# Patient Record
Sex: FEMALE | Race: WHITE | NOT HISPANIC OR LATINO | Employment: FULL TIME | URBAN - METROPOLITAN AREA
[De-identification: names, ages, dates, MRNs, and addresses within clinical notes are randomized per-mention and may not be internally consistent; named-entity substitution may affect disease eponyms.]

---

## 2017-01-11 ENCOUNTER — OFFICE VISIT (OUTPATIENT)
Dept: PODIATRY | Facility: CLINIC | Age: 47
End: 2017-01-11
Payer: COMMERCIAL

## 2017-01-11 ENCOUNTER — DOCUMENTATION ONLY (OUTPATIENT)
Dept: PODIATRY | Facility: CLINIC | Age: 47
End: 2017-01-11

## 2017-01-11 ENCOUNTER — TELEPHONE (OUTPATIENT)
Dept: PODIATRY | Facility: CLINIC | Age: 47
End: 2017-01-11

## 2017-01-11 VITALS
SYSTOLIC BLOOD PRESSURE: 114 MMHG | WEIGHT: 167 LBS | BODY MASS INDEX: 25.31 KG/M2 | HEIGHT: 68 IN | DIASTOLIC BLOOD PRESSURE: 66 MMHG

## 2017-01-11 DIAGNOSIS — S82.891D ANKLE FRACTURE, RIGHT, CLOSED, WITH ROUTINE HEALING, SUBSEQUENT ENCOUNTER: Primary | ICD-10-CM

## 2017-01-11 PROCEDURE — 99213 OFFICE O/P EST LOW 20 MIN: CPT | Performed by: PODIATRIST

## 2017-01-11 NOTE — TELEPHONE ENCOUNTER
Received a form from patient to fill out for her work. Dr. Solorzano filled out the form and I mailed it to the address as it instructed me to. Copy for form sent to be scanned.    ELIE Tyson MA January 11, 2017 5:37 PM

## 2017-01-11 NOTE — PROGRESS NOTES
"PATIENT HISTORY:  Nicole Fritsche is a 46 year old female who presents to clinic for recheck of her right ankle fracture, ligament injury.  DOI 11/12/16.  She fell helping her  put up a tree stand and twisted her ankle.  She is WB in a tall Aircast.  Doing better.  Minimal pain.  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  Reports some zinging pain down leg at times, but overall doing well.  Smoker.  Nondiabetic.  Family hx of DM.  She has not started PT.     EXAM:/66 mmHg  Ht 5' 8\" (1.727 m)  Wt 167 lb (75.751 kg)  BMI 25.40 kg/m2    General appearance: Patient is alert and fully cooperative with history & exam.  No sign of distress is noted during the visit.     Dermatologic: Skin is intact to RLE.  No paronychia or evidence of soft tissue infection is noted.     Vascular: DP & PT pulses are intact & regular on the right.  Edema of ankle is mild. CFT and skin temperature are normal.     Neurologic: Lower extremity sensation is intact to light touch.      Musculoskeletal:   Some diffuse right ankle discomfort on exam, but I could not elicit pain with palpation of the fracture site, lateral ankle ligaments, medial ankle ligaments, syndesmosis.  No significant calf pain/swelling noted.    XRs of right ankle deferred today; prior images with bone healing noted.     ASSESSMENT: Complex right ankle ligamentous injury with fracture of posterior malleolus, improving     PLAN:  Reviewed patient's chart in epic.  Discussed condition and treatment options including pros and cons.    Continue protected wt bearing in the boot as tolerated.  RICE as needed.  Advised she start PT.    Pt will return to work next week for 4 hr shifts in boot.    F/u in 2 wks.  Call with concerns.      Andrés Solorzano, BRYCE, FACFAS      "

## 2017-01-11 NOTE — MR AVS SNAPSHOT
After Visit Summary   1/11/2017    Nicole Fritsche    MRN: [id number]           Patient Information     Date Of Birth          1970        Visit Information        Provider Department      1/11/2017 3:20 PM Andrés Solorzano DPM [facility]        Today's Diagnoses     Ankle fracture, right, closed, with routine healing, subsequent encounter    -  1       Care Instructions    Continue weightbearing as tolerated in Aircast boot  Begin physical therapy  Follow up in 2 weeks      PRICE Therapy    Many aches and pains throughout the foot and ankle can be helped with many simple treatments.  This is usually described as PRICE Therapy.      P - Protection - often times, inflammation/pain in the lower extremity is not able to improve simply because the areas involved are never allowed to rest.  Every step we take can bother the problematic area.  Protecting those areas is an important step in the healing process.  This may involve a walking cast boot, a special insert/orthotic device, an ankle brace, or simply avoiding barefoot walking.    R - Rest - in addition to protecting the foot/ankle, resting is an important, but often times difficult, treatment option.  Getting off your feet when they bother you, and specifically avoiding activities that cause pain/discomfort, are very beneficial to prevent, and treat, foot/ankle pain.      I - Ice - icing regularly can help to decrease inflammation and swelling in the foot, thus decreasing pain.  Using an ice pack or a bag of frozen peas works very well.  Ice for 20 minutes multiple times per day as needed.  Do not place the ice directly on the skin as this can cause tissue damage.    C - Compression - using a compression wrap or an ACE wrap can help to decrease swelling, which can help to decrease pain.  Wearing the wraps is generally not needed at night, but they should be worn on a regular basis when you are going to be on your feet for  prolonged periods as gravity tends to pull fluids down to your feet/ankles.    E - Elevation - elevating your lower extremities multiple times daily for 15-20 minutes can help to decrease swelling, which works well in decreasing pain levels.      NSAID/Tylenol - An anti-inflammatory, like Aleve or ibuprofen, and/or a pain medication, such as Tylenol, can help to improve pain levels and get the issue resolved sooner rather than later.  Anyone with liver issues should be careful with Tylenol, and anyone with high blood pressure or heart, stomach or kidney issues should be careful with anti-inflammatories.  Please ask if you have questions about these medications, including dosage.            Follow-ups after your visit        Who to contact     If you have questions or need follow up information about today's clinic visit or your schedule please contact [facility] directly at [phone].  Normal or non-critical lab and imaging results will be communicated to you by MyChart, letter or phone within 4 business days after the clinic has received the results. If you do not hear from us within 7 days, please contact the clinic through [employer] or phone. If you have a critical or abnormal lab result, we will notify you by phone as soon as possible.  Submit refill requests through [employer] or call your pharmacy and they will forward the refill request to us. Please allow 3 business days for your refill to be completed.          Additional Information About Your Visit        MyChart Information     [employer] gives you secure access to your electronic health record. If you see a primary care provider, you can also send messages to your care team and make appointments. If you have questions, please call your primary care clinic.  If you do not have a primary care provider, please call [phone] and they will assist you.        Care EveryWhere ID     This is your Care EveryWhere ID. This could be used by other  "organizations to access your [facility] medical records  [id number]        Your Vitals Were     Height BMI (Body Mass Index)                5' 8\" (1.727 m) 25.40 kg/m2           Blood Pressure from Last 3 Encounters:   01/11/17 114/66   12/28/16 115/59   11/28/16 110/67    Weight from Last 3 Encounters:   01/11/17 167 lb (75.751 kg)   12/28/16 167 lb (75.751 kg)   11/28/16 167 lb (75.751 kg)              Today, you had the following     No orders found for display       Primary Care Provider Office Phone # Fax #    Ansley White PA-C [phone]       [address] [address]        Thank you!     Thank you for choosing [facility]  for your care. Our goal is always to provide you with excellent care. Hearing back from our patients is one way we can continue to improve our services. Please take a few minutes to complete the written survey that you may receive in the mail after your visit with us. Thank you!             Your Updated Medication List - Protect others around you: Learn how to safely use, store and throw away your medicines at www.disposemymeds.org.          This list is accurate as of: 1/11/17  3:56 PM.  Always use your most recent med list.                   Brand Name Dispense Instructions for use    acetaminophen-codeine 300-30 MG per tablet    TYLENOL #3    18 tablet    Take 1-2 tablets by mouth nightly as needed for pain maximum 4 tablet(s) per day       albuterol 108 (90 BASE) MCG/ACT Inhaler    albuterol    1 Inhaler    Inhale 1-2 puffs into the lungs every 6 hours as needed for shortness of breath / dyspnea or wheezing       fluticasone 110 MCG/ACT Inhaler    FLOVENT HFA    3 Inhaler    Inhale 2 puffs into the lungs 2 times daily       fluticasone 50 MCG/ACT spray    FLONASE    1 Package    Spray 1-2 sprays into both nostrils daily       HYDROcodone-acetaminophen 5-325 MG per tablet    NORCO    20 tablet    " Take 1-2 tablets by mouth nightly as needed for moderate to severe pain       IRON SUPPLEMENT PO      Take 325 mg by mouth 2 times daily (with meals)       MULTIVITAMINS PO      Take  by mouth daily.       * order for DME     1 Device    rollabout Length of use: Three months       * order for DME     1 Device    Equipment being ordered: Tall aircast boot size LG       oxyCODONE-acetaminophen 5-325 MG per tablet    PERCOCET    40 tablet    Take 1-2 tablets by mouth every 6 hours as needed for moderate to severe pain       * Notice:  This list has 2 medication(s) that are the same as other medications prescribed for you. Read the directions carefully, and ask your doctor or other care provider to review them with you.

## 2017-01-11 NOTE — PATIENT INSTRUCTIONS
Continue weightbearing as tolerated in Aircast boot  Begin physical therapy  Follow up in 2 weeks      PRICE Therapy    Many aches and pains throughout the foot and ankle can be helped with many simple treatments.  This is usually described as PRICE Therapy.      P - Protection - often times, inflammation/pain in the lower extremity is not able to improve simply because the areas involved are never allowed to rest.  Every step we take can bother the problematic area.  Protecting those areas is an important step in the healing process.  This may involve a walking cast boot, a special insert/orthotic device, an ankle brace, or simply avoiding barefoot walking.    R - Rest - in addition to protecting the foot/ankle, resting is an important, but often times difficult, treatment option.  Getting off your feet when they bother you, and specifically avoiding activities that cause pain/discomfort, are very beneficial to prevent, and treat, foot/ankle pain.      I - Ice - icing regularly can help to decrease inflammation and swelling in the foot, thus decreasing pain.  Using an ice pack or a bag of frozen peas works very well.  Ice for 20 minutes multiple times per day as needed.  Do not place the ice directly on the skin as this can cause tissue damage.    C - Compression - using a compression wrap or an ACE wrap can help to decrease swelling, which can help to decrease pain.  Wearing the wraps is generally not needed at night, but they should be worn on a regular basis when you are going to be on your feet for prolonged periods as gravity tends to pull fluids down to your feet/ankles.    E - Elevation - elevating your lower extremities multiple times daily for 15-20 minutes can help to decrease swelling, which works well in decreasing pain levels.      NSAID/Tylenol - An anti-inflammatory, like Aleve or ibuprofen, and/or a pain medication, such as Tylenol, can help to improve pain levels and get the issue resolved sooner  rather than later.  Anyone with liver issues should be careful with Tylenol, and anyone with high blood pressure or heart, stomach or kidney issues should be careful with anti-inflammatories.  Please ask if you have questions about these medications, including dosage.

## 2017-01-11 NOTE — Clinical Note
[facility]  [address] [place] [address]  Phone: [phone]    January 11, 2017        Nicole Fritsche  [address]  [facility] [address]          To whom it may concern:    RE: Nicole Fritsche    Patient may return to work 1/12/17 with the following:  No more than 4 hours of work per day, weightbearing as tolerated in Aircast boot.  Restrictions will be reassessed at her next follow up in 2 weeks.    Please contact me for questions or concerns.      Sincerely,        Andrés Solorzano DPM

## 2017-01-11 NOTE — NURSING NOTE
"Chief Complaint   Patient presents with     RECHECK     2 week follow up R foot Fx       Initial /66 mmHg  Ht 5' 8\" (1.727 m)  Wt 167 lb (75.751 kg)  BMI 25.40 kg/m2 Estimated body mass index is 25.4 kg/(m^2) as calculated from the following:    Height as of this encounter: 5' 8\" (1.727 m).    Weight as of this encounter: 167 lb (75.751 kg).    ELIE Tyson MA January 11, 2017 3:38 PM      "

## 2017-01-11 NOTE — PROGRESS NOTES
Weight management plan: Patient was referred to their PCP to discuss a diet and exercise plan.   ELIE Tyson MA January 11, 2017 3:39 PM

## 2017-01-11 NOTE — Clinical Note
[facility]  [address] [place] [address]  Phone: [phone]    January 11, 2017        Nicole Fritsche  [address]  [facility] [address]          To whom it may concern:    RE: Nicole Fritsche    Patient may return to work 1/16/17 with the following:  No more than 4 hours of work per day, weightbearing as tolerated in Aircast boot.  Restrictions will be reassessed at her next follow up in 2 weeks.    Please contact me for questions or concerns.      Sincerely,        Andrés Solorzano DPM

## 2017-01-17 ENCOUNTER — THERAPY VISIT (OUTPATIENT)
Dept: PHYSICAL THERAPY | Facility: CLINIC | Age: 47
End: 2017-01-17
Payer: COMMERCIAL

## 2017-01-17 DIAGNOSIS — M25.571 ANKLE PAIN, RIGHT: Primary | ICD-10-CM

## 2017-01-17 PROCEDURE — 97110 THERAPEUTIC EXERCISES: CPT | Mod: GP | Performed by: PHYSICAL THERAPIST

## 2017-01-17 PROCEDURE — 97161 PT EVAL LOW COMPLEX 20 MIN: CPT | Mod: GP | Performed by: PHYSICAL THERAPIST

## 2017-01-17 PROCEDURE — 97530 THERAPEUTIC ACTIVITIES: CPT | Mod: GP | Performed by: PHYSICAL THERAPIST

## 2017-01-17 NOTE — PROGRESS NOTES
Physical Therapy Initial Evaluation   1/17/17   Precautions/Restrictions/MD instructions:    Therapist Impression:   Pt is a 47 y/o female. Pt presents with R ankle pain and strength/ROM deficits secondary to ankle fracture. These impairments limit their ability to ambulate and negotiate stairs. Skilled PT services necessary in order to reduce impairments and improve independent function.    Subjective:   Chief Complaint: R ankle fracture secondary to fall   DOI/onset: 11/12/16 DOS:   Location: R ankle Quality: throbbing  Frequency: activity dependent  Radiates: localized at ankle joint   Pain scale: 6-7/10  Time of day: n/a  Sleeping: wakes patient up at night   Exacerbated by: movement Relieved by: tylenol Progression: improving  Previous Treatment: n/a Effect of prior treatment: n/a   PMH and/or surgical history:    Imaging:     Occupation:  Job duties:    Current HEP/exercise regimen:  Patient's goals:   Medications:   General health as reported by patient:   Return to MD:     ANKLE EVALUATION    Gait: Antalgic gait on R. Ambulating with aircast walking boot     Inspection: Bruising along heel, moderate swelling along medial aspect of foot    Palpation: TTP anterior aspect of ankle along TCJ, hypersensitivity to touch      ROM:   DF PF Inv  Ev   Left 10 70 60 30   Right -20 50 Grossly limited secondary to pain and guarding  Grossly limited secondary to pain and guarding      Strength: Unable to assess secondary to pain and guarding    Foot Intrinsics:   Right: mild activation of foot intrinsics       Subjective:    HPI                    Objective:    System    Physical Exam    General     ROS    Assessment/Plan:      Patient is a 46 year old female with right side ankle complaints.    Patient has the following significant findings with corresponding treatment plan.                Diagnosis 1:  R ankle pain  Pain -  electric stimulation, splint/taping/bracing/orthotics, self management, education and home  program  Decreased ROM/flexibility - manual therapy and therapeutic exercise  Decreased joint mobility - manual therapy and therapeutic exercise  Decreased strength - therapeutic exercise and therapeutic activities  Decreased proprioception - neuro re-education and therapeutic activities  Impaired gait - gait training  Impaired muscle performance - neuro re-education  Decreased function - therapeutic activities    Therapy Evaluation Codes:   1) History comprised of:   Personal factors that impact the plan of care:      Fear avoidance behaviour .    Comorbidity factors that impact the plan of care are:      Asthma, Pain at night/rest, Smoking and Anemia.     Medications impacting care: Pain.  2) Examination of Body Systems comprised of:   Body structures and functions that impact the plan of care:      Ankle.   Activity limitations that impact the plan of care are:      Driving, Squatting/kneeling, Stairs and Walking.  3) Clinical presentation characteristics are:   Stable/Uncomplicated.  4) Decision-Making    Low complexity using standardized patient assessment instrument and/or measureable assessment of functional outcome.  Cumulative Therapy Evaluation is: Low complexity.    Previous and current functional limitations:  (See Goal Flow Sheet for this information)    Short term and Long term goals: (See Goal Flow Sheet for this information)     Communication ability:  Patient appears to be able to clearly communicate and understand verbal and written communication and follow directions correctly.  Treatment Explanation - The following has been discussed with the patient:   RX ordered/plan of care  Anticipated outcomes  Possible risks and side effects  This patient would benefit from PT intervention to resume normal activities.   Rehab potential is good.    Frequency:  1 X week, once daily  Duration:  for 8 weeks  Discharge Plan:  Achieve all LTG.  Independent in home treatment program.  Reach maximal therapeutic  benefit.    Please refer to the daily flowsheet for treatment today, total treatment time and time spent performing 1:1 timed codes.

## 2017-01-19 PROBLEM — M25.571 ANKLE PAIN, RIGHT: Status: ACTIVE | Noted: 2017-01-19

## 2017-01-25 ENCOUNTER — RADIANT APPOINTMENT (OUTPATIENT)
Dept: GENERAL RADIOLOGY | Facility: CLINIC | Age: 47
End: 2017-01-25
Attending: PODIATRIST
Payer: COMMERCIAL

## 2017-01-25 ENCOUNTER — OFFICE VISIT (OUTPATIENT)
Dept: PODIATRY | Facility: CLINIC | Age: 47
End: 2017-01-25
Payer: COMMERCIAL

## 2017-01-25 VITALS
SYSTOLIC BLOOD PRESSURE: 108 MMHG | DIASTOLIC BLOOD PRESSURE: 71 MMHG | WEIGHT: 168 LBS | BODY MASS INDEX: 25.46 KG/M2 | HEIGHT: 68 IN

## 2017-01-25 DIAGNOSIS — S82.891D ANKLE FRACTURE, RIGHT, CLOSED, WITH ROUTINE HEALING, SUBSEQUENT ENCOUNTER: Primary | ICD-10-CM

## 2017-01-25 DIAGNOSIS — S82.891D ANKLE FRACTURE, RIGHT, CLOSED, WITH ROUTINE HEALING, SUBSEQUENT ENCOUNTER: ICD-10-CM

## 2017-01-25 PROCEDURE — 99213 OFFICE O/P EST LOW 20 MIN: CPT | Performed by: PODIATRIST

## 2017-01-25 PROCEDURE — 73610 X-RAY EXAM OF ANKLE: CPT | Mod: RT

## 2017-01-25 NOTE — MR AVS SNAPSHOT
After Visit Summary   1/25/2017    Nicole Fritsche    MRN: [id number]           Patient Information     Date Of Birth          1970        Visit Information        Provider Department      1/25/2017 4:00 PM Andrés Solorzano, BRYCE [facility]        Today's Diagnoses     Ankle fracture, right, closed, with routine healing, subsequent encounter    -  1       Care Instructions    Transition to a stiff soled shoe as tolerated.  Continue Physical Therapy.  Follow up as needed.  Dr. Solorzano's Clinic Locations    Monday Tuesday  [facility]  [address]         [id number]  [place] [address]      [place] [address]  Ph:  [phone]      Ph: [phone]  Fax: [phone]      Fax: [phone]    Wednesday Thursday - Surgery Day  [facility]     Call Vy @ [phone]  [address] [place] [address]  Ph: [phone]  Fax: [phone]        PRICE Therapy    Many aches and pains throughout the foot and ankle can be helped with many simple treatments.  This is usually described as PRICE Therapy.      P - Protection - often times, inflammation/pain in the lower extremity is not able to improve simply because the areas involved are never allowed to rest.  Every step we take can bother the problematic area.  Protecting those areas is an important step in the healing process.  This may involve a walking cast boot, a special insert/orthotic device, an ankle brace, or simply avoiding barefoot walking.    R - Rest - in addition to protecting the foot/ankle, resting is an important, but often times difficult, treatment option.  Getting off your feet when they bother you, and specifically avoiding activities that cause pain/discomfort, are very beneficial to prevent, and treat, foot/ankle pain.      I - Ice - icing regularly can help to decrease inflammation and swelling in  the foot, thus decreasing pain.  Using an ice pack or a bag of frozen peas works very well.  Ice for 20 minutes multiple times per day as needed.  Do not place the ice directly on the skin as this can cause tissue damage.    C - Compression - using a compression wrap or an ACE wrap can help to decrease swelling, which can help to decrease pain.  Wearing the wraps is generally not needed at night, but they should be worn on a regular basis when you are going to be on your feet for prolonged periods as gravity tends to pull fluids down to your feet/ankles.    E - Elevation - elevating your lower extremities multiple times daily for 15-20 minutes can help to decrease swelling, which works well in decreasing pain levels.      NSAID/Tylenol - An anti-inflammatory, like Aleve or ibuprofen, and/or a pain medication, such as Tylenol, can help to improve pain levels and get the issue resolved sooner rather than later.  Anyone with liver issues should be careful with Tylenol, and anyone with high blood pressure or heart, stomach or kidney issues should be careful with anti-inflammatories.  Please ask if you have questions about these medications, including dosage.        Smoking Cessation    What's in cigarette smoke? - Cigarette smoke contains over 4,000 chemicals. Nicotine is one of the main ingredients which is an insecticide/herbicide. It is poisonous to our nervous system, increases blood clotting risk, and decreases the body's defenses to fight off infection. Another chemical is Carbon Monoxide is an asphyxiating gas that permanently binds to blood cells and blocks the transport of oxygen. This leads to tissue death and decreases your metabolism. Tar is a chemical that coats your lungs and trachea which impairs new oxygen coming in and carbon dioxide getting out of your body.   How does smoking impact surgery? - Smoking is particularly hazardous with regards to surgery. Surgery puts stress on the body and a smoker's  body is already under strain from these chemicals. Putting the two together, especially for an elective surgery, could be a recipe for disaster. Smoking before and after surgery increases your risk of heart problems, slow wound healing, delayed bone healing, blood clots, wound infection and anesthesia complications.   What are the benefits of quitting? - In 20 minutes your blood pressure will drop back down to normal. In 8 hours the carbon monoxide (a toxic gas) levels in your blood stream will drop by half, and oxygen levels will return to normal. In 48 hours your chance of having a heart attack will have decreased. All nicotine will have left your body. Your sense of taste and smell will return to a normal level. In 72 hours your bronchial tubes will relax, and your energy levels will increase. In 2 weeks your circulation will increase, and it will continue to improve for the next 10 weeks.    Recommendations for elective surgery - Ideally, patients should quit smoking 8 weeks before and at least 2 weeks after elective surgery in order to avoid complications. Simply cutting back on the amount of cigarettes smoked per day does not offer any benefit or decrease the risk of poor wound healing, heart problems, and infection. Smokers should also start taking Vitamin C and B for two weeks before surgery and two weeks after surgery.    Ways to Stop Smokin. Nicotine patches, lozenges, or gum  2. Support Groups  3. Medications (see below)    List of Medications:  1. Varenicline Tartrate (CHANTIX)   2. Bupropion HCL (WELLBUTRIN, ZYBAN) - note: make sure Wellbutrin is for smoking cessation and not other issues   3. Nicotine Patch (NICODERM)   4. Nicotine Inhaler (NICOTROL)   5. Nicotine Gum Nicotine Polacrilex   6. Nicotine Lozenge: Nicotine Polacrilex (COMMIT)   * [place] offers a smoking support group as well!  Please visit: [email or web address]  If you are interested in these, ask about getting a  "prescription or talk to your primary care doctor about what may be the best way for you to quit.               Follow-ups after your visit        Your next 10 appointments already scheduled     Jan 26, 2017  4:40 PM   MONICA Extremity with Mylene Rush PT   [facility] ([facility]  )    [address] [place] [address] [phone]              Who to contact     If you have questions or need follow up information about today's clinic visit or your schedule please contact [facility] directly at [phone].  Normal or non-critical lab and imaging results will be communicated to you by MyChart, letter or phone within 4 business days after the clinic has received the results. If you do not hear from us within 7 days, please contact the clinic through [employer] or phone. If you have a critical or abnormal lab result, we will notify you by phone as soon as possible.  Submit refill requests through [employer] or call your pharmacy and they will forward the refill request to us. Please allow 3 business days for your refill to be completed.          Additional Information About Your Visit        MyChart Information     [employer] gives you secure access to your electronic health record. If you see a primary care provider, you can also send messages to your care team and make appointments. If you have questions, please call your primary care clinic.  If you do not have a primary care provider, please call [phone] and they will assist you.        Care EveryWhere ID     This is your Care EveryWhere ID. This could be used by other organizations to access your [place] medical records  [id number]        Your Vitals Were     Height BMI (Body Mass Index)                5' 8\" (1.727 m) 25.55 kg/m2           Blood Pressure from Last 3 Encounters:   01/25/17 108/71   01/11/17 114/66   12/28/16 115/59    Weight from Last 3 Encounters:   01/25/17 " 168 lb (76.204 kg)   01/11/17 167 lb (75.751 kg)   12/28/16 167 lb (75.751 kg)               Primary Care Provider Office Phone # Fax #    Ansley [place] PA-C [phone]       [facility] [address] [facility] [address]        Thank you!     Thank you for choosing [facility]  for your care. Our goal is always to provide you with excellent care. Hearing back from our patients is one way we can continue to improve our services. Please take a few minutes to complete the written survey that you may receive in the mail after your visit with us. Thank you!             Your Updated Medication List - Protect others around you: Learn how to safely use, store and throw away your medicines at www.disposemymeds.org.          This list is accurate as of: 1/25/17  4:04 PM.  Always use your most recent med list.                   Brand Name Dispense Instructions for use    acetaminophen-codeine 300-30 MG per tablet    TYLENOL #3    18 tablet    Take 1-2 tablets by mouth nightly as needed for pain maximum 4 tablet(s) per day       albuterol 108 (90 BASE) MCG/ACT Inhaler    albuterol    1 Inhaler    Inhale 1-2 puffs into the lungs every 6 hours as needed for shortness of breath / dyspnea or wheezing       fluticasone 110 MCG/ACT Inhaler    FLOVENT HFA    3 Inhaler    Inhale 2 puffs into the lungs 2 times daily       fluticasone 50 MCG/ACT spray    FLONASE    1 Package    Spray 1-2 sprays into both nostrils daily       HYDROcodone-acetaminophen 5-325 MG per tablet    NORCO    20 tablet    Take 1-2 tablets by mouth nightly as needed for moderate to severe pain       IRON SUPPLEMENT PO      Take 325 mg by mouth 2 times daily (with meals)       MULTIVITAMINS PO      Take  by mouth daily.       * order for DME     1 Device    rollabout Length of use: Three months       * order for DME     1 Device    Equipment being ordered: Tall aircast boot size LG        oxyCODONE-acetaminophen 5-325 MG per tablet    PERCOCET    40 tablet    Take 1-2 tablets by mouth every 6 hours as needed for moderate to severe pain       * Notice:  This list has 2 medication(s) that are the same as other medications prescribed for you. Read the directions carefully, and ask your doctor or other care provider to review them with you.

## 2017-01-25 NOTE — PROGRESS NOTES
Weight management plan: Patient was referred to their PCP to discuss a diet and exercise plan.   ELIE Tyson MA January 25, 2017 3:51 PM

## 2017-01-25 NOTE — NURSING NOTE
"Chief Complaint   Patient presents with     Fracture     2 week follow up R foot fracture       Initial /71 mmHg  Ht 5' 8\" (1.727 m)  Wt 168 lb (76.204 kg)  BMI 25.55 kg/m2 Estimated body mass index is 25.55 kg/(m^2) as calculated from the following:    Height as of this encounter: 5' 8\" (1.727 m).    Weight as of this encounter: 168 lb (76.204 kg).    ELIE Tyson MA January 25, 2017 3:50 PM      "

## 2017-01-25 NOTE — PATIENT INSTRUCTIONS
Transition to a stiff soled shoe as tolerated.  Continue Physical Therapy.  Follow up as needed.  Dr. Solorzano's Clinic Locations    Monday Tuesday  [facility]  [address]         [id number]  [place] [address]      [place] [address]  Ph:  [phone]      Ph: [phone]  Fax: [phone]      Fax: [phone]    Wednesday Thursday - Surgery Day  [facility]     Call Vy @ [phone]  [address] [place], [place] [address] [address]  Ph: [phone]  Fax: [phone]        PRICE Therapy    Many aches and pains throughout the foot and ankle can be helped with many simple treatments.  This is usually described as PRICE Therapy.      P - Protection - often times, inflammation/pain in the lower extremity is not able to improve simply because the areas involved are never allowed to rest.  Every step we take can bother the problematic area.  Protecting those areas is an important step in the healing process.  This may involve a walking cast boot, a special insert/orthotic device, an ankle brace, or simply avoiding barefoot walking.    R - Rest - in addition to protecting the foot/ankle, resting is an important, but often times difficult, treatment option.  Getting off your feet when they bother you, and specifically avoiding activities that cause pain/discomfort, are very beneficial to prevent, and treat, foot/ankle pain.      I - Ice - icing regularly can help to decrease inflammation and swelling in the foot, thus decreasing pain.  Using an ice pack or a bag of frozen peas works very well.  Ice for 20 minutes multiple times per day as needed.  Do not place the ice directly on the skin as this can cause tissue damage.    C - Compression - using a compression wrap or an ACE wrap can help to decrease swelling, which can help to decrease pain.  Wearing the wraps is generally not needed at night, but they should be  worn on a regular basis when you are going to be on your feet for prolonged periods as gravity tends to pull fluids down to your feet/ankles.    E - Elevation - elevating your lower extremities multiple times daily for 15-20 minutes can help to decrease swelling, which works well in decreasing pain levels.      NSAID/Tylenol - An anti-inflammatory, like Aleve or ibuprofen, and/or a pain medication, such as Tylenol, can help to improve pain levels and get the issue resolved sooner rather than later.  Anyone with liver issues should be careful with Tylenol, and anyone with high blood pressure or heart, stomach or kidney issues should be careful with anti-inflammatories.  Please ask if you have questions about these medications, including dosage.        Smoking Cessation    What's in cigarette smoke? - Cigarette smoke contains over 4,000 chemicals. Nicotine is one of the main ingredients which is an insecticide/herbicide. It is poisonous to our nervous system, increases blood clotting risk, and decreases the body's defenses to fight off infection. Another chemical is Carbon Monoxide is an asphyxiating gas that permanently binds to blood cells and blocks the transport of oxygen. This leads to tissue death and decreases your metabolism. Tar is a chemical that coats your lungs and trachea which impairs new oxygen coming in and carbon dioxide getting out of your body.   How does smoking impact surgery? - Smoking is particularly hazardous with regards to surgery. Surgery puts stress on the body and a smoker's body is already under strain from these chemicals. Putting the two together, especially for an elective surgery, could be a recipe for disaster. Smoking before and after surgery increases your risk of heart problems, slow wound healing, delayed bone healing, blood clots, wound infection and anesthesia complications.   What are the benefits of quitting? - In 20 minutes your blood pressure will drop back down to normal.  In 8 hours the carbon monoxide (a toxic gas) levels in your blood stream will drop by half, and oxygen levels will return to normal. In 48 hours your chance of having a heart attack will have decreased. All nicotine will have left your body. Your sense of taste and smell will return to a normal level. In 72 hours your bronchial tubes will relax, and your energy levels will increase. In 2 weeks your circulation will increase, and it will continue to improve for the next 10 weeks.    Recommendations for elective surgery - Ideally, patients should quit smoking 8 weeks before and at least 2 weeks after elective surgery in order to avoid complications. Simply cutting back on the amount of cigarettes smoked per day does not offer any benefit or decrease the risk of poor wound healing, heart problems, and infection. Smokers should also start taking Vitamin C and B for two weeks before surgery and two weeks after surgery.    Ways to Stop Smokin. Nicotine patches, lozenges, or gum  2. Support Groups  3. Medications (see below)    List of Medications:  1. Varenicline Tartrate (CHANTIX)   2. Bupropion HCL (WELLBUTRIN, ZYBAN)   note: make sure Wellbutrin is for smoking cessation and not other issues   3. Nicotine Patch (NICODERM)   4. Nicotine Inhaler (NICOTROL)   5. Nicotine Gum Nicotine Polacrilex   6. Nicotine Lozenge: Nicotine Polacrilex (COMMIT)   * [facility] offers a smoking support group as well!  Please visit: [email or web address]  If you are interested in these, ask about getting a prescription or talk to your primary care doctor about what may be the best way for you to quit.

## 2017-01-25 NOTE — PROGRESS NOTES
"PATIENT HISTORY:  Nicole Fritsche is a 46 year old female who presents to clinic for recheck of her right ankle fracture, ligament injury.  DOI 11/12/16.  She fell helping her  put up a tree stand and twisted her ankle.  She is WB in a tall Aircast.  Doing better.  Denies pain in the boot.  She just started PT.  Some ankle pain at night.  Smoker.  Nondiabetic.  Family hx of DM.       EXAM:  /71 mmHg  Ht 1.727 m (5' 8\")  Wt 76.204 kg (168 lb)  BMI 25.55 kg/m2    General appearance: Patient is alert and fully cooperative with history & exam.  No sign of distress is noted during the visit.     Dermatologic: Skin is intact to RLE.  No paronychia or evidence of soft tissue infection is noted.     Vascular: DP & PT pulses are intact & regular on the right.  Edema of ankle is mild. CFT and skin temperature are normal.     Neurologic: Lower extremity sensation is intact to light touch.      Musculoskeletal:   Some diffuse right ankle discomfort on exam, but I could not elicit pain with palpation of the fracture site, lateral ankle ligaments, medial ankle ligaments, syndesmosis.  Pt is gaurded.  No significant calf pain/swelling noted.    XRs of right ankle reviewed with pt.  WB views.  Fx appears healed.  Mortise congruent w/o widening.     ASSESSMENT: Complex right ankle ligamentous injury with fracture of posterior malleolus     PLAN:  Reviewed patient's chart in epic.  Discussed condition and treatment options including pros and cons.    Pt may transition out of the boot into supportive shoes as tolerated.  RICE as needed.      Continue PT.  Stressed importance of rehab.      Continue current work restrictions.    Starting 1/30/17 she can work for 6 hr shifts in boot as needed for 2 weeks, followed by full time w/o restrictions as tolerated.    F/u with me as needed with any concerns.        Andrés Solorzano, BRYCE, FACFAS      "

## 2017-01-25 NOTE — Clinical Note
[facility]  [address] [place] [address]  Phone: [phone]    January 25, 2017        Nicole Fritsche  [address]   [facility] [address]          To whom it may concern:    RE: Nicole Fritsche    Patient was seen and treated today at our clinic.    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  After that time she may return to full time without restrictions.    Please contact me for questions or concerns.      Sincerely,        Andrés Solorzano DPM

## 2017-01-25 NOTE — Clinical Note
[facility]  [address] [place] [address]  Phone: [phone]    January 25, 2017        Nicole Fritsche  [address] [facility]   [facility] [address]          To whom it may concern:    RE: Nicole Fritsche    Patient was seen and treated today at our clinic.    Continue current work restrictions until 1/30/17.  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  After that time she may return to full time without restrictions.    Please contact me for questions or concerns.      Sincerely,        Andrés Solorzano DPM

## 2017-02-03 ENCOUNTER — THERAPY VISIT (OUTPATIENT)
Dept: PHYSICAL THERAPY | Facility: CLINIC | Age: 47
End: 2017-02-03
Payer: COMMERCIAL

## 2017-02-03 DIAGNOSIS — M25.571 ANKLE PAIN, RIGHT: Primary | ICD-10-CM

## 2017-02-03 PROCEDURE — 97530 THERAPEUTIC ACTIVITIES: CPT | Mod: GP | Performed by: PHYSICAL THERAPIST

## 2017-02-03 PROCEDURE — 97110 THERAPEUTIC EXERCISES: CPT | Mod: GP | Performed by: PHYSICAL THERAPIST

## 2017-02-09 ENCOUNTER — THERAPY VISIT (OUTPATIENT)
Dept: PHYSICAL THERAPY | Facility: CLINIC | Age: 47
End: 2017-02-09
Payer: COMMERCIAL

## 2017-02-09 DIAGNOSIS — M25.571 ANKLE PAIN, RIGHT: Primary | ICD-10-CM

## 2017-02-09 PROCEDURE — 97110 THERAPEUTIC EXERCISES: CPT | Mod: GP | Performed by: PHYSICAL THERAPIST

## 2017-02-09 PROCEDURE — 97035 APP MDLTY 1+ULTRASOUND EA 15: CPT | Mod: GP | Performed by: PHYSICAL THERAPIST

## 2017-02-09 PROCEDURE — 97112 NEUROMUSCULAR REEDUCATION: CPT | Mod: GP | Performed by: PHYSICAL THERAPIST

## 2017-02-13 ENCOUNTER — OFFICE VISIT (OUTPATIENT)
Dept: FAMILY MEDICINE | Facility: CLINIC | Age: 47
End: 2017-02-13
Payer: COMMERCIAL

## 2017-02-13 VITALS
HEART RATE: 68 BPM | BODY MASS INDEX: 25.16 KG/M2 | SYSTOLIC BLOOD PRESSURE: 112 MMHG | HEIGHT: 68 IN | WEIGHT: 166 LBS | DIASTOLIC BLOOD PRESSURE: 78 MMHG | TEMPERATURE: 98.6 F

## 2017-02-13 DIAGNOSIS — Z71.6 ENCOUNTER FOR TOBACCO USE CESSATION COUNSELING: ICD-10-CM

## 2017-02-13 DIAGNOSIS — R53.83 OTHER FATIGUE: ICD-10-CM

## 2017-02-13 DIAGNOSIS — Z72.0 TOBACCO ABUSE: Primary | ICD-10-CM

## 2017-02-13 LAB — HGB BLD-MCNC: 14.2 G/DL (ref 11.7–15.7)

## 2017-02-13 PROCEDURE — 85018 HEMOGLOBIN: CPT | Performed by: PHYSICIAN ASSISTANT

## 2017-02-13 PROCEDURE — 84439 ASSAY OF FREE THYROXINE: CPT | Performed by: PHYSICIAN ASSISTANT

## 2017-02-13 PROCEDURE — 99214 OFFICE O/P EST MOD 30 MIN: CPT | Performed by: PHYSICIAN ASSISTANT

## 2017-02-13 PROCEDURE — 84443 ASSAY THYROID STIM HORMONE: CPT | Performed by: PHYSICIAN ASSISTANT

## 2017-02-13 PROCEDURE — 36415 COLL VENOUS BLD VENIPUNCTURE: CPT | Performed by: PHYSICIAN ASSISTANT

## 2017-02-13 PROCEDURE — 82306 VITAMIN D 25 HYDROXY: CPT | Performed by: PHYSICIAN ASSISTANT

## 2017-02-13 RX ORDER — BUPROPION HYDROCHLORIDE 150 MG/1
TABLET, EXTENDED RELEASE ORAL
Qty: 60 TABLET | Refills: 2 | Status: SHIPPED | OUTPATIENT
Start: 2017-02-13 | End: 2018-02-13

## 2017-02-13 NOTE — PROGRESS NOTES
"  SUBJECTIVE:                                                    Nicole Fritsche is a 46 year old female who presents to clinic today for the following health issues:      Concern - Fatigue     Onset: couple of weeks    Description:   Pt states that she has been feeling tired, weak, blah.  Off and on for several months, but worse over past couple of weeks.  Both fatigue and sleepy.  Not sleeping well-pain in her foot prevents her from getting comfortable.  PT for this right now. Taking APAP and ibuprofen.  Can fall asleep without problem, just wakes up frequently. At times can take up to 30 minutes to fall back asleep. Does snore, but  doesn't have periods of stopping breathing.  Period is really heavy-lasts 6-7 days. Does take iron tablet-is taking this daily.  Vitamins daily.    Intensity: mild    Progression of Symptoms:  same    Accompanying Signs & Symptoms:  none       Previous history of similar problem:   yes    Precipitating factors:   Worsened by: low iron levels    Alleviating factors:  Improved by: none       Therapies Tried and outcome: none    Discuss smoking cessation.  Has tried to quit in the past.  Has been on Chantix, but wants to try something different.  Has been smoking since she was 15, quit x 1 year and during pregnancies.  Smoking 1/2 ppd.      -------------------------------------    Problem list and histories reviewed & adjusted, as indicated.  Additional history: as documented    ROS:  Constitutional, HEENT, cardiovascular, pulmonary, gi and gu systems are negative, except as otherwise noted.    OBJECTIVE:                                                    /78 (Cuff Size: Adult Regular)  Pulse 68  Temp 98.6  F (37  C) (Oral)  Ht 5' 8\" (1.727 m)  Wt 166 lb (75.3 kg)  LMP  (LMP Unknown)  BMI 25.24 kg/m2  Body mass index is 25.24 kg/(m^2).  GENERAL: healthy, alert and no distress  NECK: no adenopathy, no asymmetry, masses, or scars and thyroid normal to palpation  RESP: lungs " clear to auscultation - no rales, rhonchi or wheezes  CV: regular rate and rhythm, normal S1 S2, no S3 or S4, no murmur, click or rub, no peripheral edema and peripheral pulses strong  ABDOMEN: soft, nontender, no hepatosplenomegaly, no masses and bowel sounds normal  MS: no gross musculoskeletal defects noted, no edema  PSYCH: mentation appears normal, affect normal/bright    Diagnostic Test Results:  none      ASSESSMENT/PLAN:                                                    1. Tobacco abuse  2. Encounter for tobacco use cessation counseling  Below medication as directed with full discussion of risks, benefits, and possible adverse effects.  F/u via [employer] in 2-4 weeks.  - Tobacco Cessation - for Health Maintenance  - T4 free  - buPROPion (WELLBUTRIN SR) 150 MG 12 hr tablet; Take 1 tablet once daily and increase to 1 tablet twice daily after 4 to 7 days  Dispense: 60 tablet; Refill: 2    3. Other fatigue  Unclear cause at this time, likely multifactorial.  Further workup with below labs  F/u pending results.  - TSH with free T4 reflex  - Hemoglobin  - Vitamin D Deficiency    [place] PA-C  [facility]

## 2017-02-13 NOTE — MR AVS SNAPSHOT
After Visit Summary   2/13/2017    Nicole Fritsche    MRN: [id number]           Patient Information     Date Of Birth          1970        Visit Information        Provider Department      2/13/2017 3:20 PM Ansley White PA-C [facility]        Today's Diagnoses     Tobacco abuse    -  1    Encounter for tobacco use cessation counseling        Other fatigue          Care Instructions    Ansley or her team will be in touch with you with results.  Start Wellbutrin as directed below.    Send Ansley a [employer] Message in 2-4 weeks with update.    RD England PA-C        Zyban / Wellbutrin    Dosing:    Before Your Quit Date: Dose   Days 1-3 Take 150 mg by mouth once daily in the morning.   Days 4-7 (or up to 4-14 days) Take 150 mg by mouth twice daily in the morning and evening.   After Your Quit Date:    Treatment usually continues 7-12 weeks Take 150 mg by mouth twice daily in the morning and evening.       Some tips:    You will start taking bupropion at least 1 week before quitting smoking. It is okay to smoke while using bupropion.     You can use nicotine replacement therapy such as nicotine gum while using  Bupropion.     Take your 2 daily doses at least 8 hours apart.     DO NOT CRUSH OR BREAK TABLETS. Swallow the tablet whole.     You can take your medication with or without food.     Do not drink alcohol while taking this medication.     Side Effects:    Some people may experience dry mouth and insomnia. These may resolve in time.     Small frequent meals, frequent mouth care, chewing gum, or sucking lozenges may help with dry mouth.     Other possible rare side effects include constipation, nausea, headache, drowsiness, and weight loss may occur.     If you experience any other side effects that are troublesome, or if you feel something is not right, call your health care professional.         HOW TO QUIT SMOKING  Smoking is one of the hardest habits to  break. About half of all those who have ever smoked have been able to quit, and most of those (about 70%) who still smoke want to quit. Here are some of the best ways to stop smoking.     KEEP TRYING:  It takes most smokers about 8 tries before they are finally able to fully quit. So, the more often you try and fail, the better your chance of quitting the next time! So, don't give up!    GO COLD TURKEY:  Most ex-smokers quit cold turkey. Trying to cut back gradually doesn't seem to work as well, perhaps because it continues the smoking habit. Also, it is possible to fool yourself by inhaling more while smoking fewer cigarettes. This results in the same amount of nicotine in your body!    GET SUPPORT:  Support programs can make an important difference, especially for the heavy smoker. These groups offer lectures, methods to change your behavior and peer support. Call the free national Quitline for more information. 800-QUIT-NOW (327-057-4962). Low-cost or free programs are offered by many hospitals, local chapters of the American Lung Association (509-006-1683) and the American Cancer Society (651-199-0583). Support at home is important too. Non-smokers can help by offering praise and encouragement. If the smoker fails to quit, encourage them to try again!    OVER-THE-COUNTER MEDICINES:  For those who can't quit on their own, Nicotine Replacement Therapy (NRT) may make quitting much easier. Certain aids such as the nicotine patch, gum and lozenge are available without a prescription. However, it is best to use these under the guidance of your doctor. The skin patch provides a steady supply of nicotine to the body. Nicotine gum and lozenge gives temporary bursts of low levels of nicotine. Both methods take the edge off the craving for cigarettes. WARNING: If you feel symptoms of nicotine overdose, such as nausea, vomiting, dizziness, weakness, or fast heartbeat, stop using these and see your doctor.    PRESCRIPTION  MEDICINES:  After evaluating your smoking patterns and prior attempts at quitting, your doctor may offer a prescription medicine such as bupropion (Zyban, Wellbutrin), varenicline (Chantix, Champix), a niocotine inhaler or nasal spray. Each has its unique advantage and side effects which your doctor can review with you.    HEALTH BENEFITS OF QUITTING:  The benefits of quitting start right away and keep improving the longer you go without smokin minutes: blood pressure and pulse return to normal  8 hours: oxygen levels return to normal  2 days: ability to smell and taste begins to improve as damaged nerves start to regrow  2-3 weeks: circulation and lung function improves  1-9 months: decreased cough, congestion and shortness of breath; less tired  1 year: risk of heart attack decreases by half  5 years: risk of lung cancer decreases by half; risk of stroke becomes the same as a non-smoker  For information about how to quit smoking, visit the following links:  National Cancer [place] ,   Clearing the Air, Quit Smoking Today   - an online booklet. http://www.smokefree.gov/pubs/clearing_the_air.pdf  Smokefree.gov http://smokefree.gov/  QuitNet http://www.quitnet.com/    7042-8933 [facility], [address] [place] [address], [place] [address]. All rights reserved. This information is not intended as a substitute for professional medical care. Always follow your healthcare professional's instructions.    The Benefits of Living Smoke Free  What do you want to gain from quitting? Check off some reasons to quit.  Health Benefits  ___ Reduce my risk of lung cancer, heart disease, chronic lung disease  ___ Have fewer wrinkles and softer skin  ___ Improve my sense of taste and smell  ___ For pregnant women--reduce the risk of having a miscarriage, stillbirth, premature birth, or low-birth-weight baby  Personal Benefits  ___ Feel more in control of my life  ___ Have better-smelling hair, breath, clothes, home, and car  ___  Save time by not having to take smoke breaks, buy cigarettes, or hunt for a light  ___ Have whiter teeth  Family Benefits  ___ Reduce my children s respiratory tract infections  ___ Set a good example for my children  ___ Reduce my family s cancer risk  Financial Benefits  ___ Save hundreds of dollars each year that would be spent on cigarettes  ___ Save money on medical bills  ___ Save on life, health, and car insurance premiums    Those Dollars Add Up!  Cigarettes are expensive, and getting more expensive all the time. Do you realize how much money you are spending on cigarettes per year? What is the average amount you spend on a pack of cigarettes? What is the average number of packs that you smoke per day? Using your answers to these questions, fill in this formula to help you find out:  ($ _____ per pack) ×  ( _____ number of packs per day) × (365 days) =  $ _____ yearly cost of smoking  Besides tobacco, there are other costs, including extra cleaning bills and replacement costs for clothing and furniture; medical expenses for smoking-related illnesses; and higher health, life, and car insurance premiums.    Cigars and Pipes Count Too!  Cigars and pipes are also dangerous. So are smokeless (chewing) tobacco and snuff. All of these products contain nicotine, a highly addictive substance that has harmful effects on your body. Quitting smoking means giving up all tobacco products.      1859-0353 [place] [address]. All rights reserved. This information is not intended as a substitute for professional medical care. Always follow your healthcare professional's instructions.        Follow-ups after your visit        Your next 10 appointments already scheduled     Feb 14, 2017  4:40 PM CST   MONICA Callejas with Mylene Rush, PT   [place] [facility] ([facility]  )    [address] [place] [id number] [phone]         "      Who to contact     If you have questions or need follow up information about today's clinic visit or your schedule please contact [facility] directly at [phone].  Normal or non-critical lab and imaging results will be communicated to you by MyChart, letter or phone within 4 business days after the clinic has received the results. If you do not hear from us within 7 days, please contact the clinic through [employer] or phone. If you have a critical or abnormal lab result, we will notify you by phone as soon as possible.  Submit refill requests through [employer] or call your pharmacy and they will forward the refill request to us. Please allow 3 business days for your refill to be completed.          Additional Information About Your Visit        [employer] Information     [employer] gives you secure access to your electronic health record. If you see a primary care provider, you can also send messages to your care team and make appointments. If you have questions, please call your primary care clinic.  If you do not have a primary care provider, please call [phone] and they will assist you.        Care EveryWhere ID     This is your Care EveryWhere ID. This could be used by other organizations to access your [facility] medical records  [id number]        Your Vitals Were     Pulse Temperature Height Last Period BMI (Body Mass Index)       68 98.6  F (37  C) (Oral) 5' 8\" (1.727 m) (LMP Unknown) 25.24 kg/m2        Blood Pressure from Last 3 Encounters:   02/13/17 112/78   01/25/17 108/71   01/11/17 114/66    Weight from Last 3 Encounters:   02/13/17 166 lb (75.3 kg)   01/25/17 168 lb (76.2 kg)   01/11/17 167 lb (75.8 kg)              We Performed the Following     Hemoglobin     Tobacco Cessation - for Health Maintenance     TSH with free T4 reflex     Vitamin D Deficiency          Today's Medication Changes          These changes are accurate as of: 2/13/17  4:23 PM.  If you have any questions, " ask your nurse or doctor.               Start taking these medicines.        Dose/Directions    buPROPion 150 MG 12 hr tablet   Commonly known as:  WELLBUTRIN SR   Used for:  Encounter for tobacco use cessation counseling   Started by:  Ansley White PA-C        Take 1 tablet once daily and increase to 1 tablet twice daily after 4 to 7 days   Quantity:  60 tablet   Refills:  2            Where to get your medicines      These medications were sent to [facility] - [place], [place] - [address] AT [facility] [address]  [address], [place] [address]     Phone:  [phone]     buPROPion 150 MG 12 hr tablet                Primary Care Provider Office Phone # Fax #    Ansley White PA-C [phone]       [facility] [address] [facility] [address]        Thank you!     Thank you for choosing [facility]  for your care. Our goal is always to provide you with excellent care. Hearing back from our patients is one way we can continue to improve our services. Please take a few minutes to complete the written survey that you may receive in the mail after your visit with us. Thank you!             Your Updated Medication List - Protect others around you: Learn how to safely use, store and throw away your medicines at www.disposemymeds.org.          This list is accurate as of: 2/13/17  4:23 PM.  Always use your most recent med list.                   Brand Name Dispense Instructions for use    albuterol 108 (90 BASE) MCG/ACT Inhaler    albuterol    1 Inhaler    Inhale 1-2 puffs into the lungs every 6 hours as needed for shortness of breath / dyspnea or wheezing       buPROPion 150 MG 12 hr tablet    WELLBUTRIN SR    60 tablet    Take 1 tablet once daily and increase to 1 tablet twice daily after 4 to 7 days       fluticasone 110 MCG/ACT Inhaler    FLOVENT HFA    3 Inhaler    Inhale 2 puffs into the  lungs 2 times daily       fluticasone 50 MCG/ACT spray    FLONASE    1 Package    Spray 1-2 sprays into both nostrils daily       IRON SUPPLEMENT PO      Take 325 mg by mouth 2 times daily (with meals)       MULTIVITAMINS PO      Take  by mouth daily.

## 2017-02-13 NOTE — NURSING NOTE
"Chief Complaint   Patient presents with     Smoking Cessation     Fatigue     would like to be checked for low hemoglobin       Initial /78 (Cuff Size: Adult Regular)  Pulse 68  Temp 98.6  F (37  C) (Oral)  Ht 5' 8\" (1.727 m)  Wt 166 lb (75.3 kg)  LMP  (LMP Unknown)  BMI 25.24 kg/m2 Estimated body mass index is 25.24 kg/(m^2) as calculated from the following:    Height as of this encounter: 5' 8\" (1.727 m).    Weight as of this encounter: 166 lb (75.3 kg).  Medication Reconciliation: complete   Mandi Hunt, Certified Medical Assistant (AAMA)     "

## 2017-02-13 NOTE — PATIENT INSTRUCTIONS
Ansley or her team will be in touch with you with results.  Start Wellbutrin as directed below.    Send Ansley a [employer] Message in 2-4 weeks with update.    RD England, MICHAEL        Zyban / Wellbutrin    Dosing:    Before Your Quit Date: Dose   Days 1-3 Take 150 mg by mouth once daily in the morning.   Days 4-7 (or up to 4-14 days) Take 150 mg by mouth twice daily in the morning and evening.   After Your Quit Date:    Treatment usually continues 7-12 weeks Take 150 mg by mouth twice daily in the morning and evening.       Some tips:    You will start taking bupropion at least 1 week before quitting smoking. It is okay to smoke while using bupropion.     You can use nicotine replacement therapy such as nicotine gum while using  Bupropion.     Take your 2 daily doses at least 8 hours apart.     DO NOT CRUSH OR BREAK TABLETS. Swallow the tablet whole.     You can take your medication with or without food.     Do not drink alcohol while taking this medication.     Side Effects:    Some people may experience dry mouth and insomnia. These may resolve in time.     Small frequent meals, frequent mouth care, chewing gum, or sucking lozenges may help with dry mouth.     Other possible rare side effects include constipation, nausea, headache, drowsiness, and weight loss may occur.     If you experience any other side effects that are troublesome, or if you feel something is not right, call your health care professional.         HOW TO QUIT SMOKING  Smoking is one of the hardest habits to break. About half of all those who have ever smoked have been able to quit, and most of those (about 70%) who still smoke want to quit. Here are some of the best ways to stop smoking.     KEEP TRYING:  It takes most smokers about 8 tries before they are finally able to fully quit. So, the more often you try and fail, the better your chance of quitting the next time! So, don't give up!    GO COLD TURKEY:  Most ex-smokers quit cold  turkey. Trying to cut back gradually doesn't seem to work as well, perhaps because it continues the smoking habit. Also, it is possible to fool yourself by inhaling more while smoking fewer cigarettes. This results in the same amount of nicotine in your body!    GET SUPPORT:  Support programs can make an important difference, especially for the heavy smoker. These groups offer lectures, methods to change your behavior and peer support. Call the free national Quitline for more information. 800-QUIT-NOW (829-397-4916). Low-cost or free programs are offered by many hospitals, local chapters of the American Lung Association (933-128-4826) and the American Cancer Society (536-274-4005). Support at home is important too. Non-smokers can help by offering praise and encouragement. If the smoker fails to quit, encourage them to try again!    OVER-THE-COUNTER MEDICINES:  For those who can't quit on their own, Nicotine Replacement Therapy (NRT) may make quitting much easier. Certain aids such as the nicotine patch, gum and lozenge are available without a prescription. However, it is best to use these under the guidance of your doctor. The skin patch provides a steady supply of nicotine to the body. Nicotine gum and lozenge gives temporary bursts of low levels of nicotine. Both methods take the edge off the craving for cigarettes. WARNING: If you feel symptoms of nicotine overdose, such as nausea, vomiting, dizziness, weakness, or fast heartbeat, stop using these and see your doctor.    PRESCRIPTION MEDICINES:  After evaluating your smoking patterns and prior attempts at quitting, your doctor may offer a prescription medicine such as bupropion (Zyban, Wellbutrin), varenicline (Chantix, Champix), a niocotine inhaler or nasal spray. Each has its unique advantage and side effects which your doctor can review with you.    HEALTH BENEFITS OF QUITTING:  The benefits of quitting start right away and keep improving the longer you go  without smokin minutes: blood pressure and pulse return to normal  8 hours: oxygen levels return to normal  2 days: ability to smell and taste begins to improve as damaged nerves start to regrow  2-3 weeks: circulation and lung function improves  1-9 months: decreased cough, congestion and shortness of breath; less tired  1 year: risk of heart attack decreases by half  5 years: risk of lung cancer decreases by half; risk of stroke becomes the same as a non-smoker  For information about how to quit smoking, visit the following links:  National Cancer [place] ,   Clearing the Air, Quit Smoking Today   - an online booklet. http://www.smokefree.gov/pubs/clearing_the_air.pdf  Smokefree.gov http://smokefree.gov/  QuitNet http://www.quitnet.com/    4379-5931 [facility], [address] [place] [address]. All rights reserved. This information is not intended as a substitute for professional medical care. Always follow your healthcare professional's instructions.    The Benefits of Living Smoke Free  What do you want to gain from quitting? Check off some reasons to quit.  Health Benefits  ___ Reduce my risk of lung cancer, heart disease, chronic lung disease  ___ Have fewer wrinkles and softer skin  ___ Improve my sense of taste and smell  ___ For pregnant women--reduce the risk of having a miscarriage, stillbirth, premature birth, or low-birth-weight baby  Personal Benefits  ___ Feel more in control of my life  ___ Have better-smelling hair, breath, clothes, home, and car  ___ Save time by not having to take smoke breaks, buy cigarettes, or hunt for a light  ___ Have whiter teeth  Family Benefits  ___ Reduce my children s respiratory tract infections  ___ Set a good example for my children  ___ Reduce my family s cancer risk  Financial Benefits  ___ Save hundreds of dollars each year that would be spent on cigarettes  ___ Save money on medical bills  ___ Save on life, health, and car insurance  premiums    Those Dollars Add Up!  Cigarettes are expensive, and getting more expensive all the time. Do you realize how much money you are spending on cigarettes per year? What is the average amount you spend on a pack of cigarettes? What is the average number of packs that you smoke per day? Using your answers to these questions, fill in this formula to help you find out:  ($ _____ per pack) ×  ( _____ number of packs per day) × (365 days) =  $ _____ yearly cost of smoking  Besides tobacco, there are other costs, including extra cleaning bills and replacement costs for clothing and furniture; medical expenses for smoking-related illnesses; and higher health, life, and car insurance premiums.    Cigars and Pipes Count Too!  Cigars and pipes are also dangerous. So are smokeless (chewing) tobacco and snuff. All of these products contain nicotine, a highly addictive substance that has harmful effects on your body. Quitting smoking means giving up all tobacco products.      8183-9482 [facility], [address] [place] [address], [place] [address]. All rights reserved. This information is not intended as a substitute for professional medical care. Always follow your healthcare professional's instructions.

## 2017-02-14 LAB
DEPRECATED CALCIDIOL+CALCIFEROL SERPL-MC: 33 UG/L (ref 20–75)
T4 FREE SERPL-MCNC: 1.02 NG/DL (ref 0.76–1.46)
TSH SERPL DL<=0.005 MIU/L-ACNC: 4.2 MU/L (ref 0.4–4)

## 2017-02-22 ENCOUNTER — THERAPY VISIT (OUTPATIENT)
Dept: PHYSICAL THERAPY | Facility: CLINIC | Age: 47
End: 2017-02-22
Payer: COMMERCIAL

## 2017-02-22 DIAGNOSIS — M25.571 ANKLE PAIN, RIGHT: ICD-10-CM

## 2017-02-22 PROCEDURE — 97110 THERAPEUTIC EXERCISES: CPT | Mod: GP | Performed by: PHYSICAL THERAPIST

## 2017-02-22 PROCEDURE — 97035 APP MDLTY 1+ULTRASOUND EA 15: CPT | Mod: GP | Performed by: PHYSICAL THERAPIST

## 2017-03-01 ENCOUNTER — THERAPY VISIT (OUTPATIENT)
Dept: PHYSICAL THERAPY | Facility: CLINIC | Age: 47
End: 2017-03-01
Payer: COMMERCIAL

## 2017-03-01 DIAGNOSIS — M25.571 ANKLE PAIN, RIGHT: ICD-10-CM

## 2017-03-01 PROCEDURE — 97110 THERAPEUTIC EXERCISES: CPT | Mod: GP | Performed by: PHYSICAL THERAPIST

## 2017-03-01 PROCEDURE — 97035 APP MDLTY 1+ULTRASOUND EA 15: CPT | Mod: GP | Performed by: PHYSICAL THERAPIST

## 2017-03-08 ENCOUNTER — THERAPY VISIT (OUTPATIENT)
Dept: PHYSICAL THERAPY | Facility: CLINIC | Age: 47
End: 2017-03-08
Payer: COMMERCIAL

## 2017-03-08 DIAGNOSIS — M25.571 ANKLE PAIN, RIGHT: ICD-10-CM

## 2017-03-08 PROCEDURE — 97110 THERAPEUTIC EXERCISES: CPT | Mod: GP | Performed by: PHYSICAL THERAPIST

## 2017-03-08 PROCEDURE — 97112 NEUROMUSCULAR REEDUCATION: CPT | Mod: GP | Performed by: PHYSICAL THERAPIST

## 2017-12-19 ENCOUNTER — OFFICE VISIT (OUTPATIENT)
Dept: FAMILY MEDICINE | Facility: CLINIC | Age: 47
End: 2017-12-19
Payer: COMMERCIAL

## 2017-12-19 VITALS
BODY MASS INDEX: 22.37 KG/M2 | DIASTOLIC BLOOD PRESSURE: 80 MMHG | HEIGHT: 68 IN | TEMPERATURE: 98.3 F | WEIGHT: 147.6 LBS | SYSTOLIC BLOOD PRESSURE: 138 MMHG | OXYGEN SATURATION: 98 % | RESPIRATION RATE: 22 BRPM | HEART RATE: 91 BPM

## 2017-12-19 DIAGNOSIS — J45.30 MILD PERSISTENT ASTHMA WITHOUT COMPLICATION: ICD-10-CM

## 2017-12-19 DIAGNOSIS — J20.9 ACUTE BRONCHITIS WITH SYMPTOMS > 10 DAYS: Primary | ICD-10-CM

## 2017-12-19 PROCEDURE — 99214 OFFICE O/P EST MOD 30 MIN: CPT | Performed by: PHYSICIAN ASSISTANT

## 2017-12-19 RX ORDER — AZITHROMYCIN 250 MG/1
TABLET, FILM COATED ORAL
Qty: 6 TABLET | Refills: 0 | Status: SHIPPED | OUTPATIENT
Start: 2017-12-19 | End: 2018-02-28

## 2017-12-19 RX ORDER — CODEINE PHOSPHATE AND GUAIFENESIN 10; 100 MG/5ML; MG/5ML
1 SOLUTION ORAL EVERY 4 HOURS PRN
Qty: 120 ML | Refills: 0 | Status: SHIPPED | OUTPATIENT
Start: 2017-12-19 | End: 2018-02-28

## 2017-12-19 RX ORDER — ALBUTEROL SULFATE 90 UG/1
1-2 AEROSOL, METERED RESPIRATORY (INHALATION) EVERY 6 HOURS PRN
Qty: 1 INHALER | Refills: 0 | Status: SHIPPED | OUTPATIENT
Start: 2017-12-19 | End: 2018-01-10

## 2017-12-19 ASSESSMENT — PAIN SCALES - GENERAL: PAINLEVEL: NO PAIN (0)

## 2017-12-19 NOTE — NURSING NOTE
"Chief Complaint   Patient presents with     Cough     times 3 wks        Initial /80 (BP Location: Right arm, Patient Position: Chair, Cuff Size: Adult Regular)  Pulse 91  Temp 98.3  F (36.8  C) (Oral)  Resp 22  Ht 5' 8\" (1.727 m)  Wt 147 lb 9.6 oz (67 kg)  SpO2 98%  BMI 22.44 kg/m2 Estimated body mass index is 22.44 kg/(m^2) as calculated from the following:    Height as of this encounter: 5' 8\" (1.727 m).    Weight as of this encounter: 147 lb 9.6 oz (67 kg).  Medication Reconciliation: complete   Kaylyn Cortes CMA      "

## 2017-12-19 NOTE — PATIENT INSTRUCTIONS
Cough syrup at night as needed  Albuterol inhaler 2 puffs every 6 hrs until cough is gone  Antibiotics as directed.  Continue with rest and fluids.    Call if not improving.    RD England, PA-C

## 2017-12-19 NOTE — PROGRESS NOTES
"    SUBJECTIVE:                                                    Nicole Fritsche is a 47 year old female who presents to clinic today for the following health issues:    Acute Illness   Acute illness concerns: cough   Onset: 3 wks     Fever: not anymore, but at the start of this had fevers up to 102    Chills/Sweats: no    Headache (location?): YES    Sinus Pressure:YES    Conjunctivitis:  YES-eyes watering.    Ear Pain: no    Rhinorrhea: YES    Congestion: YES    Sore Throat: YES- with cough      Cough: YES, persistent at night, having a hard time sleeping, has tried lots of OTC medications without medications.    Wheeze: YES- at night     Decreased Appetite: YES    Nausea: no    Vomiting: no    Diarrhea:  YES    Dysuria/Freq.: no    Fatigue/Achiness: YES    Sick/Strep Exposure: no     Therapies Tried and outcome: mucenex - tylenol cold       -------------------------------------    Problem list and histories reviewed & adjusted, as indicated.  Additional history: as documented    ROS:  Constitutional, HEENT, cardiovascular, pulmonary, gi and gu systems are negative, except as otherwise noted.      OBJECTIVE:   /80 (BP Location: Right arm, Patient Position: Chair, Cuff Size: Adult Regular)  Pulse 91  Temp 98.3  F (36.8  C) (Oral)  Resp 22  Ht 5' 8\" (1.727 m)  Wt 147 lb 9.6 oz (67 kg)  SpO2 98%  BMI 22.44 kg/m2  Body mass index is 22.44 kg/(m^2).  GENERAL: healthy, alert and no distress  EYES: Eyes grossly normal to inspection, PERRL and conjunctivae and sclerae normal  HENT: ear canals and TM's normal, nose and mouth without ulcers or lesions  NECK: no adenopathy, no asymmetry, masses, or scars and thyroid normal to palpation  RESP: lungs clear to auscultation - no rales, rhonchi or wheezes  CV: regular rate and rhythm, normal S1 S2, no S3 or S4, no murmur, click or rub, no peripheral edema and peripheral pulses strong  MS: no gross musculoskeletal defects noted, no edema    Diagnostic Test " Results:  none     ASSESSMENT/PLAN:       ICD-10-CM    1. Acute bronchitis with symptoms > 10 days J20.9 guaiFENesin-codeine (ROBITUSSIN AC) 100-10 MG/5ML SOLN solution     azithromycin (ZITHROMAX) 250 MG tablet   2. Mild persistent asthma without complication J45.30 albuterol (PROAIR HFA) 108 (90 BASE) MCG/ACT Inhaler     Patient Instructions   Cough syrup at night as needed  Albuterol inhaler 2 puffs every 6 hrs until cough is gone  Antibiotics as directed.  Continue with rest and fluids.    Call if not improving.    RD England, PA-C        Ansley White, PA-C  [facility]

## 2017-12-19 NOTE — MR AVS SNAPSHOT
After Visit Summary   12/19/2017    Nicole Fritsche    MRN: [id number]           Patient Information     Date Of Birth          1970        Visit Information        Provider Department      12/19/2017 11:20 AM Ansley White PA-C [facility]        Today's Diagnoses     Acute bronchitis with symptoms > 10 days    -  1    Mild persistent asthma without complication          Care Instructions    Cough syrup at night as needed  Albuterol inhaler 2 puffs every 6 hrs until cough is gone  Antibiotics as directed.  Continue with rest and fluids.    Call if not improving.    RD England PA-C            Follow-ups after your visit        Who to contact     If you have questions or need follow up information about today's clinic visit or your schedule please contact [facility] directly at [phone].  Normal or non-critical lab and imaging results will be communicated to you by mPurahart, letter or phone within 4 business days after the clinic has received the results. If you do not hear from us within 7 days, please contact the clinic through mPurahart or phone. If you have a critical or abnormal lab result, we will notify you by phone as soon as possible.  Submit refill requests through 6Waves or call your pharmacy and they will forward the refill request to us. Please allow 3 business days for your refill to be completed.          Additional Information About Your Visit        mPurahart Information     6Waves gives you secure access to your electronic health record. If you see a primary care provider, you can also send messages to your care team and make appointments. If you have questions, please call your primary care clinic.  If you do not have a primary care provider, please call [phone] and they will assist you.        Care EveryWhere ID     This is your Care EveryWhere ID. This could be used by other organizations to access your [place]  "medical records  [id number]        Your Vitals Were     Pulse Temperature Respirations Height Pulse Oximetry BMI (Body Mass Index)    91 98.3  F (36.8  C) (Oral) 22 5' 8\" (1.727 m) 98% 22.44 kg/m2       Blood Pressure from Last 3 Encounters:   12/19/17 138/80   02/13/17 112/78   01/25/17 108/71    Weight from Last 3 Encounters:   12/19/17 147 lb 9.6 oz (67 kg)   02/13/17 166 lb (75.3 kg)   01/25/17 168 lb (76.2 kg)              Today, you had the following     No orders found for display         Today's Medication Changes          These changes are accurate as of: 12/19/17 12:16 PM.  If you have any questions, ask your nurse or doctor.               Start taking these medicines.        Dose/Directions    azithromycin 250 MG tablet   Commonly known as:  ZITHROMAX   Used for:  Acute bronchitis with symptoms > 10 days   Started by:  Ansley White PA-C        Two tablets first day, then one tablet daily for four days.   Quantity:  6 tablet   Refills:  0       guaiFENesin-codeine 100-10 MG/5ML Soln solution   Commonly known as:  ROBITUSSIN AC   Used for:  Acute bronchitis with symptoms > 10 days   Started by:  Ansley White PA-C        Dose:  1 tsp.   Take 5 mLs by mouth every 4 hours as needed for cough   Quantity:  120 mL   Refills:  0            Where to get your medicines      These medications [id number] [address] AT [facility] [address], [place] [address]     Phone:  [phone]     albuterol 108 (90 BASE) MCG/ACT Inhaler    azithromycin 250 MG tablet         Some of these will need a paper prescription and others can be bought over the counter.  Ask your nurse if you have questions.     Bring a paper prescription for each of these medications     guaiFENesin-codeine 100-10 MG/5ML Soln solution                Primary Care Provider Office Phone # Fax #    Ansley White PA-C [phone] " [phone]       [address] [facility] [address]        Equal Access to Services     JIMMY PEREZ : Hadii aad ku haddelfinojosé miguel Sosouleymane, waaxda luqadaha, qaybta kaalmada gerardalexandragrazyna, waxay idiin hayruthieprabhakar johnsonjordanmarietta brewster. So [facility] [phone].    ATENCIÓN: Si habla español, tiene a otoole disposición servicios gratuitos de asistencia lingüística. Llame al [phone].    We comply with applicable federal civil rights laws and [place] laws. We do not discriminate on the basis of race, color, national origin, age, disability, sex, sexual orientation, or gender identity.            Thank you!     Thank you for choosing [facility]  for your care. Our goal is always to provide you with excellent care. Hearing back from our patients is one way we can continue to improve our services. Please take a few minutes to complete the written survey that you may receive in the mail after your visit with us. Thank you!             Your Updated Medication List - Protect others around you: Learn how to safely use, store and throw away your medicines at www.disposemymeds.org.          This list is accurate as of: 12/19/17 12:16 PM.  Always use your most recent med list.                   Brand Name Dispense Instructions for use Diagnosis    albuterol 108 (90 BASE) MCG/ACT Inhaler    PROAIR HFA    1 Inhaler    Inhale 1-2 puffs into the lungs every 6 hours as needed for shortness of breath / dyspnea or wheezing    Mild persistent asthma without complication       azithromycin 250 MG tablet    ZITHROMAX    6 tablet    Two tablets first day, then one tablet daily for four days.    Acute bronchitis with symptoms > 10 days       buPROPion 150 MG 12 hr tablet    WELLBUTRIN SR    60 tablet    Take 1 tablet once daily and increase to 1 tablet twice daily after 4 to 7 days    Encounter for tobacco use cessation counseling, Tobacco abuse       fluticasone 110 MCG/ACT Inhaler    FLOVENT HFA    3 Inhaler    Inhale 2 puffs  into the lungs 2 times daily    Mild persistent asthma       fluticasone 50 MCG/ACT spray    FLONASE    1 Package    Spray 1-2 sprays into both nostrils daily    Allergic rhinitis       guaiFENesin-codeine 100-10 MG/5ML Soln solution    ROBITUSSIN AC    120 mL    Take 5 mLs by mouth every 4 hours as needed for cough    Acute bronchitis with symptoms > 10 days       IRON SUPPLEMENT PO      Take 325 mg by mouth 2 times daily (with meals)        MULTIVITAMINS PO      Take  by mouth daily.

## 2017-12-20 ASSESSMENT — ASTHMA QUESTIONNAIRES: ACT_TOTALSCORE: 11

## 2018-01-10 DIAGNOSIS — J45.30 MILD PERSISTENT ASTHMA WITHOUT COMPLICATION: ICD-10-CM

## 2018-01-10 NOTE — TELEPHONE ENCOUNTER
"Requested Prescriptions   Pending Prescriptions Disp Refills     PROAIR  (90 BASE) MCG/ACT inhaler [Pharmacy Med Name: PROAIR HFA ORAL INH (200  PFS) 8.5G]  Last Written Prescription Date:  12/19/2017  Last Fill Quantity: 1 inhaler,  # refills: 0   Last Office Visit with [facility] or [facility] prescribing provider:  12/19/2017  Future Office Visit:      8.5 g 0     Sig: INHALE 1 TO 2 PUFFS INTO THE LUNGS EVERY 6 HOURS AS NEEDED FOR SHORTNESS OF BREATH/ DYSPNEA OR WHEEZING    Asthma Maintenance Inhalers - Anticholinergics Failed    1/10/2018 10:09 AM       Failed - Asthma control test score is 20 or greater in last 6 months    Please review ACT score.   ACT Total Scores 9/8/2015 11/14/2016 12/19/2017   ACT TOTAL SCORE - - -   ASTHMA ER VISITS - - -   ASTHMA HOSPITALIZATIONS - - -   ACT TOTAL SCORE (Goal Greater than or Equal to 20) 20 23 11   In the past 12 months, how many times did you visit the emergency room for your asthma without being admitted to the hospital? 0 0 0   In the past 12 months, how many times were you hospitalized overnight because of your asthma? 0 0 0            Passed - Patient is age 12 years or older       Passed - Recent (6 mo) or future visit with authorizing provider's specialty    Patient had office visit in the last 6 months or has a visit in the next 30 days with authorizing provider.  See \"Patient Info\" tab in inbasket, or \"Choose Columns\" in Meds & Orders section of the refill encounter.             "

## 2018-01-15 RX ORDER — ALBUTEROL SULFATE 90 UG/1
AEROSOL, METERED RESPIRATORY (INHALATION)
Qty: 8.5 G | Refills: 0 | Status: SHIPPED | OUTPATIENT
Start: 2018-01-15 | End: 2018-10-11

## 2018-01-17 ENCOUNTER — MYC MEDICAL ADVICE (OUTPATIENT)
Dept: FAMILY MEDICINE | Facility: CLINIC | Age: 48
End: 2018-01-17

## 2018-02-13 DIAGNOSIS — Z72.0 TOBACCO ABUSE: ICD-10-CM

## 2018-02-13 DIAGNOSIS — Z71.6 ENCOUNTER FOR TOBACCO USE CESSATION COUNSELING: ICD-10-CM

## 2018-02-13 RX ORDER — BUPROPION HYDROCHLORIDE 150 MG/1
TABLET, EXTENDED RELEASE ORAL
Qty: 60 TABLET | Refills: 1 | Status: SHIPPED | OUTPATIENT
Start: 2018-02-13 | End: 2018-10-11

## 2018-02-13 NOTE — TELEPHONE ENCOUNTER
Prescription approved per [facility] Refill Protocol. Patient uses for smoking cessation.   Raven Marley RN

## 2018-02-13 NOTE — TELEPHONE ENCOUNTER
"Requested Prescriptions   Pending Prescriptions Disp Refills     buPROPion (WELLBUTRIN SR) 150 MG 12 hr tablet [Pharmacy Med Name: BUPROPION SR 150MG TABLETS (12 H)]  Last Written Prescription Date:  2/13/2017  Last Fill Quantity: 60 tabs,  # refills: 2   Last Office Visit with [facility], [facility] or [facility] prescribing provider:  12/19/2017  Future Office Visit:      60 tablet 0     Sig: TAKE 1 TABLET BY MOUTH EVERY DAY. INCREASE TO 1 TABLET TWICE DAILY AFTER 4 TO 7 DAYS    SSRIs Protocol Passed    2/13/2018  3:29 AM           Passed - Recent or future visit with authorizing provider    Patient had office visit in the last year or has a visit in the next 30 days with authorizing provider.  See \"Patient Info\" tab in inbasket, or \"Choose Columns\" in Meds & Orders section of the refill encounter.            Passed - Medication is Bupropion    If the medication is Bupropion (Wellbutrin), and the patient is taking for smoking cessation; OK to refill.         Passed - Patient is age 18 or older       Passed - No active pregnancy on record       Passed - No positive pregnancy test in last 12 months          "

## 2018-02-28 ENCOUNTER — RADIANT APPOINTMENT (OUTPATIENT)
Dept: GENERAL RADIOLOGY | Facility: CLINIC | Age: 48
End: 2018-02-28
Attending: PODIATRIST
Payer: COMMERCIAL

## 2018-02-28 ENCOUNTER — OFFICE VISIT (OUTPATIENT)
Dept: PODIATRY | Facility: CLINIC | Age: 48
End: 2018-02-28
Payer: COMMERCIAL

## 2018-02-28 VITALS — WEIGHT: 145.8 LBS | HEIGHT: 68 IN | HEART RATE: 72 BPM | BODY MASS INDEX: 22.1 KG/M2

## 2018-02-28 DIAGNOSIS — Q66.70 CAVUS DEFORMITY OF FOOT: ICD-10-CM

## 2018-02-28 DIAGNOSIS — M25.571 RIGHT ANKLE PAIN, UNSPECIFIED CHRONICITY: Primary | ICD-10-CM

## 2018-02-28 DIAGNOSIS — M25.571 RIGHT ANKLE PAIN, UNSPECIFIED CHRONICITY: ICD-10-CM

## 2018-02-28 PROCEDURE — 73610 X-RAY EXAM OF ANKLE: CPT | Mod: RT

## 2018-02-28 PROCEDURE — 99214 OFFICE O/P EST MOD 30 MIN: CPT | Performed by: PODIATRIST

## 2018-02-28 NOTE — MR AVS SNAPSHOT
After Visit Summary   2/28/2018    Nicole Fritsche    MRN: [id number]           Patient Information     Date Of Birth          1970        Visit Information        Provider Department      2/28/2018 8:00 AM Andrés Salcedo DPM [facility]        Today's Diagnoses     Right ankle pain, unspecified chronicity    -  1    Cavus deformity of foot          Care Instructions    Thank you for choosing [facility]!    Follow up after MRI.    DR. SALCEDO'S CLINIC LOCATIONS     MONDAY  [place] TUESDAY & FRIDAY AM  [place]   [address] [facility]   [address]   [place] [address] [place] [id number] [phone]  [phone]       WEDNESDAY  [place] SCHEDULE SURGERY: [phone]   [address] [place] [address] APPOINTMENTS: [phone]   [place] [address] BILLING QUESTIONS: [phone]   [phone]         [facility] SCHEDULING  They should be calling you within 24 hours to schedule your scan.  If not, please call the location discussed at your appointment.    1) [facility]:       [phone]      [address] [place] [address]    2) [facility]:      [phone]      [address] [place] [address]    3) [facility]:       [phone]      [address] [place] [address]    PRICE THERAPY  Many aches and pains throughout the foot and ankle can be helped with many simple treatments. This is usually described as PRICE Therapy.      P - Protection - often times, inflammation/pain in the lower extremity is not able to improve simply because the areas involved are never allowed to rest. Every step we take can bother the problematic area. Protecting those areas is an important step in the healing process. This may involve a walking cast boot, a special insert/orthotic device, an ankle brace,  or simply avoiding barefoot walking.    R - Rest - in addition to protecting the foot/ankle, resting is an important, but often times difficult, treatment option. Getting off your feet when they bother you, and specifically avoiding activities that cause pain/discomfort, are very beneficial to prevent, and treat, foot/ankle pain.      I - Ice - icing regularly can help to decrease inflammation and swelling in the foot, thus decreasing pain. Using an ice pack or a bag of frozen veggies works very well. Ice for 20 minutes multiple times per day as needed.  Do not place the ice directly on the skin as this can cause tissue damage.    C - Compression - using a compression wrap or an ACE wrap can help to decrease swelling, which can help to decrease pain. Wearing the wraps is generally not needed at night, but they should be worn on a regular basis when you are going to be on your feet for prolonged periods as gravity tends to pull fluids down to your feet/ankles.    E - Elevation - elevating your lower extremities multiple times daily for 15-20 minutes can help to decrease swelling, which works well in decreasing pain levels.    NSAID/Tylenol - Anti-inflammatories like Aleve or ibuprofen, and/or a pain medication, such as Tylenol, can help to improve pain levels and get the issue resolved sooner rather than later. Anyone with liver issues should be careful with Tylenol, and anyone with high blood pressure or heart, stomach or kidney issues should be careful with anti-inflammatories. Please ask if you have questions about these medications, including dosage.    [place] [facility] LOCATIONS  [facility]  [address]  [place] [address]  Phone: [phone]  Fax: [phone] [facility]  [address]  [place] [address]  Phone: [phone]   Fax: [phone]   [facility]  [address]  [place]  [address]  Phone: [phone]  Fax: [phone] [facility]  [address]  [place] [address]  Phone: [phone]   Fax: [phone]   [facility]   [address]  [place] [address]  Phone: [phone]   Fax: [phone] * Please call any location listed to make an appointment for a casting/fitting. Your referral was sent to their central office and they will all have the order on file.           Body Mass Index (BMI)  Many things can cause foot and ankle problems. Foot structure, activity level, foot mechanics and injuries are common causes of pain.  One very important issue that often goes unmentioned, is body weight. Extra weight can cause increased stress on muscles, ligaments, bones and tendons.  Sometimes just a few extra pounds is all it takes to put one over her/his threshold. Without reducing that stress, it can be difficult to alleviate pain. Some people are uncomfortable addressing this issue, but we feel it is important for you to think about it. As Foot &  Ankle specialists, our job is addressing the lower extremity problem and possible causes. Regarding extra body weight, we encourage patients to discuss diet and weight management plans with their primary care doctors. It is this team approach that gives you the best opportunity for pain relief and getting you back on your feet.              Follow-ups after your visit        Additional Services     ORTHOTICS REFERRAL       Please be aware that coverage of these services is subject to the terms and limitations of your health insurance plan.  Call member services at your health plan with any benefit or coverage questions.      Please bring the following to your appointment:    >>   Any x-rays, CTs or MRIs which have been performed.  Contact the facility where they were done to arrange for  prior to your scheduled appointment.  Any new CT, MRI or other procedures ordered by your specialist  must be performed at a [place] facility or coordinated by your clinic's referral office.    >>   List of current medications   >>   This referral request   >>   Any documents/labs given to you for this referral    ==This Referral PRINTS in the [facility] (ORTHOTICS & PROSTHETICS) Central scheduling office ==     The [facility] Scheduling staff will contact patient to arrange appointments. Central Scheduling Phone #:  BRIDGETTE Delgado  [phone]     Orthotics: Foot Orthotics                  Future tests that were ordered for you today     Open Future Orders        Priority Expected Expires Ordered    MR Ankle Right w/o & w Contrast Routine  2/28/2019 2/28/2018            Who to contact     If you have questions or need follow up information about today's clinic visit or your schedule please contact [facility] directly at [phone].  Normal or non-critical lab and imaging results will be communicated to you by LYZER DIAGNOSTICShart, letter or phone within 4 business days after the clinic has received the results. If you do not hear from us within 7 days, please contact the clinic through LYZER DIAGNOSTICShart or phone. If you have a critical or abnormal lab result, we will notify you by phone as soon as possible.  Submit refill requests through Apofore or call your pharmacy and they will forward the refill request to us. Please allow 3 business days for your refill to be completed.          Additional Information About Your Visit        LYZER DIAGNOSTICSharVantageous Information     Apofore gives you secure access to your electronic health record. If you see a primary care provider, you can also send messages to your care team and make appointments. If you have questions, please call your primary care clinic.  If you do not have a primary care provider, please call [phone] and they will assist you.        Care EveryWhere ID     This is your Care EveryWhere ID. This could be used by other organizations to access  "your [place] medical records  [id number]        Your Vitals Were     Height BMI (Body Mass Index)                5' 8\" (1.727 m) 22.17 kg/m2           Blood Pressure from Last 3 Encounters:   12/19/17 138/80   02/13/17 112/78   01/25/17 108/71    Weight from Last 3 Encounters:   02/28/18 145 lb 12.8 oz (66.1 kg)   12/19/17 147 lb 9.6 oz (67 kg)   02/13/17 166 lb (75.3 kg)              We Performed the Following     ORTHOTICS REFERRAL        Primary Care Provider Office Phone # Fax #    Ansley Josseline White PA-C [phone]       [address] [facility] [address]        Equal Access to Services     JIMMY PEREZ : Hadii aad ku hadasho Soomaali, waaxda luqadaha, qaybta kaalmada adeegyada, max garcia hayselvin hale . So [facility] [phone].    ATENCIÓN: Si habla español, tiene a otoole disposición servicios gratuitos de asistencia lingüística. Llame al [phone].    We comply with applicable federal civil rights laws and [place] laws. We do not discriminate on the basis of race, color, national origin, age, disability, sex, sexual orientation, or gender identity.            Thank you!     Thank you for choosing [facility]  for your care. Our goal is always to provide you with excellent care. Hearing back from our patients is one way we can continue to improve our services. Please take a few minutes to complete the written survey that you may receive in the mail after your visit with us. Thank you!             Your Updated Medication List - Protect others around you: Learn how to safely use, store and throw away your medicines at www.disposemymeds.org.          This list is accurate as of 2/28/18  8:39 AM.  Always use your most recent med list.                   Brand Name Dispense Instructions for use Diagnosis    buPROPion 150 MG 12 hr tablet    WELLBUTRIN SR    60 tablet    TAKE 1 TABLET BY MOUTH EVERY DAY. INCREASE TO 1 TABLET TWICE DAILY AFTER 4 TO 7 DAYS    " Encounter for tobacco use cessation counseling, Tobacco abuse       fluticasone 110 MCG/ACT Inhaler    FLOVENT HFA    3 Inhaler    Inhale 2 puffs into the lungs 2 times daily    Mild persistent asthma       fluticasone 50 MCG/ACT spray    FLONASE    1 Package    Spray 1-2 sprays into both nostrils daily    Allergic rhinitis       IRON SUPPLEMENT PO      Take 325 mg by mouth 2 times daily (with meals)        MULTIVITAMINS PO      Take  by mouth daily.        PROAIR  (90 BASE) MCG/ACT Inhaler   Generic drug:  albuterol     8.5 g    INHALE 1 TO 2 PUFFS INTO THE LUNGS EVERY 6 HOURS AS NEEDED FOR SHORTNESS OF BREATH/ DYSPNEA OR WHEEZING    Mild persistent asthma without complication

## 2018-02-28 NOTE — NURSING NOTE
"Chief Complaint   Patient presents with     Ankle Pain       Initial Ht 5' 8\" (1.727 m)  Wt 145 lb 12.8 oz (66.1 kg)  BMI 22.17 kg/m2 Estimated body mass index is 22.17 kg/(m^2) as calculated from the following:    Height as of this encounter: 5' 8\" (1.727 m).    Weight as of this encounter: 145 lb 12.8 oz (66.1 kg).  Medication Reconciliation: chino Tyson MA February 28, 2018 8:07 AM  "

## 2018-02-28 NOTE — PATIENT INSTRUCTIONS
Thank you for choosing [facility]!    Follow up after MRI.    DR. SALCEDO'S CLINIC LOCATIONS     MONDAY  [place] TUESDAY & FRIDAY AM  [place]   [address] [facility]   [address]   [place] [address] [place] [address]   [phone]  [phone]  [phone]       WEDNESDAY  [place] SCHEDULE SURGERY: [phone]   [address] [facility] APPOINTMENTS: [phone]   [place] [address] BILLING QUESTIONS: [phone]   [phone]         [place] RADIOLOGY SCHEDULING  They should be calling you within 24 hours to schedule your scan.  If not, please call the location discussed at your appointment.    1) [facility]:       [phone]      [address] [place] [address]    2) [facility]:      [phone]      [address]      [place] [address]    3) [facility]:       [phone]      [address] [place] [address]    PRICE THERAPY  Many aches and pains throughout the foot and ankle can be helped with many simple treatments. This is usually described as PRICE Therapy.      P - Protection - often times, inflammation/pain in the lower extremity is not able to improve simply because the areas involved are never allowed to rest. Every step we take can bother the problematic area. Protecting those areas is an important step in the healing process. This may involve a walking cast boot, a special insert/orthotic device, an ankle brace, or simply avoiding barefoot walking.    R - Rest - in addition to protecting the foot/ankle, resting is an important, but often times difficult, treatment option. Getting off your feet when they bother you, and specifically avoiding activities that cause pain/discomfort, are very beneficial to prevent, and treat, foot/ankle pain.      I - Ice - icing regularly can help to decrease inflammation and swelling in the  foot, thus decreasing pain. Using an ice pack or a bag of frozen veggies works very well. Ice for 20 minutes multiple times per day as needed.  Do not place the ice directly on the skin as this can cause tissue damage.    C - Compression - using a compression wrap or an ACE wrap can help to decrease swelling, which can help to decrease pain. Wearing the wraps is generally not needed at night, but they should be worn on a regular basis when you are going to be on your feet for prolonged periods as gravity tends to pull fluids down to your feet/ankles.    E - Elevation - elevating your lower extremities multiple times daily for 15-20 minutes can help to decrease swelling, which works well in decreasing pain levels.    NSAID/Tylenol - Anti-inflammatories like Aleve or ibuprofen, and/or a pain medication, such as Tylenol, can help to improve pain levels and get the issue resolved sooner rather than later. Anyone with liver issues should be careful with Tylenol, and anyone with high blood pressure or heart, stomach or kidney issues should be careful with anti-inflammatories. Please ask if you have questions about these medications, including dosage.    [place] [facility] LOCATIONS  [facility]  [address]  [place] [address]  Phone: [phone]  Fax: [phone] [facility]  [address]  [place] [address]  Phone: [phone]   Fax: [phone]   [facility]  [address]  [place] [address]  Phone: [phone]  Fax: [phone] [facility]  [address]  [place] [address]  Phone: [phone]   Fax: [phone]   [facility]   [address]  [place] [address]  Phone: [phone]   Fax: [phone] * Please call any location listed to make an appointment for a casting/fitting. Your referral was sent to their central office and they will all  have the order on file.           Body Mass Index (BMI)  Many things can cause foot and ankle problems. Foot structure, activity level, foot mechanics and injuries are common causes of pain.  One very important issue that often goes unmentioned, is body weight. Extra weight can cause increased stress on muscles, ligaments, bones and tendons.  Sometimes just a few extra pounds is all it takes to put one over her/his threshold. Without reducing that stress, it can be difficult to alleviate pain. Some people are uncomfortable addressing this issue, but we feel it is important for you to think about it. As Foot &  Ankle specialists, our job is addressing the lower extremity problem and possible causes. Regarding extra body weight, we encourage patients to discuss diet and weight management plans with their primary care doctors. It is this team approach that gives you the best opportunity for pain relief and getting you back on your feet.

## 2018-02-28 NOTE — LETTER
"    2/28/2018         RE: Nicole Fritsche  [address] [place] [address]        Dear Colleague,    Thank you for referring your patient, Nicole Fritsche, to the [facility]. Please see a copy of my visit note below.    BMI is normal.  ELIE Tyson MA February 28, 2018 8:07 AM      PATIENT HISTORY:  Nicole Fritsche is a 47 year old female who presents to clinic for right ankle pain.  Pt had significant ligamentous injury with isolated posterior malleolar fracture 11/12/16.  Last time I saw her was 1/25/17.  Pt went to PT.  She states she was improving overall but the ankle pain never completely resolved.  She reports sharp pain over lateral ankle area, diffusely on the heel, plantar arch.  Reports subjective feeling of instability in the ankle when walking, as though the ankle will \"roll\" or invert.  Worse with activity.  5-8/10 pain.  Denies new injury.  She limps at times.  Nonsmoker.  Works on her feet 50% of the time.  Nondiabetic.  Family hx of DM.       EXAM:Vitals: Ht 5' 8\" (1.727 m)  Wt 145 lb 12.8 oz (66.1 kg)  BMI 22.17 kg/m2  BMI= Body mass index is 22.17 kg/(m^2).    General appearance: Patient is alert and fully cooperative with history & exam.  No sign of distress is noted during the visit.     Dermatologic: Skin is intact to right foot/ankle without significant lesions, rash or abrasion.  No paronychia or evidence of soft tissue infection is noted.     Vascular: DP & PT pulses are intact & regular bilaterally.  No significant edema or varicosities noted.  CFT and skin temperature are normal.     Neurologic: Lower extremity sensation is intact to light touch.  No evidence of weakness or contracture in the lower extremities.  No evidence of neuropathy.     Musculoskeletal: Pt guarded on exam.  Tenderness to proximal peroneal tendons on the right, also with palpation of the syndesmosis, distal fibula.  No ATFL or CFL pain.  No deltoid, medial malleolar pain, or posterior " malleolar pain appreciated.  I don't appreciate obvious instability of the ankle, but again pt is guarded.  High arch foot type.  I could not locate any focal heel or arch pain.  No pain with ankle joint motion.  Patient is ambulatory without assistive device or brace.  No gross ankle deformity noted.  No foot or ankle joint effusion is noted.    XRs of right ankle reviewed with pt.  No acute findings.  Posterior malleolus appears healed.  Mortise appears congruent w/o significant widening, but there is perhaps a small < 1mm gap between tibia and fibula on mortise view, may be angle related.      ASSESSMENT: R ankle pain, injury 11/12/16     PLAN:  Reviewed patient's chart in epic.  Discussed condition and treatment options including pros and cons.    Pt had a rare injury with isolated posterior malleolar fracture, severe ligamentous injury with syndesmotic involvement, but prior MRI did not suggest a complete rupture of the syndesmosis.  However, we did discuss the possibility of future pain/instability following her injury.  Surgical repair was offered at the time of injury, but pt elected for nonoperative care.  Likely some residual pain from this injury.    Pt also with significant cavus foot, pain along peroneal tendons proximal to fibula.  Some tendonitis is possible.  Foot type can contribute to lateral tendonitis and propensity to sprain/roll ankle.    Advised PRICE, orthotics.  Custom referral placed and otc options discussed.  Trilok given for use as needed.    Advised MRI to reeval the ankle at this point.  I advised f/u in clinic after scan to discuss findings/options.         Andrés Solorzano DPM, FACFAS          Again, thank you for allowing me to participate in the care of your patient.        Sincerely,        Andrés Solorzano DPM

## 2018-02-28 NOTE — PROGRESS NOTES
"PATIENT HISTORY:  Nicole Fritsche is a 47 year old female who presents to clinic for right ankle pain.  Pt had significant ligamentous injury with isolated posterior malleolar fracture 11/12/16.  Last time I saw her was 1/25/17.  Pt went to PT.  She states she was improving overall but the ankle pain never completely resolved.  She reports sharp pain over lateral ankle area, diffusely on the heel, plantar arch.  Reports subjective feeling of instability in the ankle when walking, as though the ankle will \"roll\" or invert.  Worse with activity.  5-8/10 pain.  Denies new injury.  She limps at times.  Nonsmoker.  Works on her feet 50% of the time.  Nondiabetic.  Family hx of DM.       EXAM:Vitals: Ht 5' 8\" (1.727 m)  Wt 145 lb 12.8 oz (66.1 kg)  BMI 22.17 kg/m2  BMI= Body mass index is 22.17 kg/(m^2).    General appearance: Patient is alert and fully cooperative with history & exam.  No sign of distress is noted during the visit.     Dermatologic: Skin is intact to right foot/ankle without significant lesions, rash or abrasion.  No paronychia or evidence of soft tissue infection is noted.     Vascular: DP & PT pulses are intact & regular bilaterally.  No significant edema or varicosities noted.  CFT and skin temperature are normal.     Neurologic: Lower extremity sensation is intact to light touch.  No evidence of weakness or contracture in the lower extremities.  No evidence of neuropathy.     Musculoskeletal: Pt guarded on exam.  Tenderness to proximal peroneal tendons on the right, also with palpation of the syndesmosis, distal fibula.  No ATFL or CFL pain.  No deltoid, medial malleolar pain, or posterior malleolar pain appreciated.  I don't appreciate obvious instability of the ankle, but again pt is guarded.  High arch foot type.  I could not locate any focal heel or arch pain.  No pain with ankle joint motion.  Patient is ambulatory without assistive device or brace.  No gross ankle deformity noted.  No foot or " ankle joint effusion is noted.    XRs of right ankle reviewed with pt.  No acute findings.  Posterior malleolus appears healed.  Mortise appears congruent w/o significant widening, but there is perhaps a small < 1mm gap between tibia and fibula on mortise view, may be angle related.      ASSESSMENT: R ankle pain, injury 11/12/16     PLAN:  Reviewed patient's chart in epic.  Discussed condition and treatment options including pros and cons.    Pt had a rare injury with isolated posterior malleolar fracture, severe ligamentous injury with syndesmotic involvement, but prior MRI did not suggest a complete rupture of the syndesmosis.  However, we did discuss the possibility of future pain/instability following her injury.  Surgical repair was offered at the time of injury, but pt elected for nonoperative care.  Likely some residual pain from this injury.    Pt also with significant cavus foot, pain along peroneal tendons proximal to fibula.  Some tendonitis is possible.  Foot type can contribute to lateral tendonitis and propensity to sprain/roll ankle.    Advised PRICE, orthotics.  Custom referral placed and otc options discussed.  Trilok given for use as needed.    Advised MRI to reeval the ankle at this point.  I advised f/u in clinic after scan to discuss findings/options.         Andrés Solorzano DPM, FACFAS

## 2018-03-16 ENCOUNTER — RADIANT APPOINTMENT (OUTPATIENT)
Dept: MRI IMAGING | Facility: CLINIC | Age: 48
End: 2018-03-16
Attending: PODIATRIST
Payer: COMMERCIAL

## 2018-03-16 DIAGNOSIS — M25.571 RIGHT ANKLE PAIN, UNSPECIFIED CHRONICITY: ICD-10-CM

## 2018-03-16 PROCEDURE — 73721 MRI JNT OF LWR EXTRE W/O DYE: CPT | Mod: TC

## 2018-10-11 ENCOUNTER — OFFICE VISIT (OUTPATIENT)
Dept: FAMILY MEDICINE | Facility: CLINIC | Age: 48
End: 2018-10-11
Payer: COMMERCIAL

## 2018-10-11 VITALS
OXYGEN SATURATION: 100 % | DIASTOLIC BLOOD PRESSURE: 68 MMHG | HEART RATE: 70 BPM | SYSTOLIC BLOOD PRESSURE: 101 MMHG | WEIGHT: 162 LBS | TEMPERATURE: 97.6 F | BODY MASS INDEX: 24.63 KG/M2

## 2018-10-11 DIAGNOSIS — J45.30 MILD PERSISTENT ASTHMA WITHOUT COMPLICATION: ICD-10-CM

## 2018-10-11 DIAGNOSIS — M25.512 ACUTE PAIN OF LEFT SHOULDER: ICD-10-CM

## 2018-10-11 DIAGNOSIS — G56.02 CARPAL TUNNEL SYNDROME OF LEFT WRIST: ICD-10-CM

## 2018-10-11 DIAGNOSIS — Z23 NEED FOR PROPHYLACTIC VACCINATION AND INOCULATION AGAINST INFLUENZA: ICD-10-CM

## 2018-10-11 DIAGNOSIS — R07.89 ATYPICAL CHEST PAIN: Primary | ICD-10-CM

## 2018-10-11 DIAGNOSIS — R07.9 CHEST PAIN, UNSPECIFIED TYPE: Primary | ICD-10-CM

## 2018-10-11 PROCEDURE — 90686 IIV4 VACC NO PRSV 0.5 ML IM: CPT | Performed by: NURSE PRACTITIONER

## 2018-10-11 PROCEDURE — 93010 ELECTROCARDIOGRAM REPORT: CPT | Performed by: INTERNAL MEDICINE

## 2018-10-11 PROCEDURE — 93005 ELECTROCARDIOGRAM TRACING: CPT | Performed by: NURSE PRACTITIONER

## 2018-10-11 PROCEDURE — 90471 IMMUNIZATION ADMIN: CPT | Performed by: NURSE PRACTITIONER

## 2018-10-11 PROCEDURE — 99214 OFFICE O/P EST MOD 30 MIN: CPT | Mod: 25 | Performed by: NURSE PRACTITIONER

## 2018-10-11 RX ORDER — ALBUTEROL SULFATE 90 UG/1
2 AEROSOL, METERED RESPIRATORY (INHALATION) EVERY 6 HOURS PRN
Qty: 1 INHALER | Refills: 1 | Status: SHIPPED | OUTPATIENT
Start: 2018-10-11 | End: 2019-05-01

## 2018-10-11 RX ORDER — FLUTICASONE PROPIONATE 110 UG/1
2 AEROSOL, METERED RESPIRATORY (INHALATION) 2 TIMES DAILY
Qty: 3 INHALER | Refills: 3 | Status: SHIPPED | OUTPATIENT
Start: 2018-10-11 | End: 2019-05-01

## 2018-10-11 ASSESSMENT — PAIN SCALES - GENERAL: PAINLEVEL: MODERATE PAIN (5)

## 2018-10-11 NOTE — PATIENT INSTRUCTIONS
Take 600 mg ibuprofen three times daily (every 6 hours) for next three days for antiinflammatory effect. After that, take as needed for pain    Wear left wrist splint at night, and much as possible during the day    If the chest pain worsens, is associated with exertion, or accompanied with nausea, shortness of breath, lightheadedness, dizziness, please call [phone]  Carpal Tunnel Syndrome    Carpal tunnel syndrome is a painful condition of the wrist and arm. It is caused by pressure on the median nerve.  The median nerve is one of the nerves that give feeling and movement to the hand. It passes through a tunnel in the wrist called the carpal tunnel. This tunnel is made up of bones and ligaments. Narrowing of this tunnel or swelling of the tissues inside the tunnel puts pressure on the median nerve. This causes numbness, pins and needles, or electric shooting pains in your hand and forearm. Often the pain is worse at night and may wake you when you are asleep.  Carpal tunnel syndrome may occur during pregnancy and with use of birth control pills. It is more common in workers who must often bend their wrists. It is also common in people who work with power tools that cause strong vibrations.  Home care    Rest the painful wrist. Avoid repeated bending of the wrist back and forth. This puts pressure on the median nerve. Avoid using power tools with strong vibrations.    If you were given a splint, wear it at night while you sleep. You may also wear it during the day for comfort.    Move your fingers and wrists often to avoid stiffness.    Elevate your arms on pillows when you lie down.    Try using the unaffected hand more.    Try not to hold your wrists in a bent, downward position.    Sometimes changes in the work place may ease symptoms. If you type most of the day, it may help to change the position of your keyboard or add a wrist support. Your wrist should be in a neutral position and not bent back when  typing.    You may use over-the-counter pain medicine to treat pain and inflammation, unless another medicine was prescribed. Anti-inflammatory pain medicines, such as ibuprofen or naproxen may be more effective than acetaminophen, which treats pain, but not inflammation. If you have chronic liver or kidney disease or ever had a stomach ulcer or GI bleeding, talk with your doctor before using these medicines.    Opioid pain medicine will only give temporary relief and does not treat the problem. If pain continues, you may need a shot of a steroid drug into your wrist.    If the above methods fail, you may need surgery. This will open the carpal tunnel and release the pressure on the trapped nerve.  Follow-up care  Follow up with your healthcare provider, or as advised, if the pain doesn t begin to improve within the next week.  If X-rays were taken, you will be notified of any new findings that may affect your care.  When to seek medical advice  Call your healthcare provider right away if any of these occur:    Pain not improving with the above treatment    Fingers or hand become cold, blue, numb, or tingly    Your whole arm becomes swollen or weak  Date Last Reviewed: 11/23/2015 2000-2017 [employer]. [address] [place] [address], [place] [address]. All rights reserved. This information is not intended as a substitute for professional medical care. Always follow your healthcare professional's instructions.

## 2018-10-11 NOTE — PROGRESS NOTES
"  SUBJECTIVE:   Nicole Fritsche is a 48 year old female who presents to clinic today for the following health issues:      Joint Pain    Onset: 4 days    Description:   Location: left shoulder and whole arm  Character: Sharp and Dull ache    Intensity: moderate    Progression of Symptoms: same    Accompanying Signs & Symptoms:  Other symptoms: tingling on her finger tips    History:   Previous similar pain: no       Precipitating factors:   Trauma or overuse: no     Alleviating factors:  Improved by: stretching and exercises not helpful    Therapies Tried and outcome:     Developed pain to left shoulder, radiates down to fingers and left chest wall 4 days ago  No history of injury  Intermittent  Tried deep breathing and relaxation which doesn't help  Has not taken anything for it  Some tingling to tips of left fingers  Hand pain will wake her up at night  Denies lightheadedness, nausea, anxiety, diaphoresis  Family history of CAD. Father had MI in 40s, sister is 62 with MI  She does not routinely exercise. Does \"light exercise\" like stretching  Has not tried to exert herself since the pain started    Needs refill Flovent  ACT: 21  She uses seasonally      Problem list and histories reviewed & adjusted, as indicated.  Additional history: none    Patient Active Problem List   Diagnosis     Arrested hydrocephalus     Tobacco abuse     CARDIOVASCULAR SCREENING; LDL GOAL LESS THAN 160     Pain in joint, upper arm     Lateral epicondylitis     Breast cancer screening     Mild persistent asthma     Ankle pain, right     Past Surgical History:   Procedure Laterality Date     C VENTRICULO-CISTERNOSTOMY,3RD VENT      for treatment of HA related to hydrocephalus     EXCISE MASS TRUNK Left 9/16/2015    Procedure: EXCISE MASS TRUNK;  Surgeon: Carlos Enrique Briones MD;  Location:  OR       Social History   Substance Use Topics     Smoking status: Former Smoker     Packs/day: 0.25     Years: 20.00     Types: Cigarettes     " Quit date: 2/18/2018     Smokeless tobacco: Former User     Quit date: 5/25/2015     Alcohol use 0.0 oz/week     0 Standard drinks or equivalent per week      Comment: once every 3-4 months     Family History   Problem Relation Age of Onset     Diabetes Mother      Cerebrovascular Disease Mother      C.A.D. Father 40     Cerebrovascular Disease Father      C.A.D. Sister 62     stent placed           Reviewed and updated as needed this visit by clinical staff  Tobacco  Allergies  Meds  Med Hx  Surg Hx  Fam Hx  Soc Hx      Reviewed and updated as needed this visit by Provider         ROS:  Constitutional, HEENT, cardiovascular, pulmonary, gi and gu systems are negative, except as otherwise noted.    OBJECTIVE:     /68 (BP Location: Right arm, Patient Position: Chair, Cuff Size: Adult Regular)  Pulse 70  Temp 97.6  F (36.4  C) (Oral)  Wt 162 lb (73.5 kg)  SpO2 100%  Breastfeeding? No  BMI 24.63 kg/m2  Body mass index is 24.63 kg/(m^2).  GENERAL: healthy, alert and no distress  RESP: lungs clear to auscultation - no rales, rhonchi or wheezes  CV: regular rate and rhythm, normal S1 S2, no S3 or S4, no murmur, click or rub, no peripheral edema and peripheral pulses strong  MS: Tenderness to palpation left chest wall, left anterior shoulder, biceps tendon. Full ROM left shoulder without difficulty. Negative empty can test. Positive Phalen left  SKIN: no suspicious lesions or rashes  NEURO: Normal strength and tone, mentation intact and speech normal    Diagnostic Test Results:  ECG: SR, HR 73    ASSESSMENT/PLAN:       ICD-10-CM    1. Chest pain, unspecified type R07.9 EKG 12-lead, tracing only   2. Mild persistent asthma without complication J45.30 fluticasone (FLOVENT HFA) 110 MCG/ACT Inhaler     albuterol (PROAIR HFA/PROVENTIL HFA/VENTOLIN HFA) 108 (90 Base) MCG/ACT inhaler   3. Acute pain of left shoulder M25.512    4. Carpal tunnel syndrome of left wrist G56.02    5. Need for prophylactic vaccination  and inoculation against influenza Z23 FLU VACCINE, SPLIT VIRUS, IM (QUADRIVALENT) [27525]- >3 YRS     Vaccine Administration, Initial [15046]       She does have family history of CAD, but ECG was normal, I can reproduce pain on exam, and she does not have other symptoms suggestive of ACS  Reviewed S/S and when to call 911  Suspect pain is MSK in origin. Will treat with rest, NSAID and ice  No findings to warrant imaging  Also likely some carpal tunnel. Reviewed etiology and treatment. She was given splint in clinic      HOMERO Jurado [facility]

## 2018-10-11 NOTE — MR AVS SNAPSHOT
After Visit Summary   10/11/2018    Nicole Fritsche    MRN: [id number]           Patient Information     Date Of Birth          1970        Visit Information        Provider Department      10/11/2018 11:00 AM Nataliya Colunga APRN [facility]        Today's Diagnoses     Chest pain, unspecified type    -  1    Mild persistent asthma without complication        Acute pain of left shoulder        Carpal tunnel syndrome of left wrist          Care Instructions    Take 600 mg ibuprofen three times daily (every 6 hours) for next three days for antiinflammatory effect. After that, take as needed for pain    Wear left wrist splint at night, and much as possible during the day    If the chest pain worsens, is associated with exertion, or accompanied with nausea, shortness of breath, lightheadedness, dizziness, please call 911  Carpal Tunnel Syndrome    Carpal tunnel syndrome is a painful condition of the wrist and arm. It is caused by pressure on the median nerve.  The median nerve is one of the nerves that give feeling and movement to the hand. It passes through a tunnel in the wrist called the carpal tunnel. This tunnel is made up of bones and ligaments. Narrowing of this tunnel or swelling of the tissues inside the tunnel puts pressure on the median nerve. This causes numbness, pins and needles, or electric shooting pains in your hand and forearm. Often the pain is worse at night and may wake you when you are asleep.  Carpal tunnel syndrome may occur during pregnancy and with use of birth control pills. It is more common in workers who must often bend their wrists. It is also common in people who work with power tools that cause strong vibrations.  Home care    Rest the painful wrist. Avoid repeated bending of the wrist back and forth. This puts pressure on the median nerve. Avoid using power tools with strong vibrations.    If you were given a splint, wear it at night  while you sleep. You may also wear it during the day for comfort.    Move your fingers and wrists often to avoid stiffness.    Elevate your arms on pillows when you lie down.    Try using the unaffected hand more.    Try not to hold your wrists in a bent, downward position.    Sometimes changes in the work place may ease symptoms. If you type most of the day, it may help to change the position of your keyboard or add a wrist support. Your wrist should be in a neutral position and not bent back when typing.    You may use over-the-counter pain medicine to treat pain and inflammation, unless another medicine was prescribed. Anti-inflammatory pain medicines, such as ibuprofen or naproxen may be more effective than acetaminophen, which treats pain, but not inflammation. If you have chronic liver or kidney disease or ever had a stomach ulcer or GI bleeding, talk with your doctor before using these medicines.    Opioid pain medicine will only give temporary relief and does not treat the problem. If pain continues, you may need a shot of a steroid drug into your wrist.    If the above methods fail, you may need surgery. This will open the carpal tunnel and release the pressure on the trapped nerve.  Follow-up care  Follow up with your healthcare provider, or as advised, if the pain doesn t begin to improve within the next week.  If X-rays were taken, you will be notified of any new findings that may affect your care.  When to seek medical advice  Call your healthcare provider right away if any of these occur:    Pain not improving with the above treatment    Fingers or hand become cold, blue, numb, or tingly    Your whole arm becomes swollen or weak  Date Last Reviewed: 11/23/2015 2000-2017 [employer]. [address] [place] [address], [place] [address]. All rights reserved. This information is not intended as a substitute for professional medical care. Always follow your healthcare professional's  instructions.                Follow-ups after your visit        Who to contact     If you have questions or need follow up information about today's clinic visit or your schedule please contact [facility] directly at [phone].  Normal or non-critical lab and imaging results will be communicated to you by MyChart, letter or phone within 4 business days after the clinic has received the results. If you do not hear from us within 7 days, please contact the clinic through [employer] or phone. If you have a critical or abnormal lab result, we will notify you by phone as soon as possible.  Submit refill requests through [employer] or call your pharmacy and they will forward the refill request to us. Please allow 3 business days for your refill to be completed.          Additional Information About Your Visit        [employer] Information     [employer] gives you secure access to your electronic health record. If you see a primary care provider, you can also send messages to your care team and make appointments. If you have questions, please call your primary care clinic.  If you do not have a primary care provider, please call [phone] and they will assist you.        Care EveryWhere ID     This is your Care EveryWhere ID. This could be used by other organizations to access your [facility] medical records  [id number]        Your Vitals Were     Pulse Temperature Pulse Oximetry Breastfeeding? BMI (Body Mass Index)       70 97.6  F (36.4  C) (Oral) 100% No 24.63 kg/m2        Blood Pressure from Last 3 Encounters:   10/11/18 101/68   12/19/17 138/80   02/13/17 112/78    Weight from Last 3 Encounters:   10/11/18 162 lb (73.5 kg)   02/28/18 145 lb 12.8 oz (66.1 kg)   12/19/17 147 lb 9.6 oz (67 kg)              We Performed the Following     EKG 12-lead, tracing only          Today's Medication Changes          These changes are accurate as of 10/11/18 11:43 AM.  If you have any questions, ask your nurse or  doctor.               Start taking these medicines.        Dose/Directions    albuterol 108 (90 Base) MCG/ACT inhaler   Commonly known as:  PROAIR HFA/PROVENTIL HFA/VENTOLIN HFA   Used for:  Mild persistent asthma without complication   Started by:  Nataliya Colunga APRN CNP        Dose:  2 puff   Inhale 2 puffs into the lungs every 6 hours as needed for shortness of breath / dyspnea or wheezing   Quantity:  1 Inhaler   Refills:  1            Where to get your medicines      These medications were sent to [id number] - [place], [place] - [address] AT [facility] [address]  [address], [place] [address]     Phone:  [phone]     albuterol 108 (90 Base) MCG/ACT inhaler    fluticasone 110 MCG/ACT Inhaler                Primary Care Provider Office Phone # Fax #    Ansley Josseline White PA-C [phone]       [address] [facility] [address]        Equal Access to Services     GOSIA PEREZ AH: Hadii oren ku hadasho Soomaali, waaxda luqadaha, qaybta kaalmada adeegyada, waxay amanuelin hayselvin hale . So [facility] [phone].    ATENCIÓN: Si habla español, tiene a otoole disposición servicios gratuitos de asistencia lingüística. Ll[facility] [phone].    We comply with applicable federal civil rights laws and [place] laws. We do not discriminate on the basis of race, color, national origin, age, disability, sex, sexual orientation, or gender identity.            Thank you!     Thank you for choosing [facility]  for your care. Our goal is always to provide you with excellent care. Hearing back from our patients is one way we can continue to improve our services. Please take a few minutes to complete the written survey that you may receive in the mail after your visit with us. Thank you!             Your Updated Medication List - Protect others around you: Learn how to safely use, store and throw away your  medicines at www.disposemymeds.org.          This list is accurate as of 10/11/18 11:43 AM.  Always use your most recent med list.                   Brand Name Dispense Instructions for use Diagnosis    albuterol 108 (90 Base) MCG/ACT inhaler    PROAIR HFA/PROVENTIL HFA/VENTOLIN HFA    1 Inhaler    Inhale 2 puffs into the lungs every 6 hours as needed for shortness of breath / dyspnea or wheezing    Mild persistent asthma without complication       fluticasone 110 MCG/ACT Inhaler    FLOVENT HFA    3 Inhaler    Inhale 2 puffs into the lungs 2 times daily    Mild persistent asthma without complication       fluticasone 50 MCG/ACT spray    FLONASE    1 Package    Spray 1-2 sprays into both nostrils daily    Allergic rhinitis       IRON SUPPLEMENT PO      Take 325 mg by mouth 2 times daily (with meals)        MULTIVITAMINS PO      Take  by mouth daily.        order for DME     1 Device    Equipment being ordered: Tri-bee brace    Right ankle pain, unspecified chronicity, Cavus deformity of foot

## 2018-10-11 NOTE — PROGRESS NOTES
Injectable Influenza Immunization Documentation    1.  Is the person to be vaccinated sick today?   No    2. Does the person to be vaccinated have an allergy to a component   of the vaccine?   No  Egg Allergy Algorithm Link    3. Has the person to be vaccinated ever had a serious reaction   to influenza vaccine in the past?   No    4. Has the person to be vaccinated ever had Guillain-Barré syndrome?   No    Form completed by ADDI Lomeli MA  Patient/or Parent of Patient instructed to wait 15 minutes after injection in the case of a allergic reaction.

## 2018-10-12 ASSESSMENT — ASTHMA QUESTIONNAIRES: ACT_TOTALSCORE: 21

## 2019-05-01 ENCOUNTER — OFFICE VISIT (OUTPATIENT)
Dept: FAMILY MEDICINE | Facility: CLINIC | Age: 49
End: 2019-05-01
Payer: COMMERCIAL

## 2019-05-01 VITALS
HEART RATE: 68 BPM | DIASTOLIC BLOOD PRESSURE: 78 MMHG | TEMPERATURE: 98.2 F | HEIGHT: 68 IN | SYSTOLIC BLOOD PRESSURE: 110 MMHG | WEIGHT: 162 LBS | BODY MASS INDEX: 24.55 KG/M2

## 2019-05-01 DIAGNOSIS — J45.30 MILD PERSISTENT ASTHMA WITHOUT COMPLICATION: ICD-10-CM

## 2019-05-01 DIAGNOSIS — Z00.01 ENCOUNTER FOR ROUTINE ADULT MEDICAL EXAM WITH ABNORMAL FINDINGS: Primary | ICD-10-CM

## 2019-05-01 DIAGNOSIS — Z12.31 VISIT FOR SCREENING MAMMOGRAM: ICD-10-CM

## 2019-05-01 DIAGNOSIS — L82.1 SEBORRHEIC KERATOSIS: ICD-10-CM

## 2019-05-01 DIAGNOSIS — J30.2 SEASONAL ALLERGIC RHINITIS, UNSPECIFIED TRIGGER: ICD-10-CM

## 2019-05-01 DIAGNOSIS — N93.9 VAGINAL SPOTTING: ICD-10-CM

## 2019-05-01 DIAGNOSIS — Z11.4 SCREENING FOR HIV (HUMAN IMMUNODEFICIENCY VIRUS): ICD-10-CM

## 2019-05-01 DIAGNOSIS — Z12.11 SCREEN FOR COLON CANCER: ICD-10-CM

## 2019-05-01 LAB
CHOLEST SERPL-MCNC: 153 MG/DL
HDLC SERPL-MCNC: 42 MG/DL
HIV 1+2 AB+HIV1 P24 AG SERPL QL IA: NONREACTIVE
LDLC SERPL CALC-MCNC: 88 MG/DL
NONHDLC SERPL-MCNC: 117 MG/DL
TRIGL SERPL-MCNC: 146 MG/DL

## 2019-05-01 PROCEDURE — 99213 OFFICE O/P EST LOW 20 MIN: CPT | Performed by: PHYSICIAN ASSISTANT

## 2019-05-01 PROCEDURE — 36415 COLL VENOUS BLD VENIPUNCTURE: CPT | Performed by: PHYSICIAN ASSISTANT

## 2019-05-01 PROCEDURE — 87389 HIV-1 AG W/HIV-1&-2 AB AG IA: CPT | Performed by: PHYSICIAN ASSISTANT

## 2019-05-01 PROCEDURE — 99396 PREV VISIT EST AGE 40-64: CPT | Performed by: PHYSICIAN ASSISTANT

## 2019-05-01 PROCEDURE — 80061 LIPID PANEL: CPT | Performed by: PHYSICIAN ASSISTANT

## 2019-05-01 RX ORDER — FLUTICASONE PROPIONATE 50 MCG
1-2 SPRAY, SUSPENSION (ML) NASAL DAILY
Qty: 16 G | Refills: 1 | Status: SHIPPED | OUTPATIENT
Start: 2019-05-01 | End: 2021-06-28

## 2019-05-01 RX ORDER — ALBUTEROL SULFATE 90 UG/1
2 AEROSOL, METERED RESPIRATORY (INHALATION) EVERY 6 HOURS PRN
Qty: 18 G | Refills: 1 | Status: SHIPPED | OUTPATIENT
Start: 2019-05-01 | End: 2021-06-28

## 2019-05-01 RX ORDER — FLUTICASONE PROPIONATE 110 UG/1
2 AEROSOL, METERED RESPIRATORY (INHALATION) 2 TIMES DAILY
Qty: 3 INHALER | Refills: 3 | Status: SHIPPED | OUTPATIENT
Start: 2019-05-01 | End: 2021-06-28

## 2019-05-01 ASSESSMENT — ENCOUNTER SYMPTOMS
SORE THROAT: 0
CHILLS: 1
EYE PAIN: 0
HEADACHES: 1
JOINT SWELLING: 0
MYALGIAS: 1
DIZZINESS: 0
NERVOUS/ANXIOUS: 1
HEARTBURN: 0
HEMATURIA: 0
FEVER: 0
HEMATOCHEZIA: 0
ABDOMINAL PAIN: 0
BREAST MASS: 0
DYSURIA: 0
WEAKNESS: 0
PARESTHESIAS: 0
ARTHRALGIAS: 0
FREQUENCY: 0
PALPITATIONS: 0
CONSTIPATION: 0
DIARRHEA: 0
NAUSEA: 0
SHORTNESS OF BREATH: 0
COUGH: 0

## 2019-05-01 ASSESSMENT — MIFFLIN-ST. JEOR: SCORE: 1403.33

## 2019-05-01 NOTE — PATIENT INSTRUCTIONS
BRIDGETTE Rodriguez will call you to schedule an appointment for colonoscopy.  Call [facility] to schedule appointment for imaging study-[phone]-Mammogram.  [facility]  will call you to schedule ultrasound.  Ansley or her team will be in touch with you with results.  Shingles vaccine at pharmacy.    Refills sent to pharmacy.    Ansley White PA-C

## 2019-05-01 NOTE — PROGRESS NOTES
SUBJECTIVE:   CC: Nicole Fritsche is an 49 year old woman who presents for preventive health visit.     Healthy Habits:     Getting at least 3 servings of Calcium per day:  Yes    Bi-annual eye exam:  Yes    Dental care twice a year:  Yes    Sleep apnea or symptoms of sleep apnea:  Daytime drowsiness    Diet:  Regular (no restrictions)    Frequency of exercise:  2-3 days/week    Duration of exercise:  15-30 minutes    Taking medications regularly:  Yes    Medication side effects:  Not applicable    PHQ-2 Total Score: 2    Additional concerns today:  Yes    Check out a mole, thinks it is getting bigger on her R mid abdomen.  Continues to be a non-smoker, still vaping on occasion.  Spotting with intercourse x 1.  No longer having regular periods. 1-2 days of bleeding every 5-6 months. Denies any other vaginal symptoms, no concern for pregnancy.    Asthma Follow-Up    Was ACT completed today?    Yes    ACT Total Scores 10/11/2018   ACT TOTAL SCORE -   ASTHMA ER VISITS -   ASTHMA HOSPITALIZATIONS -   ACT TOTAL SCORE (Goal Greater than or Equal to 20) 21   In the past 12 months, how many times did you visit the emergency room for your asthma without being admitted to the hospital? 0   In the past 12 months, how many times were you hospitalized overnight because of your asthma? 0       Recent asthma triggers that patient is dealing with: spring allergies     Today's PHQ-2 Score:   PHQ-2 ( 1999 Pfizer) 5/1/2019   Q1: Little interest or pleasure in doing things 1   Q2: Feeling down, depressed or hopeless 1   PHQ-2 Score 2   Q1: Little interest or pleasure in doing things Several days   Q2: Feeling down, depressed or hopeless Several days   PHQ-2 Score 2     Abuse: Current or Past(Physical, Sexual or Emotional)- No  Do you feel safe in your environment? Yes    Social History     Tobacco Use     Smoking status: Former Smoker     Packs/day: 0.25     Years: 20.00     Pack years: 5.00     Types: Cigarettes     Last attempt to  quit: 2018     Years since quittin.1     Smokeless tobacco: Former User     Quit date: 2015   Substance Use Topics     Alcohol use: Yes     Alcohol/week: 0.0 oz     Comment: once every 3-4 months       Alcohol Use 2019   Prescreen: >3 drinks/day or >7 drinks/week? No   Prescreen: >3 drinks/day or >7 drinks/week? -   No flowsheet data found.    Reviewed orders with patient.  Reviewed health maintenance and updated orders accordingly - Yes      Mammogram Screening: Patient over age 50, mutual decision to screen reflected in health maintenance.    Pertinent mammograms are reviewed under the imaging tab.  History of abnormal Pap smear: NO - age 30-65 PAP every 5 years with negative HPV co-testing recommended  PAP / HPV Latest Ref Rng & Units 6/15/2015 3/28/2012 2009   PAP - NIL OTHER-NIL, See Result NIL   HPV 16 DNA NEG Negative - -   HPV 18 DNA NEG Negative - -   OTHER HR HPV NEG Negative - -     Reviewed and updated as needed this visit by clinical staff  Tobacco  Allergies  Meds  Problems  Med Hx  Surg Hx  Fam Hx  Soc Hx          Reviewed and updated as needed this visit by Provider  Tobacco  Meds  Problems  Med Hx  Surg Hx  Fam Hx  Soc Hx           Review of Systems   Constitutional: Positive for chills. Negative for fever.   HENT: Negative for congestion, ear pain, hearing loss and sore throat.    Eyes: Negative for pain and visual disturbance.   Respiratory: Negative for cough and shortness of breath.    Cardiovascular: Negative for chest pain, palpitations and peripheral edema.   Gastrointestinal: Negative for abdominal pain, constipation, diarrhea, heartburn, hematochezia and nausea.   Breasts:  Negative for tenderness, breast mass and discharge.   Genitourinary: Positive for vaginal bleeding. Negative for dysuria, frequency, genital sores, hematuria, pelvic pain, urgency and vaginal discharge.   Musculoskeletal: Positive for myalgias. Negative for arthralgias and joint  "swelling.   Skin: Negative for rash.   Neurological: Positive for headaches. Negative for dizziness, weakness and paresthesias.   Psychiatric/Behavioral: Negative for mood changes. The patient is nervous/anxious.         OBJECTIVE:   /78 (Cuff Size: Adult Regular)   Pulse 68   Temp 98.2  F (36.8  C) (Oral)   Ht 1.727 m (5' 8\")   Wt 73.5 kg (162 lb)   BMI 24.63 kg/m    Physical Exam  GENERAL: healthy, alert and no distress  EYES: Eyes grossly normal to inspection, PERRL and conjunctivae and sclerae normal  HENT: ear canals and TM's normal, nose and mouth without ulcers or lesions  NECK: no adenopathy, no asymmetry, masses, or scars and thyroid normal to palpation  RESP: lungs clear to auscultation - no rales, rhonchi or wheezes  BREAST: normal without masses, tenderness or nipple discharge and no palpable axillary masses or adenopathy  CV: regular rate and rhythm, normal S1 S2, no S3 or S4, no murmur, click or rub, no peripheral edema and peripheral pulses strong  ABDOMEN: soft, nontender, no hepatosplenomegaly, no masses and bowel sounds normal  MS: no gross musculoskeletal defects noted, no edema  SKIN: R mid abdomen with 6 mm SK  NEURO: Normal strength and tone, mentation intact and speech normal  PSYCH: mentation appears normal, affect normal/bright    Diagnostic Test Results:  none     ASSESSMENT/PLAN:   1. Encounter for routine adult medical exam with abnormal findings  Follow up pending above results. Encouraged healthy diet and regular exercise, monthy SBE, and yearly exams.  - Lipid panel reflex to direct LDL Fasting    2. Mild persistent asthma without complication  ACT of 20, well controlled.  - albuterol (PROAIR HFA/PROVENTIL HFA/VENTOLIN HFA) 108 (90 Base) MCG/ACT inhaler; Inhale 2 puffs into the lungs every 6 hours as needed for shortness of breath / dyspnea or wheezing  Dispense: 18 g; Refill: 1  - fluticasone (FLOVENT HFA) 110 MCG/ACT inhaler; Inhale 2 puffs into the lungs 2 times daily  " "Dispense: 3 Inhaler; Refill: 3    3. Seasonal allergic rhinitis, unspecified trigger  Refills of below medications for stable chronic conditions.  - fluticasone (FLONASE) 50 MCG/ACT nasal spray; Spray 1-2 sprays into both nostrils daily  Dispense: 16 g; Refill: 1    4. Screen for colon cancer  Discussed options, she is interested in doing the colonoscopy and will be contacted by [facility]  - GASTROENTEROLOGY ADULT REF PROCEDURE ONLY Other; [facility] [phone]    5. Visit for screening mammogram  - MA SCREENING DIGITAL BILAT - Future  (s+30); Future    6. Screening for HIV (human immunodeficiency virus)  - HIV Screening    7. Vaginal spotting  With intercourse.  Periods are otherwise 1 day every 6 months.  - US Pelvic Complete w Transvaginal; Future    8. Seborrheic keratosis  Reassurance provided.      COUNSELING:  Reviewed preventive health counseling, as reflected in patient instructions    BP Readings from Last 1 Encounters:   05/01/19 110/78     Estimated body mass index is 24.63 kg/m  as calculated from the following:    Height as of this encounter: 1.727 m (5' 8\").    Weight as of this encounter: 73.5 kg (162 lb).     reports that she quit smoking about 14 months ago. Her smoking use included cigarettes. She has a 5.00 pack-year smoking history. She quit smokeless tobacco use about 3 years ago.      Counseling Resources:  ATP IV Guidelines  Pooled Cohorts Equation Calculator  Breast Cancer Risk Calculator  FRAX Risk Assessment  ICSI Preventive Guidelines  Dietary Guidelines for Americans, 2010  USDA's MyPlate  ASA Prophylaxis  Lung CA Screening    Ansley White PA-C  [facility]  "

## 2019-05-02 ENCOUNTER — ANCILLARY PROCEDURE (OUTPATIENT)
Dept: MAMMOGRAPHY | Facility: CLINIC | Age: 49
End: 2019-05-02
Attending: PHYSICIAN ASSISTANT
Payer: COMMERCIAL

## 2019-05-02 DIAGNOSIS — Z12.31 VISIT FOR SCREENING MAMMOGRAM: ICD-10-CM

## 2019-05-02 PROCEDURE — 77067 SCR MAMMO BI INCL CAD: CPT | Mod: TC | Performed by: FAMILY MEDICINE

## 2019-05-02 ASSESSMENT — ASTHMA QUESTIONNAIRES: ACT_TOTALSCORE: 20

## 2019-05-03 ENCOUNTER — ANCILLARY PROCEDURE (OUTPATIENT)
Dept: ULTRASOUND IMAGING | Facility: CLINIC | Age: 49
End: 2019-05-03
Attending: PHYSICIAN ASSISTANT
Payer: COMMERCIAL

## 2019-05-03 DIAGNOSIS — N93.9 VAGINAL SPOTTING: ICD-10-CM

## 2019-05-03 PROCEDURE — 76830 TRANSVAGINAL US NON-OB: CPT

## 2019-05-03 PROCEDURE — 76856 US EXAM PELVIC COMPLETE: CPT

## 2019-05-21 ENCOUNTER — RESULT FOLLOW UP (OUTPATIENT)
Dept: FAMILY MEDICINE | Facility: CLINIC | Age: 49
End: 2019-05-21

## 2019-05-21 ENCOUNTER — OFFICE VISIT (OUTPATIENT)
Dept: FAMILY MEDICINE | Facility: CLINIC | Age: 49
End: 2019-05-21
Payer: COMMERCIAL

## 2019-05-21 VITALS
TEMPERATURE: 98 F | SYSTOLIC BLOOD PRESSURE: 104 MMHG | WEIGHT: 162.8 LBS | BODY MASS INDEX: 24.67 KG/M2 | HEART RATE: 76 BPM | DIASTOLIC BLOOD PRESSURE: 74 MMHG | HEIGHT: 68 IN

## 2019-05-21 DIAGNOSIS — D25.9 FIBROID OF CERVIX: ICD-10-CM

## 2019-05-21 DIAGNOSIS — N93.9 VAGINAL BLEEDING: Primary | ICD-10-CM

## 2019-05-21 DIAGNOSIS — Z12.4 SCREENING FOR CERVICAL CANCER: ICD-10-CM

## 2019-05-21 LAB
SPECIMEN SOURCE: NORMAL
WET PREP SPEC: NORMAL

## 2019-05-21 PROCEDURE — 87491 CHLMYD TRACH DNA AMP PROBE: CPT | Performed by: PHYSICIAN ASSISTANT

## 2019-05-21 PROCEDURE — G0145 SCR C/V CYTO,THINLAYER,RESCR: HCPCS | Performed by: PHYSICIAN ASSISTANT

## 2019-05-21 PROCEDURE — 87591 N.GONORRHOEAE DNA AMP PROB: CPT | Performed by: PHYSICIAN ASSISTANT

## 2019-05-21 PROCEDURE — G0124 SCREEN C/V THIN LAYER BY MD: HCPCS | Performed by: PHYSICIAN ASSISTANT

## 2019-05-21 PROCEDURE — 87624 HPV HI-RISK TYP POOLED RSLT: CPT | Performed by: PHYSICIAN ASSISTANT

## 2019-05-21 PROCEDURE — 99213 OFFICE O/P EST LOW 20 MIN: CPT | Performed by: PHYSICIAN ASSISTANT

## 2019-05-21 PROCEDURE — 87210 SMEAR WET MOUNT SALINE/INK: CPT | Performed by: PHYSICIAN ASSISTANT

## 2019-05-21 ASSESSMENT — MIFFLIN-ST. JEOR: SCORE: 1406.96

## 2019-05-21 NOTE — LETTER
July 23, 2019      Nicole Fritsche  [address] [place] [address]    Dear Ms.Fritsche,      At [facility], your health and wellness is our primary concern. That is why we are following up on an abnormal pap from 5/21/19, which was reported as ASCUS and positive for high risk HPV type 18. Your provider had recommended that you have a Colposcopy  completed by 8/21/19. Our records do not show that this has been scheduled.    It is important to complete the follow up that your provider has suggested for you to ensure that there are no worsening changes which may, over time, develop into cancer.      Please contact our [facility] at [phone], choose #2 (ob/gyn) then #3 for the nurses to schedule an appointment for a Colposcopy (this cannot be scheduled through [facility]) at your earliest convenience. If you have questions or concerns, please call the clinic and we will be happy to assist you.    If you have completed the tests outside of [facility], please have the results forwarded to our office. We will update the chart for your primary Physician to review before your next annual physical.     Thank you for choosing [facility]!    Sincerely,      Your [facility] Care Team/rlm

## 2019-05-21 NOTE — PATIENT INSTRUCTIONS
Ansley or her team will be in touch with you with results.  If negative, will recommend consultation with one of our OBYGN.    Ansley White PA-C

## 2019-05-21 NOTE — PROGRESS NOTES
Subjective     Nicole Fritsche is a 49 year old female who presents to clinic today for the following health issues:    HPI   Vaginal Symptoms  Onset: 3 days ago    Description:  Vaginal Discharge: none   Itching (Pruritis): no   Burning sensation:  no   Odor: no     Accompanying Signs & Symptoms:  Pain with Urination: no   Abdominal Pain: YES- cramping-mild,  Has had heavy bleeding after intercourse, then lightens over the next 3 days  Fever: no     History:   Sexually active: YES  New Partner: no   Possibility of Pregnancy:  No    Precipitating factors:   Recent Antibiotic Use: no     Alleviating factors:  Heating pad    Patient did have some fibroids in the uterus.  Patient says when she has intercourse, she bleeds and it hurts a little bit.   Therapies Tried and outcome: Heating pad helped with cramping.     Pt was sent for TVUS at recent CPE as at end of visit she noted bleeding after intercourse and intermittent spotting.     PELVIC ULTRASOUND WITH ENDOVAGINAL TRANSDUCER IMAGING   5/3/2019 8:10  AM      HISTORY: Vaginal spotting. Anemia.     TECHNIQUE:  Transabdominal and endovaginal sonography was performed.     COMPARISON: None.     FINDINGS: The uterus is normal in size and is retroverted measuring  8.1 x 4.5 x 5.6 cm. The endometrium is normal in thickness at 0.2 cm.  There is a solitary fibroid in the region of the cervix measuring 2.4  x 2.0 x 1.5 cm. There is a simple right ovarian cyst measuring 3.4 x  3.0 x 1.9 cm. Left ovary is normal in appearance. No adnexal mass. No  free pelvic fluid.                                                      IMPRESSION:  1. 3.4 cm simple right ovarian cyst.  2. 2.4 cm fibroid in the region of the cervix.     ABELINO TORRES MD        Reviewed and updated as needed this visit by Provider         Review of Systems   ROS COMP: Constitutional, HEENT, cardiovascular, pulmonary, gi and gu systems are negative, except as otherwise noted.      Objective    /74 (BP  "Location: Right arm, Patient Position: Chair, Cuff Size: Adult Regular)   Pulse 76   Temp 98  F (36.7  C) (Oral)   Ht 1.727 m (5' 8\")   Wt 73.8 kg (162 lb 12.8 oz)   LMP  (LMP Unknown)   Breastfeeding? No   BMI 24.75 kg/m    Body mass index is 24.75 kg/m .  Physical Exam   GENERAL: healthy, alert and no distress  RESP: lungs clear to auscultation - no rales, rhonchi or wheezes  CV: regular rate and rhythm, normal S1 S2, no S3 or S4, no murmur, click or rub, no peripheral edema and peripheral pulses strong  ABDOMEN: soft, nontender, no hepatosplenomegaly, no masses and bowel sounds normal   (female): normal female external genitalia, normal urethral meatus, vaginal mucosa, normal adnexa/uterus without masses or discharge. Cervix is firm and friable, bleeding with light touch  MS: no gross musculoskeletal defects noted, no edema    Diagnostic Test Results:  Labs reviewed in Epic        Assessment & Plan     1. Vaginal bleeding  Wet prep is negative, STD testing pending.  - Wet prep  - NEISSERIA GONORRHOEA PCR  - CHLAMYDIA TRACHOMATIS PCR    2. Fibroid of cervix  3. Screening for cervical cancer  Given friable and firm cervix, pap smear was completed.  Recommended consultation with OBGYN given TVUS findings and bleeding with intercourse.   Pt will be scheduled.  - Pap imaged thin layer screen with HPV - recommended age 30 - 65 years (select HPV order below)  - HPV High Risk Types DNA Cervical     Patient Instructions   Ansley or her team will be in touch with you with results.  If negative, will recommend consultation with one of our OBYGN.    Ansley White PA-C        No follow-ups on file.    Ansley White PA-C  [facility]      "

## 2019-05-23 LAB
C TRACH DNA SPEC QL NAA+PROBE: NEGATIVE
N GONORRHOEA DNA SPEC QL NAA+PROBE: NEGATIVE
SPECIMEN SOURCE: NORMAL
SPECIMEN SOURCE: NORMAL

## 2019-05-24 LAB
COPATH REPORT: ABNORMAL
PAP: ABNORMAL

## 2019-05-28 PROBLEM — R87.810 ASCUS WITH POSITIVE HIGH RISK HPV CERVICAL: Status: ACTIVE | Noted: 2019-05-28

## 2019-05-28 PROBLEM — R87.610 ASCUS WITH POSITIVE HIGH RISK HPV CERVICAL: Status: ACTIVE | Noted: 2019-05-28

## 2019-05-28 LAB
FINAL DIAGNOSIS: ABNORMAL
HPV HR 12 DNA CVX QL NAA+PROBE: NEGATIVE
HPV16 DNA SPEC QL NAA+PROBE: NEGATIVE
HPV18 DNA SPEC QL NAA+PROBE: POSITIVE
SPECIMEN DESCRIPTION: ABNORMAL
SPECIMEN SOURCE CVX/VAG CYTO: ABNORMAL

## 2019-05-28 NOTE — PROGRESS NOTES
5/21/19 ASCUS pap, + HR HPV # 18. Plan: [place]  5/29/19 Pt notified by phone.  6/12/19 [place] - INVASIVE WELL-DIFFERENTIATED SQUAMOUS CELL CARCINOMA, focally   Keratinizing (tessa)  7/23/19 [place] reminder letter sent (rlm)

## 2019-05-29 ENCOUNTER — TELEPHONE (OUTPATIENT)
Dept: FAMILY MEDICINE | Facility: CLINIC | Age: 49
End: 2019-05-29
Payer: COMMERCIAL

## 2019-05-29 NOTE — TELEPHONE ENCOUNTER
Reason for Call:  Other / Colposcopy appointment    Detailed comments: Patient called and requested a call back to schedule a colposcopy with Dr Gin Bejarano.    Phone Number Patient can be reached at: Cell number on file:    Telephone Information:   Mobile [phone]     Best Time: ASAP    Can we leave a detailed message on this number? YES    Call taken on 5/29/2019 at 5:08 PM by Ita Rodríguez

## 2019-05-30 NOTE — TELEPHONE ENCOUNTER
Patient calling this morning to schedule colp.   She would like a call back before 12:30 today (5/30/19), as she will be going to work and cannot accept calls.   PH: [phone]    Thank you!  Meenakshi Velasco, LAURENN, RN, Pap Tracking Nurse

## 2019-06-03 ENCOUNTER — OFFICE VISIT (OUTPATIENT)
Dept: FAMILY MEDICINE | Facility: CLINIC | Age: 49
End: 2019-06-03
Payer: COMMERCIAL

## 2019-06-03 VITALS
SYSTOLIC BLOOD PRESSURE: 128 MMHG | HEIGHT: 68 IN | BODY MASS INDEX: 24.64 KG/M2 | TEMPERATURE: 97.5 F | DIASTOLIC BLOOD PRESSURE: 70 MMHG | WEIGHT: 162.6 LBS

## 2019-06-03 DIAGNOSIS — R87.610 ASCUS WITH POSITIVE HIGH RISK HPV CERVICAL: Primary | ICD-10-CM

## 2019-06-03 DIAGNOSIS — R87.810 ASCUS WITH POSITIVE HIGH RISK HPV CERVICAL: Primary | ICD-10-CM

## 2019-06-03 LAB — HCG UR QL: NEGATIVE

## 2019-06-03 PROCEDURE — 88342 IMHCHEM/IMCYTCHM 1ST ANTB: CPT | Performed by: FAMILY MEDICINE

## 2019-06-03 PROCEDURE — 88305 TISSUE EXAM BY PATHOLOGIST: CPT | Performed by: FAMILY MEDICINE

## 2019-06-03 PROCEDURE — 88341 IMHCHEM/IMCYTCHM EA ADD ANTB: CPT | Performed by: FAMILY MEDICINE

## 2019-06-03 PROCEDURE — 57454 BX/CURETT OF CERVIX W/SCOPE: CPT | Performed by: FAMILY MEDICINE

## 2019-06-03 PROCEDURE — 81025 URINE PREGNANCY TEST: CPT | Performed by: FAMILY MEDICINE

## 2019-06-03 ASSESSMENT — MIFFLIN-ST. JEOR: SCORE: 1406.05

## 2019-06-03 ASSESSMENT — PAIN SCALES - GENERAL: PAINLEVEL: NO PAIN (0)

## 2019-06-03 NOTE — PROGRESS NOTES
Nicole Fritsche is a 49 year old female who presents for initial colposcopy, referred by Ansley Velasco.   Pap smear 1 months ago showed: ASC-US with HR HPV . The prior pap showed normal.     Past Medical History:   Diagnosis Date     Abnormal Pap smear of cervix 05/21/2019    see problem list     Arrested hydrocephalus 2/3/2009    Ongoing HAs, seeing neurosurgeon. Per past notes from Dr. Resendiz, HAs most likely not secondary to the hydrocephalus, but rather vascular inorgin.  Please see scanned in records.     Cervical high risk HPV (human papillomavirus) test positive 05/21/2019    See problem list     Family History   Problem Relation Age of Onset     Diabetes Mother      Cerebrovascular Disease Mother      C.A.D. Father 40     Cerebrovascular Disease Father      C.A.D. Sister 62        stent placed       Previous history of abnormal paps?: No  History of cryotherapy (freezing)?: : No  History of veneral diseases: : No  Do you desire testing for any of these diseases? : No  History of genital warts:  No  Visible warts now?:  No  Previously treated? If so, how?:  No     No LMP recorded (lmp unknown). Patient is premenopausal.    History   Smoking Status     Former Smoker     Packs/day: 0.25     Years: 20.00     Types: Cigarettes     Quit date: 2/18/2018   Smokeless Tobacco     Former User     Quit date: 5/25/2015     History of sexual abuse:  No  Allergies as of 06/03/2019 - Reviewed 06/03/2019   Allergen Reaction Noted     Nkda [no known drug allergies]  11/19/2009        PROCEDURE:  Before the procedure, it was ensured that the patient was educated regarding the nature of her findings to date, the implications of them, and what was to be done. She has been made aware of the role of HPV, the natural history of infection, ways to minimize her future risk, the effect of HPV on the cervix, and treatment options available should they be indicated. The   pathophysiology of the cervix, including a discussion of  squamous vs. endometrial cells, and squamous metaplasia have all been reviewed, using illustrations and sketches. The details of the colposcopic procedure were reviewed, as well as the risks of missed diagnoses, pain, infection and bleeding. All questions were answered before proceeding, and informed consent was therefore obtained.    Bimanual examination: was not done  Unenhanced examination of the cervix was normal without lesions.  Pap smear and endocervical sampling not obtained due to:    not due  Please refer to images section for details!  Pap repeated?:  No  SCJ seen?:  no  Endocervical speculum needed?:  No  ECC done?:  Yes   Lugol's solution used?:  No  Satisfactory examination?:  no    Vaginal vault: normal to cursory inspection   Urethra normal?:  yes  Labia normal?:  yes  Perineum normal?:  yes  Rectum normal?:  yes    FINDINGS:        Cervix: Thick acetowhite changes from 6:00-3:00.  Abnormal vessels in the same area   Procedure: biopsies taken (not including ECC): 2.    Procedure summary: Patient tolerated procedure well     Assessment: JESSA 2    Plan: Specimens labelled and sent to pathology., Will base further treatment on pathology findings. and post biopsy instructions given to patient

## 2019-06-06 LAB — COPATH REPORT: NORMAL

## 2019-06-07 ENCOUNTER — TELEPHONE (OUTPATIENT)
Dept: FAMILY MEDICINE | Facility: CLINIC | Age: 49
End: 2019-06-07

## 2019-06-07 NOTE — TELEPHONE ENCOUNTER
Called pt at 810am today to relay colposcopy results.  Got VM and left a message to call back.      Patient should speak only to physician when she calls back.  Feel free to grab me from a patient room to talk with her if needed.

## 2019-06-07 NOTE — TELEPHONE ENCOUNTER
Routing to Dr. Bejarano as an FYI regarding plan.     Spoke to Sumeet at OB.  Patient has an upcoming appointment on 6/18. Due to new diagnosis, patient will need to see GYN Oncology.      Called GYN Oncology and scheduled appointment.  Details are as follows:    1.  Wednesday, June 12th, at 5:00 pm with Dr. Palacio.  2.  [facility], [address] [place] [address]  3.   parking costs 7.00.  4.  Patient to check-in at 's desk on 2nd floor.    5.  Patient to call [phone] for questions.      Per GYN Oncology, okay to cancel appointment for 6/18 with Dr. Loera; however, patient will wait to cancel appointment until after the appointment with  Dr. Palacio.    Fernando Mccracken RN

## 2019-06-07 NOTE — TELEPHONE ENCOUNTER
RECORDS STATUS - ALL OTHER DIAGNOSIS      RECORDS RECEIVED FROM: [facility]   DATE RECEIVED: 6/7/19   NOTES STATUS DETAILS   OFFICE NOTE from referring provider Manju Bejarano   OFFICE NOTE from medical oncologist NA    DISCHARGE SUMMARY from hospital NA    DISCHARGE REPORT from the ER NA    OPERATIVE REPORT NA    MEDICATION LIST [facility] As of 5/1/19   CLINICAL TRIAL TREATMENTS TO DATE     LABS     PATHOLOGY REPORTS [facility] 6/3/19   ANYTHING RELATED TO DIAGNOSIS Epic 6/3/19   GENONOMIC TESTING     TYPE:     IMAGING (NEED IMAGES & REPORT)     CT SCANS     MRI     MAMMO PACS 5/2/19   ULTRASOUND PACS 5/3/19   PET

## 2019-06-07 NOTE — TELEPHONE ENCOUNTER
ONCOLOGY INTAKE: Records Information      APPT INFORMATION:  Referring provider: Dr. Gin Bejarano  Referring provider s clinic:  [facility]  Reason for visit/diagnosis:  Uterine Mass  Has patient been notified of appointment date and time?: yes    RECORDS INFORMATION:  Were the records received with the referral (via [employer])? no    Has patient been seen for any external appt for this diagnosis? no    If yes, where? n/a    Has patient had any imaging or procedures outside of [facility] for this condition? no      If Yes, where? n/a    ADDITIONAL INFORMATION:

## 2019-06-07 NOTE — TELEPHONE ENCOUNTER
Spoke with pt on the phone regarding her colposcopy results (see below).  Patient needs to be seen by OB/Gyn for excisional treatment.  She has an appt with Dr. Loera at [facility] on 7/17 to discuss a possible hysterectomy for fibroids/menorrhagia.  She would like to be seen sooner if possible now that we know she has cervical cancer.  Please call OB/Gyn office to see if Dr. Daniel or Dr. Loera would be willing to squeeze her in somewhere.  Patient is willing to go to any clinic, but prefers morning appts since she works in the afternoons.        SPECIMEN(S):   A: Cervical biopsy, 6 o'clock   B: Cervical biopsy, 3 o'clock   C: Endocervical curettings     FINAL DIAGNOSIS:   A. Cervical biopsy, 6 o'clock:   - High-grade squamous intraepithelial lesion (severe dysplasia/carcinoma   in situ), cannot rule out invasive   malignancy (see comment and images)     B. Cervical biopsy, 3 o'clock:   - High-grade squamous intraepithelial lesion (severe dysplasia/carcinoma   in situ), cannot rule out invasive   malignancy (see comment and images)     C. Endocervical curettings:   - High-grade squamous intraepithelial lesion (severe dysplasia/carcinoma   in situ), cannot rule out invasive   malignancy (see comment and images)

## 2019-06-12 ENCOUNTER — ONCOLOGY VISIT (OUTPATIENT)
Dept: ONCOLOGY | Facility: CLINIC | Age: 49
End: 2019-06-12
Attending: OBSTETRICS & GYNECOLOGY
Payer: COMMERCIAL

## 2019-06-12 ENCOUNTER — PRE VISIT (OUTPATIENT)
Dept: ONCOLOGY | Facility: CLINIC | Age: 49
End: 2019-06-12

## 2019-06-12 ENCOUNTER — TELEPHONE (OUTPATIENT)
Dept: ONCOLOGY | Facility: CLINIC | Age: 49
End: 2019-06-12

## 2019-06-12 VITALS
OXYGEN SATURATION: 98 % | HEART RATE: 70 BPM | BODY MASS INDEX: 25.43 KG/M2 | DIASTOLIC BLOOD PRESSURE: 91 MMHG | WEIGHT: 162 LBS | TEMPERATURE: 98.1 F | HEIGHT: 67 IN | SYSTOLIC BLOOD PRESSURE: 128 MMHG | RESPIRATION RATE: 14 BRPM

## 2019-06-12 DIAGNOSIS — N87.9 CERVICAL DYSPLASIA: Primary | ICD-10-CM

## 2019-06-12 PROCEDURE — 57500 BIOPSY OF CERVIX: CPT | Performed by: OBSTETRICS & GYNECOLOGY

## 2019-06-12 PROCEDURE — 99204 OFFICE O/P NEW MOD 45 MIN: CPT | Mod: 25 | Performed by: OBSTETRICS & GYNECOLOGY

## 2019-06-12 PROCEDURE — 57500 BIOPSY OF CERVIX: CPT

## 2019-06-12 PROCEDURE — G0463 HOSPITAL OUTPT CLINIC VISIT: HCPCS | Mod: 25

## 2019-06-12 PROCEDURE — 88305 TISSUE EXAM BY PATHOLOGIST: CPT | Performed by: OBSTETRICS & GYNECOLOGY

## 2019-06-12 ASSESSMENT — PAIN SCALES - GENERAL: PAINLEVEL: MODERATE PAIN (5)

## 2019-06-12 ASSESSMENT — MIFFLIN-ST. JEOR: SCORE: 1387.46

## 2019-06-12 NOTE — LETTER
2019       RE: Nicole Fritsche  [address]   [place] [address]     Dear Colleague,    Thank you for referring your patient, Nicole Fritsche, to the [facility]. Please see a copy of my visit note below.                            [facility] Gynecologic Oncology Consult Notes on Referred Patient    Date: 2019    Dr. Gin Bejarano, DO  [address] [place] [address]     RE: Nicole Fritsche  : 1970  DAVID: 2019    Dear Dr. Gin Bejarano:    I had the pleasure of seeing your patient Nicole Fritsche here at the Gynecologic Cancer Clinic at the [facility] on 2019.  As you know she is a very pleasant 49 year old woman with a recent diagnosis of  ASC-US on pap smear on  and HSIL on colposcopy on 6/3.  Given these findings she was subsequently sent to the Gynecologic Cancer Clinic for new patient consultation.     Patient has never had any abnormal pap smears, so this new abnormal pap smear is a shock to her. Patient stopped having monthly menses several months ago (unsure about exactly when), but still have occasional vaginal spotting. Also endorsed post-coital bleeding and new 2-week of constant vaginal burning pain.     Review of Systems:  Systemic:        no weight changes; no fever; no chills; no night sweats; no appetite changes  Skin:         no rashes, or lesions  Eye:        no irritation; no changes in vision  Temo-Laryngeal:         no dysphagia; no hoarseness   Pulmonary: no cough; no shortness of breath  Cardiovascular: no chest pain; no palpitations  Gastrointestinal:  no diarrhea (does have occasional loose stool); no constipation; no abdominal pain; no changes in bowel  habits; no blood in stool  Genitourinary:  no urinary frequency; no urinary urgency; no dysuria; no pain; no abnormal vaginal discharge; postcoital vaginal bleeding; inter-menstrual vaginal spotting.  Breast: no breast discharge; no breast changes; no breast  pain  Musculoskeletal: no myalgias; no arthralgias; no back pain  Psychiatric:        no depressed mood; no anxiety    Hematologic:        no tender lymph nodes; no noticeable swellings or lumps   Endocrine: no hot flashes; no heat/cold intolerance         Neurological: no tremor; no numbness and tingling; no headaches; no difficulty  sleeping    Cancer history:   4/17/2009  3/28/2012  6/15/2015  5/21/2019    PAP NIL NIL NIL ASC-US (A)   Colposcopy         6/3/2019 Colposcopy:   SPECIMEN(S):   A: Cervical biopsy, 6 o'clock   B: Cervical biopsy, 3 o'clock   C: Endocervical curettings     FINAL DIAGNOSIS:   A. Cervical biopsy, 6 o'clock:   - High-grade squamous intraepithelial lesion (severe dysplasia/carcinoma in situ), cannot rule out invasive malignancy (see comment and images)     B. Cervical biopsy, 3 o'clock:   - High-grade squamous intraepithelial lesion (severe dysplasia/carcinoma in situ), cannot rule out invasive malignancy (see comment and images)     C. Endocervical curettings:   - High-grade squamous intraepithelial lesion (severe dysplasia/carcinoma in situ), cannot rule out invasive malignancy (see comment and images)      Past Medical History:   Diagnosis Date     Abnormal Pap smear of cervix 05/21/2019    see problem list     Arrested hydrocephalus 2/3/2009    Ongoing HAs, seeing neurosurgeon. Per past notes from Dr. Resendiz, HAs most likely not secondary to the hydrocephalus, but rather vascular inorgin.  Please see scanned in records.     Asthma      Cervical high risk HPV (human papillomavirus) test positive 05/21/2019    See problem list     Past Surgical History:   Procedure Laterality Date     C VENTRICULO-CISTERNOSTOMY,3RD VENT      for treatment of HA related to hydrocephalus     EXCISE MASS TRUNK Left 9/16/2015    Procedure: EXCISE MASS TRUNK;  Surgeon: Carlos Enrique Briones MD;  Location:  OR     Health Maintenance:  Health Maintenance Due   Topic Date Due     COLONOSCOPY  03/12/1979      "ASTHMA ACTION PLAN  2016     ZOSTER IMMUNIZATION (2 of 2) 2019     Last Pap Smear: 2019 ASC-US   She has not had a history of abnormal Pap smears.    Last Mammogram: 2019. No findings of suspicion for malignancy.      She has not had a history of abnormal mammograms.    Last Colonoscopy: Never had colonoscopy  Last Diabetes Screening: none  Last Thyroid Screenin2017 TSH: 4.2, Free T4: 1.02  Last Cholest Screenin2019    HDL Cholesterol 42 (L)   LDL Cholesterol Calculated 88   Non HDL Cholesterol 117     Current Medications:   has a current medication list which includes the following prescription(s): albuterol, ferrous sulfate, fluticasone, fluticasone, and multiple vitamin.     Allergies: seasonal allergies.    Social History:  - 15 year of 0.5 pack/day smoking   - Occasional alcohol use  - Denied recreational drug use  - 24yo, 23yo, 20yo kids    Family History   Problem Relation Age of Onset     Diabetes Mother      Cerebrovascular Disease Mother      Ovarian Cancer Mother      Breast Cancer Mother      C.A.D. Father 40     Cerebrovascular Disease Father      C.A.D. Sister 62        stent placed     Ovarian Cancer Sister      Ovarian Cancer Sister      Ovarian Cancer Sister      Physical Exam:   BP (!) 128/91   Pulse 70   Temp 98.1  F (36.7  C) (Oral)   Resp 14   Ht 1.702 m (5' 7\")   Wt 73.5 kg (162 lb)   LMP  (LMP Unknown)   SpO2 98%   Breastfeeding? No   BMI 25.37 kg/m     Body mass index is 25.37 kg/m .  General Appearance: healthy and alert, no distress. Accompanied by her .  HEENT:  no thyromegaly, no palpable nodules or masses      Cardiovascular: regular rate    Respiratory: Bilateral lung rises and perfusing well  Musculoskeletal: extremities non tender and without edema  Skin: no lesions or rashes   Neurological: normal gait, no gross defects   Psychiatric: appropriate mood and affect                             Hematological: normal cervical, " supraclavicular and inguinal lymph nodes   Gastrointestinal:       abdomen soft, non-tender, non-distended, no organomegaly or masses    Genitourinary: External genitalia and urethral meatus appears normal.  Vagina is smooth without nodularity or masses. Cervix appears friable and bleeds easily with gentle touch. Ulcerous lesion from 3 o'clock to 8 o'clock. 2 punch biopsies were taken at 4 o'clock. Bleeding was controlled with silver nitrate and pressure.  Bimanual exam reveal no masses, nodularity or fullness.  Recto-vaginal exam confirms these findings without perimetrial nodules.       Assessment:  Nicole Fritsche is a 49 year old woman with a new diagnosis of HSIL, which is pre-cancer.     A total of 45 minutes was spent with the patient, 15 minutes of which were spent in counseling the patient and/or treatment planning.    Plan:     1.)   HSIL:   - Wait for the 2 punch biopsies taken today in office. If it shows cancer, will likely offer radical hysterectomy + lymph node biopsies versus chemoradiation. If it still shows HSIL, will schedule for [facility] in the OR.     2.) Genetic risk factors were assessed and the patient does not meet the qualifications for a referral.     3.) Labs and/or tests ordered include:  2 cervical biopsies .       Thank you for allowing us to participate in the care of your patient.         Sincerely,    Hasmukh Hernandez MD, MS    Department of Obstetrics and Gynecology   Division of Gynecologic Oncology   [facility]  Phone: [phone]        CC  Patient Care Team:  Ansley White PA-C as PCP - Nataliya Valadez APRN CNP as Assigned PCP  Ansley White PA-C as Assigned PCP                Sincerely,

## 2019-06-12 NOTE — NURSING NOTE
"Oncology Rooming Note    June 12, 2019 2:04 PM   Nicole Fritsche is a 49 year old female who presents for:    Chief Complaint   Patient presents with     Oncology Clinic Visit     New; Cervical Ca; Fibroids and Cysts     Initial Vitals: BP (!) 128/91   Pulse 70   Temp 98.1  F (36.7  C) (Oral)   Resp 14   Ht 1.702 m (5' 7\")   Wt 73.5 kg (162 lb)   LMP  (LMP Unknown)   SpO2 98%   Breastfeeding? No   BMI 25.37 kg/m   Estimated body mass index is 25.37 kg/m  as calculated from the following:    Height as of this encounter: 1.702 m (5' 7\").    Weight as of this encounter: 73.5 kg (162 lb). Body surface area is 1.86 meters squared.  Moderate Pain (5) Comment: pelvic cramping   No LMP recorded (lmp unknown). Patient is premenopausal.  Allergies reviewed: Yes  Medications reviewed: Yes    Medications: Medication refills not needed today.  Pharmacy name entered into As It Is: [id number] - [place], [place] - [address] AT [facility] [address]    Clinical concerns: Fibroids, cysts and cervical cancer       Ayesha Mccormick CMA              "

## 2019-06-12 NOTE — NURSING NOTE
Prior to the start of the procedure and with procedural staff participation, I verbally confirmed the patient s identity using two indicators, relevant allergies, that the procedure was appropriate and matched the consent or emergent situation, and that the correct equipment/implants were available. Immediately prior to starting the procedure I conducted the Time Out with the procedural staff and re-confirmed the patient s name, procedure, and site/side. (The Joint Commission universal protocol was followed.)  Yes    Sedation (Moderate or Deep): None    Post procedure pain: 0    Ayde Starkey RN

## 2019-06-12 NOTE — PROGRESS NOTES
[facility] Gynecologic Oncology Consult Notes on Referred Patient    Date: 2019    Dr. Gin Bejarano, DO  [address] [place] [address]     RE: Nicole Fritsche  : 1970  DAVID: 2019    Dear Dr. Gin Bejarano:    I had the pleasure of seeing your patient Nicole Fritsche here at the Gynecologic Cancer Clinic at the [facility] on 2019.  As you know she is a very pleasant 49 year old woman with a recent diagnosis of  ASC-US on pap smear on  and HSIL on colposcopy on 6/3.  Given these findings she was subsequently sent to the Gynecologic Cancer Clinic for new patient consultation.     Patient has never had any abnormal pap smears, so this new abnormal pap smear is a shock to her. Patient stopped having monthly menses several months ago (unsure about exactly when), but still have occasional vaginal spotting. Also endorsed post-coital bleeding and new 2-week of constant vaginal burning pain.     Review of Systems:  Systemic:        no weight changes; no fever; no chills; no night sweats; no appetite changes  Skin:         no rashes, or lesions  Eye:        no irritation; no changes in vision  Temo-Laryngeal:         no dysphagia; no hoarseness   Pulmonary: no cough; no shortness of breath  Cardiovascular: no chest pain; no palpitations  Gastrointestinal:  no diarrhea (does have occasional loose stool); no constipation; no abdominal pain; no changes in bowel  habits; no blood in stool  Genitourinary:  no urinary frequency; no urinary urgency; no dysuria; no pain; no abnormal vaginal discharge; postcoital vaginal bleeding; inter-menstrual vaginal spotting.  Breast: no breast discharge; no breast changes; no breast pain  Musculoskeletal: no myalgias; no arthralgias; no back pain  Psychiatric:        no depressed mood; no anxiety    Hematologic:        no tender lymph nodes; no noticeable swellings or lumps   Endocrine: no hot flashes; no heat/cold  intolerance         Neurological: no tremor; no numbness and tingling; no headaches; no difficulty  sleeping    Cancer history:   4/17/2009  3/28/2012  6/15/2015  5/21/2019    PAP NIL NIL NIL ASC-US (A)   Colposcopy         6/3/2019 Colposcopy:   SPECIMEN(S):   A: Cervical biopsy, 6 o'clock   B: Cervical biopsy, 3 o'clock   C: Endocervical curettings     FINAL DIAGNOSIS:   A. Cervical biopsy, 6 o'clock:   - High-grade squamous intraepithelial lesion (severe dysplasia/carcinoma in situ), cannot rule out invasive malignancy (see comment and images)     B. Cervical biopsy, 3 o'clock:   - High-grade squamous intraepithelial lesion (severe dysplasia/carcinoma in situ), cannot rule out invasive malignancy (see comment and images)     C. Endocervical curettings:   - High-grade squamous intraepithelial lesion (severe dysplasia/carcinoma in situ), cannot rule out invasive malignancy (see comment and images)      Past Medical History:   Diagnosis Date     Abnormal Pap smear of cervix 05/21/2019    see problem list     Arrested hydrocephalus 2/3/2009    Ongoing HAs, seeing neurosurgeon. Per past notes from Dr. Resendiz, HAs most likely not secondary to the hydrocephalus, but rather vascular inorgin.  Please see scanned in records.     Asthma      Cervical high risk HPV (human papillomavirus) test positive 05/21/2019    See problem list     Past Surgical History:   Procedure Laterality Date     C VENTRICULO-CISTERNOSTOMY,3RD VENT      for treatment of HA related to hydrocephalus     EXCISE MASS TRUNK Left 9/16/2015    Procedure: EXCISE MASS TRUNK;  Surgeon: Carlos Enrique Briones MD;  Location:  OR     Health Maintenance:  Health Maintenance Due   Topic Date Due     COLONOSCOPY  03/12/1979     ASTHMA ACTION PLAN  09/08/2016     ZOSTER IMMUNIZATION (2 of 2) 06/26/2019     Last Pap Smear: 5/21/2019 ASC-US   She has not had a history of abnormal Pap smears.    Last Mammogram: 5/2/2019. No findings of suspicion for malignancy.    "   She has not had a history of abnormal mammograms.    Last Colonoscopy: Never had colonoscopy  Last Diabetes Screening: none  Last Thyroid Screenin2017 TSH: 4.2, Free T4: 1.02  Last Cholest Screenin2019    HDL Cholesterol 42 (L)   LDL Cholesterol Calculated 88   Non HDL Cholesterol 117     Current Medications:   has a current medication list which includes the following prescription(s): albuterol, ferrous sulfate, fluticasone, fluticasone, and multiple vitamin.     Allergies: seasonal allergies.    Social History:  - 15 year of 0.5 pack/day smoking   - Occasional alcohol use  - Denied recreational drug use  - 22yo, 21yo, 20yo kids    Family History   Problem Relation Age of Onset     Diabetes Mother      Cerebrovascular Disease Mother      Ovarian Cancer Mother      Breast Cancer Mother      C.A.D. Father 40     Cerebrovascular Disease Father      C.A.D. Sister 62        stent placed     Ovarian Cancer Sister      Ovarian Cancer Sister      Ovarian Cancer Sister      Physical Exam:   BP (!) 128/91   Pulse 70   Temp 98.1  F (36.7  C) (Oral)   Resp 14   Ht 1.702 m (5' 7\")   Wt 73.5 kg (162 lb)   LMP  (LMP Unknown)   SpO2 98%   Breastfeeding? No   BMI 25.37 kg/m    Body mass index is 25.37 kg/m .  General Appearance: healthy and alert, no distress. Accompanied by her .  HEENT:  no thyromegaly, no palpable nodules or masses      Cardiovascular: regular rate    Respiratory: Bilateral lung rises and perfusing well  Musculoskeletal: extremities non tender and without edema  Skin: no lesions or rashes   Neurological: normal gait, no gross defects   Psychiatric: appropriate mood and affect                             Hematological: normal cervical, supraclavicular and inguinal lymph nodes   Gastrointestinal:       abdomen soft, non-tender, non-distended, no organomegaly or masses    Genitourinary: External genitalia and urethral meatus appears normal.  Vagina is smooth without nodularity or " masses. Cervix appears friable and bleeds easily with gentle touch. Ulcerous lesion from 3 o'clock to 8 o'clock. 2 punch biopsies were taken at 4 o'clock. Bleeding was controlled with silver nitrate and pressure.  Bimanual exam reveal no masses, nodularity or fullness.  Recto-vaginal exam confirms these findings without perimetrial nodules.       Assessment:  Nicole Fritsche is a 49 year old woman with a new diagnosis of HSIL, which is pre-cancer.     A total of 45 minutes was spent with the patient, 15 minutes of which were spent in counseling the patient and/or treatment planning.    Plan:     1.)   HSIL:   - Wait for the 2 punch biopsies taken today in office. If it shows cancer, will likely offer radical hysterectomy + lymph node biopsies versus chemoradiation. If it still shows HSIL, will schedule for [facility] in the OR.     2.) Genetic risk factors were assessed and the patient does not meet the qualifications for a referral.     3.) Labs and/or tests ordered include:  2 cervical biopsies .       Thank you for allowing us to participate in the care of your patient.         Sincerely,    Hasmukh Hernandez MD, MS    Department of Obstetrics and Gynecology   Division of Gynecologic Oncology   [facility]  Phone: [phone]        CC  Patient Care Team:  Ansley White PA-C as PCP - Nataliya Valadez APRN CNP as Assigned PCP  Ansley White PA-C as Assigned PCP

## 2019-06-17 ENCOUNTER — TELEPHONE (OUTPATIENT)
Dept: FAMILY MEDICINE | Facility: CLINIC | Age: 49
End: 2019-06-17

## 2019-06-17 ENCOUNTER — TELEPHONE (OUTPATIENT)
Dept: ONCOLOGY | Facility: CLINIC | Age: 49
End: 2019-06-17

## 2019-06-17 NOTE — TELEPHONE ENCOUNTER
Patient called RN looking for pathology results.     RN informed patient that they are not back yet. RN explained these results can take up to 10 business days and this RN will call patient as soon as they are finalized.     RN encouraged patient to wait a few days before calling and again reassured the patient that this RN is watching for the result diligently.     RN and patient again discussed the concern that this is a cancer. As this was discussed at length at patients clinic appointment. RN and patient also discussed needed steps in  Both scenarios of cancer vs. In-situ VS high grade dysplasia.     Patient appreciative of the RN time.     Ayde Starkey RN

## 2019-06-17 NOTE — TELEPHONE ENCOUNTER
Patient asking if lab results are back from 6/12.  Results are still pending.      Patient requesting a call back as soon as results are back with plan for next steps.    Fernando Mccracken RN

## 2019-06-17 NOTE — TELEPHONE ENCOUNTER
Reason for Call:  Request for results:    Name of test or procedure: Surgical Pathology Exam    Date of test of procedure: 06/03/2019    Location of the test or procedure: [facility] to leave the result message on voice mail or with a family member? YES    Phone number Patient can be reached at:  Cell number on file:    Telephone Information:   Mobile [phone]       Additional comments: Patient is concerned about recent test results and would like to consult with an RN about this.    Call taken on 6/17/2019 at 8:54 AM by Isacc Gonzalez

## 2019-06-19 NOTE — TELEPHONE ENCOUNTER
Will close this encounter.  Please see TE from Dr. Hernandez's nurse from 6/17.  She will call patient with results and plan.    Fernando Mccracken RN

## 2019-06-20 ENCOUNTER — TELEPHONE (OUTPATIENT)
Dept: ONCOLOGY | Facility: CLINIC | Age: 49
End: 2019-06-20

## 2019-06-20 NOTE — TELEPHONE ENCOUNTER
Patient updated that pathology is still not finalized.     RN will update patient on result and plan as soon as the pathology is final.     Ayde Starkey RN

## 2019-06-21 ENCOUNTER — TELEPHONE (OUTPATIENT)
Dept: ONCOLOGY | Facility: CLINIC | Age: 49
End: 2019-06-21

## 2019-06-21 DIAGNOSIS — C53.9 CERVICAL CANCER (H): Primary | ICD-10-CM

## 2019-06-21 LAB — COPATH REPORT: NORMAL

## 2019-06-21 NOTE — TELEPHONE ENCOUNTER
Patient called with pathology report/diagnosis and follow up plan.     MRI and PET scan ordered.     Patient appreciative of the call.     Ayde Starkey RN

## 2019-07-01 ENCOUNTER — HOSPITAL ENCOUNTER (OUTPATIENT)
Dept: PET IMAGING | Facility: CLINIC | Age: 49
Discharge: HOME | End: 2019-07-01
Attending: OBSTETRICS & GYNECOLOGY | Admitting: OBSTETRICS & GYNECOLOGY
Payer: COMMERCIAL

## 2019-07-01 ENCOUNTER — HOSPITAL ENCOUNTER (OUTPATIENT)
Dept: MRI IMAGING | Facility: CLINIC | Age: 49
End: 2019-07-01
Attending: OBSTETRICS & GYNECOLOGY
Payer: COMMERCIAL

## 2019-07-01 DIAGNOSIS — C53.9 CERVICAL CANCER (H): ICD-10-CM

## 2019-07-01 LAB — GLUCOSE BLDC GLUCOMTR-MCNC: 83 MG/DL (ref 70–99)

## 2019-07-01 PROCEDURE — A9552 F18 FDG: HCPCS | Performed by: OBSTETRICS & GYNECOLOGY

## 2019-07-01 PROCEDURE — 25000128 H RX IP 250 OP 636: Performed by: OBSTETRICS & GYNECOLOGY

## 2019-07-01 PROCEDURE — 78816 PET IMAGE W/CT FULL BODY: CPT | Mod: PI

## 2019-07-01 PROCEDURE — 34300033 ZZH RX 343: Performed by: OBSTETRICS & GYNECOLOGY

## 2019-07-01 PROCEDURE — 25500064 ZZH RX 255 OP 636: Performed by: OBSTETRICS & GYNECOLOGY

## 2019-07-01 PROCEDURE — 82962 GLUCOSE BLOOD TEST: CPT

## 2019-07-01 PROCEDURE — A9585 GADOBUTROL INJECTION: HCPCS | Performed by: OBSTETRICS & GYNECOLOGY

## 2019-07-01 PROCEDURE — 71260 CT THORAX DX C+: CPT

## 2019-07-01 PROCEDURE — 72197 MRI PELVIS W/O & W/DYE: CPT

## 2019-07-01 RX ORDER — FUROSEMIDE 10 MG/ML
40 INJECTION INTRAMUSCULAR; INTRAVENOUS ONCE
Status: COMPLETED | OUTPATIENT
Start: 2019-07-01 | End: 2019-07-01

## 2019-07-01 RX ORDER — IOPAMIDOL 755 MG/ML
60-135 INJECTION, SOLUTION INTRAVASCULAR ONCE
Status: COMPLETED | OUTPATIENT
Start: 2019-07-01 | End: 2019-07-01

## 2019-07-01 RX ORDER — GADOBUTROL 604.72 MG/ML
7.5 INJECTION INTRAVENOUS ONCE
Status: COMPLETED | OUTPATIENT
Start: 2019-07-01 | End: 2019-07-01

## 2019-07-01 RX ADMIN — IOPAMIDOL 99 ML: 755 INJECTION, SOLUTION INTRAVENOUS at 10:34

## 2019-07-01 RX ADMIN — FUROSEMIDE 40 MG: 10 INJECTION, SOLUTION INTRAVENOUS at 10:34

## 2019-07-01 RX ADMIN — GADOBUTROL 7.5 ML: 604.72 INJECTION INTRAVENOUS at 08:20

## 2019-07-01 RX ADMIN — FLUDEOXYGLUCOSE F-18 10.17 MCI.: 500 INJECTION, SOLUTION INTRAVENOUS at 09:38

## 2019-07-03 DIAGNOSIS — C53.9 MALIGNANT NEOPLASM OF CERVIX, UNSPECIFIED SITE (H): ICD-10-CM

## 2019-07-03 DIAGNOSIS — C53.9 CERVICAL CANCER (H): Primary | ICD-10-CM

## 2019-07-10 ENCOUNTER — OFFICE VISIT (OUTPATIENT)
Dept: RADIATION ONCOLOGY | Facility: CLINIC | Age: 49
End: 2019-07-10
Attending: RADIOLOGY
Payer: COMMERCIAL

## 2019-07-10 ENCOUNTER — CARE COORDINATION (OUTPATIENT)
Dept: ONCOLOGY | Facility: CLINIC | Age: 49
End: 2019-07-10

## 2019-07-10 ENCOUNTER — ONCOLOGY VISIT (OUTPATIENT)
Dept: ONCOLOGY | Facility: CLINIC | Age: 49
End: 2019-07-10
Attending: OBSTETRICS & GYNECOLOGY
Payer: COMMERCIAL

## 2019-07-10 VITALS — DIASTOLIC BLOOD PRESSURE: 84 MMHG | WEIGHT: 167 LBS | SYSTOLIC BLOOD PRESSURE: 137 MMHG | BODY MASS INDEX: 26.16 KG/M2

## 2019-07-10 DIAGNOSIS — C53.1 MALIGNANT NEOPLASM OF EXOCERVIX (H): Primary | ICD-10-CM

## 2019-07-10 DIAGNOSIS — C53.9 MALIGNANT NEOPLASM OF CERVIX, UNSPECIFIED SITE (H): ICD-10-CM

## 2019-07-10 PROCEDURE — G0463 HOSPITAL OUTPT CLINIC VISIT: HCPCS | Mod: ZF

## 2019-07-10 PROCEDURE — 99214 OFFICE O/P EST MOD 30 MIN: CPT | Mod: ZP | Performed by: OBSTETRICS & GYNECOLOGY

## 2019-07-10 PROCEDURE — G0463 HOSPITAL OUTPT CLINIC VISIT: HCPCS | Performed by: RADIOLOGY

## 2019-07-10 ASSESSMENT — ENCOUNTER SYMPTOMS
WEIGHT LOSS: 0
PND: 0
BRUISES/BLEEDS EASILY: 0
DIZZINESS: 0
INSOMNIA: 0
DOUBLE VISION: 0
CLAUDICATION: 0
STRIDOR: 0
NAUSEA: 1
EYE DISCHARGE: 0
DIAPHORESIS: 0
SEIZURES: 0
HEADACHES: 0
FEVER: 0
SPUTUM PRODUCTION: 0
MEMORY LOSS: 0
FLANK PAIN: 0
SHORTNESS OF BREATH: 0
BLURRED VISION: 0
WEAKNESS: 0
ABDOMINAL PAIN: 0
FALLS: 0
PHOTOPHOBIA: 0
FREQUENCY: 0
FOCAL WEAKNESS: 0
SENSORY CHANGE: 0
EYE PAIN: 0
SPEECH CHANGE: 0
PALPITATIONS: 0
COUGH: 0
SORE THROAT: 0
LOSS OF CONSCIOUSNESS: 0
NECK PAIN: 0
NERVOUS/ANXIOUS: 1
BACK PAIN: 1
DIARRHEA: 0
BLOOD IN STOOL: 0
MYALGIAS: 0
CHILLS: 0
POLYDIPSIA: 0
DEPRESSION: 0
VOMITING: 0
TINGLING: 0
DYSURIA: 0
HEMATURIA: 0
HEMOPTYSIS: 0
EYE REDNESS: 0
CONSTIPATION: 0
SINUS PAIN: 0
ORTHOPNEA: 0
TREMORS: 0
WHEEZING: 0
HEARTBURN: 0
HALLUCINATIONS: 0

## 2019-07-10 ASSESSMENT — PAIN SCALES - GENERAL: PAINLEVEL: SEVERE PAIN (6)

## 2019-07-10 ASSESSMENT — LIFESTYLE VARIABLES: SUBSTANCE_ABUSE: 0

## 2019-07-10 NOTE — PROGRESS NOTES
HPI    INITIAL PATIENT ASSESSMENT    Diagnosis: Cervical Cancer    Prior radiation therapy: None    Prior chemotherapy: None    Prior hormonal therapy:No    Pain Eval:  Current history of pain associated with this visit:   Intensity: 6/10  Current: aching  Location: Pelvis  Treatment: Tylenol 1000 mg twice a day    Psychosocial  Living arrangements: Lives with  and children  Fall Risk: independent   referral needs: Not needed    Advanced Directive: No  Implantable Cardiac Device? No    Onset of menarche: 11  LMP: No LMP recorded. Patient is perimenopausal.  Onset of menopause: Unknown  Abnormal vaginal bleeding/discharge: No  Are you pregnant? No      Nurse face-to-face time: Level 5:  over 15 min face to face time  Review of Systems   Constitutional: Positive for malaise/fatigue. Negative for chills, diaphoresis, fever and weight loss.   HENT: Negative for congestion, ear discharge, ear pain, hearing loss, nosebleeds, sinus pain, sore throat and tinnitus.    Eyes: Negative for blurred vision, double vision, photophobia, pain, discharge and redness.   Respiratory: Negative for cough, hemoptysis, sputum production, shortness of breath, wheezing and stridor.    Cardiovascular: Negative for chest pain, palpitations, orthopnea, claudication, leg swelling and PND.   Gastrointestinal: Positive for nausea. Negative for abdominal pain, blood in stool, constipation, diarrhea, heartburn, melena and vomiting.   Genitourinary: Negative for dysuria, flank pain, frequency, hematuria and urgency.   Musculoskeletal: Positive for back pain. Negative for falls, joint pain, myalgias and neck pain.   Skin: Negative for itching and rash.   Neurological: Negative for dizziness, tingling, tremors, sensory change, speech change, focal weakness, seizures, loss of consciousness, weakness and headaches.   Endo/Heme/Allergies: Negative for environmental allergies and polydipsia. Does not bruise/bleed easily.    Psychiatric/Behavioral: Negative for depression, hallucinations, memory loss, substance abuse and suicidal ideas. The patient is nervous/anxious. The patient does not have insomnia.

## 2019-07-10 NOTE — PATIENT INSTRUCTIONS
Call your Care Coordinator if you have chills and/or temperature greater then or equal to 100.4, uncontrolled nausea and vomiting, diarrhea, constipation, dizziness, light headedness, shortness of breath, chest pain, unexplained bruising, bleeding that does not stop with 10 minutes of pressure or any new symptoms, questions/concerns  Monday- Friday.    If after hours, weekends or holidays call [phone] or the [facility] hospital at [phone] and ask for the GYN ONCOLOGY resident on call.    Your  Care Coordinator is    Radhika GARCIA, RN  Gynecologic Cancer   Office:  [phone]  Pager: [phone] #[phone]

## 2019-07-10 NOTE — PROGRESS NOTES
[facility] GYN-Oncology Teaching Note    Relevant Diagnosis:  Stage IIB Cervix Cancer    Teaching Topic:  Chemotherapy:  Cisplatin Potentiation    Person(s) involved in teaching:  Patient and     Motivation Level:   Asks Questions:   Yes  Eager to Learn:  Yes  Cooperative:  Yes  Receptive (willing/able to accept information):  Yes      Patient demonstrates understanding of the following:  Reason for the appointment, diagnosis and treatment plan:  Yes  Knowledge of proper use of medications and conditions for which they are ordered (with special attention to potential side effects or drug interactions):  Yes  Which situations necessitate calling provider and whom to contact:  Yes    Teaching Concerns:  Plan Cisplatin Potentiation/radiation therapy.  Patient was seen in radiation therapy today and has simulation scheduled on 7/17/19.  Anticipate treatment to start week of 7/22/19.  Reviewed teaching for Cisplatin weekly x 5-6 weeks through treatment.  Discussed that patient will be scheduled with 1-2 nurse practitioner appointments during treatment.  [facility] Patient Navigator notified that patient will be starting treatment.  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    Instructional Materials Used/Given:  Chemotherapy Packet and Cards  Disease Packet   Cancer Center Handbook   Care Coordinator cards and after hours contact information       Time spent teaching with patient:  45 minutes    Radhika GARCIA, RN  Care Coordinator  Gynecologic Cancer   Office:  [phone]  Pager: [phone] #[phone]

## 2019-07-10 NOTE — LETTER
7/10/2019       RE: Nicole Fritsche  [address]  [place] [address]     Dear Colleague,    Thank you for referring your patient, Nicole Fritsche, to the RADIATION ONCOLOGY CLINIC. Please see a copy of my visit note below.      HPI    INITIAL PATIENT ASSESSMENT    Diagnosis: Cervical Cancer    Prior radiation therapy: None    Prior chemotherapy: None    Prior hormonal therapy:No    Pain Eval:  Current history of pain associated with this visit:   Intensity: 6/10  Current: aching  Location: Pelvis  Treatment: Tylenol 1000 mg twice a day    Psychosocial  Living arrangements: Lives with  and children  Fall Risk: independent   referral needs: Not needed    Advanced Directive: No  Implantable Cardiac Device? No    Onset of menarche: 11  LMP: No LMP recorded. Patient is perimenopausal.  Onset of menopause: Unknown  Abnormal vaginal bleeding/discharge: No  Are you pregnant? No      Nurse face-to-face time: Level 5:  over 15 min face to face time  Review of Systems   Constitutional: Positive for malaise/fatigue. Negative for chills, diaphoresis, fever and weight loss.   HENT: Negative for congestion, ear discharge, ear pain, hearing loss, nosebleeds, sinus pain, sore throat and tinnitus.    Eyes: Negative for blurred vision, double vision, photophobia, pain, discharge and redness.   Respiratory: Negative for cough, hemoptysis, sputum production, shortness of breath, wheezing and stridor.    Cardiovascular: Negative for chest pain, palpitations, orthopnea, claudication, leg swelling and PND.   Gastrointestinal: Positive for nausea. Negative for abdominal pain, blood in stool, constipation, diarrhea, heartburn, melena and vomiting.   Genitourinary: Negative for dysuria, flank pain, frequency, hematuria and urgency.   Musculoskeletal: Positive for back pain. Negative for falls, joint pain, myalgias and neck pain.   Skin: Negative for itching and rash.   Neurological: Negative for dizziness,  tingling, tremors, sensory change, speech change, focal weakness, seizures, loss of consciousness, weakness and headaches.   Endo/Heme/Allergies: Negative for environmental allergies and polydipsia. Does not bruise/bleed easily.   Psychiatric/Behavioral: Negative for depression, hallucinations, memory loss, substance abuse and suicidal ideas. The patient is nervous/anxious. The patient does not have insomnia.                  RADIATION ONCOLOGY CONSULTATION  DATE OF VISIT: 7/10/2019    Nicole Fritsche  MRN: [id number]    PROBLEM: Ms. Nicole Fritsche was seen for initial consultation in the Department of Radiation Oncology on 7/10/2019 at the request of Dr. Hernandez for consideration of definitive chemoradiation for newly diagnosed squamous cell carcinoma of the cervix.  All available pertinent medical records, radiographic and laboratory studies were reviewed.    HISTORY OF PRESENT ILLNESS: Nicole Fritsche is a 49-year-old woman who presented to her primary care physician with irregular vaginal/postcoital bleeding.  Transabdominal/endovaginal ultrasound on May 3, 2019 demonstrated a normal-sized retroverted uterus with normal thickness endometrial stripe (0.2 cm), a solitary fibroid in the region of the cervix measuring 2.4 x 2.0 x 1.5 cm and a simple right ovarian cyst.  Pap smear on May 21 was positive for atypical squamous cells of undetermined significance (ASC-US).  Colposcopy and endocervical curettage on Patricia 3 demonstrated high-grade squamous intraepithelial lesion (severe dysplasia/carcinoma in situ), cannot rule out invasive malignancy.  She was seen by Dr. Hernandez and underwent repeat biopsy on June 12.  Pathology demonstrated invasive well-differentiated squamous cell carcinoma, focally keratinizing.  Examination demonstrated a friable, ulcerative lesion from 3-8 o'clock without parametrial involvement.  On July 1, a pelvic MRI scan demonstrated a 2.3 x 3.8 x 2.6 cm mass involving the posterior  cervix without parametrial extension or pelvic adenopathy.  PET CT scan on July 1 confirmed a 3.7 cm cervical mass (SUV max 13.2) with positive low periaortic adenopathy (SUV 2.7).  There were no distant metastasis seen imaging.    Overall, the patient feels relatively well although she has pelvic pain since her first biopsy.  She continues to have vaginal bleeding.  She denies any unexplained weight loss, change in bowel or bladder habits, nausea, vomiting.    PAST MEDICAL HISTORY:   Patient Active Problem List    Diagnosis Date Noted     Cervix cancer (H) 07/10/2019     Priority: Medium     ASCUS with positive high risk HPV cervical 05/28/2019     Priority: Medium     5/21/19 ASCUS pap, + HR HPV # 18. Plan: [place]       Mild persistent asthma 04/28/2015     Lateral epicondylitis 07/12/2012     Priority: Medium     Problem list name updated by automated process. Provider to review       Tobacco abuse 02/16/2010     Priority: Medium     Arrested hydrocephalus 02/03/2009     Priority: Medium     Ongoing HAs, seeing neurosurgeon. Per past notes from Dr. Resendiz, HAs most likely not secondary to the hydrocephalus, but rather vascular inorgin.  Please see scanned in records.    Pt had 3rd ventriculostomy 2/09-seeing neurology, PT and OT     PAST SURGICAL HISTORY:   Procedure Laterality Date     C VENTRICULO-CISTERNOSTOMY,3RD VENT      for treatment of HA related to hydrocephalus     EXCISE MASS TRUNK Left 9/16/2015    Procedure: EXCISE MASS TRUNK;  Surgeon: Carlos Enrique Briones MD;  Location: [facility] OR     CHEMOTHERAPY HISTORY: None    PAST RADIATION THERAPY HISTORY: None    IMPLANTABLE CARDIAC DEVICE: None    PREGNANCY STATUS: Surgical    MEDICATIONS:   Medication Sig     albuterol (PROAIR HFA/PROVENTIL HFA/VENTOLIN HFA) 108 (90 Base) MCG/ACT inhaler Inhale 2 puffs into the lungs every 6 hours as needed for shortness of breath / dyspnea or wheezing     Ferrous Sulfate (IRON SUPPLEMENT PO) Take 325 mg by mouth 2 times daily  (with meals)     fluticasone (FLONASE) 50 MCG/ACT nasal spray Spray 1-2 sprays into both nostrils daily     fluticasone (FLOVENT HFA) 110 MCG/ACT inhaler Inhale 2 puffs into the lungs 2 times daily     Multiple Vitamin (MULTIVITAMINS PO) Take  by mouth daily.      ALLERGIES:  Allergies as of 07/10/2019     (No Known Allergies)     SOCIAL HISTORY:   Socioeconomic History     Marital status:      Spouse name: Not on file     Number of children: 3 adult children (21, 22, 24 yo)     Years of education: Not on file     Highest education level: Not on file   Occupational History     Works at [employer]   Tobacco Use     Smoking status: Former Smoker     Packs/day: 0.25     Years: 20.00     Pack years: 5.00     Types: Cigarettes     Last attempt to quit: 2018     Years since quittin.3     Smokeless tobacco: Former User     Quit date: 2015   Substance and Sexual Activity     Alcohol use: Yes     Alcohol/week: 0.0 oz     Comment: once every 3-4 months     Drug use: No     Sexual activity: Yes     Partners: Male     FAMILY CANCER HISTORY: Patient's mother and sister have a history of ovarian cancer.    REVIEW OF SYMPTOMS:  A full 14-point review of systems was performed. All pertinent positives and negatives are noted in HPI.    FUNCTIONAL STATUS: ECO    PHYSICAL EXAMINATION:    /84   Wt 75.8 kg (167 lb)   BMI 26.16 kg/m   , Pain 6/10 pelvis  Exam:  Constitutional: In no acute distress, alert, oriented  Head: Normocephalic, atraumatic  Neck: No adenopathy  ENT: Oral cavity and oropharynx without mucosal lesion  Cardiovascular: Regular rate and rhythm  Respiratory: Clear to auscultation, no wheezing  Gastrointestinal: Soft, nondistended, tender to palpation lower abdomen  : Normal external genitalia, vaginal mucosa is smooth without mass or irregular surface, cervix visualized with erythematous, friable lesion involving the posterior lip from 3-9 o'clock without extension onto the vagina.   Patient did not tolerate rectovaginal exam secondary to pain.  Musculoskeletal: No deformity, no edema  Skin: No rashes  Neurologic: Nonfocal, no gross deficits  Psychiatric: Mood appropriate  Hematologic/Lymphatic/Immunologic: No palpable supraclavicular, axillary or inguinal adenopathy    IMAGING/PATH: Please see history of present illness.    ASSESSMENT AND PLAN: Nicole Fritsche is a 49-year-old woman with FIGO stage IB1 (clinical T1B1 N1 M0, AJCC 2018) squamous cell carcinoma of the cervix with a positive low periaortic lymph nodes seen on PET scan.  She is here with her  to discuss definitive chemoradiation as primary management for her newly diagnosed cancer.  I reviewed the findings on her PET CT scan and MRI scan and discussed the rationale for recommending definitive chemoradiation instead of surgery as primary management of her cancer.  I also reviewed the recommended course of therapy including pelvic external beam radiation with weekly chemotherapy followed by high-dose-rate brachytherapy as well as the anticipated acute side effects, potential long-term risks and expected outcome from treatment.  Patient and her  had several questions which were answered such that they verbalized understanding of the issues.  She plans to see Dr. Hernandez later today to discuss chemotherapy.  Informed consent was obtained.  We will plan treatment planning simulation on July 17 with a radiation therapy planning scan in the treatment position.    I spent a total of 45 minutes face-to-face with Ms.Fritsche during today s office visit. Over 50% of this time was spent counseling the patient and/or coordinating care regarding the diagnosis and the planned treatment.    Anne Tidwell  Department of Radiation Oncology  [facility]

## 2019-07-10 NOTE — PROGRESS NOTES
RADIATION ONCOLOGY CONSULTATION  DATE OF VISIT: 7/10/2019    Nicole Fritsche  MRN: [id number]    PROBLEM: Ms. Nicole Fritsche was seen for initial consultation in the Department of Radiation Oncology on 7/10/2019 at the request of Dr. Hernandez for consideration of definitive chemoradiation for newly diagnosed squamous cell carcinoma of the cervix.  All available pertinent medical records, radiographic and laboratory studies were reviewed.    HISTORY OF PRESENT ILLNESS: Nicole Fritsche is a 49-year-old woman who presented to her primary care physician with irregular vaginal/postcoital bleeding.  Transabdominal/endovaginal ultrasound on May 3, 2019 demonstrated a normal-sized retroverted uterus with normal thickness endometrial stripe (0.2 cm), a solitary fibroid in the region of the cervix measuring 2.4 x 2.0 x 1.5 cm and a simple right ovarian cyst.  Pap smear on May 21 was positive for atypical squamous cells of undetermined significance (ASC-US).  Colposcopy and endocervical curettage on Patricia 3 demonstrated high-grade squamous intraepithelial lesion (severe dysplasia/carcinoma in situ), cannot rule out invasive malignancy.  She was seen by Dr. Hernandez and underwent repeat biopsy on June 12.  Pathology demonstrated invasive well-differentiated squamous cell carcinoma, focally keratinizing.  Examination demonstrated a friable, ulcerative lesion from 3-8 o'clock without parametrial involvement.  On July 1, a pelvic MRI scan demonstrated a 2.3 x 3.8 x 2.6 cm mass involving the posterior cervix without parametrial extension or pelvic adenopathy.  PET CT scan on July 1 confirmed a 3.7 cm cervical mass (SUV max 13.2) with positive low periaortic adenopathy (SUV 2.7).  There were no distant metastasis seen imaging.    Overall, the patient feels relatively well although she has pelvic pain since her first biopsy.  She continues to have vaginal bleeding.  She denies any unexplained weight loss, change in bowel or  bladder habits, nausea, vomiting.    PAST MEDICAL HISTORY:   Patient Active Problem List    Diagnosis Date Noted     Cervix cancer (H) 07/10/2019     Priority: Medium     ASCUS with positive high risk HPV cervical 05/28/2019     Priority: Medium     5/21/19 ASCUS pap, + HR HPV # 18. Plan: [place]       Mild persistent asthma 04/28/2015     Lateral epicondylitis 07/12/2012     Priority: Medium     Problem list name updated by automated process. Provider to review       Tobacco abuse 02/16/2010     Priority: Medium     Arrested hydrocephalus 02/03/2009     Priority: Medium     Ongoing HAs, seeing neurosurgeon. Per past notes from Dr. Resendiz, HAs most likely not secondary to the hydrocephalus, but rather vascular inorgin.  Please see scanned in records.    Pt had 3rd ventriculostomy 2/09-seeing neurology, PT and OT     PAST SURGICAL HISTORY:   Procedure Laterality Date     C VENTRICULO-CISTERNOSTOMY,3RD VENT      for treatment of HA related to hydrocephalus     EXCISE MASS TRUNK Left 9/16/2015    Procedure: EXCISE MASS TRUNK;  Surgeon: Carlos Enrique Briones MD;  Location: [facility] OR     CHEMOTHERAPY HISTORY: None    PAST RADIATION THERAPY HISTORY: None    IMPLANTABLE CARDIAC DEVICE: None    PREGNANCY STATUS: Surgical    MEDICATIONS:   Medication Sig     albuterol (PROAIR HFA/PROVENTIL HFA/VENTOLIN HFA) 108 (90 Base) MCG/ACT inhaler Inhale 2 puffs into the lungs every 6 hours as needed for shortness of breath / dyspnea or wheezing     Ferrous Sulfate (IRON SUPPLEMENT PO) Take 325 mg by mouth 2 times daily (with meals)     fluticasone (FLONASE) 50 MCG/ACT nasal spray Spray 1-2 sprays into both nostrils daily     fluticasone (FLOVENT HFA) 110 MCG/ACT inhaler Inhale 2 puffs into the lungs 2 times daily     Multiple Vitamin (MULTIVITAMINS PO) Take  by mouth daily.      ALLERGIES:  Allergies as of 07/10/2019     (No Known Allergies)     SOCIAL HISTORY:   Socioeconomic History     Marital status:      Spouse name: Not on file      Number of children: 3 adult children (21, 22, 24 yo)     Years of education: Not on file     Highest education level: Not on file   Occupational History     Works at [employer]   Tobacco Use     Smoking status: Former Smoker     Packs/day: 0.25     Years: 20.00     Pack years: 5.00     Types: Cigarettes     Last attempt to quit: 2018     Years since quittin.3     Smokeless tobacco: Former User     Quit date: 2015   Substance and Sexual Activity     Alcohol use: Yes     Alcohol/week: 0.0 oz     Comment: once every 3-4 months     Drug use: No     Sexual activity: Yes     Partners: Male     FAMILY CANCER HISTORY: Patient's mother and sister have a history of ovarian cancer.    REVIEW OF SYMPTOMS:  A full 14-point review of systems was performed. All pertinent positives and negatives are noted in HPI.    FUNCTIONAL STATUS: ECO    PHYSICAL EXAMINATION:    /84   Wt 75.8 kg (167 lb)   BMI 26.16 kg/m  , Pain 6/10 pelvis  Exam:  Constitutional: In no acute distress, alert, oriented  Head: Normocephalic, atraumatic  Neck: No adenopathy  ENT: Oral cavity and oropharynx without mucosal lesion  Cardiovascular: Regular rate and rhythm  Respiratory: Clear to auscultation, no wheezing  Gastrointestinal: Soft, nondistended, tender to palpation lower abdomen  : Normal external genitalia, vaginal mucosa is smooth without mass or irregular surface, cervix visualized with erythematous, friable lesion involving the posterior lip from 3-9 o'clock without extension onto the vagina.  Patient did not tolerate rectovaginal exam secondary to pain.  Musculoskeletal: No deformity, no edema  Skin: No rashes  Neurologic: Nonfocal, no gross deficits  Psychiatric: Mood appropriate  Hematologic/Lymphatic/Immunologic: No palpable supraclavicular, axillary or inguinal adenopathy    IMAGING/PATH: Please see history of present illness.    ASSESSMENT AND PLAN: Nicole Fritsche is a 49-year-old woman with FIGO stage IB1  (clinical T1B1 N1 M0, AJCC 2018) squamous cell carcinoma of the cervix with a positive low periaortic lymph nodes seen on PET scan.  She is here with her  to discuss definitive chemoradiation as primary management for her newly diagnosed cancer.  I reviewed the findings on her PET CT scan and MRI scan and discussed the rationale for recommending definitive chemoradiation instead of surgery as primary management of her cancer.  I also reviewed the recommended course of therapy including pelvic external beam radiation with weekly chemotherapy followed by high-dose-rate brachytherapy as well as the anticipated acute side effects, potential long-term risks and expected outcome from treatment.  Patient and her  had several questions which were answered such that they verbalized understanding of the issues.  She plans to see Dr. Hernandez later today to discuss chemotherapy.  Informed consent was obtained.  We will plan treatment planning simulation on July 17 with a radiation therapy planning scan in the treatment position.    I spent a total of 45 minutes face-to-face with Ms.Fritsche during today s office visit. Over 50% of this time was spent counseling the patient and/or coordinating care regarding the diagnosis and the planned treatment.    Anne Tidwell  Department of Radiation Oncology  [facility]

## 2019-07-10 NOTE — NURSING NOTE
"Oncology Rooming Note    July 10, 2019 11:37 AM   Nicole Fritsche is a 49 year old female who presents for:    Chief Complaint   Patient presents with     Oncology Clinic Visit     Malignant neoplasm of exocervix      Initial Vitals: There were no vitals taken for this visit. Estimated body mass index is 26.16 kg/m  as calculated from the following:    Height as of 6/12/19: 1.702 m (5' 7\").    Weight as of an earlier encounter on 7/10/19: 75.8 kg (167 lb). There is no height or weight on file to calculate BSA.  Severe Pain (6) Comment: Data Unavailable   No LMP recorded. Patient is perimenopausal.  Allergies reviewed: Yes  Medications reviewed: Yes    Medications: Medication refills not needed today.  Pharmacy name entered into [facility]: [id number] - [place], [place] - [address] AT [facility] [address]    Clinical concerns: n/a       TRIPP MERCER CMA              "

## 2019-07-10 NOTE — LETTER
7/10/2019       RE: Nicole Fritsche  [address]  [place] [address]     Dear Colleague,    Thank you for referring your patient, Nicole Fritsche, to the [facility]. Please see a copy of my visit note below.                Follow Up Notes on Referred Patient    Date: 2019       Dr. Jonatan Garcia MD  No address on file       RE: Nicole Fritsche  : 1970  DAVID: 7/10/2019    Dear Dr. Jonatan Garcia:    Nicole Fritsche is a 49 year old woman with a diagnosis of stage IIB cervical cancer.     Patient presents today for followup.  No new symptoms since the last time I have seen her.       Past Medical History:    Past Medical History:   Diagnosis Date     Abnormal Pap smear of cervix 2019    see problem list     Arrested hydrocephalus 2/3/2009    Ongoing HAs, seeing neurosurgeon. Per past notes from Dr. Resendiz, HAs most likely not secondary to the hydrocephalus, but rather vascular inorgin.  Please see scanned in records.     Asthma      Cervical cancer (H)      Cervical high risk HPV (human papillomavirus) test positive 2019    See problem list         Past Surgical History:    Past Surgical History:   Procedure Laterality Date     C VENTRICULO-CISTERNOSTOMY,3RD VENT      for treatment of HA related to hydrocephalus     EXCISE MASS TRUNK Left 2015    Procedure: EXCISE MASS TRUNK;  Surgeon: Carlos Enrique Briones MD;  Location:  OR         Health Maintenance Due   Topic Date Due     COLONOSCOPY  1979     ASTHMA ACTION PLAN  2016     ZOSTER IMMUNIZATION (2 of 2) 2019       Current Medications:     Current Outpatient Medications   Medication Sig Dispense Refill     albuterol (PROAIR HFA/PROVENTIL HFA/VENTOLIN HFA) 108 (90 Base) MCG/ACT inhaler Inhale 2 puffs into the lungs every 6 hours as needed for shortness of breath / dyspnea or wheezing 18 g 1     Ferrous Sulfate (IRON SUPPLEMENT PO) Take 325 mg by mouth 2 times daily (with meals)        fluticasone (FLONASE) 50 MCG/ACT nasal spray Spray 1-2 sprays into both nostrils daily 16 g 1     fluticasone (FLOVENT HFA) 110 MCG/ACT inhaler Inhale 2 puffs into the lungs 2 times daily 3 Inhaler 3     Multiple Vitamin (MULTIVITAMINS PO) Take  by mouth daily.           Allergies:      No Known Allergies     Social History:     Social History     Tobacco Use     Smoking status: Former Smoker     Packs/day: 0.25     Years: 20.00     Pack years: 5.00     Types: Cigarettes     Last attempt to quit: 2018     Years since quittin.4     Smokeless tobacco: Former User     Quit date: 2015   Substance Use Topics     Alcohol use: Yes     Alcohol/week: 0.0 oz     Comment: once every 3-4 months       History   Drug Use No         Family History:       Family History   Problem Relation Age of Onset     Diabetes Mother      Cerebrovascular Disease Mother      Ovarian Cancer Mother      Breast Cancer Mother      C.A.D. Father 40     Cerebrovascular Disease Father      C.A.D. Sister 62        stent placed     Ovarian Cancer Sister      Ovarian Cancer Sister      Ovarian Cancer Sister          Physical Exam:     There were no vitals taken for this visit.  There is no height or weight on file to calculate BMI.    General Appearance: healthy and alert, no distress         Assessment:    Nicole Fritsche is a 49 year old woman with a diagnosis of stage IIB cervical cancer.     A total of 25 minutes was spent with the patient, 20 minutes of which were spent in counseling the patient and/or treatment planning.      1.  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   2.  Positive pelvic and positive periaortic nodes.      I discussed with the patient as well as with her family the findings of the MRI as well as the PET scan consistent with a stage IIB partial involvement of squamous cell carcinoma of the cervix and positive pelvic and positive periaortic nodes.  Given these findings, we will need to proceed with definitive  chemoradiation.  I will have her see my colleagues in Radiation Oncology for this.  We discussed the concepts of external beam radiation as well brachytherapy with chemopotentiation with weekly cisplatin dose of 4 mg/m2.  Information concerning the above were given to her about that and side effects were discussed.  She agrees with this plan.  We will follow up with her after done with definitive chemoradiation.  All questions were answered.       Hasmukh Hernandez MD, MS    Department of Obstetrics and Gynecology   Division of Gynecologic Oncology   [facility]  Phone: [phone]      CC  Patient Care Team:  Ansley White PA-C as PCP - General

## 2019-07-11 ENCOUNTER — DOCUMENTATION ONLY (OUTPATIENT)
Dept: ONCOLOGY | Facility: CLINIC | Age: 49
End: 2019-07-11

## 2019-07-11 NOTE — PROGRESS NOTES
FMLA forms from [employer] filled out and put in providers folder for review and signature.        Viviana Shipman CMA ([facility])

## 2019-07-15 ENCOUNTER — CARE COORDINATION (OUTPATIENT)
Dept: ONCOLOGY | Facility: CLINIC | Age: 49
End: 2019-07-15

## 2019-07-15 NOTE — PROGRESS NOTES
Care Coordinator Note  Call from patient's [employer] regarding short term disability.  Treatment to start 7/22/19 x 6-9 weeks.  Patient's next appointment is 7/17/19.  States information given via telephone is adequate, do not need to return faxed forms at this time.

## 2019-07-17 ENCOUNTER — HOSPITAL ENCOUNTER (OUTPATIENT)
Dept: CT IMAGING | Facility: CLINIC | Age: 49
Discharge: HOME | End: 2019-07-17
Attending: RADIOLOGY | Admitting: RADIOLOGY
Payer: COMMERCIAL

## 2019-07-17 ENCOUNTER — ALLIED HEALTH/NURSE VISIT (OUTPATIENT)
Dept: RADIATION ONCOLOGY | Facility: CLINIC | Age: 49
End: 2019-07-17
Attending: RADIOLOGY
Payer: COMMERCIAL

## 2019-07-17 DIAGNOSIS — C53.1 MALIGNANT NEOPLASM OF EXOCERVIX (H): Primary | ICD-10-CM

## 2019-07-17 DIAGNOSIS — C53.1 MALIGNANT NEOPLASM OF EXOCERVIX (H): ICD-10-CM

## 2019-07-17 PROCEDURE — 72193 CT PELVIS W/DYE: CPT

## 2019-07-17 PROCEDURE — 77334 RADIATION TREATMENT AID(S): CPT | Performed by: RADIOLOGY

## 2019-07-17 PROCEDURE — 77470 SPECIAL RADIATION TREATMENT: CPT | Performed by: RADIOLOGY

## 2019-07-17 PROCEDURE — 25000128 H RX IP 250 OP 636: Performed by: STUDENT IN AN ORGANIZED HEALTH CARE EDUCATION/TRAINING PROGRAM

## 2019-07-17 PROCEDURE — 77332 RADIATION TREATMENT AID(S): CPT | Performed by: RADIOLOGY

## 2019-07-17 PROCEDURE — 40000556 ZZH STATISTIC PERIPHERAL IV START W US GUIDANCE

## 2019-07-17 RX ORDER — PALONOSETRON 0.05 MG/ML
0.25 INJECTION, SOLUTION INTRAVENOUS ONCE
Status: CANCELLED
Start: 2019-08-19

## 2019-07-17 RX ORDER — METHYLPREDNISOLONE SODIUM SUCCINATE 125 MG/2ML
125 INJECTION, POWDER, LYOPHILIZED, FOR SOLUTION INTRAMUSCULAR; INTRAVENOUS
Status: CANCELLED
Start: 2019-08-12

## 2019-07-17 RX ORDER — PALONOSETRON 0.05 MG/ML
0.25 INJECTION, SOLUTION INTRAVENOUS ONCE
Status: CANCELLED
Start: 2019-07-29

## 2019-07-17 RX ORDER — EPINEPHRINE 0.3 MG/.3ML
0.3 INJECTION SUBCUTANEOUS EVERY 5 MIN PRN
Status: CANCELLED | OUTPATIENT
Start: 2019-08-19

## 2019-07-17 RX ORDER — NALOXONE HYDROCHLORIDE 0.4 MG/ML
.1-.4 INJECTION, SOLUTION INTRAMUSCULAR; INTRAVENOUS; SUBCUTANEOUS
Status: CANCELLED | OUTPATIENT
Start: 2019-09-02

## 2019-07-17 RX ORDER — LORAZEPAM 2 MG/ML
1 INJECTION INTRAMUSCULAR EVERY 6 HOURS PRN
Status: CANCELLED
Start: 2019-08-19

## 2019-07-17 RX ORDER — DEXTROSE, SODIUM CHLORIDE, AND POTASSIUM CHLORIDE 5; .45; .15 G/100ML; G/100ML; G/100ML
2000 INJECTION INTRAVENOUS ONCE
Status: CANCELLED
Start: 2019-08-12

## 2019-07-17 RX ORDER — NALOXONE HYDROCHLORIDE 0.4 MG/ML
.1-.4 INJECTION, SOLUTION INTRAMUSCULAR; INTRAVENOUS; SUBCUTANEOUS
Status: CANCELLED | OUTPATIENT
Start: 2019-08-26

## 2019-07-17 RX ORDER — DIPHENHYDRAMINE HYDROCHLORIDE 50 MG/ML
50 INJECTION INTRAMUSCULAR; INTRAVENOUS
Status: CANCELLED
Start: 2019-08-26

## 2019-07-17 RX ORDER — EPINEPHRINE 1 MG/ML
0.3 INJECTION, SOLUTION INTRAMUSCULAR; SUBCUTANEOUS EVERY 5 MIN PRN
Status: CANCELLED | OUTPATIENT
Start: 2019-08-12

## 2019-07-17 RX ORDER — ALBUTEROL SULFATE 90 UG/1
1-2 AEROSOL, METERED RESPIRATORY (INHALATION)
Status: CANCELLED
Start: 2019-08-19

## 2019-07-17 RX ORDER — PALONOSETRON 0.05 MG/ML
0.25 INJECTION, SOLUTION INTRAVENOUS ONCE
Status: CANCELLED
Start: 2019-08-05

## 2019-07-17 RX ORDER — EPINEPHRINE 0.3 MG/.3ML
0.3 INJECTION SUBCUTANEOUS EVERY 5 MIN PRN
Status: CANCELLED | OUTPATIENT
Start: 2019-08-26

## 2019-07-17 RX ORDER — ALBUTEROL SULFATE 90 UG/1
1-2 AEROSOL, METERED RESPIRATORY (INHALATION)
Status: CANCELLED
Start: 2019-08-26

## 2019-07-17 RX ORDER — EPINEPHRINE 0.3 MG/.3ML
0.3 INJECTION SUBCUTANEOUS EVERY 5 MIN PRN
Status: CANCELLED | OUTPATIENT
Start: 2019-08-05

## 2019-07-17 RX ORDER — NALOXONE HYDROCHLORIDE 0.4 MG/ML
.1-.4 INJECTION, SOLUTION INTRAMUSCULAR; INTRAVENOUS; SUBCUTANEOUS
Status: CANCELLED | OUTPATIENT
Start: 2019-07-29

## 2019-07-17 RX ORDER — METHYLPREDNISOLONE SODIUM SUCCINATE 125 MG/2ML
125 INJECTION, POWDER, LYOPHILIZED, FOR SOLUTION INTRAMUSCULAR; INTRAVENOUS
Status: CANCELLED
Start: 2019-07-29

## 2019-07-17 RX ORDER — DEXTROSE, SODIUM CHLORIDE, AND POTASSIUM CHLORIDE 5; .45; .15 G/100ML; G/100ML; G/100ML
2000 INJECTION INTRAVENOUS ONCE
Status: CANCELLED
Start: 2019-08-19

## 2019-07-17 RX ORDER — NALOXONE HYDROCHLORIDE 0.4 MG/ML
.1-.4 INJECTION, SOLUTION INTRAMUSCULAR; INTRAVENOUS; SUBCUTANEOUS
Status: CANCELLED | OUTPATIENT
Start: 2019-08-12

## 2019-07-17 RX ORDER — ALBUTEROL SULFATE 0.83 MG/ML
2.5 SOLUTION RESPIRATORY (INHALATION)
Status: CANCELLED | OUTPATIENT
Start: 2019-09-02

## 2019-07-17 RX ORDER — PALONOSETRON 0.05 MG/ML
0.25 INJECTION, SOLUTION INTRAVENOUS ONCE
Status: CANCELLED
Start: 2019-08-26

## 2019-07-17 RX ORDER — EPINEPHRINE 1 MG/ML
0.3 INJECTION, SOLUTION INTRAMUSCULAR; SUBCUTANEOUS EVERY 5 MIN PRN
Status: CANCELLED | OUTPATIENT
Start: 2019-07-29

## 2019-07-17 RX ORDER — PALONOSETRON 0.05 MG/ML
0.25 INJECTION, SOLUTION INTRAVENOUS ONCE
Status: CANCELLED
Start: 2019-08-12

## 2019-07-17 RX ORDER — PALONOSETRON 0.05 MG/ML
0.25 INJECTION, SOLUTION INTRAVENOUS ONCE
Status: CANCELLED
Start: 2019-09-02

## 2019-07-17 RX ORDER — EPINEPHRINE 1 MG/ML
0.3 INJECTION, SOLUTION INTRAMUSCULAR; SUBCUTANEOUS EVERY 5 MIN PRN
Status: CANCELLED | OUTPATIENT
Start: 2019-09-02

## 2019-07-17 RX ORDER — DEXTROSE, SODIUM CHLORIDE, AND POTASSIUM CHLORIDE 5; .45; .15 G/100ML; G/100ML; G/100ML
2000 INJECTION INTRAVENOUS ONCE
Status: CANCELLED
Start: 2019-08-05

## 2019-07-17 RX ORDER — EPINEPHRINE 0.3 MG/.3ML
0.3 INJECTION SUBCUTANEOUS EVERY 5 MIN PRN
Status: CANCELLED | OUTPATIENT
Start: 2019-07-29

## 2019-07-17 RX ORDER — IOPAMIDOL 755 MG/ML
103 INJECTION, SOLUTION INTRAVASCULAR ONCE
Status: COMPLETED | OUTPATIENT
Start: 2019-07-17 | End: 2019-07-17

## 2019-07-17 RX ORDER — MEPERIDINE HYDROCHLORIDE 25 MG/ML
25 INJECTION INTRAMUSCULAR; INTRAVENOUS; SUBCUTANEOUS EVERY 30 MIN PRN
Status: CANCELLED | OUTPATIENT
Start: 2019-09-02

## 2019-07-17 RX ORDER — ALBUTEROL SULFATE 0.83 MG/ML
2.5 SOLUTION RESPIRATORY (INHALATION)
Status: CANCELLED | OUTPATIENT
Start: 2019-08-19

## 2019-07-17 RX ORDER — DIPHENHYDRAMINE HYDROCHLORIDE 50 MG/ML
50 INJECTION INTRAMUSCULAR; INTRAVENOUS
Status: CANCELLED
Start: 2019-09-02

## 2019-07-17 RX ORDER — DIPHENHYDRAMINE HYDROCHLORIDE 50 MG/ML
50 INJECTION INTRAMUSCULAR; INTRAVENOUS
Status: CANCELLED
Start: 2019-08-19

## 2019-07-17 RX ORDER — DIPHENHYDRAMINE HYDROCHLORIDE 50 MG/ML
50 INJECTION INTRAMUSCULAR; INTRAVENOUS
Status: CANCELLED
Start: 2019-08-05

## 2019-07-17 RX ORDER — NALOXONE HYDROCHLORIDE 0.4 MG/ML
.1-.4 INJECTION, SOLUTION INTRAMUSCULAR; INTRAVENOUS; SUBCUTANEOUS
Status: CANCELLED | OUTPATIENT
Start: 2019-08-05

## 2019-07-17 RX ORDER — ALBUTEROL SULFATE 0.83 MG/ML
2.5 SOLUTION RESPIRATORY (INHALATION)
Status: CANCELLED | OUTPATIENT
Start: 2019-08-05

## 2019-07-17 RX ORDER — LORAZEPAM 2 MG/ML
1 INJECTION INTRAMUSCULAR EVERY 6 HOURS PRN
Status: CANCELLED
Start: 2019-08-05

## 2019-07-17 RX ORDER — DEXTROSE, SODIUM CHLORIDE, AND POTASSIUM CHLORIDE 5; .45; .15 G/100ML; G/100ML; G/100ML
2000 INJECTION INTRAVENOUS ONCE
Status: CANCELLED
Start: 2019-09-02

## 2019-07-17 RX ORDER — SODIUM CHLORIDE 9 MG/ML
1000 INJECTION, SOLUTION INTRAVENOUS CONTINUOUS PRN
Status: CANCELLED
Start: 2019-08-12

## 2019-07-17 RX ORDER — MEPERIDINE HYDROCHLORIDE 25 MG/ML
25 INJECTION INTRAMUSCULAR; INTRAVENOUS; SUBCUTANEOUS EVERY 30 MIN PRN
Status: CANCELLED | OUTPATIENT
Start: 2019-08-26

## 2019-07-17 RX ORDER — LORAZEPAM 2 MG/ML
1 INJECTION INTRAMUSCULAR EVERY 6 HOURS PRN
Status: CANCELLED
Start: 2019-09-02

## 2019-07-17 RX ORDER — METHYLPREDNISOLONE SODIUM SUCCINATE 125 MG/2ML
125 INJECTION, POWDER, LYOPHILIZED, FOR SOLUTION INTRAMUSCULAR; INTRAVENOUS
Status: CANCELLED
Start: 2019-08-05

## 2019-07-17 RX ORDER — METHYLPREDNISOLONE SODIUM SUCCINATE 125 MG/2ML
125 INJECTION, POWDER, LYOPHILIZED, FOR SOLUTION INTRAMUSCULAR; INTRAVENOUS
Status: CANCELLED
Start: 2019-08-26

## 2019-07-17 RX ORDER — METHYLPREDNISOLONE SODIUM SUCCINATE 125 MG/2ML
125 INJECTION, POWDER, LYOPHILIZED, FOR SOLUTION INTRAMUSCULAR; INTRAVENOUS
Status: CANCELLED
Start: 2019-09-02

## 2019-07-17 RX ORDER — SODIUM CHLORIDE 9 MG/ML
1000 INJECTION, SOLUTION INTRAVENOUS CONTINUOUS PRN
Status: CANCELLED
Start: 2019-08-26

## 2019-07-17 RX ORDER — LORAZEPAM 2 MG/ML
1 INJECTION INTRAMUSCULAR EVERY 6 HOURS PRN
Status: CANCELLED
Start: 2019-08-26

## 2019-07-17 RX ORDER — EPINEPHRINE 1 MG/ML
0.3 INJECTION, SOLUTION INTRAMUSCULAR; SUBCUTANEOUS EVERY 5 MIN PRN
Status: CANCELLED | OUTPATIENT
Start: 2019-08-26

## 2019-07-17 RX ORDER — EPINEPHRINE 0.3 MG/.3ML
0.3 INJECTION SUBCUTANEOUS EVERY 5 MIN PRN
Status: CANCELLED | OUTPATIENT
Start: 2019-09-02

## 2019-07-17 RX ORDER — MEPERIDINE HYDROCHLORIDE 25 MG/ML
25 INJECTION INTRAMUSCULAR; INTRAVENOUS; SUBCUTANEOUS EVERY 30 MIN PRN
Status: CANCELLED | OUTPATIENT
Start: 2019-08-12

## 2019-07-17 RX ORDER — MEPERIDINE HYDROCHLORIDE 25 MG/ML
25 INJECTION INTRAMUSCULAR; INTRAVENOUS; SUBCUTANEOUS EVERY 30 MIN PRN
Status: CANCELLED | OUTPATIENT
Start: 2019-07-29

## 2019-07-17 RX ORDER — EPINEPHRINE 1 MG/ML
0.3 INJECTION, SOLUTION INTRAMUSCULAR; SUBCUTANEOUS EVERY 5 MIN PRN
Status: CANCELLED | OUTPATIENT
Start: 2019-08-19

## 2019-07-17 RX ORDER — DEXTROSE, SODIUM CHLORIDE, AND POTASSIUM CHLORIDE 5; .45; .15 G/100ML; G/100ML; G/100ML
2000 INJECTION INTRAVENOUS ONCE
Status: CANCELLED
Start: 2019-07-29

## 2019-07-17 RX ORDER — DIPHENHYDRAMINE HYDROCHLORIDE 50 MG/ML
50 INJECTION INTRAMUSCULAR; INTRAVENOUS
Status: CANCELLED
Start: 2019-07-29

## 2019-07-17 RX ORDER — ALBUTEROL SULFATE 90 UG/1
1-2 AEROSOL, METERED RESPIRATORY (INHALATION)
Status: CANCELLED
Start: 2019-07-29

## 2019-07-17 RX ORDER — LORAZEPAM 2 MG/ML
1 INJECTION INTRAMUSCULAR EVERY 6 HOURS PRN
Status: CANCELLED
Start: 2019-08-12

## 2019-07-17 RX ORDER — ALBUTEROL SULFATE 90 UG/1
1-2 AEROSOL, METERED RESPIRATORY (INHALATION)
Status: CANCELLED
Start: 2019-08-12

## 2019-07-17 RX ORDER — EPINEPHRINE 0.3 MG/.3ML
0.3 INJECTION SUBCUTANEOUS EVERY 5 MIN PRN
Status: CANCELLED | OUTPATIENT
Start: 2019-08-12

## 2019-07-17 RX ORDER — MEPERIDINE HYDROCHLORIDE 25 MG/ML
25 INJECTION INTRAMUSCULAR; INTRAVENOUS; SUBCUTANEOUS EVERY 30 MIN PRN
Status: CANCELLED | OUTPATIENT
Start: 2019-08-19

## 2019-07-17 RX ORDER — SODIUM CHLORIDE 9 MG/ML
1000 INJECTION, SOLUTION INTRAVENOUS CONTINUOUS PRN
Status: CANCELLED
Start: 2019-09-02

## 2019-07-17 RX ORDER — DIPHENHYDRAMINE HYDROCHLORIDE 50 MG/ML
50 INJECTION INTRAMUSCULAR; INTRAVENOUS
Status: CANCELLED
Start: 2019-08-12

## 2019-07-17 RX ORDER — ALBUTEROL SULFATE 0.83 MG/ML
2.5 SOLUTION RESPIRATORY (INHALATION)
Status: CANCELLED | OUTPATIENT
Start: 2019-07-29

## 2019-07-17 RX ORDER — NALOXONE HYDROCHLORIDE 0.4 MG/ML
.1-.4 INJECTION, SOLUTION INTRAMUSCULAR; INTRAVENOUS; SUBCUTANEOUS
Status: CANCELLED | OUTPATIENT
Start: 2019-08-19

## 2019-07-17 RX ORDER — LORAZEPAM 2 MG/ML
1 INJECTION INTRAMUSCULAR EVERY 6 HOURS PRN
Status: CANCELLED
Start: 2019-07-29

## 2019-07-17 RX ORDER — ALBUTEROL SULFATE 90 UG/1
1-2 AEROSOL, METERED RESPIRATORY (INHALATION)
Status: CANCELLED
Start: 2019-09-02

## 2019-07-17 RX ORDER — EPINEPHRINE 1 MG/ML
0.3 INJECTION, SOLUTION INTRAMUSCULAR; SUBCUTANEOUS EVERY 5 MIN PRN
Status: CANCELLED | OUTPATIENT
Start: 2019-08-05

## 2019-07-17 RX ORDER — MEPERIDINE HYDROCHLORIDE 25 MG/ML
25 INJECTION INTRAMUSCULAR; INTRAVENOUS; SUBCUTANEOUS EVERY 30 MIN PRN
Status: CANCELLED | OUTPATIENT
Start: 2019-08-05

## 2019-07-17 RX ORDER — DEXTROSE, SODIUM CHLORIDE, AND POTASSIUM CHLORIDE 5; .45; .15 G/100ML; G/100ML; G/100ML
2000 INJECTION INTRAVENOUS ONCE
Status: CANCELLED
Start: 2019-08-26

## 2019-07-17 RX ORDER — SODIUM CHLORIDE 9 MG/ML
1000 INJECTION, SOLUTION INTRAVENOUS CONTINUOUS PRN
Status: CANCELLED
Start: 2019-08-19

## 2019-07-17 RX ORDER — ALBUTEROL SULFATE 0.83 MG/ML
2.5 SOLUTION RESPIRATORY (INHALATION)
Status: CANCELLED | OUTPATIENT
Start: 2019-08-26

## 2019-07-17 RX ORDER — ALBUTEROL SULFATE 0.83 MG/ML
2.5 SOLUTION RESPIRATORY (INHALATION)
Status: CANCELLED | OUTPATIENT
Start: 2019-08-12

## 2019-07-17 RX ORDER — METHYLPREDNISOLONE SODIUM SUCCINATE 125 MG/2ML
125 INJECTION, POWDER, LYOPHILIZED, FOR SOLUTION INTRAMUSCULAR; INTRAVENOUS
Status: CANCELLED
Start: 2019-08-19

## 2019-07-17 RX ORDER — ALBUTEROL SULFATE 90 UG/1
1-2 AEROSOL, METERED RESPIRATORY (INHALATION)
Status: CANCELLED
Start: 2019-08-05

## 2019-07-17 RX ORDER — SODIUM CHLORIDE 9 MG/ML
1000 INJECTION, SOLUTION INTRAVENOUS CONTINUOUS PRN
Status: CANCELLED
Start: 2019-08-05

## 2019-07-17 RX ORDER — SODIUM CHLORIDE 9 MG/ML
1000 INJECTION, SOLUTION INTRAVENOUS CONTINUOUS PRN
Status: CANCELLED
Start: 2019-07-29

## 2019-07-17 RX ADMIN — IOPAMIDOL 103 ML: 755 INJECTION, SOLUTION INTRAVENOUS at 12:13

## 2019-07-17 NOTE — PROGRESS NOTES
A radiation therapy treatment planning simulation was performed.  Please see the [facility] record for documentation.    Anne Tidwell MD  Radiation Oncology

## 2019-07-17 NOTE — PROGRESS NOTES
Radiation Therapy Patient Education    Person involved with teaching: Patient and     Patient educational needs for self management of treatment-related side effects assessment completed.  [facility] Patient Ed tab contains Patient Learning Assessment    Education Materials Given  Radiation Therapy for GYN Patients    Educational Topics Discussed  Side effects expected, Pain management, Skin care, Nutrition and weight loss and When to call MD/RN    Response To Teaching  Verbalizes understanding    GYN Only  Vaginal Dilator-given and educated: not given    Referrals sent: None    Chemotherapy?  Yes: Medical oncology notified of start date 07/29/19

## 2019-07-19 NOTE — PROGRESS NOTES
STD paperwork completed, signed and faxed to "[employer] @ [phone]. A copy was made, filed and original mailed to patient at home address.    Viviana Shipman CMA

## 2019-07-23 NOTE — PROGRESS NOTES
Follow Up Notes on Referred Patient    Date: 2019       Dr. Jonatan Garcia MD  No address on file       RE: Nicole Fritsche  : 1970  DAVID: 7/10/2019    Dear Dr. Jonatan Garcia:    Nicole Fritsche is a 49 year old woman with a diagnosis of stage IIB cervical cancer.     Patient presents today for followup.  No new symptoms since the last time I have seen her.           Past Medical History:    Past Medical History:   Diagnosis Date     Abnormal Pap smear of cervix 2019    see problem list     Arrested hydrocephalus 2/3/2009    Ongoing HAs, seeing neurosurgeon. Per past notes from Dr. Resendiz, HAs most likely not secondary to the hydrocephalus, but rather vascular inorgin.  Please see scanned in records.     Asthma      Cervical cancer (H)      Cervical high risk HPV (human papillomavirus) test positive 2019    See problem list         Past Surgical History:    Past Surgical History:   Procedure Laterality Date     C VENTRICULO-CISTERNOSTOMY,3RD VENT      for treatment of HA related to hydrocephalus     EXCISE MASS TRUNK Left 2015    Procedure: EXCISE MASS TRUNK;  Surgeon: Carlos Enrique Briones MD;  Location:  OR         Health Maintenance Due   Topic Date Due     COLONOSCOPY  1979     ASTHMA ACTION PLAN  2016     ZOSTER IMMUNIZATION (2 of 2) 2019       Current Medications:     Current Outpatient Medications   Medication Sig Dispense Refill     albuterol (PROAIR HFA/PROVENTIL HFA/VENTOLIN HFA) 108 (90 Base) MCG/ACT inhaler Inhale 2 puffs into the lungs every 6 hours as needed for shortness of breath / dyspnea or wheezing 18 g 1     Ferrous Sulfate (IRON SUPPLEMENT PO) Take 325 mg by mouth 2 times daily (with meals)       fluticasone (FLONASE) 50 MCG/ACT nasal spray Spray 1-2 sprays into both nostrils daily 16 g 1     fluticasone (FLOVENT HFA) 110 MCG/ACT inhaler Inhale 2 puffs into the lungs 2 times daily 3 Inhaler 3     Multiple Vitamin (MULTIVITAMINS PO)  Take  by mouth daily.           Allergies:      No Known Allergies     Social History:     Social History     Tobacco Use     Smoking status: Former Smoker     Packs/day: 0.25     Years: 20.00     Pack years: 5.00     Types: Cigarettes     Last attempt to quit: 2018     Years since quittin.4     Smokeless tobacco: Former User     Quit date: 2015   Substance Use Topics     Alcohol use: Yes     Alcohol/week: 0.0 oz     Comment: once every 3-4 months       History   Drug Use No         Family History:       Family History   Problem Relation Age of Onset     Diabetes Mother      Cerebrovascular Disease Mother      Ovarian Cancer Mother      Breast Cancer Mother      C.A.D. Father 40     Cerebrovascular Disease Father      C.A.D. Sister 62        stent placed     Ovarian Cancer Sister      Ovarian Cancer Sister      Ovarian Cancer Sister          Physical Exam:     There were no vitals taken for this visit.  There is no height or weight on file to calculate BMI.    General Appearance: healthy and alert, no distress         Assessment:    Nicole Fritsche is a 49 year old woman with a diagnosis of stage IIB cervical cancer.     A total of 25 minutes was spent with the patient, 20 minutes of which were spent in counseling the patient and/or treatment planning.      1.  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   2.  Positive pelvic and positive periaortic nodes.      I discussed with the patient as well as with her family the findings of the MRI as well as the PET scan consistent with a stage IIB partial involvement of squamous cell carcinoma of the cervix and positive pelvic and positive periaortic nodes.  Given these findings, we will need to proceed with definitive chemoradiation.  I will have her see my colleagues in Radiation Oncology for this.  We discussed the concepts of external beam radiation as well brachytherapy with chemopotentiation with weekly cisplatin dose of 4 mg/m2.  Information  concerning the above were given to her about that and side effects were discussed.  She agrees with this plan.  We will follow up with her after done with definitive chemoradiation.  All questions were answered.       Hasmukh Hernandez MD, MS    Department of Obstetrics and Gynecology   Division of Gynecologic Oncology   [facility]  Phone: [phone]        CC  Patient Care Team:  Ansley White PA-C as PCP - General  Ansley White PA-C as Assigned PCP  SELF, REFERRED

## 2019-07-26 ENCOUNTER — CARE COORDINATION (OUTPATIENT)
Dept: ONCOLOGY | Facility: CLINIC | Age: 49
End: 2019-07-26

## 2019-07-26 DIAGNOSIS — C53.1 MALIGNANT NEOPLASM OF EXOCERVIX (H): Primary | ICD-10-CM

## 2019-07-26 RX ORDER — PROCHLORPERAZINE MALEATE 10 MG
10 TABLET ORAL EVERY 6 HOURS PRN
Qty: 30 TABLET | Refills: 2 | Status: SHIPPED | OUTPATIENT
Start: 2019-07-26 | End: 2019-11-27

## 2019-07-26 RX ORDER — LORAZEPAM 1 MG/1
1 TABLET ORAL EVERY 6 HOURS PRN
Qty: 30 TABLET | Refills: 1 | Status: SHIPPED | OUTPATIENT
Start: 2019-07-26 | End: 2019-11-27

## 2019-07-26 NOTE — PROGRESS NOTES
Care Coordinator Note  Reviewed lab and chemotherapy schedule for Monday 7/29/19 with patient.  Patient will ask for a printout of treatment schedule when in infusion on Monday.  She understands that I will schedule a mid treatment clinic appointment with our nurse practitioner and that the 9/3/19 chemotherapy infusion appointment will be cancelled pending completion of radiation therapy.      Patient verbalized back understanding of the above information discussed.     Radhika GARCIA, RN  Care Coordinator  Gynecologic Cancer   Office:  [phone]  Pager: [phone] #[phone]

## 2019-07-29 ENCOUNTER — APPOINTMENT (OUTPATIENT)
Dept: LAB | Facility: CLINIC | Age: 49
End: 2019-07-29
Attending: OBSTETRICS & GYNECOLOGY
Payer: COMMERCIAL

## 2019-07-29 ENCOUNTER — APPOINTMENT (OUTPATIENT)
Dept: RADIATION ONCOLOGY | Facility: CLINIC | Age: 49
End: 2019-07-29
Attending: RADIOLOGY
Payer: COMMERCIAL

## 2019-07-29 ENCOUNTER — INFUSION THERAPY VISIT (OUTPATIENT)
Dept: ONCOLOGY | Facility: CLINIC | Age: 49
End: 2019-07-29
Attending: OBSTETRICS & GYNECOLOGY
Payer: COMMERCIAL

## 2019-07-29 VITALS
TEMPERATURE: 98.8 F | SYSTOLIC BLOOD PRESSURE: 117 MMHG | DIASTOLIC BLOOD PRESSURE: 79 MMHG | WEIGHT: 162.6 LBS | RESPIRATION RATE: 16 BRPM | BODY MASS INDEX: 25.47 KG/M2 | OXYGEN SATURATION: 99 % | HEART RATE: 65 BPM

## 2019-07-29 DIAGNOSIS — C53.9 MALIGNANT NEOPLASM OF CERVIX, UNSPECIFIED SITE (H): Primary | ICD-10-CM

## 2019-07-29 LAB
ALBUMIN SERPL-MCNC: 3.8 G/DL (ref 3.4–5)
ALP SERPL-CCNC: 89 U/L (ref 40–150)
ALT SERPL W P-5'-P-CCNC: 28 U/L (ref 0–50)
ANION GAP SERPL CALCULATED.3IONS-SCNC: 5 MMOL/L (ref 3–14)
AST SERPL W P-5'-P-CCNC: 23 U/L (ref 0–45)
BASOPHILS # BLD AUTO: 0.1 10E9/L (ref 0–0.2)
BASOPHILS NFR BLD AUTO: 1.1 %
BILIRUB SERPL-MCNC: 0.4 MG/DL (ref 0.2–1.3)
BUN SERPL-MCNC: 11 MG/DL (ref 7–30)
CALCIUM SERPL-MCNC: 8.9 MG/DL (ref 8.5–10.1)
CHLORIDE SERPL-SCNC: 111 MMOL/L (ref 94–109)
CO2 SERPL-SCNC: 26 MMOL/L (ref 20–32)
CREAT SERPL-MCNC: 0.48 MG/DL (ref 0.52–1.04)
DIFFERENTIAL METHOD BLD: NORMAL
EOSINOPHIL # BLD AUTO: 0.5 10E9/L (ref 0–0.7)
EOSINOPHIL NFR BLD AUTO: 5.6 %
ERYTHROCYTE [DISTWIDTH] IN BLOOD BY AUTOMATED COUNT: 13 % (ref 10–15)
GFR SERPL CREATININE-BSD FRML MDRD: >90 ML/MIN/{1.73_M2}
GLUCOSE SERPL-MCNC: 97 MG/DL (ref 70–99)
HCT VFR BLD AUTO: 41.7 % (ref 35–47)
HGB BLD-MCNC: 13.7 G/DL (ref 11.7–15.7)
IMM GRANULOCYTES # BLD: 0 10E9/L (ref 0–0.4)
IMM GRANULOCYTES NFR BLD: 0.2 %
LYMPHOCYTES # BLD AUTO: 2.3 10E9/L (ref 0.8–5.3)
LYMPHOCYTES NFR BLD AUTO: 25.7 %
MAGNESIUM SERPL-MCNC: 1.7 MG/DL (ref 1.6–2.3)
MCH RBC QN AUTO: 31.9 PG (ref 26.5–33)
MCHC RBC AUTO-ENTMCNC: 32.9 G/DL (ref 31.5–36.5)
MCV RBC AUTO: 97 FL (ref 78–100)
MONOCYTES # BLD AUTO: 1.2 10E9/L (ref 0–1.3)
MONOCYTES NFR BLD AUTO: 12.9 %
NEUTROPHILS # BLD AUTO: 4.9 10E9/L (ref 1.6–8.3)
NEUTROPHILS NFR BLD AUTO: 54.5 %
NRBC # BLD AUTO: 0 10*3/UL
NRBC BLD AUTO-RTO: 0 /100
PLATELET # BLD AUTO: 404 10E9/L (ref 150–450)
POTASSIUM SERPL-SCNC: 3.6 MMOL/L (ref 3.4–5.3)
PROT SERPL-MCNC: 7.3 G/DL (ref 6.8–8.8)
RBC # BLD AUTO: 4.3 10E12/L (ref 3.8–5.2)
SODIUM SERPL-SCNC: 142 MMOL/L (ref 133–144)
WBC # BLD AUTO: 9 10E9/L (ref 4–11)

## 2019-07-29 PROCEDURE — 25800025 ZZH RX 258: Mod: ZF | Performed by: OBSTETRICS & GYNECOLOGY

## 2019-07-29 PROCEDURE — 96367 TX/PROPH/DG ADDL SEQ IV INF: CPT

## 2019-07-29 PROCEDURE — 25000128 H RX IP 250 OP 636: Mod: ZF | Performed by: OBSTETRICS & GYNECOLOGY

## 2019-07-29 PROCEDURE — 77386 ZZH IMRT TREATMENT DELIVERY, COMPLEX: CPT | Performed by: RADIOLOGY

## 2019-07-29 PROCEDURE — 96413 CHEMO IV INFUSION 1 HR: CPT

## 2019-07-29 PROCEDURE — 83735 ASSAY OF MAGNESIUM: CPT | Performed by: OBSTETRICS & GYNECOLOGY

## 2019-07-29 PROCEDURE — 96361 HYDRATE IV INFUSION ADD-ON: CPT

## 2019-07-29 PROCEDURE — 80053 COMPREHEN METABOLIC PANEL: CPT | Performed by: OBSTETRICS & GYNECOLOGY

## 2019-07-29 PROCEDURE — 85025 COMPLETE CBC W/AUTO DIFF WBC: CPT | Performed by: OBSTETRICS & GYNECOLOGY

## 2019-07-29 PROCEDURE — 25800030 ZZH RX IP 258 OP 636: Mod: ZF | Performed by: OBSTETRICS & GYNECOLOGY

## 2019-07-29 PROCEDURE — 96375 TX/PRO/DX INJ NEW DRUG ADDON: CPT

## 2019-07-29 RX ORDER — PALONOSETRON 0.05 MG/ML
0.25 INJECTION, SOLUTION INTRAVENOUS ONCE
Status: COMPLETED | OUTPATIENT
Start: 2019-07-29 | End: 2019-07-29

## 2019-07-29 RX ORDER — DEXTROSE, SODIUM CHLORIDE, AND POTASSIUM CHLORIDE 5; .45; .15 G/100ML; G/100ML; G/100ML
2000 INJECTION INTRAVENOUS ONCE
Status: COMPLETED | OUTPATIENT
Start: 2019-07-29 | End: 2019-07-29

## 2019-07-29 RX ADMIN — CISPLATIN 80 MG: 1 INJECTION, SOLUTION INTRAVENOUS at 09:12

## 2019-07-29 RX ADMIN — DEXAMETHASONE SODIUM PHOSPHATE: 10 INJECTION, SOLUTION INTRAMUSCULAR; INTRAVENOUS at 07:30

## 2019-07-29 RX ADMIN — POTASSIUM CHLORIDE, DEXTROSE MONOHYDRATE AND SODIUM CHLORIDE 1000 ML: 150; 5; 450 INJECTION, SOLUTION INTRAVENOUS at 08:04

## 2019-07-29 RX ADMIN — PALONOSETRON HYDROCHLORIDE 0.25 MG: 0.25 INJECTION, SOLUTION INTRAVENOUS at 07:31

## 2019-07-29 RX ADMIN — POTASSIUM CHLORIDE, DEXTROSE MONOHYDRATE AND SODIUM CHLORIDE 1000 ML: 150; 5; 450 INJECTION, SOLUTION INTRAVENOUS at 10:32

## 2019-07-29 ASSESSMENT — PAIN SCALES - GENERAL: PAINLEVEL: NO PAIN (0)

## 2019-07-29 NOTE — NURSING NOTE
Chief Complaint   Patient presents with     Blood Draw     Labs drawn via PIV placed by RN in lab. VS taken. Pt checked in for next appt     Labs drawn from PIV placed by RN. Line flushed with saline. Vitals taken. Pt checked in for appointment(s).    Asha ROMERO RN PHN BSN  BMT/Oncology Lab

## 2019-07-29 NOTE — PATIENT INSTRUCTIONS
Contact Numbers    [facility] Main Line (for Scheduling/Triage/After Hours Nurse Line): [phone]    Please call the [facility] nurse triage or the after hours nurse line if you experience a temperature greater than or equal to 100.5, shaking chills, have uncontrolled nausea, vomiting and/or diarrhea, dizziness, lightheadedness, shortness of breath, chest pain, bleeding, unexplained bruising, or if you have any other new/concerning symptoms, questions or concerns.     If you are having any concerning symptoms or wish to speak to a provider before your next infusion visit, please call your care coordinator or triage to notify them so we can adequately serve you.     If you need a refill on a narcotic prescription or other medication, please call triage before your infusion appointment.         July 2019 Sunday Monday Tuesday Wednesday Thursday Friday Saturday        1    MR PELVIS (GYN) WWO CONTRAST   8:00 AM   (60 min.)   [id number]   [facility]    PET ONCOLOGY WHOLE BODY   9:00 AM   (45 min.)   [id number]   [facility] PET CT 2     3     4     5     6       7     8     9     10    [facility] CONSULT   9:00 AM   (90 min.)   Anne Tidwell MD   Radiation Oncology Clinic    [facility] RETURN  11:25 AM   (20 min.)   Hasmukh Hernandez MD   [facility] 11     12     13       14     15     16     17    [facility] TCT/SIM SUITE  11:00 AM   (60 min.)   Anne Tidwell MD   Radiation Oncology Clinic    CT PELVIS BONE W  12:00 PM   (20 min.)   [id number]   [facility], CT 18     19     20       21     22     23     24    [facility] TREATMENT PLAN VISIT   7:00 AM   (15 min.)   Anne Tidwell MD   Radiation Oncology Clinic 25     26     27       28     29    [facility]   6:15 AM   (15 min.)   [facility] LAB DRAW   [facility] Lab Draw    [facility] ONC INFUSION 360   7:00 AM   (360 min.)    ONCOLOGY INFUSION   [facility] EXTERNAL RADIATION TREATMT   3:30 PM   (30 min.)   [facility] RAD  ONC CARLYN   Radiation Oncology Clinic 30    [facility] EXTERNAL RADIATION TREATMT   3:30 PM   (30 min.)   [facility] RAD ONC CARLYN   Radiation Oncology Clinic    [facility] ON TREATMENT VISIT   4:00 PM   (15 min.)   Anne Tidwell MD   Radiation Oncology Clinic 31    [facility] EXTERNAL RADIATION TREATMT   3:30 PM   (30 min.)   [facility] RAD ONC CARLYN   Radiation Oncology Clinic                            August 2019 Sunday Monday Tuesday Wednesday Thursday Friday Saturday                       1    [facility] EXTERNAL RADIATION TREATMT   3:30 PM   (30 min.)   [facility] RAD ONC CARLYN   Radiation Oncology Clinic 2    [facility] EXTERNAL RADIATION TREATMT   3:30 PM   (30 min.)   [facility] RAD ONC CARLYN   Radiation Oncology Clinic 3       4     5    [facility] LAB DRAW   6:45 AM   (15 min.)   [facility] LAB DRAW   [facility] Lab Draw    P ONC INFUSION 360   7:30 AM   (360 min.)   [facility] ONCOLOGY INFUSION   [facility]    [facility] EXTERNAL RADIATION TREATMT   3:30 PM   (30 min.)   [facility] RAD ONC CARLYN   Radiation Oncology Clinic 6    [facility] EXTERNAL RADIATION TREATMT   3:30 PM   (30 min.)   [facility] RAD ONC CARLYN   Radiation Oncology Clinic 7    [facility] EXTERNAL RADIATION TREATMT   3:00 PM   (30 min.)   [facility] RAD ONC CARLYN   Radiation Oncology Clinic    [facility] ON TREATMENT VISIT   3:30 PM   (15 min.)   Anne Tidwell MD   Radiation Oncology Clinic 8    [facility] EXTERNAL RADIATION TREATMT   3:00 PM   (30 min.)   [facility] RAD ONC CARLYN   Radiation Oncology Clinic 9    [facility] EXTERNAL RADIATION TREATMT   3:00 PM   (30 min.)   [facility] RAD ONC CARLYN   Radiation Oncology Clinic 10       11     12    [facility] LAB DRAW   6:45 AM   (15 min.)   [facility] LAB DRAW   [facility] Lab Draw    P ONC INFUSION 360   7:30 AM   (360 min.)   [facility] ONCOLOGY INFUSION   [facility]    [facility] EXTERNAL RADIATION TREATMT   3:00 PM   (30 min.)   [facility] RAD ONC CARLYN   Radiation Oncology Clinic 13    [facility] EXTERNAL RADIATION TREATMT   3:00 PM   (30 min.)   P RAD ONC CARLYN   Radiation Oncology  Clinic    [facility] ON TREATMENT VISIT   3:30 PM   (15 min.)   Anne Tidwell MD   Radiation Oncology Clinic 14    [facility] EXTERNAL RADIATION TREATMT   3:00 PM   (30 min.)   [facility] RAD ONC CARLYN   Radiation Oncology Clinic 15    [facility] EXTERNAL RADIATION TREATMT   3:00 PM   (30 min.)   [facility] RAD ONC CARLYN   Radiation Oncology Clinic 16    [facility] EXTERNAL RADIATION TREATMT   3:00 PM   (30 min.)   [facility] RAD ONC CARLYN   Radiation Oncology Clinic 17       18     19    [facility] LAB DRAW   6:45 AM   (15 min.)   [facility] LAB DRAW   [facility] Lab Draw    P ONC INFUSION 360   7:30 AM   (360 min.)   [facility] ONCOLOGY INFUSION   [facility]    [facility] EXTERNAL RADIATION TREATMT   3:00 PM   (30 min.)   [facility] RAD ONC CARLYN   Radiation Oncology Clinic 20    [facility] EXTERNAL RADIATION TREATMT   3:00 PM   (30 min.)   [facility] RAD ONC CARLYN   Radiation Oncology Clinic    [facility] ON TREATMENT VISIT   3:30 PM   (15 min.)   Jess Lance MD   Radiation Oncology Clinic 21    [facility] EXTERNAL RADIATION TREATMT   3:00 PM   (30 min.)   [facility] RAD ONC CARLYN   Radiation Oncology Clinic 22    [facility] EXTERNAL RADIATION TREATMT   3:00 PM   (30 min.)   [facility] RAD ONC CARLYN   Radiation Oncology Clinic 23    [facility] EXTERNAL RADIATION TREATMT   3:00 PM   (30 min.)   [facility] RAD ONC CARLYN   Radiation Oncology Clinic 24       25     26    [facility] LAB DRAW   6:45 AM   (15 min.)   [facility] LAB DRAW   [facility] Lab Draw    P ONC INFUSION 360   7:30 AM   (360 min.)   [facility] ONCOLOGY INFUSION   [facility]    [facility] EXTERNAL RADIATION TREATMT   3:00 PM   (30 min.)   [facility] RAD ONC CARLYN   Radiation Oncology Clinic 27    [facility] EXTERNAL RADIATION TREATMT   3:00 PM   (30 min.)   [facility] RAD ONC CARLYN   Radiation Oncology Clinic    [facility] ON TREATMENT VISIT   3:30 PM   (15 min.)   Anne Tidwell MD   Radiation Oncology Clinic 28    [facility] EXTERNAL RADIATION TREATMT   3:00 PM   (30 min.)   [facility] RAD ONC CARLYN   Radiation Oncology Clinic 29    [facility] EXTERNAL RADIATION TREATMT    3:00 PM   (30 min.)   [facility] RAD ONC CARLYN   Radiation Oncology Clinic 30    [facility] EXTERNAL RADIATION TREATMT   3:00 PM   (30 min.)   [facility] RAD ONC CARLYN   Radiation Oncology Clinic 31                    Recent Results (from the past 24 hour(s))   CBC with platelets differential    Collection Time: 07/29/19  6:31 AM   Result Value Ref Range    WBC 9.0 4.0 - 11.0 10e9/L    RBC Count 4.30 3.8 - 5.2 10e12/L    Hemoglobin 13.7 11.7 - 15.7 g/dL    Hematocrit 41.7 35.0 - 47.0 %    MCV 97 78 - 100 fl    MCH 31.9 26.5 - 33.0 pg    MCHC 32.9 31.5 - 36.5 g/dL    RDW 13.0 10.0 - 15.0 %    Platelet Count 404 150 - 450 10e9/L    Diff Method Automated Method     % Neutrophils 54.5 %    % Lymphocytes 25.7 %    % Monocytes 12.9 %    % Eosinophils 5.6 %    % Basophils 1.1 %    % Immature Granulocytes 0.2 %    Nucleated RBCs 0 0 /100    Absolute Neutrophil 4.9 1.6 - 8.3 10e9/L    Absolute Lymphocytes 2.3 0.8 - 5.3 10e9/L    Absolute Monocytes 1.2 0.0 - 1.3 10e9/L    Absolute Eosinophils 0.5 0.0 - 0.7 10e9/L    Absolute Basophils 0.1 0.0 - 0.2 10e9/L    Abs Immature Granulocytes 0.0 0 - 0.4 10e9/L    Absolute Nucleated RBC 0.0    Comprehensive metabolic panel    Collection Time: 07/29/19  6:31 AM   Result Value Ref Range    Sodium 142 133 - 144 mmol/L    Potassium 3.6 3.4 - 5.3 mmol/L    Chloride 111 (H) 94 - 109 mmol/L    Carbon Dioxide 26 20 - 32 mmol/L    Anion Gap 5 3 - 14 mmol/L    Glucose 97 70 - 99 mg/dL    Urea Nitrogen 11 7 - 30 mg/dL    Creatinine 0.48 (L) 0.52 - 1.04 mg/dL    GFR Estimate >90 >60 mL/min/[1.73_m2]    GFR Estimate If Black >90 >60 mL/min/[1.73_m2]    Calcium 8.9 8.5 - 10.1 mg/dL    Bilirubin Total 0.4 0.2 - 1.3 mg/dL    Albumin 3.8 3.4 - 5.0 g/dL    Protein Total 7.3 6.8 - 8.8 g/dL    Alkaline Phosphatase 89 40 - 150 U/L    ALT 28 0 - 50 U/L    AST 23 0 - 45 U/L   Magnesium    Collection Time: 07/29/19  6:31 AM   Result Value Ref Range    Magnesium 1.7 1.6 - 2.3 mg/dL

## 2019-07-29 NOTE — PROGRESS NOTES
Infusion Nursing Note:  Nicole Fritsche presents today for Cycle 1 Day 1 Cisplatin.    Patient seen by provider today: No   present during visit today: Not Applicable.    Note:   Patient is new to the infusion room today and is receiving Cisplatin for the first time.  Patient oriented to infusion room, location of bathrooms and nutrition stations, and call light.  Verified that patient recieved written chemotherapy information previously.  Verbally reviewed chemotherapy teaching, side effects, take-home medications, and follow-up schedule with patient. Patient instructed to call triage with any questions or if she experiences a temperature >100.5, shaking chills, uncontrolled nausea/vomiting/diarrhea, dizziness, shortness of breath, bleeding not relieved with pressure, or with any other concerns.  Instructed patient to call the after hours nurse line or [phone] on nights/weekends/holidays.    Intravenous Access:  Peripheral IV placed in lab today.    Treatment Conditions:  Lab Results   Component Value Date    HGB 13.7 07/29/2019     Lab Results   Component Value Date    WBC 9.0 07/29/2019      Lab Results   Component Value Date    ANEU 4.9 07/29/2019     Lab Results   Component Value Date     07/29/2019      Lab Results   Component Value Date     07/29/2019                   Lab Results   Component Value Date    POTASSIUM 3.6 07/29/2019           Lab Results   Component Value Date    MAG 1.7 07/29/2019            Lab Results   Component Value Date    CR 0.48 07/29/2019                   Lab Results   Component Value Date    NICOLE 8.9 07/29/2019                Lab Results   Component Value Date    BILITOTAL 0.4 07/29/2019           Lab Results   Component Value Date    ALBUMIN 3.8 07/29/2019                    Lab Results   Component Value Date    ALT 28 07/29/2019           Lab Results   Component Value Date    AST 23 07/29/2019       Results reviewed, labs MET treatment parameters, ok to  proceed with treatment.    Post Infusion Assessment:  Patient tolerated infusion without incident.  Patient voided pre, during, and post Cisplatin infusion.  Blood return noted pre and post infusion.  Site patent and intact, free from redness, edema or discomfort.  No evidence of extravasations.  Access discontinued per protocol.     Discharge Plan:   Prescription refills given for Compazine and Ativan.  Discharge instructions reviewed with: Patient and Family.  Patient and/or family verbalized understanding of discharge instructions and all questions answered.  Copy of AVS reviewed with patient and/or family.  Patient will return 8/5/19 for next appointment.  Patient discharged in stable condition accompanied by: .  Departure Mode: Ambulatory.  Face to Face time: 0.    MAGEN LA RN

## 2019-07-30 ENCOUNTER — OFFICE VISIT (OUTPATIENT)
Dept: RADIATION ONCOLOGY | Facility: CLINIC | Age: 49
End: 2019-07-30
Attending: RADIOLOGY
Payer: COMMERCIAL

## 2019-07-30 VITALS
DIASTOLIC BLOOD PRESSURE: 87 MMHG | HEART RATE: 64 BPM | SYSTOLIC BLOOD PRESSURE: 130 MMHG | BODY MASS INDEX: 25.37 KG/M2 | WEIGHT: 162 LBS

## 2019-07-30 DIAGNOSIS — C53.1 MALIGNANT NEOPLASM OF EXOCERVIX (H): Primary | ICD-10-CM

## 2019-07-30 PROCEDURE — 77386 ZZH IMRT TREATMENT DELIVERY, COMPLEX: CPT | Performed by: RADIOLOGY

## 2019-07-30 NOTE — LETTER
2019       RE: Nicole Fritsche  [address]  [place] [address]     Dear Colleague,    Thank you for referring your patient, Nicole Fritsche, to the RADIATION ONCOLOGY CLINIC. Please see a copy of my visit note below.    [facility] PHYSICIANS  SPECIALIZING IN BREAKTHROUGHS  Radiation Oncology    On Treatment Visit Note      Nicole Fritsche      Date: 2019   MRN: [id number]   : 1970  Diagnosis: Cervix Cancer    Treatment Summary to Date   Pelvis Current Dose: 360/4500 + cGy Fractions:  + 0/5      Chemotherapy  Chemo concurrent with radx?: Yes  Oncologist: Dr Hernandez  Drug Name/Frequency 1: Cisplatin    Subjective: Ms. Fritsche is tolerating chemoradiation well. She has no concerns at this time.    Nursing ROS:   Nutrition Alteration  Diet Type: Patient's Preference  Skin  Skin Reaction: 0 - No changes  Skin Note: Proshield     Gastrointestinal  Nausea: 0 - None     Pain Assessment  0-10 Pain Scale: 0    Objective:   /87   Pulse 64   Wt 73.5 kg (162 lb)   BMI 25.37 kg/m   , pain 0/10  Gen: Appears well, NAD  Skin: No erythema    Laboratory:  Lab Results   Component Value Date    WBC 9.0 2019    HGB 13.7 2019    HCT 41.7 2019    MCV 97 2019     2019     Lab Results   Component Value Date     2019    POTASSIUM 3.6 2019    CHLORIDE 111 (H) 2019    CO2 26 2019    GLC 97 2019     Assessment:    Tolerating radiation therapy well.  All questions and concerns addressed.    Plan:   1. Continue current therapy.      Mosaiq chart and setup information reviewed  MVCT images reviewed    Medication Review  Med list reviewed with patient?: Yes    Educational Topic Discussed  Education Instructions: Reviewed    Anne Tidwell MD  Department of Radiation Oncology  [facility]

## 2019-07-31 ENCOUNTER — APPOINTMENT (OUTPATIENT)
Dept: RADIATION ONCOLOGY | Facility: CLINIC | Age: 49
End: 2019-07-31
Attending: RADIOLOGY
Payer: COMMERCIAL

## 2019-07-31 PROCEDURE — 77386 ZZH IMRT TREATMENT DELIVERY, COMPLEX: CPT | Performed by: RADIOLOGY

## 2019-08-01 ENCOUNTER — APPOINTMENT (OUTPATIENT)
Dept: RADIATION ONCOLOGY | Facility: CLINIC | Age: 49
End: 2019-08-01
Attending: RADIOLOGY
Payer: COMMERCIAL

## 2019-08-01 PROCEDURE — 77386 ZZH IMRT TREATMENT DELIVERY, COMPLEX: CPT | Performed by: RADIOLOGY

## 2019-08-02 ENCOUNTER — CARE COORDINATION (OUTPATIENT)
Dept: ONCOLOGY | Facility: CLINIC | Age: 49
End: 2019-08-02

## 2019-08-02 ENCOUNTER — APPOINTMENT (OUTPATIENT)
Dept: RADIATION ONCOLOGY | Facility: CLINIC | Age: 49
End: 2019-08-02
Attending: RADIOLOGY
Payer: COMMERCIAL

## 2019-08-02 PROCEDURE — 77336 RADIATION PHYSICS CONSULT: CPT | Performed by: RADIOLOGY

## 2019-08-02 PROCEDURE — 77386 ZZH IMRT TREATMENT DELIVERY, COMPLEX: CPT | Performed by: RADIOLOGY

## 2019-08-02 NOTE — PROGRESS NOTES
Care Coordinator Note  Left message for patient with mid treatment check appointment times 8/9/19 and 8/23/19.    Radhika Javier MSN, RN  Care Coordinator  Gynecologic Cancer   Office:  [phone]  Pager: [phone] #[phone]

## 2019-08-02 NOTE — PROGRESS NOTES
[facility] PHYSICIANS  SPECIALIZING IN BREAKTHROUGHS  Radiation Oncology    On Treatment Visit Note      Nicole Fritsche      Date: 2019   MRN: [id number]   : 1970  Diagnosis: Cervix Cancer    Treatment Summary to Date   Pelvis Current Dose: 360/4500 + cGy Fractions: / + 0/5      Chemotherapy  Chemo concurrent with radx?: Yes  Oncologist: Dr Hernandez  Drug Name/Frequency 1: Cisplatin    Subjective: Ms. Fritsche is tolerating chemoradiation well. She has no concerns at this time.    Nursing ROS:   Nutrition Alteration  Diet Type: Patient's Preference  Skin  Skin Reaction: 0 - No changes  Skin Note: Proshield     Gastrointestinal  Nausea: 0 - None     Pain Assessment  0-10 Pain Scale: 0    Objective:   /87   Pulse 64   Wt 73.5 kg (162 lb)   BMI 25.37 kg/m  , pain 0/10  Gen: Appears well, NAD  Skin: No erythema    Laboratory:  Lab Results   Component Value Date    WBC 9.0 2019    HGB 13.7 2019    HCT 41.7 2019    MCV 97 2019     2019     Lab Results   Component Value Date     2019    POTASSIUM 3.6 2019    CHLORIDE 111 (H) 2019    CO2 26 2019    GLC 97 2019     Assessment:    Tolerating radiation therapy well.  All questions and concerns addressed.    Plan:   1. Continue current therapy.      Mosaiq chart and setup information reviewed  MVCT images reviewed    Medication Review  Med list reviewed with patient?: Yes    Educational Topic Discussed  Education Instructions: Reviewed    Anne Tidwell MD  Department of Radiation Oncology  [facility]

## 2019-08-05 ENCOUNTER — INFUSION THERAPY VISIT (OUTPATIENT)
Dept: ONCOLOGY | Facility: CLINIC | Age: 49
End: 2019-08-05
Attending: OBSTETRICS & GYNECOLOGY
Payer: COMMERCIAL

## 2019-08-05 ENCOUNTER — APPOINTMENT (OUTPATIENT)
Dept: RADIATION ONCOLOGY | Facility: CLINIC | Age: 49
End: 2019-08-05
Attending: RADIOLOGY
Payer: COMMERCIAL

## 2019-08-05 ENCOUNTER — APPOINTMENT (OUTPATIENT)
Dept: LAB | Facility: CLINIC | Age: 49
End: 2019-08-05
Attending: OBSTETRICS & GYNECOLOGY
Payer: COMMERCIAL

## 2019-08-05 VITALS
TEMPERATURE: 98.2 F | RESPIRATION RATE: 16 BRPM | SYSTOLIC BLOOD PRESSURE: 119 MMHG | HEART RATE: 87 BPM | DIASTOLIC BLOOD PRESSURE: 75 MMHG | BODY MASS INDEX: 25.44 KG/M2 | WEIGHT: 162.4 LBS | OXYGEN SATURATION: 98 %

## 2019-08-05 DIAGNOSIS — C53.9 MALIGNANT NEOPLASM OF CERVIX, UNSPECIFIED SITE (H): Primary | ICD-10-CM

## 2019-08-05 LAB
ALBUMIN SERPL-MCNC: 3.6 G/DL (ref 3.4–5)
ALP SERPL-CCNC: 81 U/L (ref 40–150)
ALT SERPL W P-5'-P-CCNC: 80 U/L (ref 0–50)
ANION GAP SERPL CALCULATED.3IONS-SCNC: 6 MMOL/L (ref 3–14)
AST SERPL W P-5'-P-CCNC: 45 U/L (ref 0–45)
BASOPHILS # BLD AUTO: 0 10E9/L (ref 0–0.2)
BASOPHILS NFR BLD AUTO: 0.6 %
BILIRUB SERPL-MCNC: 0.5 MG/DL (ref 0.2–1.3)
BUN SERPL-MCNC: 14 MG/DL (ref 7–30)
CALCIUM SERPL-MCNC: 9.1 MG/DL (ref 8.5–10.1)
CHLORIDE SERPL-SCNC: 106 MMOL/L (ref 94–109)
CO2 SERPL-SCNC: 27 MMOL/L (ref 20–32)
CREAT SERPL-MCNC: 0.55 MG/DL (ref 0.52–1.04)
DIFFERENTIAL METHOD BLD: NORMAL
EOSINOPHIL # BLD AUTO: 0.3 10E9/L (ref 0–0.7)
EOSINOPHIL NFR BLD AUTO: 5 %
ERYTHROCYTE [DISTWIDTH] IN BLOOD BY AUTOMATED COUNT: 12.5 % (ref 10–15)
GFR SERPL CREATININE-BSD FRML MDRD: >90 ML/MIN/{1.73_M2}
GLUCOSE SERPL-MCNC: 111 MG/DL (ref 70–99)
HCT VFR BLD AUTO: 41.4 % (ref 35–47)
HGB BLD-MCNC: 14.1 G/DL (ref 11.7–15.7)
IMM GRANULOCYTES # BLD: 0.1 10E9/L (ref 0–0.4)
IMM GRANULOCYTES NFR BLD: 1.2 %
LYMPHOCYTES # BLD AUTO: 0.9 10E9/L (ref 0.8–5.3)
LYMPHOCYTES NFR BLD AUTO: 13.8 %
MAGNESIUM SERPL-MCNC: 1.8 MG/DL (ref 1.6–2.3)
MCH RBC QN AUTO: 32.4 PG (ref 26.5–33)
MCHC RBC AUTO-ENTMCNC: 34.1 G/DL (ref 31.5–36.5)
MCV RBC AUTO: 95 FL (ref 78–100)
MONOCYTES # BLD AUTO: 0.8 10E9/L (ref 0–1.3)
MONOCYTES NFR BLD AUTO: 11.5 %
NEUTROPHILS # BLD AUTO: 4.5 10E9/L (ref 1.6–8.3)
NEUTROPHILS NFR BLD AUTO: 67.9 %
NRBC # BLD AUTO: 0 10*3/UL
NRBC BLD AUTO-RTO: 0 /100
PLATELET # BLD AUTO: 304 10E9/L (ref 150–450)
POTASSIUM SERPL-SCNC: 3.4 MMOL/L (ref 3.4–5.3)
PROT SERPL-MCNC: 7.3 G/DL (ref 6.8–8.8)
RBC # BLD AUTO: 4.35 10E12/L (ref 3.8–5.2)
SODIUM SERPL-SCNC: 138 MMOL/L (ref 133–144)
WBC # BLD AUTO: 6.6 10E9/L (ref 4–11)

## 2019-08-05 PROCEDURE — G0463 HOSPITAL OUTPT CLINIC VISIT: HCPCS | Mod: 25

## 2019-08-05 PROCEDURE — 96361 HYDRATE IV INFUSION ADD-ON: CPT

## 2019-08-05 PROCEDURE — 96413 CHEMO IV INFUSION 1 HR: CPT

## 2019-08-05 PROCEDURE — 25000125 ZZHC RX 250: Mod: ZF | Performed by: RADIOLOGY

## 2019-08-05 PROCEDURE — 25000128 H RX IP 250 OP 636: Mod: ZF | Performed by: OBSTETRICS & GYNECOLOGY

## 2019-08-05 PROCEDURE — 25800025 ZZH RX 258: Mod: ZF | Performed by: OBSTETRICS & GYNECOLOGY

## 2019-08-05 PROCEDURE — 36415 COLL VENOUS BLD VENIPUNCTURE: CPT

## 2019-08-05 PROCEDURE — 80053 COMPREHEN METABOLIC PANEL: CPT | Performed by: OBSTETRICS & GYNECOLOGY

## 2019-08-05 PROCEDURE — 77386 ZZH IMRT TREATMENT DELIVERY, COMPLEX: CPT | Performed by: RADIOLOGY

## 2019-08-05 PROCEDURE — 96375 TX/PRO/DX INJ NEW DRUG ADDON: CPT

## 2019-08-05 PROCEDURE — 25800030 ZZH RX IP 258 OP 636: Mod: ZF | Performed by: OBSTETRICS & GYNECOLOGY

## 2019-08-05 PROCEDURE — 96367 TX/PROPH/DG ADDL SEQ IV INF: CPT

## 2019-08-05 PROCEDURE — 40000556 ZZH STATISTIC PERIPHERAL IV START W US GUIDANCE: Mod: ZF

## 2019-08-05 PROCEDURE — 85025 COMPLETE CBC W/AUTO DIFF WBC: CPT | Performed by: OBSTETRICS & GYNECOLOGY

## 2019-08-05 PROCEDURE — 83735 ASSAY OF MAGNESIUM: CPT | Performed by: OBSTETRICS & GYNECOLOGY

## 2019-08-05 PROCEDURE — 25000125 ZZHC RX 250: Mod: ZF | Performed by: OBSTETRICS & GYNECOLOGY

## 2019-08-05 RX ORDER — DEXTROSE, SODIUM CHLORIDE, AND POTASSIUM CHLORIDE 5; .45; .15 G/100ML; G/100ML; G/100ML
2000 INJECTION INTRAVENOUS ONCE
Status: COMPLETED | OUTPATIENT
Start: 2019-08-05 | End: 2019-08-05

## 2019-08-05 RX ORDER — PALONOSETRON 0.05 MG/ML
0.25 INJECTION, SOLUTION INTRAVENOUS ONCE
Status: COMPLETED | OUTPATIENT
Start: 2019-08-05 | End: 2019-08-05

## 2019-08-05 RX ADMIN — FAMOTIDINE 20 MG: 10 INJECTION INTRAVENOUS at 09:47

## 2019-08-05 RX ADMIN — DEXAMETHASONE SODIUM PHOSPHATE: 10 INJECTION, SOLUTION INTRAMUSCULAR; INTRAVENOUS at 09:58

## 2019-08-05 RX ADMIN — CISPLATIN 80 MG: 1 INJECTION, SOLUTION INTRAVENOUS at 11:28

## 2019-08-05 RX ADMIN — POTASSIUM CHLORIDE, DEXTROSE MONOHYDRATE AND SODIUM CHLORIDE 2000 ML: 150; 5; 450 INJECTION, SOLUTION INTRAVENOUS at 08:29

## 2019-08-05 RX ADMIN — LIDOCAINE HYDROCHLORIDE 0.5 ML: 10 INJECTION, SOLUTION EPIDURAL; INFILTRATION; INTRACAUDAL; PERINEURAL at 11:37

## 2019-08-05 RX ADMIN — PALONOSETRON HYDROCHLORIDE 0.25 MG: 0.25 INJECTION, SOLUTION INTRAVENOUS at 09:49

## 2019-08-05 ASSESSMENT — PAIN SCALES - GENERAL: PAINLEVEL: NO PAIN (0)

## 2019-08-05 NOTE — PROGRESS NOTES
"Infusion Nursing Note:  Nicole Fritsche presents today for Cycle 1 Day 8 Cisplatin.    Patient seen by provider today: No   present during visit today: Not Applicable.    Note: Patient reports having \"quite a bit of nausea/upset stomach\" starting the first day of chemo last week and lasting throughout the week.  Patient denies any vomiting, but reports decrease in appetite, and that she has \"had a hard time keeping up\" with her fluid intake.  Patient states she probably should have taken her compazine more.  Radhika Javier RN Care Coordinator notified and asked for suggestions.    8/5/19 0845 TORB:  Lorena Jimenez NP/Radhika Javier RN/Edgar Ballard RN  - OK to try IV pepcid with pre-medications today to see if that helps  - Remind patient to take her Compazine more scheduled this week to see if that helps with nausea  - Instruct patient that if she continues to feel like she is not getting enough fluids to please call and we can get her scheduled to come in for IV fluids later in the week.    Relayed above recommendations to patient who verbalized understanding.    Intravenous Access:  Peripheral IV placed by vascular .    Treatment Conditions:  Lab Results   Component Value Date    HGB 14.1 08/05/2019     Lab Results   Component Value Date    WBC 6.6 08/05/2019      Lab Results   Component Value Date    ANEU 4.5 08/05/2019     Lab Results   Component Value Date     08/05/2019      Lab Results   Component Value Date     08/05/2019                   Lab Results   Component Value Date    POTASSIUM 3.4 08/05/2019           Lab Results   Component Value Date    MAG 1.8 08/05/2019            Lab Results   Component Value Date    CR 0.55 08/05/2019                   Lab Results   Component Value Date    NICOLE 9.1 08/05/2019                Lab Results   Component Value Date    BILITOTAL 0.5 08/05/2019           Lab Results   Component Value Date    ALBUMIN 3.6 08/05/2019                    Lab Results "   Component Value Date    ALT 80 08/05/2019           Lab Results   Component Value Date    AST 45 08/05/2019       Results reviewed, labs MET treatment parameters, ok to proceed with treatment.    Post Infusion Assessment:  Patient tolerated infusion without incident.  Patient voided pre, during, and post Cisplatin infusion.  Blood return noted pre and post infusion.  Site patent and intact, free from redness, edema or discomfort.  No evidence of extravasations.  Access discontinued per protocol.     Discharge Plan:   Patient declined prescription refills.  Discharge instructions reviewed with: Patient.  Patient and/or family verbalized understanding of discharge instructions and all questions answered.  Copy of AVS reviewed with patient and/or family.  Patient will return 8/12/19 for next appointment.  Patient discharged in stable condition accompanied by: .  Departure Mode: Ambulatory.  Face to Face time: 0.    MAGEN LA RN

## 2019-08-05 NOTE — NURSING NOTE
Chief Complaint   Patient presents with     Blood Draw     labs drawn with vpt by rn.  vs taken     Labs drawn with vpt by rn.  Pt tolerated well.  VS taken.  Pt checked in for next appt.    Jen Lucio RN

## 2019-08-05 NOTE — PATIENT INSTRUCTIONS
Contact Numbers    [facility] Main Line (for Scheduling/Triage/After Hours Nurse Line): [phone]    Please call the [facility] nurse triage or the after hours nurse line if you experience a temperature greater than or equal to 100.5, shaking chills, have uncontrolled nausea, vomiting and/or diarrhea, dizziness, lightheadedness, shortness of breath, chest pain, bleeding, unexplained bruising, or if you have any other new/concerning symptoms, questions or concerns.     If you are having any concerning symptoms or wish to speak to a provider before your next infusion visit, please call your care coordinator or triage to notify them so we can adequately serve you.     If you need a refill on a narcotic prescription or other medication, please call triage before your infusion appointment.         August 2019 Sunday Monday Tuesday Wednesday Thursday Friday Saturday                       1    [facility] EXTERNAL RADIATION TREATMT   3:30 PM   (30 min.)   [facility] RAD ONC CAR[facility]   Radiation Oncology Clinic 2    P EXTERNAL RADIATION TREATMT   3:00 PM   (30 min.)   [facility] RAD ONC CAR[facility]N   Radiation Oncology Clinic 3       4     5    [facility] LAB DRAW   6:45 AM   (15 min.)   [facility] LAB DRAW   [facility] Lab Draw    P ONC INFUSION 360   7:30 AM   (360 min.)    ONCOLOGY INFUSION   [facility]   3:30 PM   (30 min.)   [facility] RAD ONC CAR[facility]   Radiation Oncology Clinic 6    P EXTERNAL RADIATION TREATMT   3:30 PM   (30 min.)   [facility] RAD ONC CAR[facility]N   Radiation Oncology Clinic 7    P EXTERNAL RADIATION TREATMT   3:00 PM   (30 min.)   [facility] RAD ONC CAR[facility]   Radiation Oncology Clinic    [facility] ON TREATMENT VISIT   3:30 PM   (15 min.)   Anne Tidwell MD   Radiation Oncology Clinic 8    P EXTERNAL RADIATION TREATMT   3:00 PM   (30 min.)   [facility] RAD ONC CAR[facility]N   Radiation Oncology Clinic 9    P RETURN   1:05 PM   (40 min.)   Rosio Harding APRN CNP   [facility]   3:00 PM   (30 min.)   P RAD ONC CARLYN   Radiation Oncology Clinic 10       11     12    [facility] LAB DRAW   6:45 AM   (15 min.)    [facility]   [facility]    P ONC INFUSION 360   7:30 AM   (360 min.)   [facility]   [facility]    [facility] EXTERNAL RADIATION TREATMT   3:00 PM   (30 min.)   P RAD ONC CARLYN   Radiation Oncology Clinic 13    [facility] EXTERNAL RADIATION TREATMT   3:00 PM   (30 min.)   P RAD ONC CARLYN   Radiation Oncology Clinic    [facility] ON TREATMENT VISIT   3:30 PM   (15 min.)   Anne Tidwell MD   Radiation Oncology Clinic 14    [facility] EXTERNAL RADIATION TREATMT   3:00 PM   (30 min.)   P RAD ONC CARLYN   Radiation Oncology Clinic 15    [facility] EXTERNAL RADIATION TREATMT   3:00 PM   (30 min.)   P RAD ONC CARLYN   Radiation Oncology Clinic 16    [facility] EXTERNAL RADIATION TREATMT   3:00 PM   (30 min.)   P RAD ONC CARLYN   Radiation Oncology Clinic 17       18     19    [facility] LAB DRAW   6:45 AM   (15 min.)    [facility] LAB [facility]RAW   [facility]    P ONC INFUSION 360   7:30 AM   (360 min.)   [facility] INFUSION   [facility]    [facility] EXTERNAL RADIATION TREATMT   3:00 PM   (30 min.)   P RAD ONC CARLYN   Radiation Oncology Clinic 20    [facility] EXTERNAL RADIATION TREATMT   3:00 PM   (30 min.)   P RAD ONC CARLYN   Radiation Oncology Clinic    P ON TREATMENT VISIT   3:30 PM   (15 min.)   Jess Lance MD   Radiation Oncology Clinic 21    [facility] EXTERNAL RADIATION TREATMT   3:00 PM   (30 min.)   P RAD ONC CARLYN   Radiation Oncology Clinic 22    [facility] RETURN   2:15 PM   (45 min.)   Kayla Dallas APRN CNP   Radiation Oncology Clinic    [facility] EXTERNAL RADIATION TREATMT   3:00 PM   (30 min.)   P RAD ONC CARLYN   Radiation Oncology Clinic 23    [facility] RETURN   1:05 PM   (40 min.)   Rosio Harding APRN CNP   [facility]    MR PELVIS BONE WWO   2:00 PM   (60 min.)    UUMR2   [facility] [place], MRI    [facility] EXTERNAL RADIATION TREATMT   3:00 PM   (30 min.)   [facility] RAD ONC CAR[facility]N   Radiation Oncology Clinic 24       25     26    [facility] LAB DRAW   6:45 AM   (15 min.)    [facility] LAB DRAW   [facility] Lab Draw    P ONC INFUSION 360   7:30 AM   (360 min.)   [facility] ONCOLOGY INFUSION   [facility]    [facility] EXTERNAL RADIATION TREATMT   3:00 PM   (30 min.)   P RAD ONC CARLYN   Radiation Oncology Clinic 27    [facility] EXTERNAL RADIATION TREATMT   3:00 PM   (30 min.)   P RAD ONC CARLYN   Radiation Oncology Clinic    [facility] ON TREATMENT VISIT   3:30 PM   (15 min.)   Anne Tidwell MD   Radiation Oncology Clinic 28    [facility] EXTERNAL RADIATION TREATMT   3:00 PM   (30 min.)   P RAD ONC CARLYN   Radiation Oncology Clinic 29    [facility] EXTERNAL RADIATION TREATMT   3:00 PM   (30 min.)   P RAD ONC CARLYN   Radiation Oncology Clinic 30    [facility] EXTERNAL RADIATION TREATMT  10:30 AM   (30 min.)   P RAD ONC CARLYN   Radiation Oncology Clinic    P TCT/SIM SUITE  11:05 AM   (60 min.)   Anne Tidwell MD   Radiation Oncology Clinic 31 September 2019 Sunday Monday Tuesday Wednesday Thursday Friday Saturday   1     2     3    [facility] LAB DRAW   6:45 AM   (15 min.)    [facility] LAB DRAW   [facility] Lab Draw    P ONC INFUSION 360   7:30 AM   (360 min.)   [facility] ON[facility]Y INFUSION   [facility] 4     5     6     7       8     9    P TCT/SIM SUITE   8:00 AM   (320 min.)   Anne Tidwell MD   Radiation Oncology Clinic    MR PELVIS BONE WO CONTRAST  10:00 AM   (30 min.)   UR2   [facility], MRI 10     11    [facility] TCT/SIM SUITE   8:00 AM   (240 min.)   Anne Tidwell MD   Radiation Oncology Clinic    MR LIMITED SCAN  10:00 AM   (60 min.)   UR2   [facility], MRI 12     13    [facility] TCT/SIM SUITE   8:00 AM   (240 min.)   Anne Tidwell MD   Radiation Oncology Clinic    MR LIMITED SCAN  10:00 AM   (60 min.)   [address],  [place], MRI 14       15     16    [facility] TCT/SIM SUITE   8:00 AM   (240 min.)   Anne Tidwell MD   Radiation Oncology Clinic    MR LIMITED SCAN  10:00 AM   (60 min.)   UUMR2   [facility], [place], MRI 17     18    [facility] TCT/SIM SUITE   8:00 AM   (240 min.)   Anne Tidwell MD   Radiation Oncology Clinic    MR LIMITED SCAN  10:00 AM   (60 min.)   UR2   [facility], [place], MRI 19     20     21       22     23     24     25     26     27     28       29     30                                             Recent Results (from the past 24 hour(s))   CBC with platelets differential    Collection Time: 08/05/19  7:45 AM   Result Value Ref Range    WBC 6.6 4.0 - 11.0 10e9/L    RBC Count 4.35 3.8 - 5.2 10e12/L    Hemoglobin 14.1 11.7 - 15.7 g/dL    Hematocrit 41.4 35.0 - 47.0 %    MCV 95 78 - 100 fl    MCH 32.4 26.5 - 33.0 pg    MCHC 34.1 31.5 - 36.5 g/dL    RDW 12.5 10.0 - 15.0 %    Platelet Count 304 150 - 450 10e9/L    Diff Method Automated Method     % Neutrophils 67.9 %    % Lymphocytes 13.8 %    % Monocytes 11.5 %    % Eosinophils 5.0 %    % Basophils 0.6 %    % Immature Granulocytes 1.2 %    Nucleated RBCs 0 0 /100    Absolute Neutrophil 4.5 1.6 - 8.3 10e9/L    Absolute Lymphocytes 0.9 0.8 - 5.3 10e9/L    Absolute Monocytes 0.8 0.0 - 1.3 10e9/L    Absolute Eosinophils 0.3 0.0 - 0.7 10e9/L    Absolute Basophils 0.0 0.0 - 0.2 10e9/L    Abs Immature Granulocytes 0.1 0 - 0.4 10e9/L    Absolute Nucleated RBC 0.0    Comprehensive metabolic panel    Collection Time: 08/05/19  7:45 AM   Result Value Ref Range    Sodium 138 133 - 144 mmol/L    Potassium 3.4 3.4 - 5.3 mmol/L    Chloride 106 94 - 109 mmol/L    Carbon Dioxide 27 20 - 32 mmol/L    Anion Gap 6 3 - 14 mmol/L    Glucose 111 (H) 70 - 99 mg/dL    Urea Nitrogen 14 7 - 30 mg/dL    Creatinine 0.55 0.52 - 1.04 mg/dL    GFR Estimate >90 >60 mL/min/[1.73_m2]    GFR Estimate If Black >90 >60 mL/min/[1.73_m2]    Calcium 9.1 8.5 - 10.1 mg/dL    Bilirubin Total 0.5 0.2 - 1.3  mg/dL    Albumin 3.6 3.4 - 5.0 g/dL    Protein Total 7.3 6.8 - 8.8 g/dL    Alkaline Phosphatase 81 40 - 150 U/L    ALT 80 (H) 0 - 50 U/L    AST 45 0 - 45 U/L   Magnesium    Collection Time: 08/05/19  7:45 AM   Result Value Ref Range    Magnesium 1.8 1.6 - 2.3 mg/dL

## 2019-08-06 ENCOUNTER — APPOINTMENT (OUTPATIENT)
Dept: RADIATION ONCOLOGY | Facility: CLINIC | Age: 49
End: 2019-08-06
Attending: RADIOLOGY
Payer: COMMERCIAL

## 2019-08-06 PROCEDURE — 77386 ZZH IMRT TREATMENT DELIVERY, COMPLEX: CPT | Performed by: RADIOLOGY

## 2019-08-07 ENCOUNTER — OFFICE VISIT (OUTPATIENT)
Dept: RADIATION ONCOLOGY | Facility: CLINIC | Age: 49
End: 2019-08-07
Attending: RADIOLOGY
Payer: COMMERCIAL

## 2019-08-07 VITALS — WEIGHT: 158 LBS | BODY MASS INDEX: 24.75 KG/M2 | DIASTOLIC BLOOD PRESSURE: 79 MMHG | SYSTOLIC BLOOD PRESSURE: 128 MMHG

## 2019-08-07 DIAGNOSIS — C53.1 MALIGNANT NEOPLASM OF EXOCERVIX (H): ICD-10-CM

## 2019-08-07 DIAGNOSIS — G89.3 CANCER RELATED PAIN: Primary | ICD-10-CM

## 2019-08-07 PROCEDURE — 77386 ZZH IMRT TREATMENT DELIVERY, COMPLEX: CPT | Performed by: RADIOLOGY

## 2019-08-07 RX ORDER — OXYCODONE HYDROCHLORIDE 5 MG/1
5-10 TABLET ORAL EVERY 6 HOURS PRN
Qty: 40 TABLET | Refills: 0 | Status: SHIPPED | OUTPATIENT
Start: 2019-08-07 | End: 2019-08-20

## 2019-08-07 NOTE — LETTER
2019       RE: Nicole Fritsche  [address]  [place] [address]     Dear Colleague,    Thank you for referring your patient, Nicole Fritsche, to the RADIATION ONCOLOGY CLINIC. Please see a copy of my visit note below.    [facility] PHYSICIANS  SPECIALIZING IN BREAKTHROUGHS  Radiation Oncology    On Treatment Visit Note      Nicole Fritsche      Date: 2019   MRN: [id number]   : 1970  Diagnosis: Cervix Cancer    Treatment Summary to Date   Pelvis Current Dose: 1440/4500 + cGy Fractions:  + 0/5      Chemotherapy  Chemo concurrent with radx?: Yes  Oncologist: Dr Hernandez  Drug Name/Frequency 1: Cisplatin    Subjective: Ms. Fritsche is tolerating chemoradiation well. She has pelvic pain that has not improved with treatment and wakes her at night.    Nursing ROS:   Nutrition Alteration  Diet Type: Patient's Preference  Skin  Skin Reaction: 0 - No changes  Skin Note: Proshield    Gastrointestinal  Nausea: 0 - None    Pain Assessment  0-10 Pain Scale: 6  Pain Note: Rx for Oxycodone    Objective:   /79   Wt 71.7 kg (158 lb)   BMI 24.75 kg/m   , pain 0/10  Gen: Appears well, NAD  Skin: No erythema    Laboratory:  Lab Results   Component Value Date    WBC 6.6 2019    HGB 14.1 2019    HCT 41.4 2019    MCV 95 2019     2019     Lab Results   Component Value Date     2019    POTASSIUM 3.4 2019    CHLORIDE 106 2019    CO2 27 2019     (H) 2019     Assessment:    Tolerating radiation therapy well.  All questions and concerns addressed.    Plan:   1. Continue current therapy.      Mosaiq chart and setup information reviewed  MVCT images reviewed    Medication Review  Med list reviewed with patient?: Yes    Educational Topic Discussed  Education Instructions: Reviewed    Anne Tidwell MD  Department of Radiation Oncology  [facility]      Again, thank you for  allowing me to participate in the care of your patient.      Sincerely,    Anne Tidwell MD

## 2019-08-08 ENCOUNTER — APPOINTMENT (OUTPATIENT)
Dept: RADIATION ONCOLOGY | Facility: CLINIC | Age: 49
End: 2019-08-08
Attending: RADIOLOGY
Payer: COMMERCIAL

## 2019-08-08 PROCEDURE — 77386 ZZH IMRT TREATMENT DELIVERY, COMPLEX: CPT | Performed by: RADIOLOGY

## 2019-08-08 NOTE — PROGRESS NOTES
Follow Up Notes on Referred Patient    Date: 2019        RE: Nicole Fritsche  : 1970  DAVID: 2019      Nicole Fritsche is a 49 year old woman with a diagnosis of stage IIB cervical cancer.   She is here today for a  treatment follow up.     Treatment history:    19: Cervical biopsy, 4 o'clock     FINAL DIAGNOSIS:   Cervix, 4:00, biopsy:   - INVASIVE WELL-DIFFERENTIATED SQUAMOUS CELL CARCINOMA, focally keratinizing   - High grade squamous intraepithelial lesion (cervical intraepithelial neoplasia 3)     19: PET CT IMPRESSION: In this patient with newly diagnosed cervical cancer:  1. 3.7 cm cervical lesion with a max SUV of 13.16.   2. Focal uptake in the endometrium, favor adenomyosis over metastatic disease. Adenomyosis is demonstrated on MRI 2019 and can be hypermetabolic in a premenopausal woman.  3. Small left periaortic retroperitoneal lymph node borderline SUV uptake, inflammatory versus metastatic. Attention on follow-up.   4. Small left inguinal lymph nodes with SUV max of 2.45, likely inflammatory.    19: Cisplatin + radiation started.        Today she comes to clinic feeling generally well and is tolerating her treatment ok. She did start having some diarrhea this week and is taking medication for this. She has been taking Compazine as needed for nausea and taking Ativan at bedtime. She has also noticed some fatigue as well. She denies any vaginal bleeding, no changes in her bladder habits, no nausea/emesis, no lower extremity edema, and no difficulties eating or sleeping. She denies any abdominal discomfort/bloating, no fevers or chills, and no chest pain or shortness of breath. She is a former smoker who quit 1.5 years ago.           Review of Systems:    Systemic           no weight changes; no fever; no chills; no night sweats; no appetite changes  Skin           no rashes, or lesions  Eye           no irritation; no changes in  vision  Temo-Laryngeal           no dysphagia; no hoarseness   Pulmonary    no cough; no shortness of breath  Cardiovascular    no chest pain; no palpitations  Gastrointestinal    no diarrhea; no constipation; no abdominal pain; no changes in bowel habits; no blood in stool  Genitourinary   no urinary frequency; no urinary urgency; no dysuria; no pain; no abnormal vaginal discharge; no abnormal vaginal bleeding  Breast    no breast discharge; no breast changes; no breast pain  Musculoskeletal    no myalgias; no arthralgias; no back pain  Psychiatric           no depressed mood; no anxiety    Hematologic              no tender lymph nodes; no noticeable swellings or lumps   Endocrine    no hot flashes; no heat/cold intolerance         Neurological   no tremor; no numbness and tingling; no headaches; no difficulty sleeping      Past Medical History:    Past Medical History:   Diagnosis Date     Abnormal Pap smear of cervix 05/21/2019    see problem list     Arrested hydrocephalus 2/3/2009    Ongoing HAs, seeing neurosurgeon. Per past notes from Dr. Resendiz, HAs most likely not secondary to the hydrocephalus, but rather vascular inorgin.  Please see scanned in records.     Asthma      Cervical cancer (H)      Cervical high risk HPV (human papillomavirus) test positive 05/21/2019    See problem list         Past Surgical History:    Past Surgical History:   Procedure Laterality Date     C VENTRICULO-CISTERNOSTOMY,3RD VENT      for treatment of HA related to hydrocephalus     EXCISE MASS TRUNK Left 9/16/2015    Procedure: EXCISE MASS TRUNK;  Surgeon: Carlos Enrique Briones MD;  Location:  OR         Health Maintenance Due   Topic Date Due     COLONOSCOPY  03/12/1979     ASTHMA ACTION PLAN  09/08/2016     ZOSTER IMMUNIZATION (2 of 2) 06/26/2019       Current Medications:     Current Outpatient Medications   Medication Sig Dispense Refill     albuterol (PROAIR HFA/PROVENTIL HFA/VENTOLIN HFA) 108 (90 Base) MCG/ACT inhaler  Inhale 2 puffs into the lungs every 6 hours as needed for shortness of breath / dyspnea or wheezing 18 g 1     Ferrous Sulfate (IRON SUPPLEMENT PO) Take 325 mg by mouth 2 times daily (with meals)       fluticasone (FLONASE) 50 MCG/ACT nasal spray Spray 1-2 sprays into both nostrils daily 16 g 1     fluticasone (FLOVENT HFA) 110 MCG/ACT inhaler Inhale 2 puffs into the lungs 2 times daily 3 Inhaler 3     LORazepam (ATIVAN) 1 MG tablet Take 1 tablet (1 mg) by mouth every 6 hours as needed (nausea/vomiting, anxiety or sleep ) 30 tablet 1     Multiple Vitamin (MULTIVITAMINS PO) Take  by mouth daily.       oxyCODONE (ROXICODONE) 5 MG tablet Take 1-2 tablets (5-10 mg) by mouth every 6 hours as needed for severe pain (every 4 to hours as needed for pain) 40 tablet 0     prochlorperazine (COMPAZINE) 10 MG tablet Take 1 tablet (10 mg) by mouth every 6 hours as needed (nausea/vomiting) 30 tablet 2         Allergies:      No Known Allergies     Social History:     Social History     Tobacco Use     Smoking status: Former Smoker     Packs/day: 0.25     Years: 20.00     Pack years: 5.00     Types: Cigarettes     Last attempt to quit: 2018     Years since quittin.4     Smokeless tobacco: Former User     Quit date: 2015   Substance Use Topics     Alcohol use: Yes     Alcohol/week: 0.0 oz     Comment: once every 3-4 months       History   Drug Use No         Family History:       Family History   Problem Relation Age of Onset     Diabetes Mother      Cerebrovascular Disease Mother      Ovarian Cancer Mother      Breast Cancer Mother      C.A.D. Father 40     Cerebrovascular Disease Father      C.A.D. Sister 62        stent placed     Ovarian Cancer Sister      Ovarian Cancer Sister      Ovarian Cancer Sister          Physical Exam:     /77   Pulse 68   Temp 97.6  F (36.4  C) (Oral)   Wt 74.6 kg (164 lb 6.4 oz)   SpO2 99%   BMI 25.75 kg/m    Body mass index is 25.75 kg/m .    General Appearance: healthy and  alert, no distress     HEENT: no thyromegaly, no palpable nodules or masses        Cardiovascular: regular rate and rhythm, no gallops, rubs or murmurs     Respiratory: lungs clear, no rales, rhonchi or wheezes, normal diaphragmatic excursion    Musculoskeletal: extremities non tender and without edema    Skin: no lesions or rashes     Neurological: normal gait, no gross defects     Psychiatric: appropriate mood and affect                               Hematological: normal cervical, supraclavicular lymph nodes     Gastrointestinal:       abdomen soft, non-tender, non-distended    Genitourinary: Deferred      Assessment:    Nicole Fritsche is a 49 year old woman with a diagnosis of stage IIB cervical cancer.   She is here today for a  treatment follow up.     20 minutes were spent with this patient, over 50% of that time was spent in symptom management, treatment planning and in counseling and coordination of care.      Plan:     1.)        Continue with current regimen and weekly follow ups with Rad Onc. Reviewed signs and symptoms for when she should contact the clinic or seek additional care. Patient to contact the clinic with any questions or concerns in the interim. Discussed importance of pacing activities, eating smaller meals more often, and adequate hydration.      2.) Genetic risk factors were assessed and the patient does not meet the qualifications for a referral.      3.) Labs and/or tests ordered include:  None.     4.) Health maintenance issues addressed today include annual health maintenance and non-gynecologic issues with PCP.    HOMERO Perez, WHNP-BC, ANP-BC  Women's Health Nurse Practitioner  Adult Nurse Pracitioner  Division of Gynecologic Oncology          CC  Patient Care Team:  Ansley White PA-C as PCP - General  Ansley White PA-C as Assigned PCP  Stephanie Javier RN as Specialty Care Coordinator (Gyn-Onc)  SELF, REFERRED

## 2019-08-09 ENCOUNTER — APPOINTMENT (OUTPATIENT)
Dept: RADIATION ONCOLOGY | Facility: CLINIC | Age: 49
End: 2019-08-09
Attending: RADIOLOGY
Payer: COMMERCIAL

## 2019-08-09 ENCOUNTER — ONCOLOGY VISIT (OUTPATIENT)
Dept: ONCOLOGY | Facility: CLINIC | Age: 49
End: 2019-08-09
Attending: NURSE PRACTITIONER
Payer: COMMERCIAL

## 2019-08-09 VITALS
HEART RATE: 68 BPM | OXYGEN SATURATION: 99 % | WEIGHT: 164.4 LBS | TEMPERATURE: 97.6 F | SYSTOLIC BLOOD PRESSURE: 112 MMHG | BODY MASS INDEX: 25.75 KG/M2 | DIASTOLIC BLOOD PRESSURE: 77 MMHG

## 2019-08-09 DIAGNOSIS — C53.1 MALIGNANT NEOPLASM OF EXOCERVIX (H): Primary | ICD-10-CM

## 2019-08-09 PROCEDURE — 77336 RADIATION PHYSICS CONSULT: CPT | Performed by: RADIOLOGY

## 2019-08-09 PROCEDURE — G0463 HOSPITAL OUTPT CLINIC VISIT: HCPCS | Mod: ZF

## 2019-08-09 PROCEDURE — 99213 OFFICE O/P EST LOW 20 MIN: CPT | Mod: ZP | Performed by: NURSE PRACTITIONER

## 2019-08-09 PROCEDURE — 77386 ZZH IMRT TREATMENT DELIVERY, COMPLEX: CPT | Performed by: RADIOLOGY

## 2019-08-09 ASSESSMENT — PAIN SCALES - GENERAL: PAINLEVEL: NO PAIN (0)

## 2019-08-09 NOTE — NURSING NOTE
"Oncology Rooming Note    August 9, 2019 1:44 PM   Nicole Fritsche is a 49 year old female who presents for:    Chief Complaint   Patient presents with     Oncology Clinic Visit     Malignant neoplasm of exocervix     Initial Vitals: /77   Pulse 68   Temp 97.6  F (36.4  C) (Oral)   Wt 74.6 kg (164 lb 6.4 oz)   SpO2 99%   BMI 25.75 kg/m   Estimated body mass index is 25.75 kg/m  as calculated from the following:    Height as of 6/12/19: 1.702 m (5' 7\").    Weight as of this encounter: 74.6 kg (164 lb 6.4 oz). Body surface area is 1.88 meters squared.  No Pain (0) Comment: Data Unavailable   No LMP recorded. Patient is perimenopausal.  Allergies reviewed: Yes  Medications reviewed: Yes    Medications: Medication refills not needed today.  Pharmacy name entered into [facility]: [id number] - [place], [place] - [address] AT [facility]    Clinical concerns: n/a       TRIPP MERCER CMA              "

## 2019-08-09 NOTE — LETTER
2019       RE: Nicole Fritsche  [address]  [place] [address]     Dear Colleague,    Thank you for referring your patient, Nicole Fritsche, to the [facility]. Please see a copy of my visit note below.                                           Follow Up Notes on Referred Patient    Date: 2019        RE: Nicole Fritsche  : 1970  DAVID: 2019      Nicole Fritsche is a 49 year old woman with a diagnosis of stage IIB cervical cancer.    She is here today for a  treatment follow up.     Treatment history:    19: Cervical biopsy, 4 o'clock     FINAL DIAGNOSIS:   Cervix, 4:00, biopsy:   - INVASIVE WELL-DIFFERENTIATED SQUAMOUS CELL CARCINOMA, focally keratinizing   - High grade squamous intraepithelial lesion (cervical intraepithelial neoplasia 3)     19: PET CT IMPRESSION: In this patient with newly diagnosed cervical cancer:  1. 3.7 cm cervical lesion with a max SUV of 13.16.   2. Focal uptake in the endometrium, favor adenomyosis over metastatic disease. Adenomyosis is demonstrated on MRI 2019 and can be hypermetabolic in a premenopausal woman.  3. Small left periaortic retroperitoneal lymph node borderline SUV uptake, inflammatory versus metastatic. Attention on follow-up.   4. Small left inguinal lymph nodes with SUV max of 2.45, likely inflammatory.    19: Cisplatin + radiation started.        Today she comes to clinic feeling generally well and is tolerating her treatment ok. She did start having some diarrhea this week and is taking medication for this. She has been taking Compazine as needed for nausea and taking Ativan at bedtime. She has also noticed some fatigue as well. She denies any vaginal bleeding, no changes in her bladder habits, no nausea/emesis, no lower extremity edema, and no difficulties eating or sleeping. She denies any abdominal discomfort/bloating, no fevers or chills, and no chest pain or shortness of breath. She is a former smoker  who quit 1.5 years ago.           Review of Systems:    Systemic           no weight changes; no fever; no chills; no night sweats; no appetite changes  Skin           no rashes, or lesions  Eye           no irritation; no changes in vision  Temo-Laryngeal           no dysphagia; no hoarseness   Pulmonary    no cough; no shortness of breath  Cardiovascular    no chest pain; no palpitations  Gastrointestinal    no diarrhea; no constipation; no abdominal pain; no changes in bowel habits; no blood in stool  Genitourinary   no urinary frequency; no urinary urgency; no dysuria; no pain; no abnormal vaginal discharge; no abnormal vaginal bleeding  Breast    no breast discharge; no breast changes; no breast pain  Musculoskeletal    no myalgias; no arthralgias; no back pain  Psychiatric           no depressed mood; no anxiety    Hematologic              no tender lymph nodes; no noticeable swellings or lumps   Endocrine    no hot flashes; no heat/cold intolerance         Neurological   no tremor; no numbness and tingling; no headaches; no difficulty sleeping      Past Medical History:    Past Medical History:   Diagnosis Date     Abnormal Pap smear of cervix 05/21/2019    see problem list     Arrested hydrocephalus 2/3/2009    Ongoing HAs, seeing neurosurgeon. Per past notes from Dr. Resendiz, HAs most likely not secondary to the hydrocephalus, but rather vascular inorgin.  Please see scanned in records.     Asthma      Cervical cancer (H)      Cervical high risk HPV (human papillomavirus) test positive 05/21/2019    See problem list         Past Surgical History:    Past Surgical History:   Procedure Laterality Date     C VENTRICULO-CISTERNOSTOMY,3RD VENT      for treatment of HA related to hydrocephalus     EXCISE MASS TRUNK Left 9/16/2015    Procedure: EXCISE MASS TRUNK;  Surgeon: Carlos Enrique Briones MD;  Location:  OR         Health Maintenance Due   Topic Date Due     COLONOSCOPY  03/12/1979     ASTHMA ACTION PLAN   2016     ZOSTER IMMUNIZATION (2 of 2) 2019       Current Medications:     Current Outpatient Medications   Medication Sig Dispense Refill     albuterol (PROAIR HFA/PROVENTIL HFA/VENTOLIN HFA) 108 (90 Base) MCG/ACT inhaler Inhale 2 puffs into the lungs every 6 hours as needed for shortness of breath / dyspnea or wheezing 18 g 1     Ferrous Sulfate (IRON SUPPLEMENT PO) Take 325 mg by mouth 2 times daily (with meals)       fluticasone (FLONASE) 50 MCG/ACT nasal spray Spray 1-2 sprays into both nostrils daily 16 g 1     fluticasone (FLOVENT HFA) 110 MCG/ACT inhaler Inhale 2 puffs into the lungs 2 times daily 3 Inhaler 3     LORazepam (ATIVAN) 1 MG tablet Take 1 tablet (1 mg) by mouth every 6 hours as needed (nausea/vomiting, anxiety or sleep ) 30 tablet 1     Multiple Vitamin (MULTIVITAMINS PO) Take  by mouth daily.       oxyCODONE (ROXICODONE) 5 MG tablet Take 1-2 tablets (5-10 mg) by mouth every 6 hours as needed for severe pain (every 4 to hours as needed for pain) 40 tablet 0     prochlorperazine (COMPAZINE) 10 MG tablet Take 1 tablet (10 mg) by mouth every 6 hours as needed (nausea/vomiting) 30 tablet 2         Allergies:      No Known Allergies     Social History:     Social History     Tobacco Use     Smoking status: Former Smoker     Packs/day: 0.25     Years: 20.00     Pack years: 5.00     Types: Cigarettes     Last attempt to quit: 2018     Years since quittin.4     Smokeless tobacco: Former User     Quit date: 2015   Substance Use Topics     Alcohol use: Yes     Alcohol/week: 0.0 oz     Comment: once every 3-4 months       History   Drug Use No         Family History:       Family History   Problem Relation Age of Onset     Diabetes Mother      Cerebrovascular Disease Mother      Ovarian Cancer Mother      Breast Cancer Mother      C.A.D. Father 40     Cerebrovascular Disease Father      C.A.D. Sister 62        stent placed     Ovarian Cancer Sister      Ovarian Cancer Sister       Ovarian Cancer Sister          Physical Exam:     /77   Pulse 68   Temp 97.6  F (36.4  C) (Oral)   Wt 74.6 kg (164 lb 6.4 oz)   SpO2 99%   BMI 25.75 kg/m     Body mass index is 25.75 kg/m .    General Appearance: healthy and alert, no distress     HEENT: no thyromegaly, no palpable nodules or masses        Cardiovascular: regular rate and rhythm, no gallops, rubs or murmurs     Respiratory: lungs clear, no rales, rhonchi or wheezes, normal diaphragmatic excursion    Musculoskeletal: extremities non tender and without edema    Skin: no lesions or rashes     Neurological: normal gait, no gross defects     Psychiatric: appropriate mood and affect                               Hematological: normal cervical, supraclavicular lymph nodes     Gastrointestinal:       abdomen soft, non-tender, non-distended    Genitourinary: Deferred      Assessment:    Nicole Fritsche is a 49 year old woman with a diagnosis of stage IIB cervical cancer.    She is here today for a  treatment follow up.     20 minutes were spent with this patient, over 50% of that time was spent in symptom management, treatment planning and in counseling and coordination of care.      Plan:     1.)        Continue with current regimen and weekly follow ups with Rad Onc. Reviewed signs and symptoms for when she should contact the clinic or seek additional care. Patient to contact the clinic with any questions or concerns in the interim. Discussed importance of pacing activities, eating smaller meals more often, and adequate hydration.      2.) Genetic risk factors were assessed and the patient does not meet the qualifications for a referral.      3.) Labs and/or tests ordered include:  None.     4.) Health maintenance issues addressed today include annual health maintenance and non-gynecologic issues with PCP.    HOMERO Perez, WHNP-BC, ANP-BC  Women's Health Nurse Practitioner  Adult Nurse Pracitioner  Division of Gynecologic  Oncology          CC  Patient Care Team:  Ansley White PA-C as PCP - General  Stephanie Javier, RN as Specialty Care Coordinator (Gyn-Onc)

## 2019-08-12 ENCOUNTER — APPOINTMENT (OUTPATIENT)
Dept: LAB | Facility: CLINIC | Age: 49
End: 2019-08-12
Attending: OBSTETRICS & GYNECOLOGY
Payer: COMMERCIAL

## 2019-08-12 ENCOUNTER — APPOINTMENT (OUTPATIENT)
Dept: RADIATION ONCOLOGY | Facility: CLINIC | Age: 49
End: 2019-08-12
Attending: RADIOLOGY
Payer: COMMERCIAL

## 2019-08-12 ENCOUNTER — INFUSION THERAPY VISIT (OUTPATIENT)
Dept: ONCOLOGY | Facility: CLINIC | Age: 49
End: 2019-08-12
Attending: OBSTETRICS & GYNECOLOGY
Payer: COMMERCIAL

## 2019-08-12 VITALS
SYSTOLIC BLOOD PRESSURE: 109 MMHG | OXYGEN SATURATION: 100 % | DIASTOLIC BLOOD PRESSURE: 74 MMHG | HEART RATE: 73 BPM | RESPIRATION RATE: 20 BRPM | BODY MASS INDEX: 25.4 KG/M2 | WEIGHT: 162.2 LBS | TEMPERATURE: 97.8 F

## 2019-08-12 VITALS
BODY MASS INDEX: 25.37 KG/M2 | SYSTOLIC BLOOD PRESSURE: 132 MMHG | DIASTOLIC BLOOD PRESSURE: 78 MMHG | HEART RATE: 64 BPM | WEIGHT: 162 LBS

## 2019-08-12 DIAGNOSIS — C53.9 MALIGNANT NEOPLASM OF CERVIX, UNSPECIFIED SITE (H): Primary | ICD-10-CM

## 2019-08-12 DIAGNOSIS — C53.1 MALIGNANT NEOPLASM OF EXOCERVIX (H): Primary | ICD-10-CM

## 2019-08-12 LAB
ALBUMIN SERPL-MCNC: 3.5 G/DL (ref 3.4–5)
ALP SERPL-CCNC: 78 U/L (ref 40–150)
ALT SERPL W P-5'-P-CCNC: 86 U/L (ref 0–50)
ANION GAP SERPL CALCULATED.3IONS-SCNC: 4 MMOL/L (ref 3–14)
AST SERPL W P-5'-P-CCNC: 37 U/L (ref 0–45)
BASOPHILS # BLD AUTO: 0 10E9/L (ref 0–0.2)
BASOPHILS NFR BLD AUTO: 0.4 %
BILIRUB SERPL-MCNC: 0.5 MG/DL (ref 0.2–1.3)
BUN SERPL-MCNC: 12 MG/DL (ref 7–30)
CALCIUM SERPL-MCNC: 9.1 MG/DL (ref 8.5–10.1)
CHLORIDE SERPL-SCNC: 107 MMOL/L (ref 94–109)
CO2 SERPL-SCNC: 28 MMOL/L (ref 20–32)
CREAT SERPL-MCNC: 0.55 MG/DL (ref 0.52–1.04)
DIFFERENTIAL METHOD BLD: ABNORMAL
EOSINOPHIL # BLD AUTO: 0.1 10E9/L (ref 0–0.7)
EOSINOPHIL NFR BLD AUTO: 2.4 %
ERYTHROCYTE [DISTWIDTH] IN BLOOD BY AUTOMATED COUNT: 12.5 % (ref 10–15)
GFR SERPL CREATININE-BSD FRML MDRD: >90 ML/MIN/{1.73_M2}
GLUCOSE SERPL-MCNC: 82 MG/DL (ref 70–99)
HCT VFR BLD AUTO: 39.2 % (ref 35–47)
HGB BLD-MCNC: 12.9 G/DL (ref 11.7–15.7)
IMM GRANULOCYTES # BLD: 0 10E9/L (ref 0–0.4)
IMM GRANULOCYTES NFR BLD: 0.4 %
LYMPHOCYTES # BLD AUTO: 0.7 10E9/L (ref 0.8–5.3)
LYMPHOCYTES NFR BLD AUTO: 12 %
MAGNESIUM SERPL-MCNC: 1.8 MG/DL (ref 1.6–2.3)
MCH RBC QN AUTO: 32.3 PG (ref 26.5–33)
MCHC RBC AUTO-ENTMCNC: 32.9 G/DL (ref 31.5–36.5)
MCV RBC AUTO: 98 FL (ref 78–100)
MONOCYTES # BLD AUTO: 0.6 10E9/L (ref 0–1.3)
MONOCYTES NFR BLD AUTO: 11.8 %
NEUTROPHILS # BLD AUTO: 4 10E9/L (ref 1.6–8.3)
NEUTROPHILS NFR BLD AUTO: 73 %
NRBC # BLD AUTO: 0 10*3/UL
NRBC BLD AUTO-RTO: 0 /100
PLATELET # BLD AUTO: 182 10E9/L (ref 150–450)
POTASSIUM SERPL-SCNC: 3.8 MMOL/L (ref 3.4–5.3)
PROT SERPL-MCNC: 7.2 G/DL (ref 6.8–8.8)
RBC # BLD AUTO: 3.99 10E12/L (ref 3.8–5.2)
SODIUM SERPL-SCNC: 140 MMOL/L (ref 133–144)
WBC # BLD AUTO: 5.4 10E9/L (ref 4–11)

## 2019-08-12 PROCEDURE — 25800030 ZZH RX IP 258 OP 636: Mod: ZF | Performed by: OBSTETRICS & GYNECOLOGY

## 2019-08-12 PROCEDURE — 96375 TX/PRO/DX INJ NEW DRUG ADDON: CPT

## 2019-08-12 PROCEDURE — 25000128 H RX IP 250 OP 636: Mod: ZF | Performed by: OBSTETRICS & GYNECOLOGY

## 2019-08-12 PROCEDURE — 25800025 ZZH RX 258: Mod: ZF | Performed by: OBSTETRICS & GYNECOLOGY

## 2019-08-12 PROCEDURE — 80053 COMPREHEN METABOLIC PANEL: CPT | Performed by: OBSTETRICS & GYNECOLOGY

## 2019-08-12 PROCEDURE — 96361 HYDRATE IV INFUSION ADD-ON: CPT

## 2019-08-12 PROCEDURE — 77386 ZZH IMRT TREATMENT DELIVERY, COMPLEX: CPT | Performed by: RADIOLOGY

## 2019-08-12 PROCEDURE — 83735 ASSAY OF MAGNESIUM: CPT | Performed by: OBSTETRICS & GYNECOLOGY

## 2019-08-12 PROCEDURE — 85025 COMPLETE CBC W/AUTO DIFF WBC: CPT | Performed by: OBSTETRICS & GYNECOLOGY

## 2019-08-12 PROCEDURE — 40000141 ZZH STATISTIC PERIPHERAL IV START W/O US GUIDANCE: Mod: ZF

## 2019-08-12 PROCEDURE — 96367 TX/PROPH/DG ADDL SEQ IV INF: CPT

## 2019-08-12 PROCEDURE — 96413 CHEMO IV INFUSION 1 HR: CPT

## 2019-08-12 RX ORDER — PALONOSETRON 0.05 MG/ML
0.25 INJECTION, SOLUTION INTRAVENOUS ONCE
Status: COMPLETED | OUTPATIENT
Start: 2019-08-12 | End: 2019-08-12

## 2019-08-12 RX ORDER — DEXTROSE, SODIUM CHLORIDE, AND POTASSIUM CHLORIDE 5; .45; .15 G/100ML; G/100ML; G/100ML
2000 INJECTION INTRAVENOUS ONCE
Status: COMPLETED | OUTPATIENT
Start: 2019-08-12 | End: 2019-08-12

## 2019-08-12 RX ADMIN — CISPLATIN 80 MG: 1 INJECTION, SOLUTION INTRAVENOUS at 09:18

## 2019-08-12 RX ADMIN — DEXAMETHASONE SODIUM PHOSPHATE: 10 INJECTION, SOLUTION INTRAMUSCULAR; INTRAVENOUS at 08:35

## 2019-08-12 RX ADMIN — POTASSIUM CHLORIDE, DEXTROSE MONOHYDRATE AND SODIUM CHLORIDE 2000 ML: 150; 5; 450 INJECTION, SOLUTION INTRAVENOUS at 08:31

## 2019-08-12 RX ADMIN — PALONOSETRON HYDROCHLORIDE 0.25 MG: 0.25 INJECTION, SOLUTION INTRAVENOUS at 08:32

## 2019-08-12 ASSESSMENT — PAIN SCALES - GENERAL: PAINLEVEL: NO PAIN (0)

## 2019-08-12 NOTE — LETTER
2019       RE: Nicole Fritsche  [address]  [place] [address]     Dear Colleague,    Thank you for referring your patient, Nicole Fritsche, to the RADIATION ONCOLOGY CLINIC. Please see a copy of my visit note below.    [facility] PHYSICIANS  SPECIALIZING IN BREAKTHROUGHS  Radiation Oncology    On Treatment Visit Note      Nicole Fritsche      Date: 2019   MRN: [id number]   : 1970  Diagnosis: Cervix Cancer    Treatment Summary to Date   Pelvis Current Dose: 1980 /4500 + cGy Fractions:  + 0/5      Chemotherapy  Chemo concurrent with radx?: Yes  Oncologist: Dr Hernandez  Drug Name/Frequency 1: Cisplatin    Subjective: Ms. Fritsche is tolerating chemoradiation well. She has pelvic pain that has not improved with treatment and wakes her at night.  She was prescribed oxycodone at her last visit which has been helpful.  She has not required any pain medication the last few days.    Nursing ROS:   Nutrition Alteration  Diet Type: Patient's Preference  Skin  Skin Reaction: 0 - No changes  Skin Note: Proshield    Gastrointestinal  Nausea: 0 - None    Pain Assessment  0-10 Pain Scale: 2  Pain Note: Rx for Oxycodone    Objective:   /78   Pulse 64   Wt 73.5 kg (162 lb)   BMI 25.37 kg/m   , pain 0/10  Gen: Appears well, NAD  Skin: No erythema    Laboratory:  Lab Results   Component Value Date    WBC 5.4 2019    HGB 12.9 2019    HCT 39.2 2019    MCV 98 2019     2019     Lab Results   Component Value Date     2019    POTASSIUM 3.8 2019    CHLORIDE 107 2019    CO2 28 2019    GLC 82 2019     Assessment:    Tolerating radiation therapy well.  All questions and concerns addressed.    Plan:   1. Continue current therapy.      Mosaiq chart and setup information reviewed  MVCT images reviewed    Medication Review  Med list reviewed with patient?: Yes    Educational Topic Discussed  Education  Instructions: Reviewed    Anne Tidwell MD  Department of Radiation Oncology  [facility]

## 2019-08-12 NOTE — PROGRESS NOTES
Infusion Nursing Note:  Nicole Fritsche presents today for Cycle 1 Day 15 Cisplatin.    Patient seen by provider today: No    Note: Patient met with Rosio Harding NP on 8/9, but forgot to mention she has had intermittent ringing in her ears since starting chemotherapy.    Discussed tinnitus with Lorena Jimenez NP who gave the OK to proceed with treatment. Patient aware to call if symptoms worsen prior to next infusion.    Intravenous Access:  Peripheral IV Placed.    Treatment Conditions:  Lab Results   Component Value Date    HGB 12.9 08/12/2019     Lab Results   Component Value Date    WBC 5.4 08/12/2019      Lab Results   Component Value Date    ANEU 4.0 08/12/2019     Lab Results   Component Value Date     08/12/2019      Lab Results   Component Value Date     08/12/2019                   Lab Results   Component Value Date    POTASSIUM 3.8 08/12/2019           Lab Results   Component Value Date    MAG 1.8 08/12/2019            Lab Results   Component Value Date    CR 0.55 08/12/2019                   Lab Results   Component Value Date    NICOLE 9.1 08/12/2019                Lab Results   Component Value Date    BILITOTAL 0.5 08/12/2019           Lab Results   Component Value Date    ALBUMIN 3.5 08/12/2019                    Lab Results   Component Value Date    ALT 86 08/12/2019           Lab Results   Component Value Date    AST 37 08/12/2019     Results reviewed, labs MET treatment parameters, ok to proceed with treatment.    Post Infusion Assessment:  Patient tolerated infusion without incident.  Patient voided in adequate amounts pre, during and post Cisplatin.  Blood return noted pre and post infusion.  Site patent and intact, free from redness, edema or discomfort.  No evidence of extravasations. Access discontinued per protocol.     Discharge Plan:   Patient declined prescription refills.  Patient and/or family verbalized understanding of discharge instructions and all questions  answered.  Copy of AVS reviewed with patient and/or family.  Patient will return 8/19 for next appointment.  Patient discharged in stable condition accompanied by: self.  Departure Mode: Ambulatory.    Danielle Jacob RN

## 2019-08-12 NOTE — NURSING NOTE
Chief Complaint   Patient presents with     Blood Draw     PIV attempted, unable to place piv. Will retry during infusion visit. Labs collected.

## 2019-08-12 NOTE — PROGRESS NOTES
[facility] PHYSICIANS  SPECIALIZING IN BREAKTHROUGHS  Radiation Oncology    On Treatment Visit Note      Nicole Fritsche      Date: 2019   MRN: [id number]   : 1970  Diagnosis: Cervix Cancer    Treatment Summary to Date   Pelvis Current Dose: 1440/4500 + cGy Fractions:  + 0/5      Chemotherapy  Chemo concurrent with radx?: Yes  Oncologist: Dr Hernandez  Drug Name/Frequency 1: Cisplatin    Subjective: Ms. Fritsche is tolerating chemoradiation well. She has pelvic pain that has not improved with treatment and wakes her at night.    Nursing ROS:   Nutrition Alteration  Diet Type: Patient's Preference  Skin  Skin Reaction: 0 - No changes  Skin Note: Proshield    Gastrointestinal  Nausea: 0 - None    Pain Assessment  0-10 Pain Scale: 6  Pain Note: Rx for Oxycodone    Objective:   /79   Wt 71.7 kg (158 lb)   BMI 24.75 kg/m  , pain 0/10  Gen: Appears well, NAD  Skin: No erythema    Laboratory:  Lab Results   Component Value Date    WBC 6.6 2019    HGB 14.1 2019    HCT 41.4 2019    MCV 95 2019     2019     Lab Results   Component Value Date     2019    POTASSIUM 3.4 2019    CHLORIDE 106 2019    CO2 27 2019     (H) 2019     Assessment:    Tolerating radiation therapy well.  All questions and concerns addressed.    Plan:   1. Continue current therapy.      Mosaiq chart and setup information reviewed  MVCT images reviewed    Medication Review  Med list reviewed with patient?: Yes    Educational Topic Discussed  Education Instructions: Reviewed    Anne Tidwell MD  Department of Radiation Oncology  [facility]

## 2019-08-13 ENCOUNTER — APPOINTMENT (OUTPATIENT)
Dept: RADIATION ONCOLOGY | Facility: CLINIC | Age: 49
End: 2019-08-13
Attending: RADIOLOGY
Payer: COMMERCIAL

## 2019-08-13 PROCEDURE — 77386 ZZH IMRT TREATMENT DELIVERY, COMPLEX: CPT | Performed by: RADIOLOGY

## 2019-08-13 NOTE — PROGRESS NOTES
[facility] PHYSICIANS  SPECIALIZING IN BREAKTHROUGHS  Radiation Oncology    On Treatment Visit Note      Nicole Fritsche      Date: 2019   MRN: [id number]   : 1970  Diagnosis: Cervix Cancer    Treatment Summary to Date   Pelvis Current Dose: 1980 /4500 + cGy Fractions:  + 0/5      Chemotherapy  Chemo concurrent with radx?: Yes  Oncologist: Dr Hernandez  Drug Name/Frequency 1: Cisplatin    Subjective: Ms. Fritsche is tolerating chemoradiation well. She has pelvic pain that has not improved with treatment and wakes her at night.  She was prescribed oxycodone at her last visit which has been helpful.  She has not required any pain medication the last few days.    Nursing ROS:   Nutrition Alteration  Diet Type: Patient's Preference  Skin  Skin Reaction: 0 - No changes  Skin Note: Proshield    Gastrointestinal  Nausea: 0 - None    Pain Assessment  0-10 Pain Scale: 2  Pain Note: Rx for Oxycodone    Objective:   /78   Pulse 64   Wt 73.5 kg (162 lb)   BMI 25.37 kg/m  , pain 0/10  Gen: Appears well, NAD  Skin: No erythema    Laboratory:  Lab Results   Component Value Date    WBC 5.4 2019    HGB 12.9 2019    HCT 39.2 2019    MCV 98 2019     2019     Lab Results   Component Value Date     2019    POTASSIUM 3.8 2019    CHLORIDE 107 2019    CO2 28 2019    GLC 82 2019     Assessment:    Tolerating radiation therapy well.  All questions and concerns addressed.    Plan:   1. Continue current therapy.      Mosaiq chart and setup information reviewed  MVCT images reviewed    Medication Review  Med list reviewed with patient?: Yes    Educational Topic Discussed  Education Instructions: Reviewed    Anne Tidwell MD  Department of Radiation Oncology  [facility]

## 2019-08-14 ENCOUNTER — APPOINTMENT (OUTPATIENT)
Dept: RADIATION ONCOLOGY | Facility: CLINIC | Age: 49
End: 2019-08-14
Attending: RADIOLOGY
Payer: COMMERCIAL

## 2019-08-14 PROCEDURE — 77386 ZZH IMRT TREATMENT DELIVERY, COMPLEX: CPT | Performed by: RADIOLOGY

## 2019-08-15 ENCOUNTER — APPOINTMENT (OUTPATIENT)
Dept: RADIATION ONCOLOGY | Facility: CLINIC | Age: 49
End: 2019-08-15
Attending: RADIOLOGY
Payer: COMMERCIAL

## 2019-08-15 PROCEDURE — 77386 ZZH IMRT TREATMENT DELIVERY, COMPLEX: CPT | Performed by: RADIOLOGY

## 2019-08-16 ENCOUNTER — APPOINTMENT (OUTPATIENT)
Dept: RADIATION ONCOLOGY | Facility: CLINIC | Age: 49
End: 2019-08-16
Attending: RADIOLOGY
Payer: COMMERCIAL

## 2019-08-16 PROCEDURE — 77386 ZZH IMRT TREATMENT DELIVERY, COMPLEX: CPT | Performed by: RADIOLOGY

## 2019-08-16 PROCEDURE — 77336 RADIATION PHYSICS CONSULT: CPT | Performed by: RADIOLOGY

## 2019-08-19 ENCOUNTER — INFUSION THERAPY VISIT (OUTPATIENT)
Dept: ONCOLOGY | Facility: CLINIC | Age: 49
End: 2019-08-19
Attending: OBSTETRICS & GYNECOLOGY
Payer: COMMERCIAL

## 2019-08-19 ENCOUNTER — APPOINTMENT (OUTPATIENT)
Dept: LAB | Facility: CLINIC | Age: 49
End: 2019-08-19
Attending: OBSTETRICS & GYNECOLOGY
Payer: COMMERCIAL

## 2019-08-19 ENCOUNTER — APPOINTMENT (OUTPATIENT)
Dept: RADIATION ONCOLOGY | Facility: CLINIC | Age: 49
End: 2019-08-19
Attending: RADIOLOGY
Payer: COMMERCIAL

## 2019-08-19 VITALS
RESPIRATION RATE: 16 BRPM | SYSTOLIC BLOOD PRESSURE: 119 MMHG | TEMPERATURE: 97.9 F | HEART RATE: 79 BPM | OXYGEN SATURATION: 97 % | DIASTOLIC BLOOD PRESSURE: 64 MMHG | WEIGHT: 160.6 LBS | BODY MASS INDEX: 25.15 KG/M2

## 2019-08-19 DIAGNOSIS — C53.9 MALIGNANT NEOPLASM OF CERVIX, UNSPECIFIED SITE (H): Primary | ICD-10-CM

## 2019-08-19 LAB
ALBUMIN SERPL-MCNC: 3.6 G/DL (ref 3.4–5)
ALP SERPL-CCNC: 90 U/L (ref 40–150)
ALT SERPL W P-5'-P-CCNC: 82 U/L (ref 0–50)
ANION GAP SERPL CALCULATED.3IONS-SCNC: 5 MMOL/L (ref 3–14)
AST SERPL W P-5'-P-CCNC: 32 U/L (ref 0–45)
BASOPHILS # BLD AUTO: 0 10E9/L (ref 0–0.2)
BASOPHILS NFR BLD AUTO: 0.6 %
BILIRUB SERPL-MCNC: 0.3 MG/DL (ref 0.2–1.3)
BUN SERPL-MCNC: 10 MG/DL (ref 7–30)
CALCIUM SERPL-MCNC: 9.2 MG/DL (ref 8.5–10.1)
CHLORIDE SERPL-SCNC: 106 MMOL/L (ref 94–109)
CO2 SERPL-SCNC: 29 MMOL/L (ref 20–32)
CREAT SERPL-MCNC: 0.56 MG/DL (ref 0.52–1.04)
DIFFERENTIAL METHOD BLD: ABNORMAL
EOSINOPHIL # BLD AUTO: 0.1 10E9/L (ref 0–0.7)
EOSINOPHIL NFR BLD AUTO: 1.6 %
ERYTHROCYTE [DISTWIDTH] IN BLOOD BY AUTOMATED COUNT: 12.5 % (ref 10–15)
GFR SERPL CREATININE-BSD FRML MDRD: >90 ML/MIN/{1.73_M2}
GLUCOSE SERPL-MCNC: 95 MG/DL (ref 70–99)
HCT VFR BLD AUTO: 37.4 % (ref 35–47)
HGB BLD-MCNC: 12.6 G/DL (ref 11.7–15.7)
IMM GRANULOCYTES # BLD: 0 10E9/L (ref 0–0.4)
IMM GRANULOCYTES NFR BLD: 0.4 %
LYMPHOCYTES # BLD AUTO: 0.5 10E9/L (ref 0.8–5.3)
LYMPHOCYTES NFR BLD AUTO: 9.4 %
MAGNESIUM SERPL-MCNC: 1.6 MG/DL (ref 1.6–2.3)
MCH RBC QN AUTO: 32.1 PG (ref 26.5–33)
MCHC RBC AUTO-ENTMCNC: 33.7 G/DL (ref 31.5–36.5)
MCV RBC AUTO: 95 FL (ref 78–100)
MONOCYTES # BLD AUTO: 0.6 10E9/L (ref 0–1.3)
MONOCYTES NFR BLD AUTO: 11.6 %
NEUTROPHILS # BLD AUTO: 3.9 10E9/L (ref 1.6–8.3)
NEUTROPHILS NFR BLD AUTO: 76.4 %
NRBC # BLD AUTO: 0 10*3/UL
NRBC BLD AUTO-RTO: 0 /100
PLATELET # BLD AUTO: 190 10E9/L (ref 150–450)
POTASSIUM SERPL-SCNC: 3.6 MMOL/L (ref 3.4–5.3)
PROT SERPL-MCNC: 7.3 G/DL (ref 6.8–8.8)
RBC # BLD AUTO: 3.93 10E12/L (ref 3.8–5.2)
SODIUM SERPL-SCNC: 140 MMOL/L (ref 133–144)
WBC # BLD AUTO: 5.1 10E9/L (ref 4–11)

## 2019-08-19 PROCEDURE — 36415 COLL VENOUS BLD VENIPUNCTURE: CPT

## 2019-08-19 PROCEDURE — 96367 TX/PROPH/DG ADDL SEQ IV INF: CPT

## 2019-08-19 PROCEDURE — 40000556 ZZH STATISTIC PERIPHERAL IV START W US GUIDANCE: Mod: ZF

## 2019-08-19 PROCEDURE — 96361 HYDRATE IV INFUSION ADD-ON: CPT

## 2019-08-19 PROCEDURE — 83735 ASSAY OF MAGNESIUM: CPT | Performed by: OBSTETRICS & GYNECOLOGY

## 2019-08-19 PROCEDURE — 96375 TX/PRO/DX INJ NEW DRUG ADDON: CPT

## 2019-08-19 PROCEDURE — 25000128 H RX IP 250 OP 636: Mod: ZF | Performed by: OBSTETRICS & GYNECOLOGY

## 2019-08-19 PROCEDURE — 25800025 ZZH RX 258: Mod: ZF | Performed by: OBSTETRICS & GYNECOLOGY

## 2019-08-19 PROCEDURE — G0463 HOSPITAL OUTPT CLINIC VISIT: HCPCS | Mod: 25

## 2019-08-19 PROCEDURE — 25800030 ZZH RX IP 258 OP 636: Mod: ZF | Performed by: OBSTETRICS & GYNECOLOGY

## 2019-08-19 PROCEDURE — 80053 COMPREHEN METABOLIC PANEL: CPT | Performed by: OBSTETRICS & GYNECOLOGY

## 2019-08-19 PROCEDURE — 96413 CHEMO IV INFUSION 1 HR: CPT

## 2019-08-19 PROCEDURE — 77386 ZZH IMRT TREATMENT DELIVERY, COMPLEX: CPT | Performed by: RADIOLOGY

## 2019-08-19 PROCEDURE — 85025 COMPLETE CBC W/AUTO DIFF WBC: CPT | Performed by: OBSTETRICS & GYNECOLOGY

## 2019-08-19 RX ORDER — PALONOSETRON 0.05 MG/ML
0.25 INJECTION, SOLUTION INTRAVENOUS ONCE
Status: COMPLETED | OUTPATIENT
Start: 2019-08-19 | End: 2019-08-19

## 2019-08-19 RX ORDER — DEXTROSE, SODIUM CHLORIDE, AND POTASSIUM CHLORIDE 5; .45; .15 G/100ML; G/100ML; G/100ML
2000 INJECTION INTRAVENOUS ONCE
Status: COMPLETED | OUTPATIENT
Start: 2019-08-19 | End: 2019-08-19

## 2019-08-19 RX ADMIN — POTASSIUM CHLORIDE, DEXTROSE MONOHYDRATE AND SODIUM CHLORIDE 2000 ML: 150; 5; 450 INJECTION, SOLUTION INTRAVENOUS at 08:39

## 2019-08-19 RX ADMIN — DEXAMETHASONE SODIUM PHOSPHATE: 10 INJECTION, SOLUTION INTRAMUSCULAR; INTRAVENOUS at 08:08

## 2019-08-19 RX ADMIN — CISPLATIN 80 MG: 1 INJECTION, SOLUTION INTRAVENOUS at 10:10

## 2019-08-19 RX ADMIN — PALONOSETRON HYDROCHLORIDE 0.25 MG: 0.25 INJECTION, SOLUTION INTRAVENOUS at 08:08

## 2019-08-19 ASSESSMENT — PAIN SCALES - GENERAL: PAINLEVEL: NO PAIN (0)

## 2019-08-19 NOTE — NURSING NOTE
Chief Complaint   Patient presents with     Labs Only     venipuncture, vitals checked     Jodie Zhong RN on 8/19/2019 at 6:48 AM

## 2019-08-19 NOTE — PROGRESS NOTES
"Infusion Nursing Note:  Nicole Fritsche presents today for Cycle 1 Day 22 Cisplatin.    Patient seen by provider today: No    Note: Patient arrived feeling tired, but denied any SOB, pain, fevers/chills or other new concerns. However, she continues to experience intermittent ringing in her ears. The tinnitus continues to be intermittent, but seems to be getting a little bit worse. States she is hearing okay still, though.    Notified Rosio Harding, CLAUDIA and Radhika Javier, IKER[facility]. KM gave the \"okay\" to continue chemotherapy as planned, but will set patient up with an audiogram. Pt encouraged to call if tinnitus worsens, becomes constant, or begins to impact her hearing. Pt agreeable to plan.     Intravenous Access:  Peripheral IV placed.    Treatment Conditions:  Lab Results   Component Value Date    HGB 12.6 08/19/2019     Lab Results   Component Value Date    WBC 5.1 08/19/2019      Lab Results   Component Value Date    ANEU 3.9 08/19/2019     Lab Results   Component Value Date     08/19/2019      Lab Results   Component Value Date     08/19/2019                   Lab Results   Component Value Date    POTASSIUM 3.6 08/19/2019           Lab Results   Component Value Date    MAG 1.6 08/19/2019            Lab Results   Component Value Date    CR 0.56 08/19/2019                   Lab Results   Component Value Date    NICOLE 9.2 08/19/2019                Lab Results   Component Value Date    BILITOTAL 0.3 08/19/2019           Lab Results   Component Value Date    ALBUMIN 3.6 08/19/2019                    Lab Results   Component Value Date    ALT 82 08/19/2019           Lab Results   Component Value Date    AST 32 08/19/2019     Results reviewed, labs MET treatment parameters, ok to proceed with treatment.    Post Infusion Assessment:  Patient tolerated infusion without incident.  Patient voided in adequate amounts pre, during and post Cisplatin.  Blood return noted pre and post infusion.  Site patent and intact, " free from redness, edema or discomfort.  No evidence of extravasations. Access discontinued per protocol.     Discharge Plan:   Patient declined prescription refills.  Patient and/or family verbalized understanding of discharge instructions and all questions answered.  Copy of AVS reviewed with patient and/or family.  Patient will return 8/26 for next appointment.  Patient discharged in stable condition accompanied by: friend.  Departure Mode: Ambulatory.  Face to Face time: 3 minutes.    Danielle Jacob RN

## 2019-08-20 ENCOUNTER — APPOINTMENT (OUTPATIENT)
Dept: RADIATION ONCOLOGY | Facility: CLINIC | Age: 49
End: 2019-08-20
Attending: RADIOLOGY
Payer: COMMERCIAL

## 2019-08-20 VITALS
OXYGEN SATURATION: 97 % | TEMPERATURE: 97.9 F | HEART RATE: 68 BPM | SYSTOLIC BLOOD PRESSURE: 119 MMHG | BODY MASS INDEX: 25.76 KG/M2 | DIASTOLIC BLOOD PRESSURE: 84 MMHG | RESPIRATION RATE: 18 BRPM | WEIGHT: 164.5 LBS

## 2019-08-20 DIAGNOSIS — C53.1 MALIGNANT NEOPLASM OF EXOCERVIX (H): Primary | ICD-10-CM

## 2019-08-20 PROCEDURE — 77386 ZZH IMRT TREATMENT DELIVERY, COMPLEX: CPT | Performed by: RADIOLOGY

## 2019-08-20 ASSESSMENT — PAIN SCALES - GENERAL: PAINLEVEL: NO PAIN (0)

## 2019-08-20 NOTE — PROGRESS NOTES
[facility] PHYSICIANS  SPECIALIZING IN BREAKTHROUGHS  Radiation Oncology    On Treatment Visit Note      Nicole Fritsche      Date: 2019   MRN: [id number]   : 1970  Diagnosis: Cervix Cancer      Reason for Visit:  On Radiation Treatment Visit     Treatment Summary to Date  Treatment Site: pelvis_PANs followed by brachytherapy Current Dose: 3060/4500 cGy Fractions:       Chemotherapy  Chemo concurrent with radx?: Yes  Oncologist: Dr Hernandez  Drug Name/Frequency 1: Cisplatin    Subjective:     Ms. Fritsche is tolerating chemoradiation well. Has decreased vaginal bleeding. She has pelvic pain that is slightly improved with treatment and midol.  She prefers to not take oxycodone as it upset her stomach. Diarrhea well controlled with imodium. GERD not relieved with TUMS.    Nursing ROS:   Nutrition Alteration  Diet Type: Patient's Preference  Skin  Skin Reaction: 0 - No changes  Skin Intervention: patient denies use of Aquaphor  Skin Note: Proshield           Gastrointestinal  Nausea: 0 - None  Diarrhea: 2 - Three to five soft or liquid bowel movements per day  GI Note: increasing heartburn unrelieved by TUMS, patient reports previously instructed to take Prevacid which she notes she will begin, patient reports taking Imodium po two to three tablets daily  Genitourinary  Urinary Status: 0 - Normal   Note: patient reports brown vaginal discharge, denies bright red bleeding  Psychosocial  Mood - Anxiety: 0 - Normal  Mood - Depression: 0 - Normal  Pyschosocial Note: increasing fatigue  Pain Assessment  0-10 Pain Scale: 0  Pain Note: intermittent pelvic cramping, reports taking Midol po as needed      Objective:   /84 (BP Location: Right arm, Patient Position: Chair, Cuff Size: Adult Regular)   Pulse 68   Temp 97.9  F (36.6  C) (Oral)   Resp 18   Wt 74.6 kg (164 lb 8 oz)   SpO2 97%   BMI 25.76 kg/m    Gen: Appears well, in no acute distress      Labs:  CBC RESULTS:   Recent  Labs   Lab Test 08/19/19  0645   WBC 5.1   RBC 3.93   HGB 12.6   HCT 37.4   MCV 95   MCH 32.1   MCHC 33.7   RDW 12.5        ELECTROLYTES:  Recent Labs   Lab Test 08/19/19  0645      POTASSIUM 3.6   CHLORIDE 106   NICOLE 9.2   CO2 29   BUN 10   CR 0.56   GLC 95       Assessment:    Tolerating radiation therapy well.  All questions and concerns addressed.    Toxicities:  Pain: Grade 1: Mild pelvic pain  Diarrhea: Grade 1: Increase of <4 stools per day over baseline; mild increase in ostomy output compared to baseline    Plan:   1. Continue current therapy.    2. Imodium and dietary changes for diarrhea  3. omeprazole for GERD  4. midol otc pain medication for pelvic pain      Mosaiq chart and setup information reviewed  MVCT/IGRT images checked    Medication Review  Med list reviewed with patient?: Yes  Med list printed and given: Offered and declined    Educational Topic Discussed  Education Instructions: radiation therapy side effects reviewed        Lelia Lance MD    Please do not send letter to referring physician.

## 2019-08-20 NOTE — LETTER
Date:August 21, 2019      Provider requested that no letter be sent. Do not send.       [facility] Health Information

## 2019-08-20 NOTE — PATIENT INSTRUCTIONS
Please feel free to contact the [facility] Radiation Oncology Department at [phone] with questions or concerns following today s appointment.    Thank you!    Radiation Oncology Nursing

## 2019-08-20 NOTE — LETTER
2019       RE: Nicole Fritsche  [address]  [place] [address]     Dear Colleague,    Thank you for referring your patient, Nicole Fritsche, to the RADIATION ONCOLOGY CLINIC. Please see a copy of my visit note below.    [facility] PHYSICIANS  SPECIALIZING IN BREAKTHROUGHS  Radiation Oncology    On Treatment Visit Note      Nicole Fritsche      Date: 2019   MRN: [id number]   : 1970  Diagnosis: Cervix Cancer      Reason for Visit:  On Radiation Treatment Visit     Treatment Summary to Date  Treatment Site: pelvis_PANs followed by brachytherapy Current Dose: 3060/4500 cGy Fractions:       Chemotherapy  Chemo concurrent with radx?: Yes  Oncologist: Dr Hernandez  Drug Name/Frequency 1: Cisplatin    Subjective:     Ms. Fritsche is tolerating chemoradiation well. Has decreased vaginal bleeding. She has pelvic pain that is slightly improved with treatment and midol.  She prefers to not take oxycodone as it upset her stomach. Diarrhea well controlled with imodium. GERD not relieved with TUMS.    Nursing ROS:   Nutrition Alteration  Diet Type: Patient's Preference  Skin  Skin Reaction: 0 - No changes  Skin Intervention: patient denies use of Aquaphor  Skin Note: Proshield           Gastrointestinal  Nausea: 0 - None  Diarrhea: 2 - Three to five soft or liquid bowel movements per day  GI Note: increasing heartburn unrelieved by TUMS, patient reports previously instructed to take Prevacid which she notes she will begin, patient reports taking Imodium po two to three tablets daily  Genitourinary  Urinary Status: 0 - Normal   Note: patient reports brown vaginal discharge, denies bright red bleeding  Psychosocial  Mood - Anxiety: 0 - Normal  Mood - Depression: 0 - Normal  Pyschosocial Note: increasing fatigue  Pain Assessment  0-10 Pain Scale: 0  Pain Note: intermittent pelvic cramping, reports taking Midol po as needed      Objective:   /84 (BP Location: Right arm, Patient  Position: Chair, Cuff Size: Adult Regular)   Pulse 68   Temp 97.9  F (36.6  C) (Oral)   Resp 18   Wt 74.6 kg (164 lb 8 oz)   SpO2 97%   BMI 25.76 kg/m     Gen: Appears well, in no acute distress      Labs:  CBC RESULTS:   Recent Labs   Lab Test 08/19/19  0645   WBC 5.1   RBC 3.93   HGB 12.6   HCT 37.4   MCV 95   MCH 32.1   MCHC 33.7   RDW 12.5        ELECTROLYTES:  Recent Labs   Lab Test 08/19/19  0645      POTASSIUM 3.6   CHLORIDE 106   NICOLE 9.2   CO2 29   BUN 10   CR 0.56   GLC 95       Assessment:    Tolerating radiation therapy well.  All questions and concerns addressed.    Toxicities:  Pain: Grade 1: Mild pelvic pain  Diarrhea: Grade 1: Increase of <4 stools per day over baseline; mild increase in ostomy output compared to baseline    Plan:   1. Continue current therapy.    2. Imodium and dietary changes for diarrhea  3. omeprazole for GERD  4. midol otc pain medication for pelvic pain      Mosaiq chart and setup information reviewed  MVCT/IGRT images checked    Medication Review  Med list reviewed with patient?: Yes  Med list printed and given: Offered and declined    Educational Topic Discussed  Education Instructions: radiation therapy side effects reviewed        Lelia Lance MD    Please do not send letter to referring physician.        Again, thank you for allowing me to participate in the care of your patient.      Sincerely,    Lelia Lance MD

## 2019-08-21 ENCOUNTER — APPOINTMENT (OUTPATIENT)
Dept: RADIATION ONCOLOGY | Facility: CLINIC | Age: 49
End: 2019-08-21
Attending: RADIOLOGY
Payer: COMMERCIAL

## 2019-08-21 DIAGNOSIS — C53.9 MALIGNANT NEOPLASM OF CERVIX, UNSPECIFIED SITE (H): Primary | ICD-10-CM

## 2019-08-21 PROCEDURE — 77386 ZZH IMRT TREATMENT DELIVERY, COMPLEX: CPT | Performed by: RADIOLOGY

## 2019-08-22 ENCOUNTER — APPOINTMENT (OUTPATIENT)
Dept: RADIATION ONCOLOGY | Facility: CLINIC | Age: 49
End: 2019-08-22
Attending: RADIOLOGY
Payer: COMMERCIAL

## 2019-08-22 ENCOUNTER — TELEPHONE (OUTPATIENT)
Dept: AUDIOLOGY | Facility: CLINIC | Age: 49
End: 2019-08-22

## 2019-08-22 ENCOUNTER — PATIENT OUTREACH (OUTPATIENT)
Dept: ONCOLOGY | Facility: CLINIC | Age: 49
End: 2019-08-22

## 2019-08-22 VITALS — HEART RATE: 74 BPM | SYSTOLIC BLOOD PRESSURE: 109 MMHG | OXYGEN SATURATION: 99 % | DIASTOLIC BLOOD PRESSURE: 77 MMHG

## 2019-08-22 DIAGNOSIS — C53.9 MALIGNANT NEOPLASM OF CERVIX, UNSPECIFIED SITE (H): Primary | ICD-10-CM

## 2019-08-22 DIAGNOSIS — Z01.818 PREOP GENERAL PHYSICAL EXAM: Primary | ICD-10-CM

## 2019-08-22 PROCEDURE — 77386 ZZH IMRT TREATMENT DELIVERY, COMPLEX: CPT | Performed by: RADIOLOGY

## 2019-08-22 NOTE — PROGRESS NOTES
Care Coordinator Note  Notified patient that she should be getting a call from  in Audiology Clinic regarding our request for an audiogram to be done before her next scheduled chemotherapy appointment.  Patient verbalized back understanding of the above information discussed.     Radhika GARCIA, RN  Care Coordinator  Gynecologic Cancer   Office:  [phone]  Pager: [phone] #[phone]

## 2019-08-22 NOTE — TELEPHONE ENCOUNTER
Talked to patient and got her scheduled for 8/28 with Audiology.  Explained that we tried to find time on 8/22 and 8/26 but she had other appointments during all the possible times.  She understood and will be here on 8/28.

## 2019-08-22 NOTE — PROGRESS NOTES
Follow Up Notes on Referred Patient    Date: 2019         RE: Nicole Fritsche  : 1970  DAVID: 2019      Nicole Fritsche is a 49 year old woman with a diagnosis of stage IIB cervical cancer.   She is here today for a  treatment follow up.      Treatment history:     19: Cervical biopsy, 4 o'clock     FINAL DIAGNOSIS:   Cervix, 4:00, biopsy:   - INVASIVE WELL-DIFFERENTIATED SQUAMOUS CELL CARCINOMA, focally keratinizing   - High grade squamous intraepithelial lesion (cervical intraepithelial neoplasia 3)      19: PET CT IMPRESSION: In this patient with newly diagnosed cervical cancer:  1. 3.7 cm cervical lesion with a max SUV of 13.16.   2. Focal uptake in the endometrium, favor adenomyosis over metastatic disease. Adenomyosis is demonstrated on MRI 2019 and can be hypermetabolic in a premenopausal woman.  3. Small left periaortic retroperitoneal lymph node borderline SUV uptake, inflammatory versus metastatic. Attention on follow-up.   4. Small left inguinal lymph nodes with SUV max of 2.45, likely inflammatory.     19: Cisplatin + radiation started.        Today she comes to clinic feeling well and denies any new issues. She states the ringing in her ears is intermittent and about the same (it did increase initially). She is taking Compazine as needed for any nausea; she denies any emesis. She is taking Imodium for diarrhea (whdih does contribute to some abdominal discomfort at times) and has been given a dilator to start using once radiation is over. She denies any vaginal bleeding, no changes in her bladder habits, no lower extremity edema, and no difficulties eating or sleeping. She denies any abdominal discomfort/bloating, no fevers or chills, and no chest pain or shortness of breath. She does not need any medications refilled.         Review of Systems:    Systemic           no weight changes; no fever; no chills; no night sweats; no appetite changes; + fatigue; +  ringing in ears  Skin           no rashes, or lesions  Eye           no irritation; no changes in vision  Temo-Laryngeal           no dysphagia; no hoarseness   Pulmonary    no cough; no shortness of breath  Cardiovascular    no chest pain; no palpitations  Gastrointestinal    + diarrhea; no constipation; no abdominal pain; no changes in bowel habits; no blood in stool  Genitourinary   no urinary frequency; no urinary urgency; no dysuria; no pain; no abnormal vaginal discharge; no abnormal vaginal bleeding  Breast    no breast discharge; no breast changes; no breast pain  Musculoskeletal    no myalgias; no arthralgias; no back pain  Psychiatric           no depressed mood; no anxiety    Hematologic              no tender lymph nodes; no noticeable swellings or lumps   Endocrine    no hot flashes; no heat/cold intolerance         Neurological   no tremor; no numbness and tingling; no headaches; no difficulty sleeping      Past Medical History:    Past Medical History:   Diagnosis Date     Abnormal Pap smear of cervix 05/21/2019    see problem list     Arrested hydrocephalus 2/3/2009    Ongoing HAs, seeing neurosurgeon. Per past notes from Dr. Resendiz, HAs most likely not secondary to the hydrocephalus, but rather vascular inorgin.  Please see scanned in records.     Asthma      Cervical cancer (H)      Cervical high risk HPV (human papillomavirus) test positive 05/21/2019    See problem list         Past Surgical History:    Past Surgical History:   Procedure Laterality Date     C VENTRICULO-CISTERNOSTOMY,3RD VENT      for treatment of HA related to hydrocephalus     EXCISE MASS TRUNK Left 9/16/2015    Procedure: EXCISE MASS TRUNK;  Surgeon: Carlos Enrique Briones MD;  Location:  OR         Health Maintenance Due   Topic Date Due     COLONOSCOPY  03/12/1979     ASTHMA ACTION PLAN  09/08/2016     ZOSTER IMMUNIZATION (2 of 2) 06/26/2019       Current Medications:     Current Outpatient Medications   Medication Sig  "Dispense Refill     Acetaminophen (MIDOL PO) Take by mouth as needed       albuterol (PROAIR HFA/PROVENTIL HFA/VENTOLIN HFA) 108 (90 Base) MCG/ACT inhaler Inhale 2 puffs into the lungs every 6 hours as needed for shortness of breath / dyspnea or wheezing 18 g 1     Ferrous Sulfate (IRON SUPPLEMENT PO) Take 325 mg by mouth 2 times daily (with meals)       fluticasone (FLONASE) 50 MCG/ACT nasal spray Spray 1-2 sprays into both nostrils daily 16 g 1     fluticasone (FLOVENT HFA) 110 MCG/ACT inhaler Inhale 2 puffs into the lungs 2 times daily 3 Inhaler 3     LORazepam (ATIVAN) 1 MG tablet Take 1 tablet (1 mg) by mouth every 6 hours as needed (nausea/vomiting, anxiety or sleep ) 30 tablet 1     Multiple Vitamin (MULTIVITAMINS PO) Take  by mouth daily.       prochlorperazine (COMPAZINE) 10 MG tablet Take 1 tablet (10 mg) by mouth every 6 hours as needed (nausea/vomiting) 30 tablet 2         Allergies:      No Known Allergies     Social History:     Social History     Tobacco Use     Smoking status: Former Smoker     Packs/day: 0.25     Years: 20.00     Pack years: 5.00     Types: Cigarettes     Last attempt to quit: 2018     Years since quittin.5     Smokeless tobacco: Former User     Quit date: 2015   Substance Use Topics     Alcohol use: Yes     Alcohol/week: 0.0 oz     Comment: once every 3-4 months       History   Drug Use No         Family History:       Family History   Problem Relation Age of Onset     Diabetes Mother      Cerebrovascular Disease Mother      Ovarian Cancer Mother      Breast Cancer Mother      C.A.D. Father 40     Cerebrovascular Disease Father      C.A.D. Sister 62        stent placed     Ovarian Cancer Sister      Ovarian Cancer Sister      Ovarian Cancer Sister          Physical Exam:     /80 (BP Location: Right arm, Patient Position: Chair, Cuff Size: Adult Regular)   Pulse 89   Temp 98.1  F (36.7  C) (Oral)   Resp 14   Ht 1.702 m (5' 7.01\")   Wt 72.6 kg (160 lb)   " SpO2 98%   Breastfeeding? No   BMI 25.05 kg/m    Body mass index is 25.05 kg/m .    General Appearance: healthy and alert, no distress     HEENT: no thyromegaly, no palpable nodules or masses        Cardiovascular: regular rate and rhythm, no gallops, rubs or murmurs     Respiratory: lungs clear, no rales, rhonchi or wheezes, normal diaphragmatic excursion    Musculoskeletal: extremities non tender and without edema    Skin: no lesions or rashes     Neurological: normal gait, no gross defects     Psychiatric: appropriate mood and affect                               Hematological: normal cervical, supraclavicular lymph nodes     Gastrointestinal:       abdomen soft, non-tender, non-distended    Genitourinary: deferred      Assessment:    Nicole Fritsche is a 49 year old woman with a diagnosis of stage IIB cervical cancer.   She is here today for a  treatment follow up.    25 minutes were spent with this patient, over 50% of that time was spent in symptom management, treatment planning and in counseling and coordination of care.      Plan:     1.)        Continue with current regimen. Rad Onc to teach her about internal radiation. Reviewed signs and symptoms for when she should contact the clinic or seek additional care. Patient to contact the clinic with any questions or concerns in the interim. Encouraged to add in more protein to her diet. Briefly discussed she would be seeing Dr. Hernandez about 3 months after radiation is completed with imaging.      2.) Genetic risk factors were assessed and the patient does not meet the qualifications for a referral.  She was consented for the GOLD study today.     3.) Labs and/or tests ordered include:  Audiogram.     4.) Health maintenance issues addressed today include annual health maintenance and non-gynecologic issues with PCP.    HOMERO Perez, WHNP-BC, ANP-BC  Women's Health Nurse Practitioner  Adult Nurse Pracitioner  Division of Gynecologic  Oncology          CC  Patient Care Team:  Ansley White PA-C as PCP - General  Ansley White PA-C as Assigned PCP  Stephanie Javier, RN as Specialty Care Coordinator (Gyn-Onc)  SELF, REFERRED

## 2019-08-22 NOTE — TELEPHONE ENCOUNTER
[facility] Call Center    Phone Message    May a detailed message be left on voicemail: yes    Reason for Call: Other: Patient would like to come in for a chemotherapy audiogram before  8/26/19 because she has chemo that day. Patient is being referred by Meaghan VALENTIN CNP//The Chemotherapy audiogram providers are booking out till October and there are no WIN Slots available. Order is in epic in the patient's chart under encounters dated today 8/22/2019. Please contact patient to set up an appointment for her preferably before 8/26/19. Thanks.    Action Taken: Message routed to:  [facility] Audiology

## 2019-08-22 NOTE — LETTER
2019       RE: Nicole Fritsche  [address]  [place] [address]     Dear Colleague,    Thank you for referring your patient, Nicole Fritsche, to the [facility]. Please see a copy of my visit note below.    [facility]  [facility]  1st Floor  [address] [address]  [phone]  Dept: [phone]    PRE-OP EVALUATION:  Today's date: 2019    Nicole Fritsche (: 1970) presents for pre-operative evaluation assessment as requested by Dr. Tidwell.  She requires evaluation and anesthesia risk assessment prior to undergoing surgery/procedure for treatment of cervical cancer .    Proposed Surgery/ Procedure: Charanjit Sleeve placement  Date of Surgery/ Procedure:   Time of Surgery/ Procedure: 1:00  Hospital/Surgical Facility: [facility]  Primary Physician: Ansley White  Type of Anesthesia Anticipated: General    Patient has a Health Care Directive or Living Will:  NO    1. NO - Do you have a history of heart attack, stroke, stent, bypass or surgery on an artery in the head, neck, heart or legs?  2. NO - Do you ever have any pain or discomfort in your chest?  3. NO - Do you have a history of  Heart Failure?  4. NO - Are you troubled by shortness of breath when: walking on the level, up a slight hill or at night?  5. NO - Do you currently have a cold, bronchitis or other respiratory infection?  6. NO - Do you have a cough, shortness of breath or wheezing?  7. NO - Do you sometimes get pains in the calves of your legs when you walk?  8. NO - Do you or anyone in your family have previous history of blood clots?  9. NO - Do you or does anyone in your family have a serious bleeding problem such as prolonged bleeding following surgeries or cuts?  10. NO - Have you ever had problems with anemia or been told to take iron pills?  11. NO - Have you had any abnormal blood loss such as black, tarry or bloody stools, or abnormal  vaginal bleeding?  12. NO - Have you ever had a blood transfusion?  13. NO - Have you or any of your relatives ever had problems with anesthesia?  14. NO - Do you have sleep apnea, excessive snoring or daytime drowsiness?  15. NO - Do you have any prosthetic heart valves?  16. NO - Do you have prosthetic joints?  17. NO - Is there any chance that you may be pregnant?      HPI:     HPI related to upcoming procedure: Patient presented with vaginal bleeding.  Pap smear on 5/21 was ASCUS.Colposcopy and endocervical curettage 6/3 showed high grade squamous intraepithelial lesion.  Repeat biopsy 6/12 showed invasive well differentiated SCC.  Pelvic MRI showed a 2.3 x 3.8 x 2.6 cm mass involving the posterior cervix without parametrial involvement.  PET scan showed 3.7cm mass with positive low periarotic adenopathy. She has been undergoing definitive chemoradiation.  She has tolerated treatment fairly well at this point with some nausea and fatigue.      ANEMIA - Patient has a recent history of moderate-severe anemia, which has not been symptomatic. Work up to date has revealed-patient does not know what the cause of her anemia is. Treatment has been iron supplements.     ASTHMA - Patient has a longstanding history of moderate-severe Asthma . Patient has been doing well overall noting NO SYMPTOMS and continues on medication regimen consisting of albuterol and Flovent (patient usually only has symptoms in the spring) without adverse reactions or side effects.       MEDICAL HISTORY:     Patient Active Problem List    Diagnosis Date Noted     Cervix cancer (H) 07/10/2019     Priority: Medium     EOTD 09.02.19       ASCUS with positive high risk HPV cervical 05/28/2019     Priority: Medium     5/21/19 ASCUS pap, + HR HPV # 18. Plan: [place]       Ankle pain, right 01/19/2017     Priority: Medium     Mild persistent asthma 04/28/2015     Priority: Medium     Breast cancer screening 06/17/2014     Priority: Medium     5 yr risk  0.7%  Lifetime risk 7.9 %  Both less than average woman.       Lateral epicondylitis 07/12/2012     Priority: Medium     Problem list name updated by automated process. Provider to review       Pain in joint, upper arm 06/23/2012     Priority: Medium     CARDIOVASCULAR SCREENING; LDL GOAL LESS THAN 160 06/11/2010     Priority: Medium     Tobacco abuse 02/16/2010     Priority: Medium     Arrested hydrocephalus 02/03/2009     Priority: Medium     Ongoing HAs, seeing neurosurgeon. Per past notes from Dr. Resendiz, HAs most likely not secondary to the hydrocephalus, but rather vascular inorgin.  Please see scanned in records.    Pt had 3rd ventriculostomy 2/09-seeing neurology, PT and OT        Past Medical History:   Diagnosis Date     Abnormal Pap smear of cervix 05/21/2019    see problem list     Arrested hydrocephalus 2/3/2009    Ongoing HAs, seeing neurosurgeon. Per past notes from Dr. Resendiz, HAs most likely not secondary to the hydrocephalus, but rather vascular inorgin.  Please see scanned in records.     Asthma      Cervical cancer (H)      Cervical high risk HPV (human papillomavirus) test positive 05/21/2019    See problem list     Past Surgical History:   Procedure Laterality Date     C VENTRICULO-CISTERNOSTOMY,3RD VENT      for treatment of HA related to hydrocephalus     EXCISE MASS TRUNK Left 9/16/2015    Procedure: EXCISE MASS TRUNK;  Surgeon: Carlos Enrique Briones MD;  Location:  OR     Current Outpatient Medications   Medication Sig Dispense Refill     Acetaminophen (MIDOL PO) Take by mouth as needed       albuterol (PROAIR HFA/PROVENTIL HFA/VENTOLIN HFA) 108 (90 Base) MCG/ACT inhaler Inhale 2 puffs into the lungs every 6 hours as needed for shortness of breath / dyspnea or wheezing 18 g 1     Ferrous Sulfate (IRON SUPPLEMENT PO) Take 325 mg by mouth 2 times daily (with meals)       fluticasone (FLONASE) 50 MCG/ACT nasal spray Spray 1-2 sprays into both nostrils daily 16 g 1     fluticasone (FLOVENT  HFA) 110 MCG/ACT inhaler Inhale 2 puffs into the lungs 2 times daily 3 Inhaler 3     LORazepam (ATIVAN) 1 MG tablet Take 1 tablet (1 mg) by mouth every 6 hours as needed (nausea/vomiting, anxiety or sleep ) 30 tablet 1     Multiple Vitamin (MULTIVITAMINS PO) Take  by mouth daily.       prochlorperazine (COMPAZINE) 10 MG tablet Take 1 tablet (10 mg) by mouth every 6 hours as needed (nausea/vomiting) 30 tablet 2     OTC products: None, except as noted above    No Known Allergies   Latex Allergy: NO    Social History     Tobacco Use     Smoking status: Former Smoker     Packs/day: 0.25     Years: 20.00     Pack years: 5.00     Types: Cigarettes     Last attempt to quit: 2018     Years since quittin.5     Smokeless tobacco: Former User     Quit date: 2015   Substance Use Topics     Alcohol use: Yes     Alcohol/week: 0.0 oz     Comment: once every 3-4 months     History   Drug Use No       REVIEW OF SYSTEMS:   CONSTITUTIONAL: NEGATIVE for fever, chills, change in weight  INTEGUMENTARY/SKIN: NEGATIVE for worrisome rashes, moles or lesions  EYES: NEGATIVE for vision changes or irritation  ENT/MOUTH: NEGATIVE for ear, mouth and throat problems  RESP: NEGATIVE for significant cough or SOB  BREAST: NEGATIVE for masses, tenderness or discharge  CV: NEGATIVE for chest pain, palpitations or peripheral edema  GI: diarrhea and heartburn or reflux and nausea r/t constipation.  : NEGATIVE for frequency, dysuria, or hematuria  MUSCULOSKELETAL: NEGATIVE for significant arthralgias or myalgia  NEURO: NEGATIVE for weakness, dizziness or paresthesias  ENDOCRINE: NEGATIVE for temperature intolerance, skin/hair changes  HEME: NEGATIVE for bleeding problems  PSYCHIATRIC: NEGATIVE for changes in mood or affect    EXAM:   /77   Pulse 74   SpO2 99%       GENERAL APPEARANCE: healthy, alert and no distress     EYES: EOMI, PERRL     HENT: ear canals and TM's normal and nose and mouth without ulcers or lesions     NECK: no  adenopathy, no asymmetry, masses, or scars and thyroid normal to palpation     RESP: lungs clear to auscultation - no rales, rhonchi or wheezes     CV: regular rates and rhythm, normal S1 S2, no S3 or S4 and no murmur, click or rub     ABDOMEN:  soft, nontender, no HSM or masses and bowel sounds normal     MS: extremities normal- no gross deformities noted, no evidence of inflammation in joints, FROM in all extremities.     SKIN: no suspicious lesions or rashes     NEURO: Normal strength and tone, sensory exam grossly normal, mentation intact and speech normal     PSYCH: mentation appears normal. and affect normal/bright     LYMPHATICS: No cervical adenopathy    DIAGNOSTICS:   No labs or EKG required for low risk surgery (cataract, skin procedure, breast biopsy, etc)    Recent Labs   Lab Test 08/19/19  0645 08/12/19  0650   HGB 12.6 12.9    182    140   POTASSIUM 3.6 3.8   CR 0.56 0.55        IMPRESSION:   Diagnosis/reason for consult: Cervical cancer requiring diogo sleeve placement for HDR treatments.    The proposed surgical procedure is considered LOW risk.    REVISED CARDIAC RISK INDEX  The patient has the following serious cardiovascular risks for perioperative complications such as (MI, PE, VFib and 3  AV Block):  No serious cardiac risks  INTERPRETATION: 0 risks: Class I (very low risk - 0.4% complication rate)    The patient has the following additional risks for perioperative complications:  No identified additional risks    No diagnosis found.    RECOMMENDATIONS:       Anemia  Anemia and does not require treatment prior to surgery.  Hemoglobin stable at 12.6.      --Patient is to take all scheduled medications on the day of surgery    APPROVAL GIVEN to proceed with proposed procedure, without further diagnostic evaluation       Signed Electronically by: HOMERO Garrett CNP    Copy of this evaluation report is provided to requesting physician.    [place] Preop Guidelines    Revised  Cardiac Risk Index    Again, thank you for allowing me to participate in the care of your patient.      Sincerely,    HOMERO Garrett CNP

## 2019-08-22 NOTE — LETTER
2019      RE: Nicole Fritsche  [address]  [place] [address]       [facility]  [facility]  1st Floor  [address] [id number] [phone]  Dept: [phone]    PRE-OP EVALUATION:  Today's date: 2019    Nicole Fritsche (: 1970) presents for pre-operative evaluation assessment as requested by Dr. Tidwell.  She requires evaluation and anesthesia risk assessment prior to undergoing surgery/procedure for treatment of cervical cancer .    Proposed Surgery/ Procedure: Charanjit Sleeve placement  Date of Surgery/ Procedure:   Time of Surgery/ Procedure: 1:00  H[facility]/Surgical [facility]  Primary Physician: Ansley White  Type of Anesthesia Anticipated: General    Patient has a Health Care Directive or Living Will:  NO    1. NO - Do you have a history of heart attack, stroke, stent, bypass or surgery on an artery in the head, neck, heart or legs?  2. NO - Do you ever have any pain or discomfort in your chest?  3. NO - Do you have a history of  Heart Failure?  4. NO - Are you troubled by shortness of breath when: walking on the level, up a slight hill or at night?  5. NO - Do you currently have a cold, bronchitis or other respiratory infection?  6. NO - Do you have a cough, shortness of breath or wheezing?  7. NO - Do you sometimes get pains in the calves of your legs when you walk?  8. NO - Do you or anyone in your family have previous history of blood clots?  9. NO - Do you or does anyone in your family have a serious bleeding problem such as prolonged bleeding following surgeries or cuts?  10. NO - Have you ever had problems with anemia or been told to take iron pills?  11. NO - Have you had any abnormal blood loss such as black, tarry or bloody stools, or abnormal vaginal bleeding?  12. NO - Have you ever had a blood transfusion?  13. NO - Have you or any of your relatives ever had problems with anesthesia?  14. NO  - Do you have sleep apnea, excessive snoring or daytime drowsiness?  15. NO - Do you have any prosthetic heart valves?  16. NO - Do you have prosthetic joints?  17. NO - Is there any chance that you may be pregnant?      HPI:     HPI related to upcoming procedure: Patient presented with vaginal bleeding.  Pap smear on 5/21 was ASCUS.Colposcopy and endocervical curettage 6/3 showed high grade squamous intraepithelial lesion.  Repeat biopsy 6/12 showed invasive well differentiated SCC.  Pelvic MRI showed a 2.3 x 3.8 x 2.6 cm mass involving the posterior cervix without parametrial involvement.  PET scan showed 3.7cm mass with positive low periarotic adenopathy. She has been undergoing definitive chemoradiation.  She has tolerated treatment fairly well at this point with some nausea and fatigue.      ANEMIA - Patient has a recent history of moderate-severe anemia, which has not been symptomatic. Work up to date has revealed-patient does not know what the cause of her anemia is. Treatment has been iron supplements.     ASTHMA - Patient has a longstanding history of moderate-severe Asthma . Patient has been doing well overall noting NO SYMPTOMS and continues on medication regimen consisting of albuterol and Flovent (patient usually only has symptoms in the spring) without adverse reactions or side effects.       MEDICAL HISTORY:     Patient Active Problem List    Diagnosis Date Noted     Cervix cancer (H) 07/10/2019     Priority: Medium     EOTD 09.02.19       ASCUS with positive high risk HPV cervical 05/28/2019     Priority: Medium     5/21/19 ASCUS pap, + HR HPV # 18. Plan: [place]       Ankle pain, right 01/19/2017     Priority: Medium     Mild persistent asthma 04/28/2015     Priority: Medium     Breast cancer screening 06/17/2014     Priority: Medium     5 yr risk 0.7%  Lifetime risk 7.9 %  Both less than average woman.       Lateral epicondylitis 07/12/2012     Priority: Medium     Problem list name updated by  automated process. Provider to review       Pain in joint, upper arm 06/23/2012     Priority: Medium     CARDIOVASCULAR SCREENING; LDL GOAL LESS THAN 160 06/11/2010     Priority: Medium     Tobacco abuse 02/16/2010     Priority: Medium     Arrested hydrocephalus 02/03/2009     Priority: Medium     Ongoing HAs, seeing neurosurgeon. Per past notes from Dr. Resendiz, HAs most likely not secondary to the hydrocephalus, but rather vascular inorgin.  Please see scanned in records.    Pt had 3rd ventriculostomy 2/09-seeing neurology, PT and OT        Past Medical History:   Diagnosis Date     Abnormal Pap smear of cervix 05/21/2019    see problem list     Arrested hydrocephalus 2/3/2009    Ongoing HAs, seeing neurosurgeon. Per past notes from Dr. Resendiz, HAs most likely not secondary to the hydrocephalus, but rather vascular inorgin.  Please see scanned in records.     Asthma      Cervical cancer (H)      Cervical high risk HPV (human papillomavirus) test positive 05/21/2019    See problem list     Past Surgical History:   Procedure Laterality Date     C VENTRICULO-CISTERNOSTOMY,3RD VENT      for treatment of HA related to hydrocephalus     EXCISE MASS TRUNK Left 9/16/2015    Procedure: EXCISE MASS TRUNK;  Surgeon: Carlos Enrique Briones MD;  Location: [facility] OR     Current Outpatient Medications   Medication Sig Dispense Refill     Acetaminophen (MIDOL PO) Take by mouth as needed       albuterol (PROAIR HFA/PROVENTIL HFA/VENTOLIN HFA) 108 (90 Base) MCG/ACT inhaler Inhale 2 puffs into the lungs every 6 hours as needed for shortness of breath / dyspnea or wheezing 18 g 1     Ferrous Sulfate (IRON SUPPLEMENT PO) Take 325 mg by mouth 2 times daily (with meals)       fluticasone (FLONASE) 50 MCG/ACT nasal spray Spray 1-2 sprays into both nostrils daily 16 g 1     fluticasone (FLOVENT HFA) 110 MCG/ACT inhaler Inhale 2 puffs into the lungs 2 times daily 3 Inhaler 3     LORazepam (ATIVAN) 1 MG tablet Take 1 tablet (1 mg) by mouth every  6 hours as needed (nausea/vomiting, anxiety or sleep ) 30 tablet 1     Multiple Vitamin (MULTIVITAMINS PO) Take  by mouth daily.       prochlorperazine (COMPAZINE) 10 MG tablet Take 1 tablet (10 mg) by mouth every 6 hours as needed (nausea/vomiting) 30 tablet 2     OTC products: None, except as noted above    No Known Allergies   Latex Allergy: NO    Social History     Tobacco Use     Smoking status: Former Smoker     Packs/day: 0.25     Years: 20.00     Pack years: 5.00     Types: Cigarettes     Last attempt to quit: 2018     Years since quittin.5     Smokeless tobacco: Former User     Quit date: 2015   Substance Use Topics     Alcohol use: Yes     Alcohol/week: 0.0 oz     Comment: once every 3-4 months     History   Drug Use No       REVIEW OF SYSTEMS:   CONSTITUTIONAL: NEGATIVE for fever, chills, change in weight  INTEGUMENTARY/SKIN: NEGATIVE for worrisome rashes, moles or lesions  EYES: NEGATIVE for vision changes or irritation  ENT/MOUTH: NEGATIVE for ear, mouth and throat problems  RESP: NEGATIVE for significant cough or SOB  BREAST: NEGATIVE for masses, tenderness or discharge  CV: NEGATIVE for chest pain, palpitations or peripheral edema  GI: diarrhea and heartburn or reflux and nausea r/t constipation.  : NEGATIVE for frequency, dysuria, or hematuria  MUSCULOSKELETAL: NEGATIVE for significant arthralgias or myalgia  NEURO: NEGATIVE for weakness, dizziness or paresthesias  ENDOCRINE: NEGATIVE for temperature intolerance, skin/hair changes  HEME: NEGATIVE for bleeding problems  PSYCHIATRIC: NEGATIVE for changes in mood or affect    EXAM:   /77   Pulse 74   SpO2 99%       GENERAL APPEARANCE: healthy, alert and no distress     EYES: EOMI, PERRL     HENT: ear canals and TM's normal and nose and mouth without ulcers or lesions     NECK: no adenopathy, no asymmetry, masses, or scars and thyroid normal to palpation     RESP: lungs clear to auscultation - no rales, rhonchi or wheezes     CV:  regular rates and rhythm, normal S1 S2, no S3 or S4 and no murmur, click or rub     ABDOMEN:  soft, nontender, no HSM or masses and bowel sounds normal     MS: extremities normal- no gross deformities noted, no evidence of inflammation in joints, FROM in all extremities.     SKIN: no suspicious lesions or rashes     NEURO: Normal strength and tone, sensory exam grossly normal, mentation intact and speech normal     PSYCH: mentation appears normal. and affect normal/bright     LYMPHATICS: No cervical adenopathy    DIAGNOSTICS:   No labs or EKG required for low risk surgery (cataract, skin procedure, breast biopsy, etc)    Recent Labs   Lab Test 08/19/19  0645 08/12/19  0650   HGB 12.6 12.9    182    140   POTASSIUM 3.6 3.8   CR 0.56 0.55        IMPRESSION:   Diagnosis/reason for consult: Cervical cancer requiring diogo sleeve placement for HDR treatments.    The proposed surgical procedure is considered LOW risk.    REVISED CARDIAC RISK INDEX  The patient has the following serious cardiovascular risks for perioperative complications such as (MI, PE, VFib and 3  AV Block):  No serious cardiac risks  INTERPRETATION: 0 risks: Class I (very low risk - 0.4% complication rate)    The patient has the following additional risks for perioperative complications:  No identified additional risks    No diagnosis found.    RECOMMENDATIONS:       Anemia  Anemia and does not require treatment prior to surgery.  Hemoglobin stable at 12.6.      --Patient is to take all scheduled medications on the day of surgery    APPROVAL GIVEN to proceed with proposed procedure, without further diagnostic evaluation       Signed Electronically by: HOMERO Garrett CNP    Copy of this evaluation report is provided to requesting physician.    [facility] Preop Guidelines    Revised Cardiac Risk Index    HOMERO Garrett CNP

## 2019-08-22 NOTE — PROGRESS NOTES
RADIATION ONCOLOGY CLINIC  [facility]  1st Floor  [address] [address]  [phone]  Dept: [phone]    PRE-OP EVALUATION:  Today's date: 2019    Nicole Fritsche (: 1970) presents for pre-operative evaluation assessment as requested by Dr. Tidwell.  She requires evaluation and anesthesia risk assessment prior to undergoing surgery/procedure for treatment of cervical cancer .    Proposed Surgery/ Procedure: Charanjit Sleeve placement  Date of Surgery/ Procedure:   Time of Surgery/ Procedure: 1:00  Hospital/Surgical Facility: [facility]  Primary Physician: Ansley White  Type of Anesthesia Anticipated: General    Patient has a Health Care Directive or Living Will:  NO    1. NO - Do you have a history of heart attack, stroke, stent, bypass or surgery on an artery in the head, neck, heart or legs?  2. NO - Do you ever have any pain or discomfort in your chest?  3. NO - Do you have a history of  Heart Failure?  4. NO - Are you troubled by shortness of breath when: walking on the level, up a slight hill or at night?  5. NO - Do you currently have a cold, bronchitis or other respiratory infection?  6. NO - Do you have a cough, shortness of breath or wheezing?  7. NO - Do you sometimes get pains in the calves of your legs when you walk?  8. NO - Do you or anyone in your family have previous history of blood clots?  9. NO - Do you or does anyone in your family have a serious bleeding problem such as prolonged bleeding following surgeries or cuts?  10. NO - Have you ever had problems with anemia or been told to take iron pills?  11. NO - Have you had any abnormal blood loss such as black, tarry or bloody stools, or abnormal vaginal bleeding?  12. NO - Have you ever had a blood transfusion?  13. NO - Have you or any of your relatives ever had problems with anesthesia?  14. NO - Do you have sleep apnea, excessive snoring or daytime drowsiness?  15. NO - Do  you have any prosthetic heart valves?  16. NO - Do you have prosthetic joints?  17. NO - Is there any chance that you may be pregnant?      HPI:     HPI related to upcoming procedure: Patient presented with vaginal bleeding.  Pap smear on 5/21 was ASCUS.Colposcopy and endocervical curettage 6/3 showed high grade squamous intraepithelial lesion.  Repeat biopsy 6/12 showed invasive well differentiated SCC.  Pelvic MRI showed a 2.3 x 3.8 x 2.6 cm mass involving the posterior cervix without parametrial involvement.  PET scan showed 3.7cm mass with positive low periarotic adenopathy. She has been undergoing definitive chemoradiation.  She has tolerated treatment fairly well at this point with some nausea and fatigue.      ANEMIA - Patient has a recent history of moderate-severe anemia, which has not been symptomatic. Work up to date has revealed-patient does not know what the cause of her anemia is. Treatment has been iron supplements.     ASTHMA - Patient has a longstanding history of moderate-severe Asthma . Patient has been doing well overall noting NO SYMPTOMS and continues on medication regimen consisting of albuterol and Flovent (patient usually only has symptoms in the spring) without adverse reactions or side effects.       MEDICAL HISTORY:     Patient Active Problem List    Diagnosis Date Noted     Cervix cancer (H) 07/10/2019     Priority: Medium     EOTD 09.02.19       ASCUS with positive high risk HPV cervical 05/28/2019     Priority: Medium     5/21/19 ASCUS pap, + HR HPV # 18. Plan: [place]       Ankle pain, right 01/19/2017     Priority: Medium     Mild persistent asthma 04/28/2015     Priority: Medium     Breast cancer screening 06/17/2014     Priority: Medium     5 yr risk 0.7%  Lifetime risk 7.9 %  Both less than average woman.       Lateral epicondylitis 07/12/2012     Priority: Medium     Problem list name updated by automated process. 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 Per past notes from Dr. Resendiz, HAs most likely not secondary to the hydrocephalus, but rather vascular inorgin.  Please see scanned in records.    Pt had 3rd ventriculostomy 2/09-seeing neurology, PT and OT        Past Medical History:   Diagnosis Date     Abnormal Pap smear of cervix 05/21/2019    see problem list     Arrested hydrocephalus 2/3/2009    Ongoing HAs, seeing neurosurgeon. Per past notes from Dr. Resendiz, HAs most likely not secondary to the hydrocephalus, but rather vascular inorgin.  Please see scanned in records.     Asthma      Cervical cancer (H)      Cervical high risk HPV (human papillomavirus) test positive 05/21/2019    See problem list     Past Surgical History:   Procedure Laterality Date     C VENTRICULO-CISTERNOSTOMY,3RD VENT      for treatment of HA related to hydrocephalus     EXCISE MASS TRUNK Left 9/16/2015    Procedure: EXCISE MASS TRUNK;  Surgeon: Carlos Enrique Briones MD;  Location: [facility] OR     Current Outpatient Medications   Medication Sig Dispense Refill     Acetaminophen (MIDOL PO) Take by mouth as needed       albuterol (PROAIR HFA/PROVENTIL HFA/VENTOLIN HFA) 108 (90 Base) MCG/ACT inhaler Inhale 2 puffs into the lungs every 6 hours as needed for shortness of breath / dyspnea or wheezing 18 g 1     Ferrous Sulfate (IRON SUPPLEMENT PO) Take 325 mg by mouth 2 times daily (with meals)       fluticasone (FLONASE) 50 MCG/ACT nasal spray Spray 1-2 sprays into both nostrils daily 16 g 1     fluticasone (FLOVENT HFA) 110 MCG/ACT inhaler Inhale 2 puffs into the lungs 2 times daily 3 Inhaler 3     LORazepam (ATIVAN) 1 MG tablet Take 1 tablet (1 mg) by mouth every 6 hours as needed (nausea/vomiting, anxiety or sleep ) 30 tablet 1     Multiple  Vitamin (MULTIVITAMINS PO) Take  by mouth daily.       prochlorperazine (COMPAZINE) 10 MG tablet Take 1 tablet (10 mg) by mouth every 6 hours as needed (nausea/vomiting) 30 tablet 2     OTC products: None, except as noted above    No Known Allergies   Latex Allergy: NO    Social History     Tobacco Use     Smoking status: Former Smoker     Packs/day: 0.25     Years: 20.00     Pack years: 5.00     Types: Cigarettes     Last attempt to quit: 2018     Years since quittin.5     Smokeless tobacco: Former User     Quit date: 2015   Substance Use Topics     Alcohol use: Yes     Alcohol/week: 0.0 oz     Comment: once every 3-4 months     History   Drug Use No       REVIEW OF SYSTEMS:   CONSTITUTIONAL: NEGATIVE for fever, chills, change in weight  INTEGUMENTARY/SKIN: NEGATIVE for worrisome rashes, moles or lesions  EYES: NEGATIVE for vision changes or irritation  ENT/MOUTH: NEGATIVE for ear, mouth and throat problems  RESP: NEGATIVE for significant cough or SOB  BREAST: NEGATIVE for masses, tenderness or discharge  CV: NEGATIVE for chest pain, palpitations or peripheral edema  GI: diarrhea and heartburn or reflux and nausea r/t constipation.  : NEGATIVE for frequency, dysuria, or hematuria  MUSCULOSKELETAL: NEGATIVE for significant arthralgias or myalgia  NEURO: NEGATIVE for weakness, dizziness or paresthesias  ENDOCRINE: NEGATIVE for temperature intolerance, skin/hair changes  HEME: NEGATIVE for bleeding problems  PSYCHIATRIC: NEGATIVE for changes in mood or affect    EXAM:   /77   Pulse 74   SpO2 99%       GENERAL APPEARANCE: healthy, alert and no distress     EYES: EOMI, PERRL     HENT: ear canals and TM's normal and nose and mouth without ulcers or lesions     NECK: no adenopathy, no asymmetry, masses, or scars and thyroid normal to palpation     RESP: lungs clear to auscultation - no rales, rhonchi or wheezes     CV: regular rates and rhythm, normal S1 S2, no S3 or S4 and no murmur, click or  rub     ABDOMEN:  soft, nontender, no HSM or masses and bowel sounds normal     MS: extremities normal- no gross deformities noted, no evidence of inflammation in joints, FROM in all extremities.     SKIN: no suspicious lesions or rashes     NEURO: Normal strength and tone, sensory exam grossly normal, mentation intact and speech normal     PSYCH: mentation appears normal. and affect normal/bright     LYMPHATICS: No cervical adenopathy    DIAGNOSTICS:   No labs or EKG required for low risk surgery (cataract, skin procedure, breast biopsy, etc)    Recent Labs   Lab Test 08/19/19  0645 08/12/19  0650   HGB 12.6 12.9    182    140   POTASSIUM 3.6 3.8   CR 0.56 0.55        IMPRESSION:   Diagnosis/reason for consult: Cervical cancer requiring diogo sleeve placement for HDR treatments.    The proposed surgical procedure is considered LOW risk.    REVISED CARDIAC RISK INDEX  The patient has the following serious cardiovascular risks for perioperative complications such as (MI, PE, VFib and 3  AV Block):  No serious cardiac risks  INTERPRETATION: 0 risks: Class I (very low risk - 0.4% complication rate)    The patient has the following additional risks for perioperative complications:  No identified additional risks    No diagnosis found.    RECOMMENDATIONS:       Anemia  Anemia and does not require treatment prior to surgery.  Hemoglobin stable at 12.6.      --Patient is to take all scheduled medications on the day of surgery    APPROVAL GIVEN to proceed with proposed procedure, without further diagnostic evaluation       Signed Electronically by: HOMERO Garrett CNP    Copy of this evaluation report is provided to requesting physician.    Bev Preop Guidelines    Revised Cardiac Risk Index

## 2019-08-23 ENCOUNTER — HOSPITAL ENCOUNTER (OUTPATIENT)
Dept: MRI IMAGING | Facility: CLINIC | Age: 49
Discharge: HOME | End: 2019-08-23
Attending: RADIOLOGY | Admitting: RADIOLOGY
Payer: COMMERCIAL

## 2019-08-23 ENCOUNTER — APPOINTMENT (OUTPATIENT)
Dept: RADIATION ONCOLOGY | Facility: CLINIC | Age: 49
End: 2019-08-23
Attending: RADIOLOGY
Payer: COMMERCIAL

## 2019-08-23 ENCOUNTER — ONCOLOGY VISIT (OUTPATIENT)
Dept: ONCOLOGY | Facility: CLINIC | Age: 49
End: 2019-08-23
Attending: NURSE PRACTITIONER
Payer: COMMERCIAL

## 2019-08-23 VITALS
DIASTOLIC BLOOD PRESSURE: 80 MMHG | RESPIRATION RATE: 14 BRPM | HEART RATE: 89 BPM | TEMPERATURE: 98.1 F | WEIGHT: 160 LBS | SYSTOLIC BLOOD PRESSURE: 112 MMHG | BODY MASS INDEX: 25.11 KG/M2 | HEIGHT: 67 IN | OXYGEN SATURATION: 98 %

## 2019-08-23 DIAGNOSIS — C53.9 MALIGNANT NEOPLASM OF CERVIX, UNSPECIFIED SITE (H): Primary | ICD-10-CM

## 2019-08-23 DIAGNOSIS — C53.1 MALIGNANT NEOPLASM OF EXOCERVIX (H): ICD-10-CM

## 2019-08-23 PROCEDURE — A9585 GADOBUTROL INJECTION: HCPCS | Performed by: RADIOLOGY

## 2019-08-23 PROCEDURE — 77386 ZZH IMRT TREATMENT DELIVERY, COMPLEX: CPT | Performed by: RADIOLOGY

## 2019-08-23 PROCEDURE — 77336 RADIATION PHYSICS CONSULT: CPT | Performed by: RADIOLOGY

## 2019-08-23 PROCEDURE — 25500064 ZZH RX 255 OP 636: Performed by: RADIOLOGY

## 2019-08-23 PROCEDURE — G0463 HOSPITAL OUTPT CLINIC VISIT: HCPCS | Mod: ZF

## 2019-08-23 PROCEDURE — 72197 MRI PELVIS W/O & W/DYE: CPT

## 2019-08-23 PROCEDURE — G0463 HOSPITAL OUTPT CLINIC VISIT: HCPCS | Mod: 25

## 2019-08-23 PROCEDURE — 99214 OFFICE O/P EST MOD 30 MIN: CPT | Mod: ZP | Performed by: NURSE PRACTITIONER

## 2019-08-23 RX ORDER — GADOBUTROL 604.72 MG/ML
7.5 INJECTION INTRAVENOUS ONCE
Status: COMPLETED | OUTPATIENT
Start: 2019-08-23 | End: 2019-08-23

## 2019-08-23 RX ADMIN — GADOBUTROL 7.5 ML: 604.72 INJECTION INTRAVENOUS at 15:56

## 2019-08-23 ASSESSMENT — MIFFLIN-ST. JEOR: SCORE: 1378.51

## 2019-08-23 ASSESSMENT — PAIN SCALES - GENERAL: PAINLEVEL: NO PAIN (0)

## 2019-08-23 NOTE — LETTER
2019       RE: Nicole Fritsche  [address]  [place] [address]     Dear Colleague,    Thank you for referring your patient, Nicole Fritsche, to the [facility]. Please see a copy of my visit note below.                Follow Up Notes on Referred Patient    Date: 2019         RE: Nicole Fritsche  : 1970  DAVID: 2019      Nicole Fritsche is a 49 year old woman with a diagnosis of stage IIB cervical cancer.   She is here today for a  treatment follow up.      Treatment history:     19: Cervical biopsy, 4 o'clock     FINAL DIAGNOSIS:   Cervix, 4:00, biopsy:   - INVASIVE WELL-DIFFERENTIATED SQUAMOUS CELL CARCINOMA, focally keratinizing   - High grade squamous intraepithelial lesion (cervical intraepithelial neoplasia 3)      19: PET CT IMPRESSION: In this patient with newly diagnosed cervical cancer:  1. 3.7 cm cervical lesion with a max SUV of 13.16.   2. Focal uptake in the endometrium, favor adenomyosis over metastatic disease. Adenomyosis is demonstrated on MRI 2019 and can be hypermetabolic in a premenopausal woman.  3. Small left periaortic retroperitoneal lymph node borderline SUV uptake, inflammatory versus metastatic. Attention on follow-up.   4. Small left inguinal lymph nodes with SUV max of 2.45, likely inflammatory.     19: Cisplatin + radiation started.        Today she comes to clinic feeling well and denies any new issues. She states the ringing in her ears is intermittent and about the same (it did increase initially). She is taking Compazine as needed for any nausea; she denies any emesis. She is taking Imodium for diarrhea (whdih does contribute to some abdominal discomfort at times) and has been given a dilator to start using once radiation is over. She denies any vaginal bleeding, no changes in her bladder habits, no lower extremity edema, and no difficulties eating or sleeping. She denies any abdominal discomfort/bloating, no fevers  or chills, and no chest pain or shortness of breath. She does not need any medications refilled.         Review of Systems:    Systemic           no weight changes; no fever; no chills; no night sweats; no appetite changes; + fatigue; + ringing in ears  Skin           no rashes, or lesions  Eye           no irritation; no changes in vision  Temo-Laryngeal           no dysphagia; no hoarseness   Pulmonary    no cough; no shortness of breath  Cardiovascular    no chest pain; no palpitations  Gastrointestinal    + diarrhea; no constipation; no abdominal pain; no changes in bowel habits; no blood in stool  Genitourinary   no urinary frequency; no urinary urgency; no dysuria; no pain; no abnormal vaginal discharge; no abnormal vaginal bleeding  Breast    no breast discharge; no breast changes; no breast pain  Musculoskeletal    no myalgias; no arthralgias; no back pain  Psychiatric           no depressed mood; no anxiety    Hematologic              no tender lymph nodes; no noticeable swellings or lumps   Endocrine    no hot flashes; no heat/cold intolerance         Neurological   no tremor; no numbness and tingling; no headaches; no difficulty sleeping      Past Medical History:    Past Medical History:   Diagnosis Date     Abnormal Pap smear of cervix 05/21/2019    see problem list     Arrested hydrocephalus 2/3/2009    Ongoing HAs, seeing neurosurgeon. Per past notes from Dr. Resendiz, HAs most likely not secondary to the hydrocephalus, but rather vascular inorgin.  Please see scanned in records.     Asthma      Cervical cancer (H)      Cervical high risk HPV (human papillomavirus) test positive 05/21/2019    See problem list         Past Surgical History:    Past Surgical History:   Procedure Laterality Date     C VENTRICULO-CISTERNOSTOMY,3RD VENT      for treatment of HA related to hydrocephalus     EXCISE MASS TRUNK Left 9/16/2015    Procedure: EXCISE MASS TRUNK;  Surgeon: Carlos Enrique Briones MD;  Location:  OR          Health Maintenance Due   Topic Date Due     COLONOSCOPY  1979     ASTHMA ACTION PLAN  2016     ZOSTER IMMUNIZATION (2 of 2) 2019       Current Medications:     Current Outpatient Medications   Medication Sig Dispense Refill     Acetaminophen (MIDOL PO) Take by mouth as needed       albuterol (PROAIR HFA/PROVENTIL HFA/VENTOLIN HFA) 108 (90 Base) MCG/ACT inhaler Inhale 2 puffs into the lungs every 6 hours as needed for shortness of breath / dyspnea or wheezing 18 g 1     Ferrous Sulfate (IRON SUPPLEMENT PO) Take 325 mg by mouth 2 times daily (with meals)       fluticasone (FLONASE) 50 MCG/ACT nasal spray Spray 1-2 sprays into both nostrils daily 16 g 1     fluticasone (FLOVENT HFA) 110 MCG/ACT inhaler Inhale 2 puffs into the lungs 2 times daily 3 Inhaler 3     LORazepam (ATIVAN) 1 MG tablet Take 1 tablet (1 mg) by mouth every 6 hours as needed (nausea/vomiting, anxiety or sleep ) 30 tablet 1     Multiple Vitamin (MULTIVITAMINS PO) Take  by mouth daily.       prochlorperazine (COMPAZINE) 10 MG tablet Take 1 tablet (10 mg) by mouth every 6 hours as needed (nausea/vomiting) 30 tablet 2         Allergies:      No Known Allergies     Social History:     Social History     Tobacco Use     Smoking status: Former Smoker     Packs/day: 0.25     Years: 20.00     Pack years: 5.00     Types: Cigarettes     Last attempt to quit: 2018     Years since quittin.5     Smokeless tobacco: Former User     Quit date: 2015   Substance Use Topics     Alcohol use: Yes     Alcohol/week: 0.0 oz     Comment: once every 3-4 months       History   Drug Use No         Family History:       Family History   Problem Relation Age of Onset     Diabetes Mother      Cerebrovascular Disease Mother      Ovarian Cancer Mother      Breast Cancer Mother      C.A.D. Father 40     Cerebrovascular Disease Father      C.A.D. Sister 62        stent placed     Ovarian Cancer Sister      Ovarian Cancer Sister      Ovarian  "Cancer Sister          Physical Exam:     /80 (BP Location: Right arm, Patient Position: Chair, Cuff Size: Adult Regular)   Pulse 89   Temp 98.1  F (36.7  C) (Oral)   Resp 14   Ht 1.702 m (5' 7.01\")   Wt 72.6 kg (160 lb)   SpO2 98%   Breastfeeding? No   BMI 25.05 kg/m     Body mass index is 25.05 kg/m .    General Appearance: healthy and alert, no distress     HEENT: no thyromegaly, no palpable nodules or masses        Cardiovascular: regular rate and rhythm, no gallops, rubs or murmurs     Respiratory: lungs clear, no rales, rhonchi or wheezes, normal diaphragmatic excursion    Musculoskeletal: extremities non tender and without edema    Skin: no lesions or rashes     Neurological: normal gait, no gross defects     Psychiatric: appropriate mood and affect                               Hematological: normal cervical, supraclavicular lymph nodes     Gastrointestinal:       abdomen soft, non-tender, non-distended    Genitourinary: deferred      Assessment:    Nicole Fritsche is a 49 year old woman with a diagnosis of stage IIB cervical cancer.   She is here today for a  treatment follow up.    25 minutes were spent with this patient, over 50% of that time was spent in symptom management, treatment planning and in counseling and coordination of care.      Plan:     1.)        Continue with current regimen. Rad Onc to teach her about internal radiation. Reviewed signs and symptoms for when she should contact the clinic or seek additional care. Patient to contact the clinic with any questions or concerns in the interim. Encouraged to add in more protein to her diet. Briefly discussed she would be seeing Dr. Hernandez about 3 months after radiation is completed with imaging.      2.) Genetic risk factors were assessed and the patient does not meet the qualifications for a referral.  She was consented for the GOLD study today.     3.) Labs and/or tests ordered include:  Audiogram.     4.) Health maintenance " issues addressed today include annual health maintenance and non-gynecologic issues with PCP.    HOMERO Perez, WHNP-BC, ANP-BC  Women's Health Nurse Practitioner  Adult Nurse Pracitioner  Division of Gynecologic Oncology    CC  Patient Care Team:  Ansley White PA-C as PCP - General  Stephanie Javier RN as Specialty Care Coordinator (Gyn-Onc)

## 2019-08-23 NOTE — NURSING NOTE
"Oncology Rooming Note    August 23, 2019 1:12 PM   Nicole Fritsche is a 49 year old female who presents for:    Chief Complaint   Patient presents with     Oncology Clinic Visit     Return Visit for Malignant neoplasm of exocervix      Initial Vitals: /80 (BP Location: Right arm, Patient Position: Chair, Cuff Size: Adult Regular)   Pulse 89   Temp 98.1  F (36.7  C) (Oral)   Resp 14   Ht 1.702 m (5' 7.01\")   Wt 72.6 kg (160 lb)   SpO2 98%   Breastfeeding? No   BMI 25.05 kg/m   Estimated body mass index is 25.05 kg/m  as calculated from the following:    Height as of this encounter: 1.702 m (5' 7.01\").    Weight as of this encounter: 72.6 kg (160 lb). Body surface area is 1.85 meters squared.  No Pain (0) Comment: Data Unavailable   No LMP recorded. Patient is perimenopausal.  Allergies reviewed: Yes  Medications reviewed: Yes    Medications: Medication refills not needed today.  Pharmacy name entered into [facility]: [id number] - [place], [place] - [address] AT [address] [facility] [address]    Clinical concerns: Patient reports being extremely fatigued & her anxiety level is high due to this fatigue & her pending radiation implantation.  She has questions related to this procedure.   [facility]io was notified.      Jess Lezama, RN, MSN              "

## 2019-08-26 ENCOUNTER — APPOINTMENT (OUTPATIENT)
Dept: LAB | Facility: CLINIC | Age: 49
End: 2019-08-26
Attending: OBSTETRICS & GYNECOLOGY
Payer: COMMERCIAL

## 2019-08-26 ENCOUNTER — APPOINTMENT (OUTPATIENT)
Dept: RADIATION ONCOLOGY | Facility: CLINIC | Age: 49
End: 2019-08-26
Attending: RADIOLOGY
Payer: COMMERCIAL

## 2019-08-26 ENCOUNTER — INFUSION THERAPY VISIT (OUTPATIENT)
Dept: ONCOLOGY | Facility: CLINIC | Age: 49
End: 2019-08-26
Attending: OBSTETRICS & GYNECOLOGY
Payer: COMMERCIAL

## 2019-08-26 VITALS
BODY MASS INDEX: 25.3 KG/M2 | DIASTOLIC BLOOD PRESSURE: 75 MMHG | WEIGHT: 161.6 LBS | RESPIRATION RATE: 18 BRPM | SYSTOLIC BLOOD PRESSURE: 110 MMHG | OXYGEN SATURATION: 99 % | TEMPERATURE: 97.7 F | HEART RATE: 85 BPM

## 2019-08-26 DIAGNOSIS — C53.9 MALIGNANT NEOPLASM OF CERVIX, UNSPECIFIED SITE (H): Primary | ICD-10-CM

## 2019-08-26 LAB
ALBUMIN SERPL-MCNC: 3.3 G/DL (ref 3.4–5)
ALP SERPL-CCNC: 88 U/L (ref 40–150)
ALT SERPL W P-5'-P-CCNC: 73 U/L (ref 0–50)
ANION GAP SERPL CALCULATED.3IONS-SCNC: 3 MMOL/L (ref 3–14)
AST SERPL W P-5'-P-CCNC: 28 U/L (ref 0–45)
BASOPHILS # BLD AUTO: 0 10E9/L (ref 0–0.2)
BASOPHILS NFR BLD AUTO: 0.5 %
BILIRUB SERPL-MCNC: 0.3 MG/DL (ref 0.2–1.3)
BUN SERPL-MCNC: 20 MG/DL (ref 7–30)
CALCIUM SERPL-MCNC: 9.3 MG/DL (ref 8.5–10.1)
CHLORIDE SERPL-SCNC: 106 MMOL/L (ref 94–109)
CO2 SERPL-SCNC: 32 MMOL/L (ref 20–32)
CREAT SERPL-MCNC: 0.61 MG/DL (ref 0.52–1.04)
DIFFERENTIAL METHOD BLD: ABNORMAL
EOSINOPHIL # BLD AUTO: 0 10E9/L (ref 0–0.7)
EOSINOPHIL NFR BLD AUTO: 0.7 %
ERYTHROCYTE [DISTWIDTH] IN BLOOD BY AUTOMATED COUNT: 12.8 % (ref 10–15)
GFR SERPL CREATININE-BSD FRML MDRD: >90 ML/MIN/{1.73_M2}
GLUCOSE SERPL-MCNC: 90 MG/DL (ref 70–99)
HCT VFR BLD AUTO: 34.9 % (ref 35–47)
HGB BLD-MCNC: 11.7 G/DL (ref 11.7–15.7)
IMM GRANULOCYTES # BLD: 0 10E9/L (ref 0–0.4)
IMM GRANULOCYTES NFR BLD: 0.5 %
LYMPHOCYTES # BLD AUTO: 0.4 10E9/L (ref 0.8–5.3)
LYMPHOCYTES NFR BLD AUTO: 9.1 %
MAGNESIUM SERPL-MCNC: 1.4 MG/DL (ref 1.6–2.3)
MCH RBC QN AUTO: 32.4 PG (ref 26.5–33)
MCHC RBC AUTO-ENTMCNC: 33.5 G/DL (ref 31.5–36.5)
MCV RBC AUTO: 97 FL (ref 78–100)
MONOCYTES # BLD AUTO: 0.7 10E9/L (ref 0–1.3)
MONOCYTES NFR BLD AUTO: 16.3 %
NEUTROPHILS # BLD AUTO: 3 10E9/L (ref 1.6–8.3)
NEUTROPHILS NFR BLD AUTO: 72.9 %
NRBC # BLD AUTO: 0 10*3/UL
NRBC BLD AUTO-RTO: 0 /100
PLATELET # BLD AUTO: 145 10E9/L (ref 150–450)
POTASSIUM SERPL-SCNC: 4.2 MMOL/L (ref 3.4–5.3)
PROT SERPL-MCNC: 6.8 G/DL (ref 6.8–8.8)
RBC # BLD AUTO: 3.61 10E12/L (ref 3.8–5.2)
SODIUM SERPL-SCNC: 140 MMOL/L (ref 133–144)
WBC # BLD AUTO: 4.1 10E9/L (ref 4–11)

## 2019-08-26 PROCEDURE — 85025 COMPLETE CBC W/AUTO DIFF WBC: CPT | Performed by: OBSTETRICS & GYNECOLOGY

## 2019-08-26 PROCEDURE — 25000128 H RX IP 250 OP 636: Mod: ZF | Performed by: OBSTETRICS & GYNECOLOGY

## 2019-08-26 PROCEDURE — 83735 ASSAY OF MAGNESIUM: CPT | Performed by: OBSTETRICS & GYNECOLOGY

## 2019-08-26 PROCEDURE — 80053 COMPREHEN METABOLIC PANEL: CPT | Performed by: OBSTETRICS & GYNECOLOGY

## 2019-08-26 PROCEDURE — 96375 TX/PRO/DX INJ NEW DRUG ADDON: CPT

## 2019-08-26 PROCEDURE — 77386 ZZH IMRT TREATMENT DELIVERY, COMPLEX: CPT | Performed by: RADIOLOGY

## 2019-08-26 PROCEDURE — 96367 TX/PROPH/DG ADDL SEQ IV INF: CPT

## 2019-08-26 PROCEDURE — 96361 HYDRATE IV INFUSION ADD-ON: CPT

## 2019-08-26 PROCEDURE — 40000556 ZZH STATISTIC PERIPHERAL IV START W US GUIDANCE: Mod: ZF

## 2019-08-26 PROCEDURE — 25800030 ZZH RX IP 258 OP 636: Mod: ZF | Performed by: OBSTETRICS & GYNECOLOGY

## 2019-08-26 PROCEDURE — 36415 COLL VENOUS BLD VENIPUNCTURE: CPT

## 2019-08-26 PROCEDURE — 25800025 ZZH RX 258: Mod: ZF | Performed by: OBSTETRICS & GYNECOLOGY

## 2019-08-26 PROCEDURE — 96413 CHEMO IV INFUSION 1 HR: CPT

## 2019-08-26 RX ORDER — DEXTROSE, SODIUM CHLORIDE, AND POTASSIUM CHLORIDE 5; .45; .15 G/100ML; G/100ML; G/100ML
2000 INJECTION INTRAVENOUS ONCE
Status: COMPLETED | OUTPATIENT
Start: 2019-08-26 | End: 2019-08-26

## 2019-08-26 RX ORDER — PALONOSETRON 0.05 MG/ML
0.25 INJECTION, SOLUTION INTRAVENOUS ONCE
Status: COMPLETED | OUTPATIENT
Start: 2019-08-26 | End: 2019-08-26

## 2019-08-26 RX ORDER — LOPERAMIDE HCL 2 MG
2 CAPSULE ORAL PRN
COMMUNITY
End: 2019-11-27

## 2019-08-26 RX ADMIN — DEXAMETHASONE SODIUM PHOSPHATE: 10 INJECTION, SOLUTION INTRAMUSCULAR; INTRAVENOUS at 08:30

## 2019-08-26 RX ADMIN — POTASSIUM CHLORIDE, DEXTROSE MONOHYDRATE AND SODIUM CHLORIDE 2000 ML: 150; 5; 450 INJECTION, SOLUTION INTRAVENOUS at 08:54

## 2019-08-26 RX ADMIN — CISPLATIN 80 MG: 1 INJECTION, SOLUTION INTRAVENOUS at 09:38

## 2019-08-26 RX ADMIN — PALONOSETRON HYDROCHLORIDE 0.25 MG: 0.25 INJECTION, SOLUTION INTRAVENOUS at 08:51

## 2019-08-26 ASSESSMENT — PAIN SCALES - GENERAL: PAINLEVEL: NO PAIN (0)

## 2019-08-26 NOTE — PROGRESS NOTES
Infusion Nursing Note:  Nicole Fritsche presents today for Cycle 1 Day 29 Cisplatin.    Patient seen by provider today: No   present during visit today: Not Applicable.    Note: Patient has been experiencing ongoing tinnitus and has appt with audiology scheduled for this Wednesday. Pt also reports nausea and fatigue as her biggest complaints. States that after a shower she will have to take a two hour nap sometimes. She states this level of fatigue isn't new and that VENANCIO is aware. Per Radhika Javier/Chelsey Fernandez RN 8/26/19: patient will not receive Day 36 Cisplatin and will remove from treatment plan. Patient already aware of this and is grateful for a break from chemo/radiation next week.    Intravenous Access:  Peripheral IV placed.    Treatment Conditions:  Lab Results   Component Value Date    HGB 11.7 08/26/2019     Lab Results   Component Value Date    WBC 4.1 08/26/2019      Lab Results   Component Value Date    ANEU 3.0 08/26/2019     Lab Results   Component Value Date     08/26/2019      Lab Results   Component Value Date     08/26/2019                   Lab Results   Component Value Date    POTASSIUM 4.2 08/26/2019           Lab Results   Component Value Date    MAG 1.4 08/26/2019            Lab Results   Component Value Date    CR 0.61 08/26/2019                   Lab Results   Component Value Date    NICOLE 9.3 08/26/2019                Lab Results   Component Value Date    BILITOTAL 0.3 08/26/2019           Lab Results   Component Value Date    ALBUMIN 3.3 08/26/2019                    Lab Results   Component Value Date    ALT 73 08/26/2019           Lab Results   Component Value Date    AST 28 08/26/2019       Results reviewed, labs MET treatment parameters, ok to proceed with treatment.      Post Infusion Assessment:  Patient tolerated infusion without incident.  Patient voided prior, during, and after Cisplatin.  Blood return noted pre and post infusion.  Site patent and intact,  free from redness, edema or discomfort.  No evidence of extravasations.  Access discontinued per protocol.       Discharge Plan:   Prescription refills given for Compazine.  Discharge instructions reviewed with: Patient.  Patient and/or family verbalized understanding of discharge instructions and all questions answered.  AVS to patient via [employer].  Patient will return 8/28 for audiology appt. Will complete radiation this week and will resume on 9/3. Per patient, no further oncology appointments will need to be made until RT completed and eval completed.  Patient discharged in stable condition accompanied by: friend.  Departure Mode: Ambulatory.    Eneida Gaitan RN

## 2019-08-26 NOTE — NURSING NOTE
Chief Complaint   Patient presents with     Blood Draw     Labs drawn via  by RN in lab. VS taken.     Honey Castillo RN

## 2019-08-27 ENCOUNTER — OFFICE VISIT (OUTPATIENT)
Dept: RADIATION ONCOLOGY | Facility: CLINIC | Age: 49
End: 2019-08-27
Attending: RADIOLOGY
Payer: COMMERCIAL

## 2019-08-27 VITALS — SYSTOLIC BLOOD PRESSURE: 147 MMHG | DIASTOLIC BLOOD PRESSURE: 90 MMHG | HEART RATE: 82 BPM

## 2019-08-27 DIAGNOSIS — C53.1 MALIGNANT NEOPLASM OF EXOCERVIX (H): Primary | ICD-10-CM

## 2019-08-27 PROCEDURE — 77386 ZZH IMRT TREATMENT DELIVERY, COMPLEX: CPT | Performed by: RADIOLOGY

## 2019-08-27 NOTE — LETTER
2019       RE: Nicole Fritsche  [address]  [place] [address]     Dear Colleague,    Thank you for referring your patient, Nicole Fritsche, to the RADIATION ONCOLOGY CLINIC. Please see a copy of my visit note below.    [facility]  SPECIALIZING IN BREAKTHROUGHS  Radiation Oncology    On Treatment Visit Note      Nicole Fritsche      Date: 2019   MRN: [id number]   : 1970  Diagnosis: Cervix Cancer    Treatment Summary to Date   Pelvis Current Dose: 3960 /4500 + cGy Fractions:  + 0/5      Chemotherapy  Chemo concurrent with radx?: Yes  Oncologist: Dr Hernandez  Drug Name/Frequency 1: Cisplatin    Subjective: Ms. Fritsche is tolerating chemoradiation well. She has pelvic pain that has not improved with treatment and wakes her at night.  She was prescribed oxycodone at her last visit which has been helpful.  She has not required any pain medication the last few days.  Diarrhea is controlled with Imodium and diet    Nursing ROS:   Nutrition Alteration  Diet Type: Patient's Preference  Skin  Skin Reaction: 0 - No changes  Skin Note: Proshield    Gastrointestinal  Nausea: 0 - None  Diarrhea: 2 - Three to five soft or liquid bowel movements per day  Genitourinary  Urinary Status: 0 - Normal   Note: patient reports brown vaginal discharge, denies bright red bleeding     Pain Assessment  0-10 Pain Scale: 0    Objective:   BP (!) 147/90   Pulse 82 , pain 0/10  Gen: Appears well, NAD  Skin: No erythema    Laboratory:  Lab Results   Component Value Date    WBC 4.1 2019    HGB 11.7 2019    HCT 34.9 (L) 2019    MCV 97 2019     (L) 2019     Lab Results   Component Value Date     2019    POTASSIUM 4.2 2019    CHLORIDE 106 2019    CO2 32 2019    GLC 90 2019     Imaging:  EXAMINATION: MR PELVIS (GYN) WO & W CONTRAST, 2019 3:55 PM   COMPARISON: 2019, 2019, 5/3/2019.     HISTORY:  Cervical cancer - assess response to chemoradiation before  brachytherapy.     FINDINGS:   Decreased size of the mass centered in the posterior cervix, which  measures approximately 1.4 x 2.4 x 0.9 cm compared to 2.1 x 3.9 x 2.5  cm previously on MRI 7/1/2019. The lesion remains T2 intermediate with  heterogeneous hypoenhancement and mild diffusion restriction. The  previously seen parametrial involvement is no longer appreciated. No  evidence of involvement of the pelvic sidewall or upper third of the  vagina.     Globular appearance of the retroverted and retroflexed uterus with  scattered fibroids. Decreased conspicuity of the previously seen  scattered small endometrial cysts. The junctional zone is again  ill-defined and measures 13 mm in thickness.      The left ovary appears grossly normal. Unilocular cystic focus in the  right adnexa measures 2.9 cm compared to 2.8 cm previously. An  adjacent smaller cyst seen previously is no longer present.     The bladder and intrapelvic bowel appears normal. No pelvic  lymphadenopathy. The major pelvic vasculature is patent. No worrisome  osseous lesions.                                                                 IMPRESSION:   1. Positive response to therapy with significantly decreased size of  the mass in the posterior cervix. The parametrial involvement on the  previous exam is no longer appreciated. No invasion of adjacent  organs, the pelvic sidewalls, or the upper third of the vagina. No  pelvic lymphadenopathy.  2. Findings of uterine adenomyosis and possibly endometrial  hyperplasia. Scattered fibroids.  3. Stable cystic focus in the right adnexa. Attention on follow-up  Studies.    Assessment:    Tolerating radiation therapy well.  All questions and concerns addressed.    Plan:   1. Continue current therapy.    2. Charanjit sleeve placement later this week for brachytherapy    Mosaiq chart and setup information reviewed  MVCT images reviewed    Medication  Review  Med list reviewed with patient?: Yes    Educational Topic Discussed  Education Instructions: Reviewed    Anne Tidwell MD  Department of Radiation Oncology  [facility]

## 2019-08-28 ENCOUNTER — OFFICE VISIT (OUTPATIENT)
Dept: AUDIOLOGY | Facility: CLINIC | Age: 49
End: 2019-08-28
Attending: NURSE PRACTITIONER
Payer: COMMERCIAL

## 2019-08-28 ENCOUNTER — APPOINTMENT (OUTPATIENT)
Dept: RADIATION ONCOLOGY | Facility: CLINIC | Age: 49
End: 2019-08-28
Attending: RADIOLOGY
Payer: COMMERCIAL

## 2019-08-28 DIAGNOSIS — H90.3 SENSORINEURAL HEARING LOSS, BILATERAL: Primary | ICD-10-CM

## 2019-08-28 PROCEDURE — 77386 ZZH IMRT TREATMENT DELIVERY, COMPLEX: CPT | Performed by: RADIOLOGY

## 2019-08-28 NOTE — PROGRESS NOTES
"**  Forwarding chart as FYI to Gynecology Oncology team for ongoing care **    Patient previously followed by [facility], now being followed by Gyn Onc.  Last visit with Gyn Onc: 8/23/19  Future visit scheduled: several      [facility] will be closed for this pt once this message appears \"viewed\" by the Gyn Onc team.    Thank you,  Ayde Arredondo RN  Pap Tracking       "

## 2019-08-29 ENCOUNTER — APPOINTMENT (OUTPATIENT)
Dept: RADIATION ONCOLOGY | Facility: CLINIC | Age: 49
End: 2019-08-29
Attending: RADIOLOGY
Payer: COMMERCIAL

## 2019-08-29 PROCEDURE — 77386 ZZH IMRT TREATMENT DELIVERY, COMPLEX: CPT | Performed by: RADIOLOGY

## 2019-08-29 NOTE — PROGRESS NOTES
AUDIOLOGY REPORT    SUBJECTIVE:  Nicole Fritsche is a 49 year old female who was seen in Audiology at the [facility] for audiologic evaluation, referred by Rosio Harding. The patient reports that she started and ceased chemotherapy treatment in August of 2019 with five total treatments during that time. The patient started to experience the onset of bilateral episodic high-frequency tinnitus and bilateral episodic occlusion causing echoing in her own voice after the second or third round of chemotherapy. The patient denies hearing loss, asymmetry in hearing between ears, other ear related issues or vertigo.    OBJECTIVE:  Fall Risk Screen:  1. Have you fallen two or more times in the past year? No  2. Have you fallen and had an injury in the past year? No    Abuse Screening:  Do you feel unsafe at home or work/school? No  Do you feel threatened by someone? No  Does anyone try to keep you from having contact with others, or doing things outside of your home? No  Physical signs of abuse present? No    Otoscopic exam indicates ears are clear of cerumen bilaterally     Pure Tone Thresholds assessed using conventional audiometry with good  reliability from 250-8000 Hz bilaterally using circumaural headphones and reassessed using insert earphones    RIGHT:  Normal sloping to moderate sensorineural hearing loss     LEFT:    Normal sloping to moderate sensorineural hearing loss   Tympanogram:    RIGHT: normal eardrum mobility    LEFT:   normal eardrum mobility    Reflexes (reported by stimulus ear):  RIGHT: Ipsilateral is present at normal levels  RIGHT: Contralateral is present at normal levels  LEFT:   Ipsilateral is present at normal levels  LEFT:   Contralateral is present at normal levels    Speech Reception Threshold:    RIGHT: 15 dB HL    LEFT:   15 dB HL  Word Recognition Score:     RIGHT: 100% at 60 dB HL using NU-6 recorded word list.    LEFT:   100% at 60 dB  HL using NU-6 recorded word list.    ASSESSMENT:  Symmetric normal sloping to moderate sensorineural hearing loss bilaterally Today s results were discussed with the patient in detail.     PLAN:    Patient was counseled regarding hearing loss and impact on communication.  Patient is not an ideal candidate for amplification at this time but may consider trialing hearing amplification technology to help aid in communication and to try to help combat the present tinnitus.  It is recommended that the patient return at least bi-annually for monitoring of hearing status, sooner if changes in hearing or ear related symptoms are noted.  Please call this clinic with questions regarding these results or recommendations.      Luis Gaxiola.  Licensed Audiologist  [place] #[id number]

## 2019-08-30 ENCOUNTER — ANESTHESIA (OUTPATIENT)
Dept: SURGERY | Facility: CLINIC | Age: 49
End: 2019-08-30
Payer: COMMERCIAL

## 2019-08-30 ENCOUNTER — HOSPITAL ENCOUNTER (OUTPATIENT)
Facility: CLINIC | Age: 49
Discharge: HOME | End: 2019-08-30
Attending: RADIOLOGY | Admitting: RADIOLOGY
Payer: COMMERCIAL

## 2019-08-30 ENCOUNTER — ALLIED HEALTH/NURSE VISIT (OUTPATIENT)
Dept: RADIATION ONCOLOGY | Facility: CLINIC | Age: 49
End: 2019-08-30
Attending: RADIOLOGY
Payer: COMMERCIAL

## 2019-08-30 ENCOUNTER — HOSPITAL ENCOUNTER (OUTPATIENT)
Dept: ULTRASOUND IMAGING | Facility: CLINIC | Age: 49
End: 2019-08-30
Attending: RADIOLOGY | Admitting: RADIOLOGY
Payer: COMMERCIAL

## 2019-08-30 ENCOUNTER — ANESTHESIA EVENT (OUTPATIENT)
Dept: SURGERY | Facility: CLINIC | Age: 49
End: 2019-08-30
Payer: COMMERCIAL

## 2019-08-30 VITALS
OXYGEN SATURATION: 99 % | RESPIRATION RATE: 18 BRPM | SYSTOLIC BLOOD PRESSURE: 121 MMHG | DIASTOLIC BLOOD PRESSURE: 88 MMHG | TEMPERATURE: 98.2 F | HEART RATE: 80 BPM | BODY MASS INDEX: 25.4 KG/M2 | HEIGHT: 67 IN | WEIGHT: 161.82 LBS

## 2019-08-30 DIAGNOSIS — C53.1 MALIGNANT NEOPLASM OF EXOCERVIX (H): Primary | ICD-10-CM

## 2019-08-30 DIAGNOSIS — C53.1 MALIGNANT NEOPLASM OF EXOCERVIX (H): ICD-10-CM

## 2019-08-30 LAB — GLUCOSE BLDC GLUCOMTR-MCNC: 101 MG/DL (ref 70–99)

## 2019-08-30 PROCEDURE — 25800030 ZZH RX IP 258 OP 636: Performed by: ANESTHESIOLOGY

## 2019-08-30 PROCEDURE — 25000125 ZZHC RX 250: Performed by: NURSE ANESTHETIST, CERTIFIED REGISTERED

## 2019-08-30 PROCEDURE — 27210794 ZZH OR GENERAL SUPPLY STERILE: Performed by: RADIOLOGY

## 2019-08-30 PROCEDURE — 77386 ZZH IMRT TREATMENT DELIVERY, COMPLEX: CPT | Performed by: RADIOLOGY

## 2019-08-30 PROCEDURE — 76857 US EXAM PELVIC LIMITED: CPT

## 2019-08-30 PROCEDURE — 25000128 H RX IP 250 OP 636: Performed by: NURSE ANESTHETIST, CERTIFIED REGISTERED

## 2019-08-30 PROCEDURE — 71000027 ZZH RECOVERY PHASE 2 EACH 15 MINS: Performed by: RADIOLOGY

## 2019-08-30 PROCEDURE — 25000128 H RX IP 250 OP 636: Performed by: ANESTHESIOLOGY

## 2019-08-30 PROCEDURE — 71000014 ZZH RECOVERY PHASE 1 LEVEL 2 FIRST HR: Performed by: RADIOLOGY

## 2019-08-30 PROCEDURE — 36000059 ZZH SURGERY LEVEL 3 EA 15 ADDTL MIN UMMC: Performed by: RADIOLOGY

## 2019-08-30 PROCEDURE — 77336 RADIATION PHYSICS CONSULT: CPT | Performed by: RADIOLOGY

## 2019-08-30 PROCEDURE — 36000057 ZZH SURGERY LEVEL 3 1ST 30 MIN - UMMC: Performed by: RADIOLOGY

## 2019-08-30 PROCEDURE — 82962 GLUCOSE BLOOD TEST: CPT

## 2019-08-30 PROCEDURE — 40000170 ZZH STATISTIC PRE-PROCEDURE ASSESSMENT II: Performed by: RADIOLOGY

## 2019-08-30 PROCEDURE — 25000131 ZZH RX MED GY IP 250 OP 636 PS 637: Performed by: ANESTHESIOLOGY

## 2019-08-30 PROCEDURE — 25000565 ZZH ISOFLURANE, EA 15 MIN: Performed by: RADIOLOGY

## 2019-08-30 PROCEDURE — 37000009 ZZH ANESTHESIA TECHNICAL FEE, EACH ADDTL 15 MIN: Performed by: RADIOLOGY

## 2019-08-30 PROCEDURE — 37000008 ZZH ANESTHESIA TECHNICAL FEE, 1ST 30 MIN: Performed by: RADIOLOGY

## 2019-08-30 PROCEDURE — 25000132 ZZH RX MED GY IP 250 OP 250 PS 637: Performed by: ANESTHESIOLOGY

## 2019-08-30 RX ORDER — DEXAMETHASONE SODIUM PHOSPHATE 4 MG/ML
INJECTION, SOLUTION INTRA-ARTICULAR; INTRALESIONAL; INTRAMUSCULAR; INTRAVENOUS; SOFT TISSUE PRN
Status: DISCONTINUED | OUTPATIENT
Start: 2019-08-30 | End: 2019-08-30

## 2019-08-30 RX ORDER — SODIUM CHLORIDE, SODIUM LACTATE, POTASSIUM CHLORIDE, CALCIUM CHLORIDE 600; 310; 30; 20 MG/100ML; MG/100ML; MG/100ML; MG/100ML
INJECTION, SOLUTION INTRAVENOUS CONTINUOUS
Status: DISCONTINUED | OUTPATIENT
Start: 2019-08-30 | End: 2019-08-30 | Stop reason: HOSPADM

## 2019-08-30 RX ORDER — PROPOFOL 10 MG/ML
INJECTION, EMULSION INTRAVENOUS PRN
Status: DISCONTINUED | OUTPATIENT
Start: 2019-08-30 | End: 2019-08-30

## 2019-08-30 RX ORDER — ACETAMINOPHEN 325 MG/1
975 TABLET ORAL ONCE
Status: COMPLETED | OUTPATIENT
Start: 2019-08-30 | End: 2019-08-30

## 2019-08-30 RX ORDER — FENTANYL CITRATE 50 UG/ML
INJECTION, SOLUTION INTRAMUSCULAR; INTRAVENOUS PRN
Status: DISCONTINUED | OUTPATIENT
Start: 2019-08-30 | End: 2019-08-30

## 2019-08-30 RX ORDER — FENTANYL CITRATE 50 UG/ML
25-50 INJECTION, SOLUTION INTRAMUSCULAR; INTRAVENOUS
Status: DISCONTINUED | OUTPATIENT
Start: 2019-08-30 | End: 2019-08-30 | Stop reason: HOSPADM

## 2019-08-30 RX ORDER — ONDANSETRON 2 MG/ML
4 INJECTION INTRAMUSCULAR; INTRAVENOUS EVERY 30 MIN PRN
Status: DISCONTINUED | OUTPATIENT
Start: 2019-08-30 | End: 2019-08-30 | Stop reason: HOSPADM

## 2019-08-30 RX ORDER — HYDROMORPHONE HYDROCHLORIDE 1 MG/ML
.3-.5 INJECTION, SOLUTION INTRAMUSCULAR; INTRAVENOUS; SUBCUTANEOUS EVERY 5 MIN PRN
Status: DISCONTINUED | OUTPATIENT
Start: 2019-08-30 | End: 2019-08-30 | Stop reason: HOSPADM

## 2019-08-30 RX ORDER — NALOXONE HYDROCHLORIDE 0.4 MG/ML
.1-.4 INJECTION, SOLUTION INTRAMUSCULAR; INTRAVENOUS; SUBCUTANEOUS
Status: DISCONTINUED | OUTPATIENT
Start: 2019-08-30 | End: 2019-08-30 | Stop reason: HOSPADM

## 2019-08-30 RX ORDER — ONDANSETRON 4 MG/1
4 TABLET, ORALLY DISINTEGRATING ORAL EVERY 30 MIN PRN
Status: DISCONTINUED | OUTPATIENT
Start: 2019-08-30 | End: 2019-08-30 | Stop reason: HOSPADM

## 2019-08-30 RX ORDER — LIDOCAINE 40 MG/G
CREAM TOPICAL
Status: DISCONTINUED | OUTPATIENT
Start: 2019-08-30 | End: 2019-08-30 | Stop reason: HOSPADM

## 2019-08-30 RX ORDER — ONDANSETRON 2 MG/ML
INJECTION INTRAMUSCULAR; INTRAVENOUS PRN
Status: DISCONTINUED | OUTPATIENT
Start: 2019-08-30 | End: 2019-08-30

## 2019-08-30 RX ADMIN — PROPOFOL 10 MG: 10 INJECTION, EMULSION INTRAVENOUS at 13:30

## 2019-08-30 RX ADMIN — DEXAMETHASONE SODIUM PHOSPHATE 6 MG: 4 INJECTION, SOLUTION INTRA-ARTICULAR; INTRALESIONAL; INTRAMUSCULAR; INTRAVENOUS; SOFT TISSUE at 13:05

## 2019-08-30 RX ADMIN — ROCURONIUM BROMIDE 50 MG: 10 INJECTION INTRAVENOUS at 13:01

## 2019-08-30 RX ADMIN — ACETAMINOPHEN 975 MG: 325 TABLET, FILM COATED ORAL at 12:42

## 2019-08-30 RX ADMIN — HYDROMORPHONE HYDROCHLORIDE 0.2 MG: 1 INJECTION, SOLUTION INTRAMUSCULAR; INTRAVENOUS; SUBCUTANEOUS at 14:36

## 2019-08-30 RX ADMIN — PROPOFOL 20 MG: 10 INJECTION, EMULSION INTRAVENOUS at 13:49

## 2019-08-30 RX ADMIN — ONDANSETRON 4 MG: 2 INJECTION INTRAMUSCULAR; INTRAVENOUS at 13:47

## 2019-08-30 RX ADMIN — HYDROMORPHONE HYDROCHLORIDE 0.3 MG: 1 INJECTION, SOLUTION INTRAMUSCULAR; INTRAVENOUS; SUBCUTANEOUS at 14:24

## 2019-08-30 RX ADMIN — SODIUM CHLORIDE, POTASSIUM CHLORIDE, SODIUM LACTATE AND CALCIUM CHLORIDE: 600; 310; 30; 20 INJECTION, SOLUTION INTRAVENOUS at 12:51

## 2019-08-30 RX ADMIN — SODIUM CHLORIDE, POTASSIUM CHLORIDE, SODIUM LACTATE AND CALCIUM CHLORIDE: 600; 310; 30; 20 INJECTION, SOLUTION INTRAVENOUS at 14:00

## 2019-08-30 RX ADMIN — PROPOFOL 20 MG: 10 INJECTION, EMULSION INTRAVENOUS at 13:37

## 2019-08-30 RX ADMIN — PROPOFOL 140 MG: 10 INJECTION, EMULSION INTRAVENOUS at 13:01

## 2019-08-30 RX ADMIN — ONDANSETRON 4 MG: 4 TABLET, ORALLY DISINTEGRATING ORAL at 15:37

## 2019-08-30 RX ADMIN — FENTANYL CITRATE 50 MCG: 50 INJECTION, SOLUTION INTRAMUSCULAR; INTRAVENOUS at 13:00

## 2019-08-30 RX ADMIN — FENTANYL CITRATE 50 MCG: 50 INJECTION, SOLUTION INTRAMUSCULAR; INTRAVENOUS at 12:56

## 2019-08-30 RX ADMIN — MIDAZOLAM 2 MG: 1 INJECTION INTRAMUSCULAR; INTRAVENOUS at 12:51

## 2019-08-30 RX ADMIN — FENTANYL CITRATE 50 MCG: 50 INJECTION, SOLUTION INTRAMUSCULAR; INTRAVENOUS at 14:04

## 2019-08-30 RX ADMIN — SUGAMMADEX 200 MG: 100 INJECTION, SOLUTION INTRAVENOUS at 13:50

## 2019-08-30 ASSESSMENT — MIFFLIN-ST. JEOR: SCORE: 1386.63

## 2019-08-30 NOTE — OR NURSING
1518 Patient arrived to phase 2 via cart with NST , patient settled into chair with c/o nausea , zofran 4 mg ivp given , with mild relief patient states its a 6/10 Anesthesia called to bedside , Dr Michelle medicated patient ( propofol ) patient placed on O2 nasal canula 1 liter and monitored, patient states nausea is gone. Will continue to monitor.KARIN

## 2019-08-30 NOTE — ANESTHESIA POSTPROCEDURE EVALUATION
Anesthesia POST Procedure Evaluation    Patient: Nicole Fritsche   MRN:     [id number] Gender:   female   Age:    49 year old :      1970        Preoperative Diagnosis: Cervical Cancer   Procedure(s):  Charanjit Sleeve Insertion, Ultrasound Guided   Postop Comments: No value filed.       Anesthesia Type:  Not documented  General    Reportable Event: NO     PAIN: Uncomplicated   Sign Out status: Comfortable, Well controlled pain     PONV: No PONV   Sign Out status:  No Nausea or Vomiting     Neuro/Psych: Uneventful perioperative course   Sign Out Status: Preoperative baseline; Age appropriate mentation     Airway/Resp.: Uneventful perioperative course   Sign Out Status: Non labored breathing, age appropriate RR; Resp. Status within EXPECTED Parameters     CV: Uneventful perioperative course   Sign Out status: Appropriate BP and perfusion indices; Appropriate HR/Rhythm     Disposition:   Sign Out in:  PACU  Disposition:  Phase II; Home  Recovery Course: Uneventful  Follow-Up: Not required           Last Anesthesia Record Vitals:  CRNA VITALS  2019 1327 - 2019 1427      2019             NIBP:  121/77    Pulse:  84    NIBP Mean:  91    SpO2:  98 %    EKG:  Sinus rhythm          Last PACU Vitals:  Vitals Value Taken Time   /81 2019  2:50 PM   Temp 36.6  C (97.9  F) 2019  2:50 PM   Pulse 80 2019  2:50 PM   Resp 13 2019  2:50 PM   SpO2 95 % 2019  2:50 PM   Temp src     NIBP     Pulse     SpO2     Resp     Temp     Ht Rate     Temp 2           Electronically Signed By: Ritu Michelle MD, 2019, 3:57 PM

## 2019-08-30 NOTE — ANESTHESIA PREPROCEDURE EVALUATION
Anesthesia Pre-Procedure Evaluation    Patient: Nicole Fritsche   MRN:     [id number] Gender:   female   Age:    49 year old :      1970        Preoperative Diagnosis: Cervical Cancer   Procedure(s):  Charanjit Sleeve Insertion, Ultrasound Guided     Past Medical History:   Diagnosis Date     Abnormal Pap smear of cervix 2019    see problem list     Arrested hydrocephalus 2/3/2009    Ongoing HAs, seeing neurosurgeon. Per past notes from Dr. Resendiz, HAs most likely not secondary to the hydrocephalus, but rather vascular inorgin.  Please see scanned in records.     Asthma      Cervical cancer (H)      Cervical high risk HPV (human papillomavirus) test positive 2019    See problem list      Past Surgical History:   Procedure Laterality Date     C VENTRICULO-CISTERNOSTOMY,3RD VENT      for treatment of HA related to hydrocephalus     EXCISE MASS TRUNK Left 2015    Procedure: EXCISE MASS TRUNK;  Surgeon: Carlos Enrique Briones MD;  Location: [facility] OR          Anesthesia Evaluation     .             ROS/MED HX    ENT/Pulmonary:  - neg pulmonary ROS     Neurologic: Comment: Hx of obstructive hydrocephalus now resolved      Cardiovascular:  - neg cardiovascular ROS       METS/Exercise Tolerance:     Hematologic:  - neg hematologic  ROS       Musculoskeletal:  - neg musculoskeletal ROS       GI/Hepatic:  - neg GI/hepatic ROS       Renal/Genitourinary: Comment: Cervical cancer        Endo:  - neg endo ROS       Psychiatric:  - neg psychiatric ROS       Infectious Disease:  - neg infectious disease ROS       Malignancy:      - no malignancy   Other:                         PHYSICAL EXAM:   Mental Status/Neuro: A/A/O   Airway: Facies: Feasible  Mallampati: I  Mouth/Opening: Full  TM distance: > 6 cm  Neck ROM: Full   Respiratory: Auscultation: CTAB     Resp. Rate: Normal     Resp. Effort: Normal      CV: Rhythm: Regular  Rate: Age appropriate  Heart: Normal Sounds  Edema: None   Comments:      Dental: Normal  "Dentition                LABS:  CBC:   Lab Results   Component Value Date    WBC 4.1 08/26/2019    WBC 5.1 08/19/2019    HGB 11.7 08/26/2019    HGB 12.6 08/19/2019    HCT 34.9 (L) 08/26/2019    HCT 37.4 08/19/2019     (L) 08/26/2019     08/19/2019     BMP:   Lab Results   Component Value Date     08/26/2019     08/19/2019    POTASSIUM 4.2 08/26/2019    POTASSIUM 3.6 08/19/2019    CHLORIDE 106 08/26/2019    CHLORIDE 106 08/19/2019    CO2 32 08/26/2019    CO2 29 08/19/2019    BUN 20 08/26/2019    BUN 10 08/19/2019    CR 0.61 08/26/2019    CR 0.56 08/19/2019    GLC 90 08/26/2019    GLC 95 08/19/2019     COAGS:   Lab Results   Component Value Date    PTT 28 02/24/2009    INR 1.01 02/24/2009     POC:   Lab Results   Component Value Date     (H) 08/30/2019    HCG Negative 06/03/2019     OTHER:   Lab Results   Component Value Date    NICOLE 9.3 08/26/2019    MAG 1.4 (L) 08/26/2019    ALBUMIN 3.3 (L) 08/26/2019    PROTTOTAL 6.8 08/26/2019    ALT 73 (H) 08/26/2019    AST 28 08/26/2019    ALKPHOS 88 08/26/2019    BILITOTAL 0.3 08/26/2019    TSH 4.20 (H) 02/13/2017    T4 1.02 02/13/2017        Preop Vitals    BP Readings from Last 3 Encounters:   08/30/19 113/80   08/27/19 (!) 147/90   08/26/19 110/75    Pulse Readings from Last 3 Encounters:   08/30/19 84   08/27/19 82   08/26/19 85      Resp Readings from Last 3 Encounters:   08/30/19 16   08/26/19 18   08/23/19 14    SpO2 Readings from Last 3 Encounters:   08/30/19 100%   08/26/19 99%   08/23/19 98%      Temp Readings from Last 1 Encounters:   08/30/19 36.6  C (97.9  F) (Oral)    Ht Readings from Last 1 Encounters:   08/30/19 1.702 m (5' 7\")      Wt Readings from Last 1 Encounters:   08/30/19 73.4 kg (161 lb 13.1 oz)    Estimated body mass index is 25.34 kg/m  as calculated from the following:    Height as of this encounter: 1.702 m (5' 7\").    Weight as of this encounter: 73.4 kg (161 lb 13.1 oz).     LDA:        Assessment:   ASA SCORE: 2  "     Smoking Status:  Non-Smoker/Unknown   NPO Status: NPO Appropriate     Plan:   Anes. Type:  General   Pre-Medication: None   Induction:  IV (Standard)   Airway: ETT; Oral   Access/Monitoring: PIV   Maintenance: Balanced     Postop Plan:   Postop Pain: Opioids  Postop Sedation/Airway: Not planned     PONV Management:   Adult Risk Factors: Female, Non-Smoker, Postop Opioids   Prevention: Ondansetron, Dexamethasone     CONSENT: Direct conversation   Plan and risks discussed with: Patient   Blood Products: Consent Deferred (Minimal Blood Loss)                   Ritu Michelle MD

## 2019-08-30 NOTE — OP NOTE
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     COMPLICATIONS: None.     ESTIMATED BLOOD LOSS: 10 cc     INTRAVENOUS FLUIDS: per anesthesia record    OPERATIVE FINDINGS: Normal external genitalia with multiparous, cervix.There is no visible tumor at the cervical os. Bimanual examination revealed cervix measuring approximately 4 cm without parametrial extension.    DESCRIPTION OF OPERATIVE PROCEDURE: Ms. Fritsche was brought back to the operating room wearing pneumoboots and identified to be herself. The patient was placed under general anesthesia via endotracheal tube. She was placed on the dorsal lithotomy position. An exam under anesthesia was performed with the above findings. The patient was prepped and draped in the usual sterile fashion. A time out was performed. A Wells catheter was inserted into the bladder and 8 cc of saline used to inflate the balloon. Uterus was sounded under ultrasound guidance and sounded to 7 cm. The cervical canal was serially dilated with Hegar dilators. The 60-mm Charanjit sleeve was secured with 4 stitches. Anesthesia was then reversed and the patient taken to the PACU in stable condition.     Ms. Fritsche will be discharged home after recovering from PACU.     Anne Tidwell MD  Department of Radiation Oncology  [facility]

## 2019-08-30 NOTE — ANESTHESIA CARE TRANSFER NOTE
Patient: Nicole Fritsche    Procedure(s):  Charanjit Sleeve Insertion, Ultrasound Guided    Diagnosis: Cervical Cancer  Diagnosis Additional Information: No value filed.    Anesthesia Type:   General     Note:  Airway :Nasal Cannula  Patient transferred to:PACU  Comments: Pt alert, breathing spontaneously on 3L O2 via NC. VSS. Report shared with RN.Handoff Report: Identifed the Patient, Identified the Reponsible Provider, Reviewed the pertinent medical history, Discussed the surgical course, Reviewed Intra-OP anesthesia mangement and issues during anesthesia, Set expectations for post-procedure period and Allowed opportunity for questions and acknowledgement of understanding      Vitals: (Last set prior to Anesthesia Care Transfer)    CRNA VITALS  8/30/2019 1327 - 8/30/2019 1403      8/30/2019             Pulse:  84    SpO2:  98 %    EKG:  Sinus rhythm                Electronically Signed By: HOMERO Emery CRNA  August 30, 2019  2:03 PM

## 2019-08-30 NOTE — DISCHARGE INSTRUCTIONS
[facility], [place]  Same-Day Surgery   Adult Discharge Orders & Instructions     Take it easy when you get home.  Remember, same day surgery DOES NOT MEAN SAME DAY RECOVERY!  Healing is a gradual process.  You will need some time to recover - you may be more tired than you realize at first.  Rest and relax for at least the first 24 hours at home.  You'll feel better and heal faster if you take good care of yourself.  For 24 hours after surgery    1. Get plenty of rest.  A responsible adult must stay with you for at least 24 hours after you leave the hospital.   2. Do not drive or use heavy equipment.  If you have weakness or tingling, don't drive or use heavy equipment until this feeling goes away.  3. Do not drink alcohol.  4. Avoid strenuous or risky activities.  Ask for help when climbing stairs.   5. You may feel lightheaded.  IF so, sit for a few minutes before standing.  Have someone help you get up.   6. If you have nausea (feel sick to your stomach): Drink only clear liquids such as apple juice, ginger ale, broth or 7-Up.  Rest may also help.  Be sure to drink enough fluids.  Move to a regular diet as you feel able.  7. You may have a slight fever. Call the doctor if your fever is over 100 F (37.7 C) (taken under the tongue) or lasts longer than 24 hours.  8. You may have a dry mouth, a sore throat, muscle aches or trouble sleeping.  These should go away after 24 hours.  9. Do not make important or legal decisions.   Call your doctor for any of the followin.  Signs of infection (fever, growing tenderness at the surgery site, a large amount of drainage or bleeding, severe pain, foul-smelling drainage, redness, swelling).    2. It has been over 8 to 10 hours since surgery and you are still not able to urinate (pass water).    3.  Headache for over 24 hours.      To contact a doctor, call Dr Tidwell at  [phone] (radiation oncology clinic) or:        [phone] and  ask for the resident on call for GYN/ONC (answered 24 hours a day)      Emergency Department:    [facility]: [phone]       (TTY for hearing impaired: [phone])

## 2019-09-02 NOTE — PROGRESS NOTES
[facility] PHYSICIANS  SPECIALIZING IN BREAKTHROUGHS  Radiation Oncology    On Treatment Visit Note      Nicole Fritsche      Date: 2019   MRN: [id number]   : 1970  Diagnosis: Cervix Cancer    Treatment Summary to Date   Pelvis Current Dose: 3960 /4500 + cGy Fractions:  + 0/5      Chemotherapy  Chemo concurrent with radx?: Yes  Oncologist: Dr Hernandez  Drug Name/Frequency 1: Cisplatin    Subjective: Ms. Fritsche is tolerating chemoradiation well. She has pelvic pain that has not improved with treatment and wakes her at night.  She was prescribed oxycodone at her last visit which has been helpful.  She has not required any pain medication the last few days.  Diarrhea is controlled with Imodium and diet    Nursing ROS:   Nutrition Alteration  Diet Type: Patient's Preference  Skin  Skin Reaction: 0 - No changes  Skin Note: Proshield    Gastrointestinal  Nausea: 0 - None  Diarrhea: 2 - Three to five soft or liquid bowel movements per day  Genitourinary  Urinary Status: 0 - Normal   Note: patient reports brown vaginal discharge, denies bright red bleeding     Pain Assessment  0-10 Pain Scale: 0    Objective:   BP (!) 147/90   Pulse 82 , pain 0/10  Gen: Appears well, NAD  Skin: No erythema    Laboratory:  Lab Results   Component Value Date    WBC 4.1 2019    HGB 11.7 2019    HCT 34.9 (L) 2019    MCV 97 2019     (L) 2019     Lab Results   Component Value Date     2019    POTASSIUM 4.2 2019    CHLORIDE 106 2019    CO2 32 2019    GLC 90 2019     Imaging:  EXAMINATION: MR PELVIS (GYN) WO & W CONTRAST, 2019 3:55 PM   COMPARISON: 2019, 2019, 5/3/2019.     HISTORY: Cervical cancer - assess response to chemoradiation before  brachytherapy.     FINDINGS:   Decreased size of the mass centered in the posterior cervix, which  measures approximately 1.4 x 2.4 x 0.9 cm compared to 2.1 x 3.9 x 2.5  cm  previously on MRI 7/1/2019. The lesion remains T2 intermediate with  heterogeneous hypoenhancement and mild diffusion restriction. The  previously seen parametrial involvement is no longer appreciated. No  evidence of involvement of the pelvic sidewall or upper third of the  vagina.     Globular appearance of the retroverted and retroflexed uterus with  scattered fibroids. Decreased conspicuity of the previously seen  scattered small endometrial cysts. The junctional zone is again  ill-defined and measures 13 mm in thickness.      The left ovary appears grossly normal. Unilocular cystic focus in the  right adnexa measures 2.9 cm compared to 2.8 cm previously. An  adjacent smaller cyst seen previously is no longer present.     The bladder and intrapelvic bowel appears normal. No pelvic  lymphadenopathy. The major pelvic vasculature is patent. No worrisome  osseous lesions.                                                                 IMPRESSION:   1. Positive response to therapy with significantly decreased size of  the mass in the posterior cervix. The parametrial involvement on the  previous exam is no longer appreciated. No invasion of adjacent  organs, the pelvic sidewalls, or the upper third of the vagina. No  pelvic lymphadenopathy.  2. Findings of uterine adenomyosis and possibly endometrial  hyperplasia. Scattered fibroids.  3. Stable cystic focus in the right adnexa. Attention on follow-up  Studies.    Assessment:    Tolerating radiation therapy well.  All questions and concerns addressed.    Plan:   1. Continue current therapy.    2. Charanjit sleeve placement later this week for brachytherapy    Mosaiq chart and setup information reviewed  MVCT images reviewed    Medication Review  Med list reviewed with patient?: Yes    Educational Topic Discussed  Education Instructions: Reviewed    Anne Tidwell MD  Department of Radiation Oncology  [facility]

## 2019-09-09 ENCOUNTER — HOSPITAL ENCOUNTER (OUTPATIENT)
Dept: MRI IMAGING | Facility: CLINIC | Age: 49
End: 2019-09-09
Attending: RADIOLOGY | Admitting: RADIOLOGY
Payer: COMMERCIAL

## 2019-09-09 ENCOUNTER — ALLIED HEALTH/NURSE VISIT (OUTPATIENT)
Dept: RADIATION ONCOLOGY | Facility: CLINIC | Age: 49
End: 2019-09-09
Attending: RADIOLOGY
Payer: COMMERCIAL

## 2019-09-09 ENCOUNTER — HOSPITAL ENCOUNTER (OUTPATIENT)
Facility: CLINIC | Age: 49
Discharge: ANOTHER HOSPITAL | End: 2019-09-09
Attending: RADIOLOGY | Admitting: RADIOLOGY
Payer: COMMERCIAL

## 2019-09-09 ENCOUNTER — ANESTHESIA (OUTPATIENT)
Dept: GASTROENTEROLOGY | Facility: CLINIC | Age: 49
End: 2019-09-09
Payer: COMMERCIAL

## 2019-09-09 ENCOUNTER — ANESTHESIA EVENT (OUTPATIENT)
Dept: GASTROENTEROLOGY | Facility: CLINIC | Age: 49
End: 2019-09-09
Payer: COMMERCIAL

## 2019-09-09 VITALS
SYSTOLIC BLOOD PRESSURE: 100 MMHG | TEMPERATURE: 98.2 F | WEIGHT: 160.1 LBS | BODY MASS INDEX: 25.13 KG/M2 | DIASTOLIC BLOOD PRESSURE: 73 MMHG | RESPIRATION RATE: 17 BRPM | HEART RATE: 84 BPM | HEIGHT: 67 IN | OXYGEN SATURATION: 97 %

## 2019-09-09 DIAGNOSIS — C53.1 MALIGNANT NEOPLASM OF EXOCERVIX (H): ICD-10-CM

## 2019-09-09 DIAGNOSIS — C53.1 MALIGNANT NEOPLASM OF EXOCERVIX (H): Primary | ICD-10-CM

## 2019-09-09 PROCEDURE — 25000128 H RX IP 250 OP 636: Performed by: NURSE ANESTHETIST, CERTIFIED REGISTERED

## 2019-09-09 PROCEDURE — 77295 3-D RADIOTHERAPY PLAN: CPT | Performed by: RADIOLOGY

## 2019-09-09 PROCEDURE — 37000008 ZZH ANESTHESIA TECHNICAL FEE, 1ST 30 MIN

## 2019-09-09 PROCEDURE — C1717 BRACHYTX, NON-STR,HDR IR-192: HCPCS | Performed by: RADIOLOGY

## 2019-09-09 PROCEDURE — 77771 HDR RDNCL NTRSTL/ICAV BRCHTX: CPT | Performed by: RADIOLOGY

## 2019-09-09 PROCEDURE — 25000125 ZZHC RX 250: Performed by: NURSE ANESTHETIST, CERTIFIED REGISTERED

## 2019-09-09 PROCEDURE — 27110014 ZZH IMPLANT RADIATION BRIEF: Performed by: RADIOLOGY

## 2019-09-09 PROCEDURE — 77332 RADIATION TREATMENT AID(S): CPT | Performed by: RADIOLOGY

## 2019-09-09 PROCEDURE — 25800030 ZZH RX IP 258 OP 636: Performed by: NURSE ANESTHETIST, CERTIFIED REGISTERED

## 2019-09-09 PROCEDURE — 57155 INSERT UTERI TANDEM/OVOIDS: CPT | Performed by: RADIOLOGY

## 2019-09-09 PROCEDURE — 72195 MRI PELVIS W/O DYE: CPT

## 2019-09-09 RX ORDER — DEXAMETHASONE SODIUM PHOSPHATE 4 MG/ML
INJECTION, SOLUTION INTRA-ARTICULAR; INTRALESIONAL; INTRAMUSCULAR; INTRAVENOUS; SOFT TISSUE PRN
Status: DISCONTINUED | OUTPATIENT
Start: 2019-09-09 | End: 2019-09-09

## 2019-09-09 RX ORDER — ONDANSETRON 2 MG/ML
INJECTION INTRAMUSCULAR; INTRAVENOUS PRN
Status: DISCONTINUED | OUTPATIENT
Start: 2019-09-09 | End: 2019-09-09

## 2019-09-09 RX ORDER — SODIUM CHLORIDE, SODIUM LACTATE, POTASSIUM CHLORIDE, CALCIUM CHLORIDE 600; 310; 30; 20 MG/100ML; MG/100ML; MG/100ML; MG/100ML
INJECTION, SOLUTION INTRAVENOUS CONTINUOUS PRN
Status: DISCONTINUED | OUTPATIENT
Start: 2019-09-09 | End: 2019-09-09

## 2019-09-09 RX ORDER — KETOROLAC TROMETHAMINE 30 MG/ML
INJECTION, SOLUTION INTRAMUSCULAR; INTRAVENOUS PRN
Status: DISCONTINUED | OUTPATIENT
Start: 2019-09-09 | End: 2019-09-09

## 2019-09-09 RX ORDER — FENTANYL CITRATE 50 UG/ML
INJECTION, SOLUTION INTRAMUSCULAR; INTRAVENOUS PRN
Status: DISCONTINUED | OUTPATIENT
Start: 2019-09-09 | End: 2019-09-09

## 2019-09-09 RX ORDER — PROPOFOL 10 MG/ML
INJECTION, EMULSION INTRAVENOUS PRN
Status: DISCONTINUED | OUTPATIENT
Start: 2019-09-09 | End: 2019-09-09

## 2019-09-09 RX ORDER — LIDOCAINE HYDROCHLORIDE 20 MG/ML
INJECTION, SOLUTION INFILTRATION; PERINEURAL PRN
Status: DISCONTINUED | OUTPATIENT
Start: 2019-09-09 | End: 2019-09-09

## 2019-09-09 RX ORDER — PROPOFOL 10 MG/ML
INJECTION, EMULSION INTRAVENOUS CONTINUOUS PRN
Status: DISCONTINUED | OUTPATIENT
Start: 2019-09-09 | End: 2019-09-09

## 2019-09-09 RX ADMIN — MIDAZOLAM 2 MG: 1 INJECTION INTRAMUSCULAR; INTRAVENOUS at 11:34

## 2019-09-09 RX ADMIN — KETOROLAC TROMETHAMINE 30 MG: 30 INJECTION, SOLUTION INTRAMUSCULAR at 11:50

## 2019-09-09 RX ADMIN — LIDOCAINE HYDROCHLORIDE 60 MG: 20 INJECTION, SOLUTION INFILTRATION; PERINEURAL at 11:37

## 2019-09-09 RX ADMIN — PROPOFOL 150 MCG/KG/MIN: 10 INJECTION, EMULSION INTRAVENOUS at 11:38

## 2019-09-09 RX ADMIN — SODIUM CHLORIDE, POTASSIUM CHLORIDE, SODIUM LACTATE AND CALCIUM CHLORIDE: 600; 310; 30; 20 INJECTION, SOLUTION INTRAVENOUS at 11:34

## 2019-09-09 RX ADMIN — ONDANSETRON 4 MG: 2 INJECTION INTRAMUSCULAR; INTRAVENOUS at 11:44

## 2019-09-09 RX ADMIN — HYDROMORPHONE HYDROCHLORIDE 0.5 MG: 1 INJECTION, SOLUTION INTRAMUSCULAR; INTRAVENOUS; SUBCUTANEOUS at 11:48

## 2019-09-09 RX ADMIN — FENTANYL CITRATE 50 MCG: 50 INJECTION, SOLUTION INTRAMUSCULAR; INTRAVENOUS at 11:39

## 2019-09-09 RX ADMIN — DEXAMETHASONE SODIUM PHOSPHATE 4 MG: 4 INJECTION, SOLUTION INTRA-ARTICULAR; INTRALESIONAL; INTRAMUSCULAR; INTRAVENOUS; SOFT TISSUE at 11:44

## 2019-09-09 ASSESSMENT — MIFFLIN-ST. JEOR: SCORE: 1378.84

## 2019-09-09 NOTE — OP NOTE
OPERATIVE NOTE       PREOPERATIVE DIAGNOSIS: Squamous cell carcinoma of the cervix,  FIGO stage IB1 (clinical T1B1 N1 M0)     POSTOPERATIVE DIAGNOSIS: Squamous cell carcinoma of the cervix,  FIGO stage IB1 (clinical T1B1 N1 M0)     PROCEDURE PERFORMED: Placement of tandem and ring apparatus for HDR brachytherapy, 1 of 5      SURGEON: Anne Tidwell, Radiation Oncology     ASSISTANT: Sandoval Meeks MD     ANESTHESIA: Conscious sedation       COMPLICATIONS: None      ESTIMATED BLOOD LOSS: None      INTRAVENOUS FLUIDS: Per anesthesia record       OPERATIVE FINDINGS: Normal external genitalia. Charanjit sleeve visualized, sutured in place.       DESCRIPTION OF OPERATIVE PROCEDURE:  Ms. Nicole Fritsche was brought back to the endoscopy suite and identified to be herself. A time-out was performed and the patient was given conscious sedation. She was placed in the dorsal lithotomy position. Using sterile technique, a mccartney catheter was inserted into the bladder and 8 cc of saline was used to inflate the balloon. Speculum examination was performed with Charanjit sleeve visualized sutured in place at the cervical os. A 60 mm, 60 degree tandem applicator was placed and secured in the proper fashion. A 3.0 cm ring applicator was placed and secured. Vaginal packing was placed and the implant secured with radiation panties.  Anesthesia was then reversed and the patient was brought to MRI. Dr. Tidwell was present for the duration of the procedure.       Sandoval Meeks MD  PGY-3 Radiation Oncology    I was present with the resident during all critical portions of the operation, and immediately available for final wake-up.  I have edited Dr. Meeks' note to describe the operative findings and flow.     Anne Tidwell MD  Radiation Oncology

## 2019-09-09 NOTE — ANESTHESIA CARE TRANSFER NOTE
Patient: Nicole Fritsche    Procedure(s):  Anesthesia Coverage Rad Therapy @1130    Diagnosis: Cervical Cancer  Diagnosis Additional Information: No value filed.    Anesthesia Type:   MAC     Note:  Airway :Room Air  Patient transferred to: Recovery  Comments: Awake, VSS, patent airway, report to radiation oncology RN.Handoff Report: Identifed the Patient, Identified the Reponsible Provider, Reviewed the pertinent medical history, Discussed the surgical course, Reviewed Intra-OP anesthesia mangement and issues during anesthesia, Set expectations for post-procedure period and Allowed opportunity for questions and acknowledgement of understanding      Vitals: (Last set prior to Anesthesia Care Transfer)    CRNA VITALS  9/9/2019 1127 - 9/9/2019 1158      9/9/2019             Pulse:  82    SpO2:  98 %                Electronically Signed By: HOMERO Marcial CRNA  September 9, 2019  11:58 AM

## 2019-09-09 NOTE — ANESTHESIA PREPROCEDURE EVALUATION
Anesthesia Pre-Procedure Evaluation    Patient: Nicole Fritsche   MRN:     [id number] Gender:   female   Age:    49 year old :      1970        Preoperative Diagnosis: Cervical Cancer   Procedure(s):  Anesthesia Coverage Rad Therapy @1130     Past Medical History:   Diagnosis Date     Abnormal Pap smear of cervix 2019    see problem list     Arrested hydrocephalus 2/3/2009    Ongoing HAs, seeing neurosurgeon. Per past notes from Dr. Resendiz, HAs most likely not secondary to the hydrocephalus, but rather vascular inorgin.  Please see scanned in records.     Asthma      Cervical cancer (H)      Cervical high risk HPV (human papillomavirus) test positive 2019    See problem list      Past Surgical History:   Procedure Laterality Date     C VENTRICULO-CISTERNOSTOMY,3RD VENT      for treatment of HA related to hydrocephalus     EXAM UNDER ANESTHESIA, INSERT ANN MARIE SLEEVE, UTERINE PLACEMENT OF TANDEM AND RING FOR RAD, ULTRASOUND N/A 2019    Procedure: Ann Marie Sleeve Insertion, Ultrasound Guided;  Surgeon: Anne Tidwell MD;  Location: [facility] OR     EXCISE MASS TRUNK Left 2015    Procedure: EXCISE MASS TRUNK;  Surgeon: Carlos Enrique Briones MD;  Location: [facility] OR          Anesthesia Evaluation     .             ROS/MED HX    ENT/Pulmonary:  - neg pulmonary ROS     Neurologic: Comment: Hx of obstructive hydrocephalus now resolved      Cardiovascular:  - neg cardiovascular ROS       METS/Exercise Tolerance:     Hematologic:  - neg hematologic  ROS       Musculoskeletal:  - neg musculoskeletal ROS       GI/Hepatic:  - neg GI/hepatic ROS       Renal/Genitourinary: Comment: Cervical cancer        Endo:  - neg endo ROS       Psychiatric:  - neg psychiatric ROS       Infectious Disease:  - neg infectious disease ROS       Malignancy:      - no malignancy   Other:                         PHYSICAL EXAM:   Mental Status/Neuro: A/A/O   Airway: Facies: Feasible  Mallampati: II  Mouth/Opening: Full  TM  "distance: > 6 cm  Neck ROM: Full   Respiratory: Auscultation: CTAB     Resp. Rate: Normal     Resp. Effort: Normal      CV: Rhythm: Regular  Rate: Age appropriate  Heart: Normal Sounds  Edema: None   Comments:      Dental: Normal Dentition                LABS:  CBC:   Lab Results   Component Value Date    WBC 4.1 08/26/2019    WBC 5.1 08/19/2019    HGB 11.7 08/26/2019    HGB 12.6 08/19/2019    HCT 34.9 (L) 08/26/2019    HCT 37.4 08/19/2019     (L) 08/26/2019     08/19/2019     BMP:   Lab Results   Component Value Date     08/26/2019     08/19/2019    POTASSIUM 4.2 08/26/2019    POTASSIUM 3.6 08/19/2019    CHLORIDE 106 08/26/2019    CHLORIDE 106 08/19/2019    CO2 32 08/26/2019    CO2 29 08/19/2019    BUN 20 08/26/2019    BUN 10 08/19/2019    CR 0.61 08/26/2019    CR 0.56 08/19/2019    GLC 90 08/26/2019    GLC 95 08/19/2019     COAGS:   Lab Results   Component Value Date    PTT 28 02/24/2009    INR 1.01 02/24/2009     POC:   Lab Results   Component Value Date     (H) 08/30/2019    HCG Negative 06/03/2019     OTHER:   Lab Results   Component Value Date    NICOLE 9.3 08/26/2019    MAG 1.4 (L) 08/26/2019    ALBUMIN 3.3 (L) 08/26/2019    PROTTOTAL 6.8 08/26/2019    ALT 73 (H) 08/26/2019    AST 28 08/26/2019    ALKPHOS 88 08/26/2019    BILITOTAL 0.3 08/26/2019    TSH 4.20 (H) 02/13/2017    T4 1.02 02/13/2017        Preop Vitals    BP Readings from Last 3 Encounters:   08/30/19 121/88   08/27/19 (!) 147/90   08/26/19 110/75    Pulse Readings from Last 3 Encounters:   08/30/19 80   08/27/19 82   08/26/19 85      Resp Readings from Last 3 Encounters:   08/30/19 18   08/26/19 18   08/23/19 14    SpO2 Readings from Last 3 Encounters:   08/30/19 99%   08/26/19 99%   08/23/19 98%      Temp Readings from Last 1 Encounters:   08/30/19 36.8  C (98.2  F)    Ht Readings from Last 1 Encounters:   08/30/19 1.702 m (5' 7\")      Wt Readings from Last 1 Encounters:   08/30/19 73.4 kg (161 lb 13.1 oz)    " "Estimated body mass index is 25.34 kg/m  as calculated from the following:    Height as of 8/30/19: 1.702 m (5' 7\").    Weight as of 8/30/19: 73.4 kg (161 lb 13.1 oz).     LDA:        Assessment:   ASA SCORE: 2    H&P: History and physical reviewed and following examination; no interval change.   Smoking Status:  Non-Smoker/Unknown   NPO Status: NPO Appropriate     Plan:   Anes. Type:  MAC   Pre-Medication: None   Induction:  N/a   Airway: Native Airway   Access/Monitoring: PIV   Maintenance: N/a     Postop Plan:   Postop Pain: None  Postop Sedation/Airway: Not planned     PONV Management:   Adult Risk Factors: Female, Non-Smoker   Prevention: Ondansetron, Dexamethasone     CONSENT:      Blood Products: Consent Deferred (Minimal Blood Loss)                   Emanuel Longo MD  "

## 2019-09-09 NOTE — NURSING NOTE
Nicole Fritsche is here for scheduled HDR brachytherapy  1/5     Post-procedure-  Pain assessment: 0/10   Device removed without difficulty, Education for possible side effects and management, Discharge teaching reviewed, questions answered and Discharged to home with sister

## 2019-09-09 NOTE — ANESTHESIA POSTPROCEDURE EVALUATION
Anesthesia POST Procedure Evaluation    Patient: Nicole Fritsche   MRN:     [id number] Gender:   female   Age:    49 year old :      1970        Preoperative Diagnosis: Cervical Cancer   Procedure(s):  Anesthesia Coverage Rad Therapy @1130   Postop Comments: No value filed.       Anesthesia Type:  Not documented  MAC    Reportable Event: NO     PAIN: Uncomplicated   Sign Out status: Comfortable, Well controlled pain     PONV: No PONV   Sign Out status:  No Nausea or Vomiting     Neuro/Psych: Uneventful perioperative course   Sign Out Status: Preoperative baseline; Age appropriate mentation     Airway/Resp.: Uneventful perioperative course   Sign Out Status: Non labored breathing, age appropriate RR; Resp. Status within EXPECTED Parameters     CV: Uneventful perioperative course   Sign Out status: Appropriate BP and perfusion indices; Appropriate HR/Rhythm     Disposition:   Sign Out in:  PACU  Disposition:  Phase II; Home  Recovery Course: Uneventful  Follow-Up: Not required     Comments/Narrative:  I assessed the patient in radiation oncology recovery following her procedure.  The patient was awake and alert with minimal to moderate pain which was well controlled with medications.  The patient denied any significant nausea or sore throat.  The patient's heart rate and blood pressure with consistent with her preoperative measurements, and she was breathing comfortably without any signs of respiratory distress.  There were no readily apparent anesthetic complications.  All her questions were answered.           Last Anesthesia Record Vitals:  CRNA VITALS  2019 1127 - 2019 1227      2019             Pulse:  82    SpO2:  98 %          Last PACU Vitals:  No vitals data found for the desired time range.        Electronically Signed By: Emanuel Longo MD, 2019, 1:25 PM

## 2019-09-09 NOTE — PROGRESS NOTES
Nicole Fritsche is here for scheduled HDR brachytherapy    HDR Tandem and Ring   1    Pre-procedure-  Time out done by Alejandra GARDUNO and Dr. Sandoval Meeks.   Patient identified, arm band applied, signed consent available   Medications taken by patient prior to procedure: None  Pain assessment: 0  There were no vitals taken for this visit.

## 2019-09-09 NOTE — PATIENT INSTRUCTIONS
You will be having a type of treatment called High Dose Radiation (HDR) therapy.  You will have this near the end of your course of radiation therapy.     Preparing for HDR treatments:    1. You will be scheduled to have sedation for this procedure.  If so:    A. The nurse will tell you when to arrive and give you specific instructions to prepare for the sedation.  This will include not eating or drinking for six hours prior to arrival.   b.  You will check in on the scheduled day at the Endoscopy center on the first floor of   the hospital.Then you will be directed to the pre-surgery area to prepare.   c.  Your Radiation Oncology doctor and nurse will come to the procedure room to insert   the treatment tube and a urinary catheter.  You will be asleep for this procedure.   When you wake up you will be brought to the Radiation oncology department   for the radiation therapy treatment.  2. You will have an MRI (sometimes a CT scan also) and then wait for treatment planning.  This may take several hours. While the planning is being completed it is important for you to lie flat so that the vaginal treatment device stays in place.  3. When the planning is done, we will bring you to the treatment room to have your treatment.  4. You will be brought into an exam room.  The doctor or nurse will remove the vaginal treatment device and urinary catheter.  5. You will receive instructions for home and your next visit.   6. You MUST have a  to bring you home.  Post Sedation instructions:   1.  Get plenty of rest.  A responsible adult must stay with you for at least 24 hours after   you leave the hospital   2.  Do not drive or use heavy equipment.  If you have weakness or tingling, don't drive   or use heavy equipment until this feeling goes away.   3.  Do not drink alcohol for 24 hours   4.  Avoid strenuous or risky activities.  Ask for help when climbing stairs   5.  You may feel lightheaded.  IF so, sit for a few  "minutes before standing.  Have    someone help you get up.   6.  If you have nausea: Drink only clear liquids.  Be sure to drink enough fluids.  Move   to a regular diet as you feel able.   7.  You may have a dry mouth, a sore throat, muscle aches or trouble sleeping.  These   should go away after 24 hours.   8.  Do not make important or legal decisions.  When to call your doctor:   1. Some minor bleeding /spotting is normal after this procedure. Call if you have bright red vaginal bleeding.    2. If vaginal discharge has a bad odor.  3. If you have a fever over 100.5 F.  4. If you are not able to urinate more than 6 hours after the urinary catheter is removed.  5. If you have increased urinary burning when using the bathroom. You may have some minor burning with urination for a few days after the catheter is removed.    [facility] Radiation Oncology: [phone]    You will be having a type of treatment called High Dose Radiation (HDR) therapy.  You will have this near the end of your course of radiation therapy.    Prior to your first HDR treatment, you will see your primary care provider or Gyn nurse practitioner for a pre-op visit.  You will get a call from the surgery department with specific pre-op instructions including when to stop eating and drinking prior to surgery as well as check in instructions.  Cervical Sleeve insertion:  The first step of receiving your High Dose Radiation therapy will be the insertion of a special tube into the cervix.  This is done in the operating room and is a \"same day\" procedure.  You will have anesthesia and will need to have a  to take you home that day.  It is also required that you have a caregiver with you for 24 hours after this procedure for your own safety.  This will be scheduled near the end of your external beam therapy.    Because you go to the operating room for the cervical sleeve insertion you will need to have a \"pre-op\" " assessment by your primary care provider or by a provider in our system.  This usually usually involves some blood tests, an EKG and a chest x-ray.  Preparing for HDR treatments:    7. You will be scheduled to have sedation for this procedure.  If so:    A. The nurse will tell you when to arrive and give you specific instructions to prepare for the sedation.  This will include not eating or drinking for six hours prior to arrival.   b.  You will check in on the scheduled day at the Endoscopy center on the first floor of   the hospital.Then you will be directed to the pre-surgery area to prepare.   c.  Your Radiation Oncology doctor and nurse will come to the procedure room to insert   the treatment tube and a urinary catheter.  You will be asleep for this procedure.   When you wake up you will be brought to the Radiation oncology department   for the radiation therapy treatment.  8. You will have an MRI (sometimes a CT scan also) and then wait for treatment planning.  This may take several hours. While the planning is being completed it is important for you to lie flat so that the vaginal treatment device stays in place.  9. When the planning is done, we will bring you to the treatment room to have your treatment.  10. You will be brought into an exam room.  The doctor or nurse will remove the vaginal treatment device and urinary catheter.  11. You will receive instructions for home and your next visit.   12. You MUST have a  to bring you home.  Post Sedation instructions:   1.  Get plenty of rest.  A responsible adult must stay with you for at least 24 hours after   you leave the hospital   2.  Do not drive or use heavy equipment.  If you have weakness or tingling, don't drive   or use heavy equipment until this feeling goes away.   3.  Do not drink alcohol for 24 hours   4.  Avoid strenuous or risky activities.  Ask for help when climbing stairs   5.  You may feel lightheaded.  IF so, sit for a few minutes  before standing.  Have    someone help you get up.   6.  If you have nausea: Drink only clear liquids.  Be sure to drink enough fluids.  Move   to a regular diet as you feel able.   7.  You may have a dry mouth, a sore throat, muscle aches or trouble sleeping.  These   should go away after 24 hours.   8.  Do not make important or legal decisions.  When to call your doctor:   6. Some minor bleeding /spotting is normal after this procedure. Call if you have bright red vaginal bleeding.    7. If vaginal discharge has a bad odor.  8. If you have a fever over 100.5 F.  9. If you are not able to urinate more than 6 hours after the urinary catheter is removed.  10. If you have increased urinary burning when using the bathroom. You may have some minor burning with urination for a few days after the catheter is removed.    [facility] [place] Radiation Oncology: [phone]

## 2019-09-09 NOTE — PROGRESS NOTES
OPERATIVE NOTE       PREOPERATIVE DIAGNOSIS: Squamous cell carcinoma of the cervix,  FIGO stage IB1 (clinical T1B1 N1 M0)     POSTOPERATIVE DIAGNOSIS: Squamous cell carcinoma of the cervix,  FIGO stage IB1 (clinical T1B1 N1 M0)     PROCEDURE PERFORMED: Placement of tandem and ring apparatus for HDR brachytherapy, 1 of 5      SURGEON: Anne Tidwell, Radiation Oncology     ASSISTANT: Sandoval Meeks MD     ANESTHESIA: Conscious sedation       COMPLICATIONS: None      ESTIMATED BLOOD LOSS: None      INTRAVENOUS FLUIDS: Per anesthesia record       OPERATIVE FINDINGS: Normal external genitalia. Charanjit sleeve visualized, sutured in place.       DESCRIPTION OF OPERATIVE PROCEDURE:  Ms. Nicole Fritsche was brought back to the endoscopy suite and identified to be herself. A time-out was performed and the patient was given conscious sedation. She was placed in the dorsal lithotomy position. Using sterile technique, a mccartney catheter was inserted into the bladder and 8 cc of saline was used to inflate the balloon. Speculum examination was performed with Charanjit sleeve visualized sutured in place at the cervical os. A 60 mm, 60 degree tandem applicator was placed and secured in the proper fashion. A 3.0 cm ring applicator was placed and secured. Vaginal packing was placed and the implant secured with radiation panties.  Anesthesia was then reversed and the patient was brought to MRI. Dr. Tidwell was present for the duration of the procedure.       Sandoval Meeks MD  PGY-3 Radiation Oncology

## 2019-09-09 NOTE — PROGRESS NOTES
A radiation therapy treatment planning simulation was performed.  HDR brachytherapy tandem and ring 1 of 5.  Please see the [facility] record for documentation.    Anne Tidwell MD  Radiation Oncology

## 2019-09-11 ENCOUNTER — HOSPITAL ENCOUNTER (OUTPATIENT)
Dept: MRI IMAGING | Facility: CLINIC | Age: 49
End: 2019-09-11
Attending: RADIOLOGY | Admitting: RADIOLOGY
Payer: COMMERCIAL

## 2019-09-11 ENCOUNTER — ALLIED HEALTH/NURSE VISIT (OUTPATIENT)
Dept: RADIATION ONCOLOGY | Facility: CLINIC | Age: 49
End: 2019-09-11
Attending: RADIOLOGY
Payer: COMMERCIAL

## 2019-09-11 ENCOUNTER — ANESTHESIA EVENT (OUTPATIENT)
Dept: GASTROENTEROLOGY | Facility: CLINIC | Age: 49
End: 2019-09-11
Payer: COMMERCIAL

## 2019-09-11 ENCOUNTER — HOSPITAL ENCOUNTER (OUTPATIENT)
Facility: CLINIC | Age: 49
Discharge: HOME | End: 2019-09-11
Attending: RADIOLOGY | Admitting: RADIOLOGY
Payer: COMMERCIAL

## 2019-09-11 ENCOUNTER — ANESTHESIA (OUTPATIENT)
Dept: GASTROENTEROLOGY | Facility: CLINIC | Age: 49
End: 2019-09-11
Payer: COMMERCIAL

## 2019-09-11 VITALS
TEMPERATURE: 98.9 F | SYSTOLIC BLOOD PRESSURE: 95 MMHG | RESPIRATION RATE: 16 BRPM | OXYGEN SATURATION: 95 % | DIASTOLIC BLOOD PRESSURE: 72 MMHG

## 2019-09-11 DIAGNOSIS — C53.1 MALIGNANT NEOPLASM OF EXOCERVIX (H): Primary | ICD-10-CM

## 2019-09-11 DIAGNOSIS — C53.1 MALIGNANT NEOPLASM OF EXOCERVIX (H): ICD-10-CM

## 2019-09-11 PROCEDURE — 25800030 ZZH RX IP 258 OP 636: Performed by: NURSE ANESTHETIST, CERTIFIED REGISTERED

## 2019-09-11 PROCEDURE — 40000268 MR LIMITED SCAN

## 2019-09-11 PROCEDURE — 37000008 ZZH ANESTHESIA TECHNICAL FEE, 1ST 30 MIN

## 2019-09-11 PROCEDURE — C1717 BRACHYTX, NON-STR,HDR IR-192: HCPCS | Performed by: RADIOLOGY

## 2019-09-11 PROCEDURE — 25000128 H RX IP 250 OP 636: Performed by: NURSE ANESTHETIST, CERTIFIED REGISTERED

## 2019-09-11 PROCEDURE — 77332 RADIATION TREATMENT AID(S): CPT | Performed by: RADIOLOGY

## 2019-09-11 PROCEDURE — 77290 THER RAD SIMULAJ FIELD CPLX: CPT | Performed by: RADIOLOGY

## 2019-09-11 PROCEDURE — 77771 HDR RDNCL NTRSTL/ICAV BRCHTX: CPT | Performed by: RADIOLOGY

## 2019-09-11 PROCEDURE — 27110014 ZZH IMPLANT RADIATION BRIEF: Performed by: RADIOLOGY

## 2019-09-11 PROCEDURE — 77317 BRACHYTX ISODOSE INTERMED: CPT | Performed by: RADIOLOGY

## 2019-09-11 PROCEDURE — 40000141 ZZH STATISTIC PERIPHERAL IV START W/O US GUIDANCE

## 2019-09-11 PROCEDURE — 57155 INSERT UTERI TANDEM/OVOIDS: CPT | Performed by: RADIOLOGY

## 2019-09-11 PROCEDURE — 25000125 ZZHC RX 250: Performed by: NURSE ANESTHETIST, CERTIFIED REGISTERED

## 2019-09-11 RX ORDER — ALBUTEROL SULFATE 0.83 MG/ML
2.5 SOLUTION RESPIRATORY (INHALATION) EVERY 4 HOURS PRN
Status: CANCELLED | OUTPATIENT
Start: 2019-09-11

## 2019-09-11 RX ORDER — SODIUM CHLORIDE, SODIUM LACTATE, POTASSIUM CHLORIDE, CALCIUM CHLORIDE 600; 310; 30; 20 MG/100ML; MG/100ML; MG/100ML; MG/100ML
INJECTION, SOLUTION INTRAVENOUS CONTINUOUS PRN
Status: DISCONTINUED | OUTPATIENT
Start: 2019-09-11 | End: 2019-09-11

## 2019-09-11 RX ORDER — FENTANYL CITRATE 50 UG/ML
INJECTION, SOLUTION INTRAMUSCULAR; INTRAVENOUS PRN
Status: DISCONTINUED | OUTPATIENT
Start: 2019-09-11 | End: 2019-09-11

## 2019-09-11 RX ORDER — PROPOFOL 10 MG/ML
INJECTION, EMULSION INTRAVENOUS PRN
Status: DISCONTINUED | OUTPATIENT
Start: 2019-09-11 | End: 2019-09-11

## 2019-09-11 RX ORDER — DEXAMETHASONE SODIUM PHOSPHATE 4 MG/ML
INJECTION, SOLUTION INTRA-ARTICULAR; INTRALESIONAL; INTRAMUSCULAR; INTRAVENOUS; SOFT TISSUE PRN
Status: DISCONTINUED | OUTPATIENT
Start: 2019-09-11 | End: 2019-09-11

## 2019-09-11 RX ORDER — ONDANSETRON 4 MG/1
4 TABLET, FILM COATED ORAL EVERY 8 HOURS PRN
Qty: 20 TABLET | Refills: 1 | Status: SHIPPED | OUTPATIENT
Start: 2019-09-11 | End: 2019-11-27

## 2019-09-11 RX ORDER — ONDANSETRON 4 MG/1
4 TABLET, ORALLY DISINTEGRATING ORAL EVERY 30 MIN PRN
Status: CANCELLED | OUTPATIENT
Start: 2019-09-11

## 2019-09-11 RX ORDER — ONDANSETRON 2 MG/ML
4 INJECTION INTRAMUSCULAR; INTRAVENOUS EVERY 30 MIN PRN
Status: CANCELLED | OUTPATIENT
Start: 2019-09-11

## 2019-09-11 RX ORDER — FENTANYL CITRATE 50 UG/ML
25-50 INJECTION, SOLUTION INTRAMUSCULAR; INTRAVENOUS
Status: CANCELLED | OUTPATIENT
Start: 2019-09-11

## 2019-09-11 RX ORDER — MEPERIDINE HYDROCHLORIDE 50 MG/ML
12.5 INJECTION INTRAMUSCULAR; INTRAVENOUS; SUBCUTANEOUS
Status: CANCELLED | OUTPATIENT
Start: 2019-09-11

## 2019-09-11 RX ORDER — HYDROMORPHONE HYDROCHLORIDE 1 MG/ML
.3-.5 INJECTION, SOLUTION INTRAMUSCULAR; INTRAVENOUS; SUBCUTANEOUS EVERY 10 MIN PRN
Status: CANCELLED | OUTPATIENT
Start: 2019-09-11

## 2019-09-11 RX ORDER — SODIUM CHLORIDE, SODIUM LACTATE, POTASSIUM CHLORIDE, CALCIUM CHLORIDE 600; 310; 30; 20 MG/100ML; MG/100ML; MG/100ML; MG/100ML
INJECTION, SOLUTION INTRAVENOUS CONTINUOUS
Status: CANCELLED | OUTPATIENT
Start: 2019-09-11

## 2019-09-11 RX ORDER — LABETALOL HYDROCHLORIDE 5 MG/ML
10 INJECTION, SOLUTION INTRAVENOUS
Status: CANCELLED | OUTPATIENT
Start: 2019-09-11

## 2019-09-11 RX ORDER — NALOXONE HYDROCHLORIDE 0.4 MG/ML
.1-.4 INJECTION, SOLUTION INTRAMUSCULAR; INTRAVENOUS; SUBCUTANEOUS
Status: CANCELLED | OUTPATIENT
Start: 2019-09-11 | End: 2019-09-12

## 2019-09-11 RX ORDER — LIDOCAINE HYDROCHLORIDE 20 MG/ML
INJECTION, SOLUTION INFILTRATION; PERINEURAL PRN
Status: DISCONTINUED | OUTPATIENT
Start: 2019-09-11 | End: 2019-09-11

## 2019-09-11 RX ORDER — KETOROLAC TROMETHAMINE 30 MG/ML
INJECTION, SOLUTION INTRAMUSCULAR; INTRAVENOUS PRN
Status: DISCONTINUED | OUTPATIENT
Start: 2019-09-11 | End: 2019-09-11

## 2019-09-11 RX ORDER — ONDANSETRON 2 MG/ML
INJECTION INTRAMUSCULAR; INTRAVENOUS PRN
Status: DISCONTINUED | OUTPATIENT
Start: 2019-09-11 | End: 2019-09-11

## 2019-09-11 RX ORDER — PROPOFOL 10 MG/ML
INJECTION, EMULSION INTRAVENOUS CONTINUOUS PRN
Status: DISCONTINUED | OUTPATIENT
Start: 2019-09-11 | End: 2019-09-11

## 2019-09-11 RX ADMIN — DEXAMETHASONE SODIUM PHOSPHATE 6 MG: 4 INJECTION, SOLUTION INTRA-ARTICULAR; INTRALESIONAL; INTRAMUSCULAR; INTRAVENOUS; SOFT TISSUE at 09:37

## 2019-09-11 RX ADMIN — LIDOCAINE HYDROCHLORIDE 60 MG: 20 INJECTION, SOLUTION INFILTRATION; PERINEURAL at 09:31

## 2019-09-11 RX ADMIN — PROPOFOL 100 MCG/KG/MIN: 10 INJECTION, EMULSION INTRAVENOUS at 09:31

## 2019-09-11 RX ADMIN — HYDROMORPHONE HYDROCHLORIDE 0.5 MG: 1 INJECTION, SOLUTION INTRAMUSCULAR; INTRAVENOUS; SUBCUTANEOUS at 09:38

## 2019-09-11 RX ADMIN — MIDAZOLAM 2 MG: 1 INJECTION INTRAMUSCULAR; INTRAVENOUS at 09:26

## 2019-09-11 RX ADMIN — KETOROLAC TROMETHAMINE 30 MG: 30 INJECTION, SOLUTION INTRAMUSCULAR at 09:37

## 2019-09-11 RX ADMIN — ONDANSETRON 4 MG: 2 INJECTION INTRAMUSCULAR; INTRAVENOUS at 09:25

## 2019-09-11 RX ADMIN — SODIUM CHLORIDE, POTASSIUM CHLORIDE, SODIUM LACTATE AND CALCIUM CHLORIDE: 600; 310; 30; 20 INJECTION, SOLUTION INTRAVENOUS at 09:25

## 2019-09-11 RX ADMIN — PROPOFOL 40 MG: 10 INJECTION, EMULSION INTRAVENOUS at 09:31

## 2019-09-11 RX ADMIN — FENTANYL CITRATE 50 MCG: 50 INJECTION, SOLUTION INTRAMUSCULAR; INTRAVENOUS at 09:26

## 2019-09-11 NOTE — PROGRESS NOTES
Cassi Fritsche is here for scheduled HDR brachytherapy    HDR Tandem and Ring   2    Pre-procedure-  Time out done by Cassi Arreaga RN and Dr Tidwell.   Patient identified, arm band applied, signed consent available   Medications taken by patient prior to procedure See OR notes  Pain assessment: 0/10    Post-procedure-  Pain assessment: 0/10   Device removed without difficulty, Education for possible side effects and management, Discharge teaching reviewed, questions answered, Vaginal dilator use reviewed and Discharged to home

## 2019-09-11 NOTE — OP NOTE
OPERATIVE NOTE       PREOPERATIVE DIAGNOSIS: Squamous cell carcinoma of the cervix,  FIGO stage IB1 (clinical T1B1 N1 M0)     POSTOPERATIVE DIAGNOSIS: Squamous cell carcinoma of the cervix,  FIGO stage IB1 (clinical T1B1 N1 M0)     PROCEDURE PERFORMED: Placement of tandem and ring apparatus for HDR brachytherapy, 2 of 5      SURGEON: Anne Tidwell, Radiation Oncology     ASSISTANT: Sandoval Meeks MD     ANESTHESIA: Conscious sedation       COMPLICATIONS: None      ESTIMATED BLOOD LOSS: None      INTRAVENOUS FLUIDS: Per anesthesia record       OPERATIVE FINDINGS: Normal external genitalia. Charanjit sleeve visualized, sutured in place.       DESCRIPTION OF OPERATIVE PROCEDURE:  Ms. Nicole Fritsche was brought back to the endoscopy suite and identified to be herself. A time-out was performed and the patient was given conscious sedation. She was placed in the dorsal lithotomy position. Using sterile technique, a mccartney catheter was inserted into the bladder and 8 cc of saline was used to inflate the balloon. Speculum examination was performed with Charanjit sleeve visualized sutured in place at the cervical os. A 60 mm, 60 degree tandem applicator was placed and secured in the proper fashion. A 3.4 cm ring applicator was placed and secured (note, this is increased size from first fraction). Vaginal packing was placed and the implant secured with radiation panties.  Anesthesia was then reversed and the patient was brought to MRI. Dr. Tidwell was present for the duration of the procedure.       Sandoval Meeks MD  PGY-3 Radiation Oncology    I was present with the resident during all critical portions of the operation, and immediately available for final wake-up.  I have edited Dr. Meeks' note to describe the operative findings and flow.     Anne Tidwell MD  Radiation Oncology

## 2019-09-11 NOTE — PROGRESS NOTES
A radiation therapy treatment planning simulation was performed.  HDR brachytherapy tandem and ring 2 of 5.  Please see the [facility] record for documentation.    Anne Tidwell MD  Radiation Oncology

## 2019-09-11 NOTE — ANESTHESIA PREPROCEDURE EVALUATION
Anesthesia Pre-Procedure Evaluation    Patient: Nicole Fritsche   MRN:     [id number] Gender:   female   Age:    49 year old :      1970        Preoperative Diagnosis: Cervical Cancer   Procedure(s):  Anesthesia Coverage Rad Therapy @0930     Past Medical History:   Diagnosis Date     Abnormal Pap smear of cervix 2019    see problem list     Arrested hydrocephalus 2/3/2009    Ongoing HAs, seeing neurosurgeon. Per past notes from Dr. Resendiz, HAs most likely not secondary to the hydrocephalus, but rather vascular inorgin.  Please see scanned in records.     Asthma      Cervical cancer (H)      Cervical high risk HPV (human papillomavirus) test positive 2019    See problem list      Past Surgical History:   Procedure Laterality Date     C VENTRICULO-CISTERNOSTOMY,3RD VENT      for treatment of HA related to hydrocephalus     EXAM UNDER ANESTHESIA, INSERT ANN MARIE SLEEVE, UTERINE PLACEMENT OF TANDEM AND RING FOR RAD, ULTRASOUND N/A 2019    Procedure: Ann Marie Sleeve Insertion, Ultrasound Guided;  Surgeon: Anne Tidwell MD;  Location: [facility] OR     EXCISE MASS TRUNK Left 2015    Procedure: EXCISE MASS TRUNK;  Surgeon: Carlos Enrique Briones MD;  Location: [facility]          Anesthesia Evaluation     .             ROS/MED HX    ENT/Pulmonary:  - neg pulmonary ROS   (+)Intermittent asthma , . .    Neurologic: Comment: Hx of obstructive hydrocephalus now resolved, per pt never had shunt.        Cardiovascular:  - neg cardiovascular ROS       METS/Exercise Tolerance:  >4 METS   Hematologic:  - neg hematologic  ROS       Musculoskeletal:  - neg musculoskeletal ROS       GI/Hepatic:  - neg GI/hepatic ROS       Renal/Genitourinary: Comment: Cervical cancer        Endo:  - neg endo ROS       Psychiatric:  - neg psychiatric ROS       Infectious Disease:  - neg infectious disease ROS       Malignancy:      - no malignancy   Other:                         PHYSICAL EXAM:   Mental Status/Neuro: A/A/O   Airway:  "Facies: Feasible  Mallampati: I  Mouth/Opening: Full  TM distance: > 6 cm  Neck ROM: Full   Respiratory: Auscultation: CTAB     Resp. Rate: Normal     Resp. Effort: Normal      CV: Rhythm: Regular  Rate: Age appropriate  Heart: Normal Sounds  Edema: None   Comments:      Dental: Normal Dentition                LABS:  CBC:   Lab Results   Component Value Date    WBC 4.1 08/26/2019    WBC 5.1 08/19/2019    HGB 11.7 08/26/2019    HGB 12.6 08/19/2019    HCT 34.9 (L) 08/26/2019    HCT 37.4 08/19/2019     (L) 08/26/2019     08/19/2019     BMP:   Lab Results   Component Value Date     08/26/2019     08/19/2019    POTASSIUM 4.2 08/26/2019    POTASSIUM 3.6 08/19/2019    CHLORIDE 106 08/26/2019    CHLORIDE 106 08/19/2019    CO2 32 08/26/2019    CO2 29 08/19/2019    BUN 20 08/26/2019    BUN 10 08/19/2019    CR 0.61 08/26/2019    CR 0.56 08/19/2019    GLC 90 08/26/2019    GLC 95 08/19/2019     COAGS:   Lab Results   Component Value Date    PTT 28 02/24/2009    INR 1.01 02/24/2009     POC:   Lab Results   Component Value Date     (H) 08/30/2019    HCG Negative 06/03/2019     OTHER:   Lab Results   Component Value Date    NICOLE 9.3 08/26/2019    MAG 1.4 (L) 08/26/2019    ALBUMIN 3.3 (L) 08/26/2019    PROTTOTAL 6.8 08/26/2019    ALT 73 (H) 08/26/2019    AST 28 08/26/2019    ALKPHOS 88 08/26/2019    BILITOTAL 0.3 08/26/2019    TSH 4.20 (H) 02/13/2017    T4 1.02 02/13/2017        Preop Vitals    BP Readings from Last 3 Encounters:   09/11/19 95/72   09/09/19 100/73   08/30/19 121/88    Pulse Readings from Last 3 Encounters:   09/09/19 84   08/30/19 80   08/27/19 82      Resp Readings from Last 3 Encounters:   09/11/19 16   09/09/19 17   08/30/19 18    SpO2 Readings from Last 3 Encounters:   09/11/19 95%   09/09/19 97%   08/30/19 99%      Temp Readings from Last 1 Encounters:   09/11/19 37.2  C (98.9  F) (Oral)    Ht Readings from Last 1 Encounters:   09/09/19 1.702 m (5' 7\")      Wt Readings from Last " "1 Encounters:   09/09/19 72.6 kg (160 lb 1.6 oz)    Estimated body mass index is 25.08 kg/m  as calculated from the following:    Height as of 9/9/19: 1.702 m (5' 7\").    Weight as of 9/9/19: 72.6 kg (160 lb 1.6 oz).     LDA:  Peripheral IV 09/11/19 Left Hand (Active)   Number of days: 0        Assessment:   ASA SCORE: 2    H&P: History and physical reviewed and following examination; no interval change.    NPO Status: NPO Appropriate     Plan:   Anes. Type:  MAC   Pre-Medication: None   Induction:  IV (Standard)   Airway: Native Airway   Access/Monitoring: PIV   Maintenance: Propofol Sedation (ketorolac)     Postop Plan:   Postop Pain: None  Postop Sedation/Airway: Not planned     PONV Management: Adult Risk Factors: Female   Prevention: Ondansetron, Dexamethasone, Propofol     CONSENT: Direct conversation   Plan and risks discussed with: Patient          Comments for Plan/Consent:  ______________________________________________________________________  I discussed the risks and benefits of general anesthesia with the patient.  Questions were sought and answered.      Grady Real MD  Attending Anesthesiologist                   Grady Real MD  "

## 2019-09-11 NOTE — ANESTHESIA CARE TRANSFER NOTE
Patient: Nicole Fritsche    Procedure(s):  Anesthesia Coverage Rad Therapy @0930    Diagnosis: Cervical Cancer  Diagnosis Additional Information: No value filed.    Anesthesia Type:   MAC     Note:  Airway :Nasal Cannula    Comments: Anesthesia Care Transfer Note    Patient: Nicole Fritsche    Patient vital signs: stable    Airway: none    Monitors on, VSS, pt. Stable, Report given to onc team RN.     Kamran Crawley CRNA  9/11/2019 9:50 AM            Vitals: (Last set prior to Anesthesia Care Transfer)    CRNA VITALS  9/11/2019 0919 - 9/11/2019 0949      9/11/2019             Pulse:  91    Ht Rate:  87    SpO2:  97 %    EKG:  Sinus rhythm                Electronically Signed By: HOMERO Lugo CRNA  September 11, 2019  9:49 AM

## 2019-09-13 ENCOUNTER — ANESTHESIA EVENT (OUTPATIENT)
Dept: GASTROENTEROLOGY | Facility: CLINIC | Age: 49
End: 2019-09-13
Payer: COMMERCIAL

## 2019-09-13 ENCOUNTER — ALLIED HEALTH/NURSE VISIT (OUTPATIENT)
Dept: RADIATION ONCOLOGY | Facility: CLINIC | Age: 49
End: 2019-09-13
Attending: RADIOLOGY
Payer: COMMERCIAL

## 2019-09-13 ENCOUNTER — HOSPITAL ENCOUNTER (OUTPATIENT)
Dept: MRI IMAGING | Facility: CLINIC | Age: 49
End: 2019-09-13
Attending: RADIOLOGY | Admitting: RADIOLOGY
Payer: COMMERCIAL

## 2019-09-13 ENCOUNTER — HOSPITAL ENCOUNTER (OUTPATIENT)
Facility: CLINIC | Age: 49
Discharge: ANOTHER HOSPITAL | End: 2019-09-13
Attending: RADIOLOGY | Admitting: RADIOLOGY
Payer: COMMERCIAL

## 2019-09-13 ENCOUNTER — ANESTHESIA (OUTPATIENT)
Dept: GASTROENTEROLOGY | Facility: CLINIC | Age: 49
End: 2019-09-13
Payer: COMMERCIAL

## 2019-09-13 VITALS
HEART RATE: 85 BPM | OXYGEN SATURATION: 99 % | DIASTOLIC BLOOD PRESSURE: 77 MMHG | TEMPERATURE: 98.2 F | RESPIRATION RATE: 15 BRPM | SYSTOLIC BLOOD PRESSURE: 113 MMHG

## 2019-09-13 VITALS — DIASTOLIC BLOOD PRESSURE: 77 MMHG | SYSTOLIC BLOOD PRESSURE: 113 MMHG

## 2019-09-13 DIAGNOSIS — C53.1 MALIGNANT NEOPLASM OF EXOCERVIX (H): Primary | ICD-10-CM

## 2019-09-13 DIAGNOSIS — C53.1 MALIGNANT NEOPLASM OF EXOCERVIX (H): ICD-10-CM

## 2019-09-13 PROCEDURE — 40000556 ZZH STATISTIC PERIPHERAL IV START W US GUIDANCE

## 2019-09-13 PROCEDURE — 37000009 ZZH ANESTHESIA TECHNICAL FEE, EACH ADDTL 15 MIN

## 2019-09-13 PROCEDURE — 77771 HDR RDNCL NTRSTL/ICAV BRCHTX: CPT | Performed by: RADIOLOGY

## 2019-09-13 PROCEDURE — 77332 RADIATION TREATMENT AID(S): CPT | Performed by: RADIOLOGY

## 2019-09-13 PROCEDURE — 40000268 MR LIMITED SCAN

## 2019-09-13 PROCEDURE — 25800030 ZZH RX IP 258 OP 636: Performed by: NURSE ANESTHETIST, CERTIFIED REGISTERED

## 2019-09-13 PROCEDURE — 25000128 H RX IP 250 OP 636: Performed by: NURSE ANESTHETIST, CERTIFIED REGISTERED

## 2019-09-13 PROCEDURE — 37000008 ZZH ANESTHESIA TECHNICAL FEE, 1ST 30 MIN

## 2019-09-13 PROCEDURE — 57155 INSERT UTERI TANDEM/OVOIDS: CPT | Performed by: RADIOLOGY

## 2019-09-13 PROCEDURE — 77317 BRACHYTX ISODOSE INTERMED: CPT | Performed by: RADIOLOGY

## 2019-09-13 PROCEDURE — 77290 THER RAD SIMULAJ FIELD CPLX: CPT | Performed by: RADIOLOGY

## 2019-09-13 PROCEDURE — C1717 BRACHYTX, NON-STR,HDR IR-192: HCPCS | Performed by: RADIOLOGY

## 2019-09-13 PROCEDURE — 27110014 ZZH IMPLANT RADIATION BRIEF: Performed by: RADIOLOGY

## 2019-09-13 RX ORDER — PROPOFOL 10 MG/ML
INJECTION, EMULSION INTRAVENOUS CONTINUOUS PRN
Status: DISCONTINUED | OUTPATIENT
Start: 2019-09-13 | End: 2019-09-13

## 2019-09-13 RX ORDER — SODIUM CHLORIDE, SODIUM LACTATE, POTASSIUM CHLORIDE, CALCIUM CHLORIDE 600; 310; 30; 20 MG/100ML; MG/100ML; MG/100ML; MG/100ML
INJECTION, SOLUTION INTRAVENOUS CONTINUOUS PRN
Status: DISCONTINUED | OUTPATIENT
Start: 2019-09-13 | End: 2019-09-13

## 2019-09-13 RX ORDER — FENTANYL CITRATE 50 UG/ML
INJECTION, SOLUTION INTRAMUSCULAR; INTRAVENOUS PRN
Status: DISCONTINUED | OUTPATIENT
Start: 2019-09-13 | End: 2019-09-13

## 2019-09-13 RX ORDER — ONDANSETRON 2 MG/ML
INJECTION INTRAMUSCULAR; INTRAVENOUS PRN
Status: DISCONTINUED | OUTPATIENT
Start: 2019-09-13 | End: 2019-09-13

## 2019-09-13 RX ORDER — DEXAMETHASONE SODIUM PHOSPHATE 4 MG/ML
INJECTION, SOLUTION INTRA-ARTICULAR; INTRALESIONAL; INTRAMUSCULAR; INTRAVENOUS; SOFT TISSUE PRN
Status: DISCONTINUED | OUTPATIENT
Start: 2019-09-13 | End: 2019-09-13

## 2019-09-13 RX ORDER — PROPOFOL 10 MG/ML
INJECTION, EMULSION INTRAVENOUS PRN
Status: DISCONTINUED | OUTPATIENT
Start: 2019-09-13 | End: 2019-09-13

## 2019-09-13 RX ADMIN — FENTANYL CITRATE 50 MCG: 50 INJECTION, SOLUTION INTRAMUSCULAR; INTRAVENOUS at 10:00

## 2019-09-13 RX ADMIN — SODIUM CHLORIDE, POTASSIUM CHLORIDE, SODIUM LACTATE AND CALCIUM CHLORIDE: 600; 310; 30; 20 INJECTION, SOLUTION INTRAVENOUS at 09:47

## 2019-09-13 RX ADMIN — PROPOFOL 100 MCG/KG/MIN: 10 INJECTION, EMULSION INTRAVENOUS at 10:00

## 2019-09-13 RX ADMIN — MIDAZOLAM 2 MG: 1 INJECTION INTRAMUSCULAR; INTRAVENOUS at 09:47

## 2019-09-13 RX ADMIN — PROPOFOL 30 MG: 10 INJECTION, EMULSION INTRAVENOUS at 10:08

## 2019-09-13 RX ADMIN — DEXAMETHASONE SODIUM PHOSPHATE 6 MG: 4 INJECTION, SOLUTION INTRA-ARTICULAR; INTRALESIONAL; INTRAMUSCULAR; INTRAVENOUS; SOFT TISSUE at 10:08

## 2019-09-13 RX ADMIN — ONDANSETRON 4 MG: 2 INJECTION INTRAMUSCULAR; INTRAVENOUS at 10:08

## 2019-09-13 NOTE — ANESTHESIA POSTPROCEDURE EVALUATION
Anesthesia POST Procedure Evaluation    Patient: Nicole Fritsche   MRN:     [id number] Gender:   female   Age:    49 year old :      1970        Preoperative Diagnosis: Cervical Cancer   Procedure(s):  Anesthesia Coverage Rad Therapy @0930   Postop Comments: No value filed.       Anesthesia Type:  Not documented  MAC    Reportable Event: NO     PAIN: Uncomplicated   Sign Out status: Comfortable, Well controlled pain     PONV: No PONV   Sign Out status:  No Nausea or Vomiting     Neuro/Psych: Uneventful perioperative course   Sign Out Status: Preoperative baseline; Age appropriate mentation     Airway/Resp.: Uneventful perioperative course   Sign Out Status: Non labored breathing, age appropriate RR; Resp. Status within EXPECTED Parameters     CV: Uneventful perioperative course   Sign Out status: Appropriate BP and perfusion indices; Appropriate HR/Rhythm     Disposition:   Sign Out in:  PACU  Disposition:  Phase II; Home  Recovery Course: Uneventful  Follow-Up: Not required           Last Anesthesia Record Vitals:  CRNA VITALS  2019 0952 - 2019 1049      2019             Pulse:  80    Ht Rate:  80    SpO2:  99 %    EKG:  Sinus rhythm          Last PACU Vitals:  No vitals data found for the desired time range.        Electronically Signed By: Fabiana Gupta MD, 2019, 10:49 AM

## 2019-09-13 NOTE — ANESTHESIA PREPROCEDURE EVALUATION
Anesthesia Pre-Procedure Evaluation    Patient: Nicole Fritsche   MRN:     [id number] Gender:   female   Age:    49 year old :      1970        Preoperative Diagnosis: Cervical Cancer   Procedure(s):  Anesthesia Coverage Rad Therapy @0930     Past Medical History:   Diagnosis Date     Abnormal Pap smear of cervix 2019    see problem list     Arrested hydrocephalus 2/3/2009    Ongoing HAs, seeing neurosurgeon. Per past notes from Dr. Resendiz, HAs most likely not secondary to the hydrocephalus, but rather vascular inorgin.  Please see scanned in records.     Asthma      Cervical cancer (H)      Cervical high risk HPV (human papillomavirus) test positive 2019    See problem list      Past Surgical History:   Procedure Laterality Date     C VENTRICULO-CISTERNOSTOMY,3RD VENT      for treatment of HA related to hydrocephalus     EXAM UNDER ANESTHESIA, INSERT ANN MARIE SLEEVE, UTERINE PLACEMENT OF TANDEM AND RING FOR RAD, ULTRASOUND N/A 2019    Procedure: Ann Marie Sleeve Insertion, Ultrasound Guided;  Surgeon: Anne Tidwell MD;  Location: [facility] OR     EXCISE MASS TRUNK Left 2015    Procedure: EXCISE MASS TRUNK;  Surgeon: Carlos Enrique Briones MD;  Location: [facility]          Anesthesia Evaluation     . Pt has had prior anesthetic. Type: General and MAC           ROS/MED HX    ENT/Pulmonary:  - neg pulmonary ROS   (+)Intermittent asthma , . .    Neurologic: Comment: Hx of obstructive hydrocephalus now resolved, per pt never had shunt.        Cardiovascular:  - neg cardiovascular ROS       METS/Exercise Tolerance:  >4 METS   Hematologic:  - neg hematologic  ROS       Musculoskeletal:  - neg musculoskeletal ROS       GI/Hepatic:  - neg GI/hepatic ROS       Renal/Genitourinary: Comment: Cervical cancer        Endo:  - neg endo ROS       Psychiatric:  - neg psychiatric ROS       Infectious Disease:  - neg infectious disease ROS       Malignancy:      - no malignancy   Other:                   Anesthesia  Pre-Procedure Evaluation    Patient: Nicole Fritsche   MRN:     [id number] Gender:   female   Age:    49 year old :      1970        Preoperative Diagnosis: Cervical Cancer   Procedure(s):  Anesthesia Coverage Rad Therapy @1130     Past Medical History:   Diagnosis Date     Abnormal Pap smear of cervix 2019    see problem list     Arrested hydrocephalus 2/3/2009    Ongoing HAs, seeing neurosurgeon. Per past notes from Dr. Resendiz, HAs most likely not secondary to the hydrocephalus, but rather vascular inorgin.  Please see scanned in records.     Asthma      Cervical cancer (H)      Cervical high risk HPV (human papillomavirus) test positive 2019    See problem list      Past Surgical History:   Procedure Laterality Date     C VENTRICULO-CISTERNOSTOMY,3RD VENT      for treatment of HA related to hydrocephalus     EXAM UNDER ANESTHESIA, INSERT ANNM ARIE SLEEVE, UTERINE PLACEMENT OF TANDEM AND RING FOR RAD, ULTRASOUND N/A 2019    Procedure: Ann Marie Sleeve Insertion, Ultrasound Guided;  Surgeon: Anne Tidwell MD;  Location: [facility]     EXCISE MASS TRUNK Left 2015    Procedure: EXCISE MASS TRUNK;  Surgeon: Carlos Enrique Briones MD;  Location: [facility]          Anesthesia Evaluation     .             ROS/MED HX    ENT/Pulmonary:  - neg pulmonary ROS     Neurologic: Comment: Hx of obstructive hydrocephalus now resolved      Cardiovascular:  - neg cardiovascular ROS       METS/Exercise Tolerance:     Hematologic:  - neg hematologic  ROS       Musculoskeletal:  - neg musculoskeletal ROS       GI/Hepatic:  - neg GI/hepatic ROS       Renal/Genitourinary: Comment: Cervical cancer        Endo:  - neg endo ROS       Psychiatric:  - neg psychiatric ROS       Infectious Disease:  - neg infectious disease ROS       Malignancy:      - no malignancy   Other:                         PHYSICAL EXAM:   Mental Status/Neuro: A/A/O   Airway: Facies: Feasible  Mallampati: II  Mouth/Opening: Full  TM distance: > 6  "cm  Neck ROM: Full   Respiratory: Auscultation: CTAB     Resp. Rate: Normal     Resp. Effort: Normal      CV: Rhythm: Regular  Rate: Age appropriate  Heart: Normal Sounds  Edema: None   Comments:      Dental: Normal Dentition                LABS:  CBC:   Lab Results   Component Value Date    WBC 4.1 08/26/2019    WBC 5.1 08/19/2019    HGB 11.7 08/26/2019    HGB 12.6 08/19/2019    HCT 34.9 (L) 08/26/2019    HCT 37.4 08/19/2019     (L) 08/26/2019     08/19/2019     BMP:   Lab Results   Component Value Date     08/26/2019     08/19/2019    POTASSIUM 4.2 08/26/2019    POTASSIUM 3.6 08/19/2019    CHLORIDE 106 08/26/2019    CHLORIDE 106 08/19/2019    CO2 32 08/26/2019    CO2 29 08/19/2019    BUN 20 08/26/2019    BUN 10 08/19/2019    CR 0.61 08/26/2019    CR 0.56 08/19/2019    GLC 90 08/26/2019    GLC 95 08/19/2019     COAGS:   Lab Results   Component Value Date    PTT 28 02/24/2009    INR 1.01 02/24/2009     POC:   Lab Results   Component Value Date     (H) 08/30/2019    HCG Negative 06/03/2019     OTHER:   Lab Results   Component Value Date    NICOLE 9.3 08/26/2019    MAG 1.4 (L) 08/26/2019    ALBUMIN 3.3 (L) 08/26/2019    PROTTOTAL 6.8 08/26/2019    ALT 73 (H) 08/26/2019    AST 28 08/26/2019    ALKPHOS 88 08/26/2019    BILITOTAL 0.3 08/26/2019    TSH 4.20 (H) 02/13/2017    T4 1.02 02/13/2017        Preop Vitals    BP Readings from Last 3 Encounters:   09/11/19 95/72   09/09/19 100/73   08/30/19 121/88    Pulse Readings from Last 3 Encounters:   09/09/19 84   08/30/19 80   08/27/19 82      Resp Readings from Last 3 Encounters:   09/11/19 16   09/09/19 17   08/30/19 18    SpO2 Readings from Last 3 Encounters:   09/11/19 95%   09/09/19 97%   08/30/19 99%      Temp Readings from Last 1 Encounters:   09/11/19 37.2  C (98.9  F) (Oral)    Ht Readings from Last 1 Encounters:   09/09/19 1.702 m (5' 7\")      Wt Readings from Last 1 Encounters:   09/09/19 72.6 kg (160 lb 1.6 oz)    Estimated body mass " "index is 25.08 kg/m  as calculated from the following:    Height as of 19: 1.702 m (5' 7\").    Weight as of 19: 72.6 kg (160 lb 1.6 oz).     LDA:  Peripheral IV 19 Left Hand (Active)   Number of days: 2        Assessment:   ASA SCORE: 2    H&P: History and physical reviewed and following examination; no interval change.   Smoking Status:  Non-Smoker/Unknown   NPO Status: NPO Appropriate     Plan:   Anes. Type:  MAC   Pre-Medication: None   Induction:  N/a   Airway: Native Airway   Access/Monitoring: PIV   Maintenance: N/a     Postop Plan:   Postop Pain: None  Postop Sedation/Airway: Not planned     PONV Management:   Adult Risk Factors: Female, Non-Smoker   Prevention: Ondansetron, Dexamethasone     CONSENT:      Blood Products: Consent Deferred (Minimal Blood Loss)                   Fabiana Gupta MD  Anesthesia Pre-Procedure Evaluation    Patient: Nicole Fritsche   MRN:     [id number] Gender:   female   Age:    49 year old :      1970        Preoperative Diagnosis: Cervical Cancer   Procedure(s):  Ann Marie Sleeve Insertion, Ultrasound Guided     Past Medical History:   Diagnosis Date     Abnormal Pap smear of cervix 2019    see problem list     Arrested hydrocephalus 2/3/2009    Ongoing HAs, seeing neurosurgeon. Per past notes from Dr. Resendiz, HAs most likely not secondary to the hydrocephalus, but rather vascular inorgin.  Please see scanned in records.     Asthma      Cervical cancer (H)      Cervical high risk HPV (human papillomavirus) test positive 2019    See problem list      Past Surgical History:   Procedure Laterality Date     C VENTRICULO-CISTERNOSTOMY,3RD VENT      for treatment of HA related to hydrocephalus     EXAM UNDER ANESTHESIA, INSERT ANN MARIE SLEEVE, UTERINE PLACEMENT OF TANDEM AND RING FOR RAD, ULTRASOUND N/A 2019    Procedure: Ann Marie Sleeve Insertion, Ultrasound Guided;  Surgeon: Anne Tidwell MD;  Location: [facility] OR     EXCISE MASS TRUNK Left 2015 "    Procedure: EXCISE MASS TRUNK;  Surgeon: Carlos Enrique Briones MD;  Location: [facility]          Anesthesia Evaluation     .             ROS/MED HX    ENT/Pulmonary:  - neg pulmonary ROS     Neurologic: Comment: Hx of obstructive hydrocephalus now resolved      Cardiovascular:  - neg cardiovascular ROS       METS/Exercise Tolerance:     Hematologic:  - neg hematologic  ROS       Musculoskeletal:  - neg musculoskeletal ROS       GI/Hepatic:  - neg GI/hepatic ROS       Renal/Genitourinary: Comment: Cervical cancer        Endo:  - neg endo ROS       Psychiatric:  - neg psychiatric ROS       Infectious Disease:  - neg infectious disease ROS       Malignancy:      - no malignancy   Other:                         PHYSICAL EXAM:   Mental Status/Neuro: A/A/O   Airway: Facies: Feasible  Mallampati: I  Mouth/Opening: Full  TM distance: > 6 cm  Neck ROM: Full   Respiratory: Auscultation: CTAB     Resp. Rate: Normal     Resp. Effort: Normal      CV: Rhythm: Regular  Rate: Age appropriate  Heart: Normal Sounds  Edema: None   Comments:      Dental: Normal Dentition                LABS:  CBC:   Lab Results   Component Value Date    WBC 4.1 08/26/2019    WBC 5.1 08/19/2019    HGB 11.7 08/26/2019    HGB 12.6 08/19/2019    HCT 34.9 (L) 08/26/2019    HCT 37.4 08/19/2019     (L) 08/26/2019     08/19/2019     BMP:   Lab Results   Component Value Date     08/26/2019     08/19/2019    POTASSIUM 4.2 08/26/2019    POTASSIUM 3.6 08/19/2019    CHLORIDE 106 08/26/2019    CHLORIDE 106 08/19/2019    CO2 32 08/26/2019    CO2 29 08/19/2019    BUN 20 08/26/2019    BUN 10 08/19/2019    CR 0.61 08/26/2019    CR 0.56 08/19/2019    GLC 90 08/26/2019    GLC 95 08/19/2019     COAGS:   Lab Results   Component Value Date    PTT 28 02/24/2009    INR 1.01 02/24/2009     POC:   Lab Results   Component Value Date     (H) 08/30/2019    HCG Negative 06/03/2019     OTHER:   Lab Results   Component Value Date    NICOLE 9.3 08/26/2019  "   MAG 1.4 (L) 08/26/2019    ALBUMIN 3.3 (L) 08/26/2019    PROTTOTAL 6.8 08/26/2019    ALT 73 (H) 08/26/2019    AST 28 08/26/2019    ALKPHOS 88 08/26/2019    BILITOTAL 0.3 08/26/2019    TSH 4.20 (H) 02/13/2017    T4 1.02 02/13/2017        Preop Vitals    BP Readings from Last 3 Encounters:   09/11/19 95/72   09/09/19 100/73   08/30/19 121/88    Pulse Readings from Last 3 Encounters:   09/09/19 84   08/30/19 80   08/27/19 82      Resp Readings from Last 3 Encounters:   09/11/19 16   09/09/19 17   08/30/19 18    SpO2 Readings from Last 3 Encounters:   09/11/19 95%   09/09/19 97%   08/30/19 99%      Temp Readings from Last 1 Encounters:   09/11/19 37.2  C (98.9  F) (Oral)    Ht Readings from Last 1 Encounters:   09/09/19 1.702 m (5' 7\")      Wt Readings from Last 1 Encounters:   09/09/19 72.6 kg (160 lb 1.6 oz)    Estimated body mass index is 25.08 kg/m  as calculated from the following:    Height as of 9/9/19: 1.702 m (5' 7\").    Weight as of 9/9/19: 72.6 kg (160 lb 1.6 oz).     LDA:  Peripheral IV 09/11/19 Left Hand (Active)   Number of days: 2        Assessment:   ASA SCORE: 2      Smoking Status:  Non-Smoker/Unknown   NPO Status: NPO Appropriate     Plan:   Anes. Type:  General   Pre-Medication: None   Induction:  IV (Standard)   Airway: ETT; Oral   Access/Monitoring: PIV   Maintenance: Balanced     Postop Plan:   Postop Pain: Opioids  Postop Sedation/Airway: Not planned     PONV Management:   Adult Risk Factors: Female, Non-Smoker, Postop Opioids   Prevention: Ondansetron, Dexamethasone     CONSENT: Direct conversation   Plan and risks discussed with: Patient   Blood Products: Consent Deferred (Minimal Blood Loss)                   Fabiana Gupta MD  Anesthesia Evaluation     . Pt has had prior anesthetic.     No history of anesthetic complications     ROS/MED HX    ENT/Pulmonary: Comment: Quit smoking, 5/25/2015    (+)tobacco use, Past use Mild Persistent asthma , . .    Neurologic:     (+)other neuro " Hydrocephalus, S/P 3rd Ventricle ventriculostomy, 2/2009    Cardiovascular:  - neg cardiovascular ROS   (+) . : . . . :. . Previous cardiac testing date:results:date: results:ECG reviewed date:9/8/2015 results:NSR=63. Normal. date: results:          METS/Exercise Tolerance:     Hematologic: Comments: Recently treated with Iron supplements, Hgb= 13.3    (+) Anemia, -      Musculoskeletal:  - neg musculoskeletal ROS       GI/Hepatic:  - neg GI/hepatic ROS       Renal/Genitourinary:  - ROS Renal section negative       Endo:  - neg endo ROS       Psychiatric:  - neg psychiatric ROS       Infectious Disease:  - neg infectious disease ROS       Malignancy:      - no malignancy   Other:    - neg other ROS           Physical Exam  Normal systems: cardiovascular, pulmonary and dental    Airway   Mallampati: II  TM distance: >3 FB  Neck ROM: full    Dental     Cardiovascular   Rhythm and rate: regular and normal      Pulmonary    breath sounds clear to auscultation                    Anesthesia Plan    ASA Score: 2 .    Plan for General and ETT -  with Intravenous and Propofol induction. Maintenance will be TIVA.      Anesthetic plan, risks, benefits and alternatives discussed with: patient or representative. Routine analgesia and antiemetics      .            History & Physical Review  History and physical reviewed and following examination; no interval change.                            .        PHYSICAL EXAM:   Mental Status/Neuro: A/A/O   Airway: Facies: Feasible  Mallampati: I  Mouth/Opening: Full  TM distance: > 6 cm  Neck ROM: Full   Respiratory: Auscultation: CTAB     Resp. Rate: Normal     Resp. Effort: Normal      CV: Rhythm: Regular  Rate: Age appropriate  Heart: Normal Sounds  Edema: None   Comments:      Dental: Normal Dentition                LABS:  CBC:   Lab Results   Component Value Date    WBC 4.1 08/26/2019    WBC 5.1 08/19/2019    HGB 11.7 08/26/2019    HGB 12.6 08/19/2019    HCT 34.9 (L) 08/26/2019    HCT  "37.4 08/19/2019     (L) 08/26/2019     08/19/2019     BMP:   Lab Results   Component Value Date     08/26/2019     08/19/2019    POTASSIUM 4.2 08/26/2019    POTASSIUM 3.6 08/19/2019    CHLORIDE 106 08/26/2019    CHLORIDE 106 08/19/2019    CO2 32 08/26/2019    CO2 29 08/19/2019    BUN 20 08/26/2019    BUN 10 08/19/2019    CR 0.61 08/26/2019    CR 0.56 08/19/2019    GLC 90 08/26/2019    GLC 95 08/19/2019     COAGS:   Lab Results   Component Value Date    PTT 28 02/24/2009    INR 1.01 02/24/2009     POC:   Lab Results   Component Value Date     (H) 08/30/2019    HCG Negative 06/03/2019     OTHER:   Lab Results   Component Value Date    NICOLE 9.3 08/26/2019    MAG 1.4 (L) 08/26/2019    ALBUMIN 3.3 (L) 08/26/2019    PROTTOTAL 6.8 08/26/2019    ALT 73 (H) 08/26/2019    AST 28 08/26/2019    ALKPHOS 88 08/26/2019    BILITOTAL 0.3 08/26/2019    TSH 4.20 (H) 02/13/2017    T4 1.02 02/13/2017        Preop Vitals    BP Readings from Last 3 Encounters:   09/11/19 95/72   09/09/19 100/73   08/30/19 121/88    Pulse Readings from Last 3 Encounters:   09/09/19 84   08/30/19 80   08/27/19 82      Resp Readings from Last 3 Encounters:   09/11/19 16   09/09/19 17   08/30/19 18    SpO2 Readings from Last 3 Encounters:   09/11/19 95%   09/09/19 97%   08/30/19 99%      Temp Readings from Last 1 Encounters:   09/11/19 37.2  C (98.9  F) (Oral)    Ht Readings from Last 1 Encounters:   09/09/19 1.702 m (5' 7\")      Wt Readings from Last 1 Encounters:   09/09/19 72.6 kg (160 lb 1.6 oz)    Estimated body mass index is 25.08 kg/m  as calculated from the following:    Height as of 9/9/19: 1.702 m (5' 7\").    Weight as of 9/9/19: 72.6 kg (160 lb 1.6 oz).     LDA:  Peripheral IV 09/11/19 Left Hand (Active)   Number of days: 2        Assessment:   ASA SCORE: 2    H&P: History and physical reviewed and following examination; no interval change.    NPO Status: NPO Appropriate     Plan:   Anes. Type:  MAC   Pre-Medication: " None   Induction:  IV (Standard)   Airway: Native Airway   Access/Monitoring: PIV   Maintenance: Propofol Sedation (ketorolac)     Postop Plan:   Postop Pain: None  Postop Sedation/Airway: Not planned  Disposition: Inpatient/Admit     PONV Management: Adult Risk Factors: Female   Prevention: Ondansetron, Dexamethasone, Propofol     CONSENT: Direct conversation   Plan and risks discussed with: Patient   Blood Products: Consent Deferred (Minimal Blood Loss)       Comments for Plan/Consent:  ______________________________________________________________________  I discussed the risks and benefits of general anesthesia with the patient.  Questions were sought and answered.      Grady Real MD  Attending Anesthesiologist                       Fabiana Gupta MD

## 2019-09-13 NOTE — PROGRESS NOTES
OPERATIVE NOTE       PREOPERATIVE DIAGNOSIS: Squamous cell carcinoma of the cervix,  FIGO stage IB1 (clinical T1B1 N1 M0)     POSTOPERATIVE DIAGNOSIS: Squamous cell carcinoma of the cervix,  FIGO stage IB1 (clinical T1B1 N1 M0)     PROCEDURE PERFORMED: Placement of tandem and ring apparatus for HDR brachytherapy, 3 of 5      SURGEON: Anne Tidwell, Radiation Oncology     ASSISTANT: Sandoval Meeks MD     ANESTHESIA: Conscious sedation       COMPLICATIONS: None      ESTIMATED BLOOD LOSS: None      INTRAVENOUS FLUIDS: Per anesthesia record       OPERATIVE FINDINGS: Normal external genitalia. Charanjit sleeve visualized, sutured in place.       DESCRIPTION OF OPERATIVE PROCEDURE:  Ms. Nicole Fritsche was brought back to the endoscopy suite and identified to be herself. A time-out was performed and the patient was given conscious sedation. She was placed in the dorsal lithotomy position. Using sterile technique, a mccartney catheter was inserted into the bladder and 8 cc of saline was used to inflate the balloon. Speculum examination was performed with Charanjit sleeve visualized sutured in place at the cervical os. A 60 mm, 60 degree tandem applicator was placed and secured in the proper fashion. A 3.4 cm ring applicator was placed and secured (note, this is increased size from first fraction). Vaginal packing was placed and the implant secured with radiation panties.  Anesthesia was then reversed and the patient was brought to MRI. Dr. Tidwell was present for the duration of the procedure.       Sandoval Meeks MD  PGY-3 Radiation Oncology

## 2019-09-13 NOTE — PROGRESS NOTES
A radiation therapy treatment planning simulation was performed.  HDR brachytherapy tandem and ring 3 of 5.  Please see the [facility] record for documentation.    Anne Tidwell MD  Radiation Oncology

## 2019-09-13 NOTE — PROGRESS NOTES
Cassi Fritsche is here for scheduled HDR brachytherapy    HDR Tandem and Ring   3    Pre-procedure-  Time out done by Cassi Arreaga RN and Dr Tidwell.   Patient identified, arm band applied, signed consent available   Medications taken by patient prior to procedure See OR notes  Pain assessment: 0/10  /77     Post-procedure-  Pain assessment: 0/10   Device removed without difficulty, Education for possible side effects and management, Discharge teaching reviewed, questions answered and Discharged to home

## 2019-09-13 NOTE — OP NOTE
OPERATIVE NOTE       PREOPERATIVE DIAGNOSIS: Squamous cell carcinoma of the cervix,  FIGO stage IB1 (clinical T1B1 N1 M0)     POSTOPERATIVE DIAGNOSIS: Squamous cell carcinoma of the cervix,  FIGO stage IB1 (clinical T1B1 N1 M0)     PROCEDURE PERFORMED: Placement of tandem and ring apparatus for HDR brachytherapy, 3 of 5      SURGEON: Anne Tidwell, Radiation Oncology     ASSISTANT: Sanodval Meeks MD     ANESTHESIA: Conscious sedation       COMPLICATIONS: None      ESTIMATED BLOOD LOSS: None      INTRAVENOUS FLUIDS: Per anesthesia record       OPERATIVE FINDINGS: Normal external genitalia. Charanjit sleeve visualized, sutured in place.       DESCRIPTION OF OPERATIVE PROCEDURE:  Ms. Nicole Fritsche was brought back to the endoscopy suite and identified to be herself. A time-out was performed and the patient was given conscious sedation. She was placed in the dorsal lithotomy position. Using sterile technique, a mccartney catheter was inserted into the bladder and 8 cc of saline was used to inflate the balloon. Speculum examination was performed with Charanjit sleeve visualized sutured in place at the cervical os. A 60 mm, 60 degree tandem applicator was placed and secured in the proper fashion. A 3.4 cm ring applicator was placed and secured (note, this is increased size from first fraction). Vaginal packing was placed and the implant secured with radiation panties.  Anesthesia was then reversed and the patient was brought to MRI. Dr. Tidwell was present for the duration of the procedure.       Sandoval Meeks MD  PGY-3 Radiation Oncology    I was present with the resident during all critical portions of the operation, and immediately available for final wake-up.  I have edited Dr. Meeks' note to describe the operative findings and flow.     Anne Tidwell MD  Radiation Oncology

## 2019-09-13 NOTE — ANESTHESIA CARE TRANSFER NOTE
Patient: Nicole Fritsche    Procedure(s):  Anesthesia Coverage Rad Therapy @0930    Diagnosis: Cervical Cancer  Diagnosis Additional Information: No value filed.    Anesthesia Type:   MAC     Note:  Airway :Room Air  Destination: mri.  Comments: Pt is awake, alert, and states she is comfortable  To mri with radiation RN        Vitals: (Last set prior to Anesthesia Care Transfer)    CRNA VITALS  9/13/2019 0951 - 9/13/2019 1021      9/13/2019             Pulse:  80    Ht Rate:  80    SpO2:  99 %    EKG:  Sinus rhythm                Electronically Signed By: HOMERO Espinal CRNA  September 13, 2019  10:21 AM

## 2019-09-16 ENCOUNTER — HOSPITAL ENCOUNTER (OUTPATIENT)
Dept: MRI IMAGING | Facility: CLINIC | Age: 49
End: 2019-09-16
Attending: RADIOLOGY | Admitting: RADIOLOGY
Payer: COMMERCIAL

## 2019-09-16 ENCOUNTER — ANESTHESIA EVENT (OUTPATIENT)
Dept: GASTROENTEROLOGY | Facility: CLINIC | Age: 49
End: 2019-09-16
Payer: COMMERCIAL

## 2019-09-16 ENCOUNTER — ALLIED HEALTH/NURSE VISIT (OUTPATIENT)
Dept: RADIATION ONCOLOGY | Facility: CLINIC | Age: 49
End: 2019-09-16
Attending: RADIOLOGY
Payer: COMMERCIAL

## 2019-09-16 ENCOUNTER — HOSPITAL ENCOUNTER (OUTPATIENT)
Facility: CLINIC | Age: 49
Discharge: ANOTHER HOSPITAL | End: 2019-09-16
Attending: RADIOLOGY | Admitting: RADIOLOGY
Payer: COMMERCIAL

## 2019-09-16 ENCOUNTER — ANESTHESIA (OUTPATIENT)
Dept: GASTROENTEROLOGY | Facility: CLINIC | Age: 49
End: 2019-09-16
Payer: COMMERCIAL

## 2019-09-16 VITALS
OXYGEN SATURATION: 94 % | RESPIRATION RATE: 28 BRPM | BODY MASS INDEX: 25.06 KG/M2 | WEIGHT: 160 LBS | SYSTOLIC BLOOD PRESSURE: 100 MMHG | DIASTOLIC BLOOD PRESSURE: 66 MMHG | HEART RATE: 78 BPM | TEMPERATURE: 98.1 F

## 2019-09-16 DIAGNOSIS — C53.1 MALIGNANT NEOPLASM OF EXOCERVIX (H): Primary | ICD-10-CM

## 2019-09-16 DIAGNOSIS — C53.1 MALIGNANT NEOPLASM OF EXOCERVIX (H): ICD-10-CM

## 2019-09-16 PROCEDURE — 25800030 ZZH RX IP 258 OP 636: Performed by: NURSE ANESTHETIST, CERTIFIED REGISTERED

## 2019-09-16 PROCEDURE — 77317 BRACHYTX ISODOSE INTERMED: CPT | Performed by: RADIOLOGY

## 2019-09-16 PROCEDURE — 37000008 ZZH ANESTHESIA TECHNICAL FEE, 1ST 30 MIN

## 2019-09-16 PROCEDURE — 77332 RADIATION TREATMENT AID(S): CPT | Performed by: RADIOLOGY

## 2019-09-16 PROCEDURE — 40000268 MR LIMITED SCAN: Mod: TC

## 2019-09-16 PROCEDURE — C1717 BRACHYTX, NON-STR,HDR IR-192: HCPCS | Performed by: RADIOLOGY

## 2019-09-16 PROCEDURE — 27110014 ZZH IMPLANT RADIATION BRIEF: Performed by: RADIOLOGY

## 2019-09-16 PROCEDURE — 77771 HDR RDNCL NTRSTL/ICAV BRCHTX: CPT | Performed by: RADIOLOGY

## 2019-09-16 PROCEDURE — 25000128 H RX IP 250 OP 636: Performed by: NURSE ANESTHETIST, CERTIFIED REGISTERED

## 2019-09-16 PROCEDURE — 37000009 ZZH ANESTHESIA TECHNICAL FEE, EACH ADDTL 15 MIN

## 2019-09-16 PROCEDURE — 40000556 ZZH STATISTIC PERIPHERAL IV START W US GUIDANCE

## 2019-09-16 PROCEDURE — 77290 THER RAD SIMULAJ FIELD CPLX: CPT | Performed by: RADIOLOGY

## 2019-09-16 PROCEDURE — 57155 INSERT UTERI TANDEM/OVOIDS: CPT | Performed by: RADIOLOGY

## 2019-09-16 RX ORDER — NALOXONE HYDROCHLORIDE 0.4 MG/ML
.1-.4 INJECTION, SOLUTION INTRAMUSCULAR; INTRAVENOUS; SUBCUTANEOUS
Status: CANCELLED | OUTPATIENT
Start: 2019-09-16 | End: 2019-09-17

## 2019-09-16 RX ORDER — SODIUM CHLORIDE, SODIUM LACTATE, POTASSIUM CHLORIDE, CALCIUM CHLORIDE 600; 310; 30; 20 MG/100ML; MG/100ML; MG/100ML; MG/100ML
INJECTION, SOLUTION INTRAVENOUS CONTINUOUS PRN
Status: DISCONTINUED | OUTPATIENT
Start: 2019-09-16 | End: 2019-09-16

## 2019-09-16 RX ORDER — ACETAMINOPHEN 325 MG/1
975 TABLET ORAL ONCE
Status: CANCELLED | OUTPATIENT
Start: 2019-09-16 | End: 2019-09-16

## 2019-09-16 RX ORDER — ONDANSETRON 2 MG/ML
4 INJECTION INTRAMUSCULAR; INTRAVENOUS EVERY 30 MIN PRN
Status: CANCELLED | OUTPATIENT
Start: 2019-09-16

## 2019-09-16 RX ORDER — FENTANYL CITRATE 50 UG/ML
25-50 INJECTION, SOLUTION INTRAMUSCULAR; INTRAVENOUS EVERY 5 MIN PRN
Status: CANCELLED | OUTPATIENT
Start: 2019-09-16

## 2019-09-16 RX ORDER — FENTANYL CITRATE 50 UG/ML
INJECTION, SOLUTION INTRAMUSCULAR; INTRAVENOUS PRN
Status: DISCONTINUED | OUTPATIENT
Start: 2019-09-16 | End: 2019-09-16

## 2019-09-16 RX ORDER — DEXAMETHASONE SODIUM PHOSPHATE 4 MG/ML
INJECTION, SOLUTION INTRA-ARTICULAR; INTRALESIONAL; INTRAMUSCULAR; INTRAVENOUS; SOFT TISSUE PRN
Status: DISCONTINUED | OUTPATIENT
Start: 2019-09-16 | End: 2019-09-16

## 2019-09-16 RX ORDER — MEPERIDINE HYDROCHLORIDE 50 MG/ML
12.5 INJECTION INTRAMUSCULAR; INTRAVENOUS; SUBCUTANEOUS
Status: CANCELLED | OUTPATIENT
Start: 2019-09-16

## 2019-09-16 RX ORDER — KETOROLAC TROMETHAMINE 30 MG/ML
INJECTION, SOLUTION INTRAMUSCULAR; INTRAVENOUS PRN
Status: DISCONTINUED | OUTPATIENT
Start: 2019-09-16 | End: 2019-09-16

## 2019-09-16 RX ORDER — ONDANSETRON 4 MG/1
4 TABLET, ORALLY DISINTEGRATING ORAL EVERY 30 MIN PRN
Status: CANCELLED | OUTPATIENT
Start: 2019-09-16

## 2019-09-16 RX ORDER — PROPOFOL 10 MG/ML
INJECTION, EMULSION INTRAVENOUS PRN
Status: DISCONTINUED | OUTPATIENT
Start: 2019-09-16 | End: 2019-09-16

## 2019-09-16 RX ORDER — KETOROLAC TROMETHAMINE 30 MG/ML
30 INJECTION, SOLUTION INTRAMUSCULAR; INTRAVENOUS EVERY 6 HOURS PRN
Status: CANCELLED | OUTPATIENT
Start: 2019-09-16 | End: 2019-09-21

## 2019-09-16 RX ORDER — SODIUM CHLORIDE, SODIUM LACTATE, POTASSIUM CHLORIDE, CALCIUM CHLORIDE 600; 310; 30; 20 MG/100ML; MG/100ML; MG/100ML; MG/100ML
INJECTION, SOLUTION INTRAVENOUS CONTINUOUS
Status: CANCELLED | OUTPATIENT
Start: 2019-09-16

## 2019-09-16 RX ORDER — DIMENHYDRINATE 50 MG/ML
25 INJECTION, SOLUTION INTRAMUSCULAR; INTRAVENOUS
Status: CANCELLED | OUTPATIENT
Start: 2019-09-16

## 2019-09-16 RX ORDER — PROPOFOL 10 MG/ML
INJECTION, EMULSION INTRAVENOUS CONTINUOUS PRN
Status: DISCONTINUED | OUTPATIENT
Start: 2019-09-16 | End: 2019-09-16

## 2019-09-16 RX ORDER — HYDROMORPHONE HYDROCHLORIDE 1 MG/ML
.3-.5 INJECTION, SOLUTION INTRAMUSCULAR; INTRAVENOUS; SUBCUTANEOUS EVERY 10 MIN PRN
Status: CANCELLED | OUTPATIENT
Start: 2019-09-16

## 2019-09-16 RX ORDER — ONDANSETRON 2 MG/ML
INJECTION INTRAMUSCULAR; INTRAVENOUS PRN
Status: DISCONTINUED | OUTPATIENT
Start: 2019-09-16 | End: 2019-09-16

## 2019-09-16 RX ADMIN — FENTANYL CITRATE 50 MCG: 50 INJECTION, SOLUTION INTRAMUSCULAR; INTRAVENOUS at 09:36

## 2019-09-16 RX ADMIN — PROPOFOL 40 MG: 10 INJECTION, EMULSION INTRAVENOUS at 09:37

## 2019-09-16 RX ADMIN — MIDAZOLAM 1 MG: 1 INJECTION INTRAMUSCULAR; INTRAVENOUS at 09:29

## 2019-09-16 RX ADMIN — ONDANSETRON 4 MG: 2 INJECTION INTRAMUSCULAR; INTRAVENOUS at 09:39

## 2019-09-16 RX ADMIN — SODIUM CHLORIDE, POTASSIUM CHLORIDE, SODIUM LACTATE AND CALCIUM CHLORIDE: 600; 310; 30; 20 INJECTION, SOLUTION INTRAVENOUS at 09:29

## 2019-09-16 RX ADMIN — KETOROLAC TROMETHAMINE 30 MG: 30 INJECTION, SOLUTION INTRAMUSCULAR at 09:45

## 2019-09-16 RX ADMIN — FENTANYL CITRATE 50 MCG: 50 INJECTION, SOLUTION INTRAMUSCULAR; INTRAVENOUS at 09:39

## 2019-09-16 RX ADMIN — MIDAZOLAM 1 MG: 1 INJECTION INTRAMUSCULAR; INTRAVENOUS at 09:36

## 2019-09-16 RX ADMIN — PROPOFOL 100 MCG/KG/MIN: 10 INJECTION, EMULSION INTRAVENOUS at 09:36

## 2019-09-16 RX ADMIN — DEXAMETHASONE SODIUM PHOSPHATE 6 MG: 4 INJECTION, SOLUTION INTRA-ARTICULAR; INTRALESIONAL; INTRAMUSCULAR; INTRAVENOUS; SOFT TISSUE at 09:39

## 2019-09-16 RX ADMIN — HYDROMORPHONE HYDROCHLORIDE 0.5 MG: 1 INJECTION, SOLUTION INTRAMUSCULAR; INTRAVENOUS; SUBCUTANEOUS at 09:53

## 2019-09-16 NOTE — ANESTHESIA PREPROCEDURE EVALUATION
Anesthesia Pre-Procedure Evaluation    Patient: Nicole Fritsche   MRN:     [id number] Gender:   female   Age:    49 year old :      1970        Preoperative Diagnosis: Cervical Cancer   Procedure(s):  Anesthesia Coverage Rad Therapy @0930     Past Medical History:   Diagnosis Date     Abnormal Pap smear of cervix 2019    see problem list     Arrested hydrocephalus 2/3/2009    Ongoing HAs, seeing neurosurgeon. Per past notes from Dr. Resendiz, HAs most likely not secondary to the hydrocephalus, but rather vascular inorgin.  Please see scanned in records.     Asthma      Cervical cancer (H)      Cervical high risk HPV (human papillomavirus) test positive 2019    See problem list      Past Surgical History:   Procedure Laterality Date     C VENTRICULO-CISTERNOSTOMY,3RD VENT      for treatment of HA related to hydrocephalus     EXAM UNDER ANESTHESIA, INSERT ANN MARIE SLEEVE, UTERINE PLACEMENT OF TANDEM AND RING FOR RAD, ULTRASOUND N/A 2019    Procedure: Ann Marie Sleeve Insertion, Ultrasound Guided;  Surgeon: Anne Tidwell MD;  Location: [facility] OR     EXCISE MASS TRUNK Left 2015    Procedure: EXCISE MASS TRUNK;  Surgeon: Carlos Enrique Briones MD;  Location: [facility] OR          Anesthesia Evaluation     . Pt has had prior anesthetic. Type: General and Regional           ROS/MED HX    ENT/Pulmonary:       Neurologic:       Cardiovascular:         METS/Exercise Tolerance:  >4 METS   Hematologic:     (+) Anemia, -      Musculoskeletal:         GI/Hepatic: Comment: Chronic nausea with treatment        Renal/Genitourinary:         Endo:         Psychiatric:         Infectious Disease:         Malignancy:   (+) Malignancy History of Other  Other CA SCCA Cervix status post Chemo and Radiation         Other:    (+) No chance of pregnancy no H/O Chronic Pain,                       PHYSICAL EXAM:   Mental Status/Neuro: A/A/O; Age Appropriate   Airway: Facies: Feasible  Mallampati: I  Mouth/Opening: Full  TM  "distance: > 6 cm  Neck ROM: Full   Respiratory: Auscultation: CTAB     Resp. Rate: Normal     Resp. Effort: Normal      CV: Rhythm: Regular  Heart: Normal Sounds  Edema: None   Comments:      Dental: Normal Dentition                LABS:  CBC:   Lab Results   Component Value Date    WBC 4.1 08/26/2019    WBC 5.1 08/19/2019    HGB 11.7 08/26/2019    HGB 12.6 08/19/2019    HCT 34.9 (L) 08/26/2019    HCT 37.4 08/19/2019     (L) 08/26/2019     08/19/2019     BMP:   Lab Results   Component Value Date     08/26/2019     08/19/2019    POTASSIUM 4.2 08/26/2019    POTASSIUM 3.6 08/19/2019    CHLORIDE 106 08/26/2019    CHLORIDE 106 08/19/2019    CO2 32 08/26/2019    CO2 29 08/19/2019    BUN 20 08/26/2019    BUN 10 08/19/2019    CR 0.61 08/26/2019    CR 0.56 08/19/2019    GLC 90 08/26/2019    GLC 95 08/19/2019     COAGS:   Lab Results   Component Value Date    PTT 28 02/24/2009    INR 1.01 02/24/2009     POC:   Lab Results   Component Value Date     (H) 08/30/2019    HCG Negative 06/03/2019     OTHER:   Lab Results   Component Value Date    NICOLE 9.3 08/26/2019    MAG 1.4 (L) 08/26/2019    ALBUMIN 3.3 (L) 08/26/2019    PROTTOTAL 6.8 08/26/2019    ALT 73 (H) 08/26/2019    AST 28 08/26/2019    ALKPHOS 88 08/26/2019    BILITOTAL 0.3 08/26/2019    TSH 4.20 (H) 02/13/2017    T4 1.02 02/13/2017        Preop Vitals    BP Readings from Last 3 Encounters:   09/16/19 100/66   09/13/19 113/77   09/13/19 113/77    Pulse Readings from Last 3 Encounters:   09/16/19 78   09/13/19 85   09/09/19 84      Resp Readings from Last 3 Encounters:   09/16/19 28   09/13/19 15   09/11/19 16    SpO2 Readings from Last 3 Encounters:   09/16/19 94%   09/13/19 99%   09/11/19 95%      Temp Readings from Last 1 Encounters:   09/16/19 36.7  C (98.1  F) (Oral)    Ht Readings from Last 1 Encounters:   09/09/19 1.702 m (5' 7\")      Wt Readings from Last 1 Encounters:   09/16/19 72.6 kg (160 lb)    Estimated body mass index is 25.06 " "kg/m  as calculated from the following:    Height as of 9/9/19: 1.702 m (5' 7\").    Weight as of this encounter: 72.6 kg (160 lb).     LDA:  Peripheral IV 09/16/19 Left;Anterior Lower forearm (Active)   Number of days: 0       Peripheral IV 09/16/19 Left Lower forearm (Active)   Number of days: 0        Assessment:   ASA SCORE: 2    H&P: History and physical reviewed and following examination; no interval change.   Smoking Status:  Non-Smoker/Unknown        Plan:   Anes. Type:  General; MAC   Pre-Medication: None   Induction:  IV (Standard)   Airway: Nasal   Access/Monitoring: PIV   Maintenance: TIVA     Postop Plan:   Postop Pain: Opioids; NSAID  Postop Sedation/Airway: Sedation likely       Postop Sedation: Propofol Infusion  Disposition: Outpatient     PONV Management:   Adult Risk Factors: Female, Non-Smoker, Postop Opioids   Prevention: Ondansetron, Dexamethasone, Propofol, No Volatiles     CONSENT: Direct conversation   Plan and risks discussed with: Patient   Blood Products: Consent Deferred (Minimal Blood Loss)                   Yaneth Flores MD  "

## 2019-09-16 NOTE — ANESTHESIA POSTPROCEDURE EVALUATION
Anesthesia POST Procedure Evaluation    Patient: Nicole Fritsche   MRN:     [id number] Gender:   female   Age:    49 year old :      1970        Preoperative Diagnosis: Cervical Cancer   Procedure(s):  Anesthesia Coverage Rad Therapy @0930   Postop Comments: No value filed.       Anesthesia Type:  Not documented  MAC    Reportable Event: NO     PAIN: Uncomplicated   Sign Out status: Comfortable, Well controlled pain     PONV: No PONV   Sign Out status:  No Nausea or Vomiting     Neuro/Psych: Uneventful perioperative course   Sign Out Status: Preoperative baseline; Age appropriate mentation     Airway/Resp.: Uneventful perioperative course   Sign Out Status: Non labored breathing, age appropriate RR; Resp. Status within EXPECTED Parameters     CV: Uneventful perioperative course   Sign Out status: Appropriate BP and perfusion indices; Appropriate HR/Rhythm     Disposition:   Sign Out in:  Phase II  Disposition:  Home  Recovery Course: Uneventful  Follow-Up: Not required           Last Anesthesia Record Vitals:  CRNA VITALS  2019 0928 - 2019 1028      2019             EKG:  NSR          Last PACU Vitals:  No vitals data found for the desired time range.        Electronically Signed By: Yaneth Flores MD, 2019, 2:14 PM

## 2019-09-16 NOTE — ADDENDUM NOTE
Addendum  created 09/16/19 1422 by Yaneth Flores MD    Review and Sign - Ready for Procedure, Review and Sign - Signed

## 2019-09-16 NOTE — OP NOTE
OPERATIVE NOTE       PREOPERATIVE DIAGNOSIS: Squamous cell carcinoma of the cervix,  FIGO stage IB1 (clinical T1B1 N1 M0)     POSTOPERATIVE DIAGNOSIS: Squamous cell carcinoma of the cervix,  FIGO stage IB1 (clinical T1B1 N1 M0)     PROCEDURE PERFORMED: Placement of tandem and ring apparatus for HDR brachytherapy, 4 of 5      SURGEON: Anne Tidwell, Radiation Oncology     ASSISTANT: Sandoval Meeks MD     ANESTHESIA: Conscious sedation       COMPLICATIONS: None      ESTIMATED BLOOD LOSS: None      INTRAVENOUS FLUIDS: Per anesthesia record       OPERATIVE FINDINGS: Normal external genitalia. Charanjit sleeve visualized, sutured in place.       DESCRIPTION OF OPERATIVE PROCEDURE:  Ms. Nicole Fritsche was brought back to the endoscopy suite and identified to be herself. A time-out was performed and the patient was given conscious sedation. She was placed in the dorsal lithotomy position. Using sterile technique, a mccartney catheter was inserted into the bladder and 8 cc of saline was used to inflate the balloon. Speculum examination was performed with Charanjit sleeve visualized sutured in place at the cervical os. A 60 mm, 60 degree tandem applicator was placed and secured in the proper fashion. A 3.4 cm ring applicator was placed and secured (note, this is increased size from first fraction). Vaginal packing was placed and the implant secured with radiation panties.  Anesthesia was then reversed and the patient was brought to MRI. Dr. Tidwell was present for the duration of the procedure.       Sandoval Meeks MD  PGY-3 Radiation Oncology    I was present with the resident during all critical portions of the operation, and immediately available for final wake-up.  I have edited Dr. Meeks' note to describe the operative findings and flow.     Anne iTdwell MD  Radiation Oncology

## 2019-09-16 NOTE — PROGRESS NOTES
A radiation therapy treatment planning simulation was performed.  HDR brachytherapy tandem and ring 4 of 5.  Please see the [facility] record for documentation.    Anne Tidwell MD  Radiation Oncology

## 2019-09-16 NOTE — PROGRESS NOTES
Nicole Fritsche is here for scheduled HDR brachytherapy    HDR Tandem and Ring   4    Pre-procedure-  Time out done by Cassi Arreaga RN and Dr Tidwell.   Patient identified, arm band applied, signed consent available   Medications taken by patient prior to procedure See OR notes  Pain assessment: 0/10    Post-procedure-  Pain assessment: 0/10   Device removed without difficulty, Education for possible side effects and management, Discharge teaching reviewed, questions answered and Discharged to home

## 2019-09-16 NOTE — ANESTHESIA CARE TRANSFER NOTE
Patient: Nicole Fritsche    Procedure(s):  Anesthesia Coverage Rad Therapy @0921    Diagnosis: Cervical Cancer  Diagnosis Additional Information: No value filed.    Anesthesia Type:   General, MAC     Note:    Destination: MRI.  Comments: Pt remains stable, pt continues to exchange well wit no obstruction , report to GYN ONC RN, no complications      Vitals: (Last set prior to Anesthesia Care Transfer)    CRNA VITALS  9/16/2019 0928 - 9/16/2019 0959      9/16/2019             Pulse:  88    Ht Rate:  87    SpO2:  98 %                Electronically Signed By: HOMERO Silveira CRNA  September 16, 2019  9:59 AM

## 2019-09-17 ENCOUNTER — ANESTHESIA EVENT (OUTPATIENT)
Dept: GASTROENTEROLOGY | Facility: CLINIC | Age: 49
End: 2019-09-17
Payer: COMMERCIAL

## 2019-09-18 ENCOUNTER — ALLIED HEALTH/NURSE VISIT (OUTPATIENT)
Dept: RADIATION ONCOLOGY | Facility: CLINIC | Age: 49
End: 2019-09-18
Attending: RADIOLOGY
Payer: COMMERCIAL

## 2019-09-18 ENCOUNTER — ANESTHESIA (OUTPATIENT)
Dept: GASTROENTEROLOGY | Facility: CLINIC | Age: 49
End: 2019-09-18
Payer: COMMERCIAL

## 2019-09-18 ENCOUNTER — HOSPITAL ENCOUNTER (OUTPATIENT)
Dept: MRI IMAGING | Facility: CLINIC | Age: 49
End: 2019-09-18
Attending: RADIOLOGY | Admitting: RADIOLOGY
Payer: COMMERCIAL

## 2019-09-18 ENCOUNTER — HOSPITAL ENCOUNTER (OUTPATIENT)
Facility: CLINIC | Age: 49
Discharge: HOME | End: 2019-09-18
Attending: RADIOLOGY | Admitting: RADIOLOGY
Payer: COMMERCIAL

## 2019-09-18 VITALS — RESPIRATION RATE: 15 BRPM | DIASTOLIC BLOOD PRESSURE: 78 MMHG | SYSTOLIC BLOOD PRESSURE: 105 MMHG | TEMPERATURE: 98.7 F

## 2019-09-18 DIAGNOSIS — C53.1 MALIGNANT NEOPLASM OF EXOCERVIX (H): ICD-10-CM

## 2019-09-18 DIAGNOSIS — C53.1 MALIGNANT NEOPLASM OF EXOCERVIX (H): Primary | ICD-10-CM

## 2019-09-18 PROCEDURE — 57155 INSERT UTERI TANDEM/OVOIDS: CPT | Performed by: RADIOLOGY

## 2019-09-18 PROCEDURE — 77771 HDR RDNCL NTRSTL/ICAV BRCHTX: CPT | Performed by: RADIOLOGY

## 2019-09-18 PROCEDURE — 77336 RADIATION PHYSICS CONSULT: CPT | Performed by: RADIOLOGY

## 2019-09-18 PROCEDURE — 77332 RADIATION TREATMENT AID(S): CPT | Performed by: RADIOLOGY

## 2019-09-18 PROCEDURE — 40000556 ZZH STATISTIC PERIPHERAL IV START W US GUIDANCE

## 2019-09-18 PROCEDURE — 77290 THER RAD SIMULAJ FIELD CPLX: CPT | Performed by: RADIOLOGY

## 2019-09-18 PROCEDURE — 37000008 ZZH ANESTHESIA TECHNICAL FEE, 1ST 30 MIN

## 2019-09-18 PROCEDURE — C1717 BRACHYTX, NON-STR,HDR IR-192: HCPCS | Performed by: RADIOLOGY

## 2019-09-18 PROCEDURE — 27110014 ZZH IMPLANT RADIATION BRIEF: Performed by: RADIOLOGY

## 2019-09-18 PROCEDURE — 25000128 H RX IP 250 OP 636: Performed by: NURSE ANESTHETIST, CERTIFIED REGISTERED

## 2019-09-18 PROCEDURE — 40000268 MR LIMITED SCAN: Mod: TC

## 2019-09-18 PROCEDURE — 77317 BRACHYTX ISODOSE INTERMED: CPT | Performed by: RADIOLOGY

## 2019-09-18 PROCEDURE — 25800030 ZZH RX IP 258 OP 636: Performed by: NURSE ANESTHETIST, CERTIFIED REGISTERED

## 2019-09-18 RX ORDER — DEXAMETHASONE SODIUM PHOSPHATE 4 MG/ML
INJECTION, SOLUTION INTRA-ARTICULAR; INTRALESIONAL; INTRAMUSCULAR; INTRAVENOUS; SOFT TISSUE PRN
Status: DISCONTINUED | OUTPATIENT
Start: 2019-09-18 | End: 2019-09-18

## 2019-09-18 RX ORDER — MEPERIDINE HYDROCHLORIDE 50 MG/ML
12.5 INJECTION INTRAMUSCULAR; INTRAVENOUS; SUBCUTANEOUS
Status: CANCELLED | OUTPATIENT
Start: 2019-09-18

## 2019-09-18 RX ORDER — ONDANSETRON 2 MG/ML
4 INJECTION INTRAMUSCULAR; INTRAVENOUS EVERY 30 MIN PRN
Status: CANCELLED | OUTPATIENT
Start: 2019-09-18

## 2019-09-18 RX ORDER — PROPOFOL 10 MG/ML
INJECTION, EMULSION INTRAVENOUS CONTINUOUS PRN
Status: DISCONTINUED | OUTPATIENT
Start: 2019-09-18 | End: 2019-09-18

## 2019-09-18 RX ORDER — ONDANSETRON 2 MG/ML
INJECTION INTRAMUSCULAR; INTRAVENOUS PRN
Status: DISCONTINUED | OUTPATIENT
Start: 2019-09-18 | End: 2019-09-18

## 2019-09-18 RX ORDER — KETOROLAC TROMETHAMINE 30 MG/ML
INJECTION, SOLUTION INTRAMUSCULAR; INTRAVENOUS PRN
Status: DISCONTINUED | OUTPATIENT
Start: 2019-09-18 | End: 2019-09-18

## 2019-09-18 RX ORDER — NALOXONE HYDROCHLORIDE 0.4 MG/ML
.1-.4 INJECTION, SOLUTION INTRAMUSCULAR; INTRAVENOUS; SUBCUTANEOUS
Status: CANCELLED | OUTPATIENT
Start: 2019-09-18 | End: 2019-09-19

## 2019-09-18 RX ORDER — FENTANYL CITRATE 50 UG/ML
25-50 INJECTION, SOLUTION INTRAMUSCULAR; INTRAVENOUS
Status: CANCELLED | OUTPATIENT
Start: 2019-09-18

## 2019-09-18 RX ORDER — FENTANYL CITRATE 50 UG/ML
INJECTION, SOLUTION INTRAMUSCULAR; INTRAVENOUS PRN
Status: DISCONTINUED | OUTPATIENT
Start: 2019-09-18 | End: 2019-09-18

## 2019-09-18 RX ORDER — ONDANSETRON 4 MG/1
4 TABLET, ORALLY DISINTEGRATING ORAL EVERY 30 MIN PRN
Status: CANCELLED | OUTPATIENT
Start: 2019-09-18

## 2019-09-18 RX ORDER — SODIUM CHLORIDE 9 MG/ML
INJECTION, SOLUTION INTRAVENOUS CONTINUOUS PRN
Status: DISCONTINUED | OUTPATIENT
Start: 2019-09-18 | End: 2019-09-18

## 2019-09-18 RX ORDER — SODIUM CHLORIDE, SODIUM LACTATE, POTASSIUM CHLORIDE, CALCIUM CHLORIDE 600; 310; 30; 20 MG/100ML; MG/100ML; MG/100ML; MG/100ML
INJECTION, SOLUTION INTRAVENOUS CONTINUOUS
Status: CANCELLED | OUTPATIENT
Start: 2019-09-18

## 2019-09-18 RX ORDER — PROPOFOL 10 MG/ML
INJECTION, EMULSION INTRAVENOUS PRN
Status: DISCONTINUED | OUTPATIENT
Start: 2019-09-18 | End: 2019-09-18

## 2019-09-18 RX ADMIN — FENTANYL CITRATE 50 MCG: 50 INJECTION, SOLUTION INTRAMUSCULAR; INTRAVENOUS at 09:37

## 2019-09-18 RX ADMIN — HYDROMORPHONE HYDROCHLORIDE 0.5 MG: 1 INJECTION, SOLUTION INTRAMUSCULAR; INTRAVENOUS; SUBCUTANEOUS at 09:50

## 2019-09-18 RX ADMIN — PROPOFOL 100 MCG/KG/MIN: 10 INJECTION, EMULSION INTRAVENOUS at 09:39

## 2019-09-18 RX ADMIN — ONDANSETRON 4 MG: 2 INJECTION INTRAMUSCULAR; INTRAVENOUS at 09:37

## 2019-09-18 RX ADMIN — KETOROLAC TROMETHAMINE 30 MG: 30 INJECTION, SOLUTION INTRAMUSCULAR at 09:58

## 2019-09-18 RX ADMIN — PROPOFOL 40 MG: 10 INJECTION, EMULSION INTRAVENOUS at 09:39

## 2019-09-18 RX ADMIN — DEXAMETHASONE SODIUM PHOSPHATE 4 MG: 4 INJECTION, SOLUTION INTRA-ARTICULAR; INTRALESIONAL; INTRAMUSCULAR; INTRAVENOUS; SOFT TISSUE at 09:42

## 2019-09-18 RX ADMIN — FENTANYL CITRATE 50 MCG: 50 INJECTION, SOLUTION INTRAMUSCULAR; INTRAVENOUS at 09:35

## 2019-09-18 RX ADMIN — SODIUM CHLORIDE: 9 INJECTION, SOLUTION INTRAVENOUS at 09:35

## 2019-09-18 RX ADMIN — MIDAZOLAM 2 MG: 1 INJECTION INTRAMUSCULAR; INTRAVENOUS at 09:35

## 2019-09-18 NOTE — OP NOTE
OPERATIVE NOTE       PREOPERATIVE DIAGNOSIS: Squamous cell carcinoma of the cervix,  FIGO stage IB1 (clinical T1B1 N1 M0)     POSTOPERATIVE DIAGNOSIS: Squamous cell carcinoma of the cervix,  FIGO stage IB1 (clinical T1B1 N1 M0)     PROCEDURE PERFORMED: Placement of tandem and ring apparatus for HDR brachytherapy, 5 of 5      SURGEON: Anne Tidwell, Radiation Oncology     ASSISTANT: Sandoval Meeks MD     ANESTHESIA: Conscious sedation       COMPLICATIONS: None      ESTIMATED BLOOD LOSS: None      INTRAVENOUS FLUIDS: Per anesthesia record       OPERATIVE FINDINGS: Normal external genitalia. Charanjit sleeve visualized, sutured in place.       DESCRIPTION OF OPERATIVE PROCEDURE:  Ms. Nicole Fritsche was brought back to the endoscopy suite and identified to be herself. A time-out was performed and the patient was given conscious sedation. She was placed in the dorsal lithotomy position. Using sterile technique, a mccartney catheter was inserted into the bladder and 8 cc of saline was used to inflate the balloon. Speculum examination was performed with Charanjit sleeve visualized sutured in place at the cervical os. The four sutures were cut and removed. A 60 mm, 60 degree tandem applicator was placed and secured in the proper fashion. A 3.4 cm ring applicator was placed and secured (note, this is increased size from first fraction). Vaginal packing was placed and the implant secured with radiation panties.  Anesthesia was then reversed and the patient was brought to MRI. Dr. Tidwell was present for the duration of the procedure.       Sandoval Meeks MD  PGY-3 Radiation Oncology    I was present with the resident during all critical portions of the operation, and immediately available for final wake-up.  I have edited Dr. Meeks' note to describe the operative findings and flow.     Anne Tidwell MD  Radiation Oncology

## 2019-09-18 NOTE — PROGRESS NOTES
A radiation therapy treatment planning simulation was performed.  HDR brachytherapy tandem and ring 5 of 5.  Please see the [facility] record for documentation.    Anne Tidwell MD  Radiation Oncology

## 2019-09-18 NOTE — ANESTHESIA POSTPROCEDURE EVALUATION
Anesthesia POST Procedure Evaluation    Patient: Nicole Fritsche   MRN:     [id number] Gender:   female   Age:    49 year old :      1970        Preoperative Diagnosis: Cervical Cancer   Procedure(s):  Anesthesia Coverage Rad Therapy @0930   Postop Comments: No value filed.       Anesthesia Type:  Not documented  MAC    Reportable Event: NO     PAIN: Uncomplicated   Sign Out status: Comfortable, Well controlled pain     PONV: No PONV   Sign Out status:  No Nausea or Vomiting     Neuro/Psych: Uneventful perioperative course   Sign Out Status: Preoperative baseline; Age appropriate mentation     Airway/Resp.: Uneventful perioperative course   Sign Out Status: Non labored breathing, age appropriate RR; Resp. Status within EXPECTED Parameters     CV: Uneventful perioperative course   Sign Out status: Appropriate BP and perfusion indices; Appropriate HR/Rhythm     Disposition:   Sign Out in:  PACU  Disposition:  Phase II; Home  Recovery Course: Uneventful  Follow-Up: Not required           Last Anesthesia Record Vitals:  CRNA VITALS  2019 0930 - 2019 1030      2019             SpO2:  97 %    EKG:  Sinus rhythm          Last PACU Vitals:  No vitals data found for the desired time range.        Electronically Signed By: Wes Regan DO, 2019, 11:31 AM

## 2019-09-18 NOTE — PROGRESS NOTES
Nicole Fritsche is here for scheduled HDR brachytherapy    HDR Tandem and Ring   5    Pre-procedure-  Time out done by Cassi Arreaga RN and Dr Tidwell.   Patient identified, arm band applied, signed consent available   Medications taken by patient prior to procedure See OR notes  Pain assessment: 0/10      Post-procedure-  Pain assessment: 0/10   Device removed without difficulty, Education for possible side effects and management, Discharge teaching reviewed, questions answered, Follow-up scheduled and Discharged to home

## 2019-09-18 NOTE — ANESTHESIA CARE TRANSFER NOTE
Patient: Nicole Fritsche    Procedure(s):  Anesthesia Coverage Rad Therapy @0930    Diagnosis: Cervical Cancer  Diagnosis Additional Information: No value filed.    Anesthesia Type:   MAC     Note:  Airway :Room Air  Destination: Rad Onc RN.  Comments: VSS. Ventilating well. RR 10. Report to RN.      Vitals: (Last set prior to Anesthesia Care Transfer)    CRNA VITALS  9/18/2019 0930 - 9/18/2019 1002      9/18/2019             SpO2:  97 %    EKG:  Sinus rhythm                Electronically Signed By: HOMERO Mendez CRNA  September 18, 2019  10:02 AM

## 2019-09-18 NOTE — ANESTHESIA PREPROCEDURE EVALUATION
Anesthesia Pre-Procedure Evaluation    Patient: Nicole Fritsche   MRN:     [id number] Gender:   female   Age:    49 year old :      1970        Preoperative Diagnosis: Cervical Cancer   Procedure(s):  Anesthesia Coverage Rad Therapy @0930     Past Medical History:   Diagnosis Date     Abnormal Pap smear of cervix 2019    see problem list     Arrested hydrocephalus 2/3/2009    Ongoing HAs, seeing neurosurgeon. Per past notes from Dr. Resendiz, HAs most likely not secondary to the hydrocephalus, but rather vascular inorgin.  Please see scanned in records.     Asthma      Cervical cancer (H)      Cervical high risk HPV (human papillomavirus) test positive 2019    See problem list      Past Surgical History:   Procedure Laterality Date     C VENTRICULO-CISTERNOSTOMY,3RD VENT      for treatment of HA related to hydrocephalus     EXAM UNDER ANESTHESIA, INSERT ANN MARIE SLEEVE, UTERINE PLACEMENT OF TANDEM AND RING FOR RAD, ULTRASOUND N/A 2019    Procedure: Ann Marie Sleeve Insertion, Ultrasound Guided;  Surgeon: Anne Tidwell MD;  Location: [facility]     EXCISE MASS TRUNK Left 2015    Procedure: EXCISE MASS TRUNK;  Surgeon: Carlos Enrique Briones MD;  Location: [facility] OR          Anesthesia Evaluation     . Pt has had prior anesthetic.     No history of anesthetic complications          ROS/MED HX    ENT/Pulmonary:     (+)allergic rhinitis, Intermittent asthma , . .    Neurologic:  - neg neurologic ROS     Cardiovascular:  - neg cardiovascular ROS       METS/Exercise Tolerance:  >4 METS   Hematologic:     (+) Anemia, -      Musculoskeletal:  - neg musculoskeletal ROS       GI/Hepatic: Comment: Chronic nausea, took Zofran this am        Renal/Genitourinary:  - ROS Renal section negative       Endo:  - neg endo ROS       Psychiatric:  - neg psychiatric ROS       Infectious Disease:  - neg infectious disease ROS       Malignancy:   (+) Malignancy History of Other  Other CA cervical status post Radiation and  Chemo         Other:    - neg other ROS                     PHYSICAL EXAM:   Mental Status/Neuro: A/A/O; Age Appropriate   Airway: Facies: Feasible  Mallampati: I  Mouth/Opening: Full  TM distance: > 6 cm  Neck ROM: Full   Respiratory: Auscultation: CTAB     Resp. Rate: Normal     Resp. Effort: Normal      CV: Rhythm: Regular  Rate: Age appropriate  Heart: Normal Sounds  Edema: None   Comments:      Dental: Normal Dentition                LABS:  CBC:   Lab Results   Component Value Date    WBC 4.1 08/26/2019    WBC 5.1 08/19/2019    HGB 11.7 08/26/2019    HGB 12.6 08/19/2019    HCT 34.9 (L) 08/26/2019    HCT 37.4 08/19/2019     (L) 08/26/2019     08/19/2019     BMP:   Lab Results   Component Value Date     08/26/2019     08/19/2019    POTASSIUM 4.2 08/26/2019    POTASSIUM 3.6 08/19/2019    CHLORIDE 106 08/26/2019    CHLORIDE 106 08/19/2019    CO2 32 08/26/2019    CO2 29 08/19/2019    BUN 20 08/26/2019    BUN 10 08/19/2019    CR 0.61 08/26/2019    CR 0.56 08/19/2019    GLC 90 08/26/2019    GLC 95 08/19/2019     COAGS:   Lab Results   Component Value Date    PTT 28 02/24/2009    INR 1.01 02/24/2009     POC:   Lab Results   Component Value Date     (H) 08/30/2019    HCG Negative 06/03/2019     OTHER:   Lab Results   Component Value Date    NICOLE 9.3 08/26/2019    MAG 1.4 (L) 08/26/2019    ALBUMIN 3.3 (L) 08/26/2019    PROTTOTAL 6.8 08/26/2019    ALT 73 (H) 08/26/2019    AST 28 08/26/2019    ALKPHOS 88 08/26/2019    BILITOTAL 0.3 08/26/2019    TSH 4.20 (H) 02/13/2017    T4 1.02 02/13/2017        Preop Vitals    BP Readings from Last 3 Encounters:   09/16/19 100/66   09/13/19 113/77   09/13/19 113/77    Pulse Readings from Last 3 Encounters:   09/16/19 78   09/13/19 85   09/09/19 84      Resp Readings from Last 3 Encounters:   09/16/19 28   09/13/19 15   09/11/19 16    SpO2 Readings from Last 3 Encounters:   09/16/19 94%   09/13/19 99%   09/11/19 95%      Temp Readings from Last 1  "Encounters:   09/16/19 36.7  C (98.1  F) (Oral)    Ht Readings from Last 1 Encounters:   09/09/19 1.702 m (5' 7\")      Wt Readings from Last 1 Encounters:   09/16/19 72.6 kg (160 lb)    Estimated body mass index is 25.06 kg/m  as calculated from the following:    Height as of 9/9/19: 1.702 m (5' 7\").    Weight as of 9/16/19: 72.6 kg (160 lb).     LDA:  Peripheral IV 09/16/19 Left;Anterior Lower forearm (Active)   Number of days: 2       Peripheral IV 09/16/19 Left Lower forearm (Active)   Number of days: 2        Assessment:   ASA SCORE: 2    H&P: History and physical reviewed and following examination; no interval change.   Smoking Status:  Non-Smoker/Unknown   NPO Status: NPO Appropriate     Plan:   Anes. Type:  MAC   Pre-Medication: None   Induction:  IV (Standard)   Airway: Native Airway   Access/Monitoring: PIV   Maintenance: Propofol Sedation     Postop Plan:   Postop Pain: None  Postop Sedation/Airway: Not planned  Disposition: Outpatient     PONV Management:   Adult Risk Factors: Female, Non-Smoker   Prevention: Ondansetron, Propofol     CONSENT: Direct conversation   Plan and risks discussed with: Patient   Blood Products: Consent Deferred (Minimal Blood Loss)                   Wes Regan DO  "

## 2019-09-27 ENCOUNTER — ONCOLOGY VISIT (OUTPATIENT)
Dept: RADIATION ONCOLOGY | Facility: CLINIC | Age: 49
End: 2019-09-27

## 2019-09-30 ENCOUNTER — DOCUMENTATION ONLY (OUTPATIENT)
Dept: RADIATION ONCOLOGY | Facility: CLINIC | Age: 49
End: 2019-09-30

## 2019-10-02 NOTE — PROGRESS NOTES
Prior Authorization Approval    ondansetron 4mg tablets  Date Initiated: 9/30/2019  Date Completed: 10/01/2019  Prior Auth Type: Quantity Limit                Status: Approved    Effective Date: 09/29/2019 - 09/29/2020  Copay: 4.18     [place] Filled: Yes    Insurance: [employer] [place] - Phone [phone] Fax [phone]  ID: [id number]  Case Number: [id number]   Submitted Via: Website: [email or web address]        Mandi Knapp  [facility] Liaison  Ph: [phone] Page: [phone]

## 2019-10-08 ENCOUNTER — ONCOLOGY VISIT (OUTPATIENT)
Dept: RADIATION ONCOLOGY | Facility: CLINIC | Age: 49
End: 2019-10-08

## 2019-10-08 NOTE — PROCEDURES
RADIOTHERAPY TREATMENT SUMMARY          DATE OF REPORT: 2019    PATIENT: FRITSCHE, NICOLE  MEDICAL RECORD NO: [id number]  : 1970    DIAGNOSIS: Squamous cell carcinoma of the cervix, FIGO stage IB1 (clinical T1B1 N1 M0, AJCC 2018), C53.1 Malignant neoplasm of exocervix  INTENT OF RADIOTHERAPY: Cure  CONCURRENT SYSTEMIC THERAPY: Weekly cisplatin                  TREATMENT SUMMARY:  Details of the treatments summarized below are found in records kept in the Department of Radiation Oncology at [facility].  Radiation Oncology - Course: 1  Treatment Site Current Dose Modality From To Elapsed Days Fx.  Pelvis and PANs  4,500 cGy 06 X  7/29/2019  2019  32 25  Cervix  2,750 cGy Implant  2019   9  5    Dose per Fraction: External beam to pelvis: 180 cGy, Brachytherapy: 550 cGy  Total Dose: Pelvis: 4,500 cGy, Cervix: 7,250 cGy (84.3 Gy EQD2)    COMMENTS:                      Nicole Fritsche is a 49-year-old woman who presented with irregular vaginal/postcoital bleeding. Eventual endometrial biopsy demonstrated invasive well-differentiated squamous cell carcinoma, focally keratinizing.  Examination demonstrated a friable, ulcerative lesion from 3-8 o'clock without parametrial involvement. A pelvic MRI scan demonstrated a 2.3 x 3.8 x 2.6 cm mass involving the posterior cervix without parametrial extension or pelvic adenopathy.  PET CT scan confirmed a 3.7 cm cervical mass (SUV max 13.2) with positive low periaortic adenopathy (SUV 2.7). There were no distant metastasis seen imaging.  She was treated with curative intent as described above using a two phase plan. First, 4,500 cGy in 25 daily fractions was administered to the pelvis and paraaortic nodes using intensity modulated radiotherapy. Then high dose rate brachytherapy was administered in 5 fractions every other day using tandem and ring applicator and Ir-192 source to the cervix, 2,750 cGy. She overall tolerated treatment well with  expected toxicities of mild nausea and diarrhea which were managed with diet, antiemetics, and loperamide.     ED visits/hospitalizations: none  Missed treatments: none    Acute Toxicity Profile by CTC v5.0: Grade 1 Nausea, Grade 1 Diarrhea    PAIN MANAGEMENT: Ms. Fritsche was started on oxycodone 5 mg very briefly for ongoing pelvic pain since prior to treatment that improved, else managed with acetaminophen plus ketorolac during brachytherapy.    FOLLOW UP PLAN:      1. Follow up with Dr. Tidwell as scheduled on 10/28/19  2. Follow up with Dr. Hernandez in GYN oncology, not yet scheduled                        Resident Physician: Sandoval Meeks MD   Staff Physician: Anne Tidwell MD  Physicist: Joisah Rivera, PhD    CC:   MD Ansley Yanez MD

## 2019-10-09 NOTE — PROCEDURES
RADIOTHERAPY TREATMENT SUMMARY          DATE OF REPORT: 2019     PATIENT: FRITSCHE, NICOLE  MEDICAL RECORD NO: [id number]  : 1970     DIAGNOSIS: Squamous cell carcinoma of the cervix, FIGO stage IB1 (clinical T1B1 N1 M0, AJCC 2018), C53.1   Malignant neoplasm of exocervix  INTENT OF RADIOTHERAPY: Cure  CONCURRENT SYSTEMIC THERAPY: Weekly cisplatin                   TREATMENT SUMMARY:  Details of the treatments summarized below are found in records kept in the Department of Radiation Oncology at   [facility].  Radiation Oncology - Course: 1  Treatment Site Current Dose Modality From To Elapsed Days Fx.  Pelvis and PANs  4,500 cGy 06 X  7/29/2019  2019  32 25  Cervix  2,750 cGy Implant  2019   9  5     Dose per Fraction: External beam to pelvis: 180 cGy, Brachytherapy: 550 cGy  Total Dose: Pelvis: 4,500 cGy, Cervix: 7,250 cGy (84.3 Gy EQD2)     COMMENTS:                             Nicole Fritsche is a 49-year-old woman who presented with irregular vaginal/postcoital bleeding. Eventual   endometrial biopsy demonstrated invasive well-differentiated squamous cell carcinoma, focally keratinizing.    Examination demonstrated a friable, ulcerative lesion from 3-8 o'clock without parametrial involvement. A pelvic MRI   scan demonstrated a 2.3 x 3.8 x 2.6 cm mass involving the posterior cervix without parametrial extension or pelvic   adenopathy.  PET CT scan confirmed a 3.7 cm cervical mass (SUV max 13.2) with positive low periaortic adenopathy   (SUV 2.7). There were no distant metastasis seen imaging.         She was treated with curative intent as described above using a two phase plan. First, 4,500 cGy in 25 daily   fractions was administered to the pelvis and paraaortic nodes using intensity modulated radiotherapy. Then high dose rate   brachytherapy was administered in 5 fractions every other day using tandem and ring applicator and Ir-192 source to the   cervix, 2,750 cGy. She  overall tolerated treatment well with expected toxicities of mild nausea and diarrhea which were   managed with diet, antiemetics, and loperamide.      ED visits/hospitalizations: none  Missed treatments: none     Acute Toxicity Profile by CTC v5.0: Grade 1 Nausea, Grade 1 Diarrhea     PAIN MANAGEMENT: Ms. Fritsche was started on oxycodone 5 mg very briefly for ongoing pelvic pain since prior to   treatment that improved, else managed with acetaminophen plus ketorolac during brachytherapy.     FOLLOW UP PLAN:      1. Follow up with Dr. Tidwell as scheduled on 10/28/19  2. Follow up with Dr. Hernandez in GYN oncology, not yet scheduled                         Resident Physician: Sandoval Meeks MD   Staff Physician: Anne Tidwell MD  Physicist: Josiah Rivera, PhD     CC:   MD Ansley Ynaez MD           Radiation Oncology:  [facility] [address] [place] [address]

## 2019-10-18 ENCOUNTER — HOSPITAL ENCOUNTER (EMERGENCY)
Facility: CLINIC | Age: 49
Discharge: HOME | End: 2019-10-19
Attending: EMERGENCY MEDICINE | Admitting: EMERGENCY MEDICINE
Payer: COMMERCIAL

## 2019-10-18 DIAGNOSIS — N39.0 URINARY TRACT INFECTION WITHOUT HEMATURIA, SITE UNSPECIFIED: ICD-10-CM

## 2019-10-18 PROCEDURE — 87086 URINE CULTURE/COLONY COUNT: CPT | Performed by: EMERGENCY MEDICINE

## 2019-10-18 PROCEDURE — 81003 URINALYSIS AUTO W/O SCOPE: CPT | Performed by: EMERGENCY MEDICINE

## 2019-10-18 PROCEDURE — 99283 EMERGENCY DEPT VISIT LOW MDM: CPT

## 2019-10-18 PROCEDURE — 87088 URINE BACTERIA CULTURE: CPT | Performed by: EMERGENCY MEDICINE

## 2019-10-18 PROCEDURE — 99283 EMERGENCY DEPT VISIT LOW MDM: CPT | Mod: Z6 | Performed by: EMERGENCY MEDICINE

## 2019-10-18 PROCEDURE — 87186 SC STD MICRODIL/AGAR DIL: CPT | Performed by: EMERGENCY MEDICINE

## 2019-10-18 PROCEDURE — 81015 MICROSCOPIC EXAM OF URINE: CPT | Performed by: EMERGENCY MEDICINE

## 2019-10-18 ASSESSMENT — ENCOUNTER SYMPTOMS
BACK PAIN: 0
DYSURIA: 1
FEVER: 0
ABDOMINAL PAIN: 1

## 2019-10-18 NOTE — ED AVS SNAPSHOT
[facility], [place], Emergency Department  [address] [place] [id number]  Phone:  [phone]                                    Nicole Fritsche   MRN: [id number]    Department:  [facility], Emergency Department   Date of Visit:  10/18/2019           After Visit Summary Signature Page    I have received my discharge instructions, and my questions have been answered. I have discussed any challenges I see with this plan with the nurse or doctor.    ..........................................................................................................................................  Patient/Patient Representative Signature      ..........................................................................................................................................  Patient Representative Print Name and Relationship to Patient    ..................................................               ................................................  Date                                   Time    ..........................................................................................................................................  Reviewed by Signature/Title    ...................................................              ..............................................  Date                                               Time          22EPIC Rev 08/18

## 2019-10-19 VITALS
OXYGEN SATURATION: 99 % | BODY MASS INDEX: 25.06 KG/M2 | DIASTOLIC BLOOD PRESSURE: 84 MMHG | TEMPERATURE: 98 F | SYSTOLIC BLOOD PRESSURE: 122 MMHG | WEIGHT: 160 LBS

## 2019-10-19 LAB
ALBUMIN UR-MCNC: 10 MG/DL
APPEARANCE UR: CLEAR
BILIRUB UR QL STRIP: NEGATIVE
COLOR UR AUTO: ABNORMAL
GLUCOSE UR STRIP-MCNC: NEGATIVE MG/DL
HGB UR QL STRIP: ABNORMAL
KETONES UR STRIP-MCNC: NEGATIVE MG/DL
LEUKOCYTE ESTERASE UR QL STRIP: ABNORMAL
MUCOUS THREADS #/AREA URNS LPF: PRESENT /LPF
NITRATE UR QL: NEGATIVE
PH UR STRIP: 6 PH (ref 5–7)
RBC #/AREA URNS AUTO: 1 /HPF (ref 0–2)
SOURCE: ABNORMAL
SP GR UR STRIP: 1 (ref 1–1.03)
SQUAMOUS #/AREA URNS AUTO: <1 /HPF (ref 0–1)
TRANS CELLS #/AREA URNS HPF: <1 /HPF (ref 0–1)
UROBILINOGEN UR STRIP-MCNC: NORMAL MG/DL (ref 0–2)
WBC #/AREA URNS AUTO: 26 /HPF (ref 0–5)

## 2019-10-19 RX ORDER — CIPROFLOXACIN 500 MG/1
500 TABLET, FILM COATED ORAL 2 TIMES DAILY
Qty: 6 TABLET | Refills: 0 | Status: SHIPPED | OUTPATIENT
Start: 2019-10-19 | End: 2019-11-27

## 2019-10-19 RX ORDER — PHENAZOPYRIDINE HYDROCHLORIDE 200 MG/1
200 TABLET, FILM COATED ORAL ONCE
Status: DISCONTINUED | OUTPATIENT
Start: 2019-10-19 | End: 2019-10-19 | Stop reason: HOSPADM

## 2019-10-19 RX ORDER — PHENAZOPYRIDINE HYDROCHLORIDE 200 MG/1
200 TABLET, FILM COATED ORAL 3 TIMES DAILY
Qty: 9 TABLET | Refills: 0 | Status: SHIPPED | OUTPATIENT
Start: 2019-10-19 | End: 2019-11-27

## 2019-10-19 RX ORDER — CIPROFLOXACIN 250 MG/1
250 TABLET, FILM COATED ORAL ONCE
Status: DISCONTINUED | OUTPATIENT
Start: 2019-10-19 | End: 2019-10-19 | Stop reason: HOSPADM

## 2019-10-19 NOTE — ED PROVIDER NOTES
[place] EMERGENCY DEPARTMENT ([facility])  October 18, 2019    History     Chief Complaint   Patient presents with     Urinary Frequency     Dysuria     HPI  Nicole Fritsche is a 49 year old female with a history of cervical cancer, arrested hydrocephalus, and asthma who presents to the ED for evaluation of lower abdominal pain and dysuria. Patient reports her symptoms started on 10/13/19, and she is worried she has a bladder infection. She denies fever, back pain, skin rash, vaginal bleeding or discharge.     PAST MEDICAL HISTORY  Past Medical History:   Diagnosis Date     Abnormal Pap smear of cervix 05/21/2019    see problem list     Arrested hydrocephalus (H) 2/3/2009    Ongoing HAs, seeing neurosurgeon. Per past notes from Dr. Resendiz, HAs most likely not secondary to the hydrocephalus, but rather vascular inorgin.  Please see scanned in records.     Asthma      Cervical cancer (H)      Cervical high risk HPV (human papillomavirus) test positive 05/21/2019    See problem list     PAST SURGICAL HISTORY  Past Surgical History:   Procedure Laterality Date     C VENTRICULO-CISTERNOSTOMY,3RD VENT      for treatment of HA related to hydrocephalus     EXAM UNDER ANESTHESIA, INSERT ANN MARIE SLEEVE, UTERINE PLACEMENT OF TANDEM AND RING FOR RAD, ULTRASOUND N/A 8/30/2019    Procedure: Ann Marie Sleeve Insertion, Ultrasound Guided;  Surgeon: Anne Tidwell MD;  Location: [facility] OR     EXCISE MASS TRUNK Left 9/16/2015    Procedure: EXCISE MASS TRUNK;  Surgeon: Carlos Enrique Briones MD;  Location: [facility] OR     FAMILY HISTORY  Family History   Problem Relation Age of Onset     Diabetes Mother      Cerebrovascular Disease Mother      Ovarian Cancer Mother      Breast Cancer Mother      C.A.D. Father 40     Cerebrovascular Disease Father      C.A.D. Sister 62        stent placed     Ovarian Cancer Sister      Ovarian Cancer Sister      Ovarian Cancer Sister      SOCIAL HISTORY  Social History     Tobacco Use     Smoking  status: Former Smoker     Packs/day: 0.25     Years: 20.00     Pack years: 5.00     Types: Cigarettes     Last attempt to quit: 2018     Years since quittin.6     Smokeless tobacco: Former User     Quit date: 2015   Substance Use Topics     Alcohol use: Yes     Alcohol/week: 0.0 standard drinks     Comment: once every 3-4 months     MEDICATIONS  Current Facility-Administered Medications   Medication     ciprofloxacin (CIPRO) tablet 250 mg     phenazopyridine (PYRIDIUM) tablet 200 mg     Current Outpatient Medications   Medication     ciprofloxacin (CIPRO) 500 MG tablet     phenazopyridine (PYRIDIUM) 200 MG tablet     Acetaminophen (MIDOL PO)     albuterol (PROAIR HFA/PROVENTIL HFA/VENTOLIN HFA) 108 (90 Base) MCG/ACT inhaler     Ferrous Sulfate (IRON SUPPLEMENT PO)     fluticasone (FLONASE) 50 MCG/ACT nasal spray     fluticasone (FLOVENT HFA) 110 MCG/ACT inhaler     loperamide (IMODIUM) 2 MG capsule     LORazepam (ATIVAN) 1 MG tablet     Multiple Vitamin (MULTIVITAMINS PO)     ondansetron (ZOFRAN) 4 MG tablet     prochlorperazine (COMPAZINE) 10 MG tablet     ALLERGIES  No Known Allergies    I have reviewed the Medications, Allergies, Past Medical and Surgical History, and Social History in the Epic system.    Review of Systems   Constitutional: Negative for fever.   Gastrointestinal: Positive for abdominal pain (lower abdominal pain).   Genitourinary: Positive for dysuria. Negative for vaginal bleeding and vaginal discharge.   Musculoskeletal: Negative for back pain.   All other systems reviewed and are negative.      Physical Exam   BP: 122/84  Heart Rate: 94  Temp: 98  F (36.7  C)  Weight: 72.6 kg (160 lb)  SpO2: 99 %      Physical Exam  Constitutional:       General: She is not in acute distress.     Appearance: She is well-developed. She is not ill-appearing or diaphoretic.      Comments: Comfortably resting, lying in bed, NAD, nondiaphoretic, lucid, fully conversant, no  respiratory distress, alert  and oriented.     HENT:      Head: Normocephalic and atraumatic.      Mouth/Throat:      Pharynx: No oropharyngeal exudate.   Eyes:      General: No scleral icterus.     Pupils: Pupils are equal, round, and reactive to light.   Neck:      Musculoskeletal: Normal range of motion and neck supple.   Cardiovascular:      Heart sounds: Normal heart sounds.   Pulmonary:      Effort: No respiratory distress.      Breath sounds: Normal breath sounds.   Abdominal:      General: Bowel sounds are normal.      Palpations: Abdomen is soft.      Tenderness: There is no tenderness.   Musculoskeletal:         General: No tenderness.   Skin:     General: Skin is warm and dry.      Coloration: Skin is not pale.      Findings: No erythema or rash.   Neurological:      Mental Status: She is alert and oriented to person, place, and time.         ED Course        Procedures             Critical Care time:  none             Labs Ordered and Resulted from Time of ED Arrival Up to the Time of Departure from the ED   UA MACROSCOPIC WITH REFLEX TO MICRO AND CULTURE - Abnormal; Notable for the following components:       Result Value    Blood Urine Trace (*)     Protein Albumin Urine 10 (*)     Leukocyte Esterase Urine Large (*)     WBC Urine 26 (*)     Mucous Urine Present (*)     All other components within normal limits   URINE CULTURE AEROBIC BACTERIAL          I have reviewed the patient's prior chart including recent labs, ER visits, and available inpatient discharge summaries if available.    Assessments & Plan (with Medical Decision Making)   This is a 49-year-old female patient coming into the emergency room with concerns for urinary frequency and dysuria.  Her physical exam is otherwise grossly unremarkable.  She is never had a UTI in the past but is currently being treated for cervical cancer.  She denies any vaginal discharge or bleeding.  She is otherwise nontoxic.  Her UA is assessed which shows that she has large leuk esterase  "and elevated WBCs in the urine.  She will be treated with Cipro Pyridium and discharged with a prescription.  She should follow-up with her primary care team for further care management.    Pt was discharged home/self-care.  PT was provided written discharge instructions. Additionally verbal instructions were given and discussed with patient.  PT was asked to return to the ED immediately for any new or concerning symptoms.  Pt was in agreement, endorsed understanding, and questions were answered.     I have reviewed the nursing notes.    I have reviewed the findings, diagnosis, plan and need for follow up with the patient.    Discharge Medication List as of 10/19/2019 12:39 AM      START taking these medications    Details   ciprofloxacin (CIPRO) 500 MG tablet Take 1 tablet (500 mg) by mouth 2 times daily for 3 days, Disp-6 tablet, R-0, Local Print      phenazopyridine (PYRIDIUM) 200 MG tablet Take 1 tablet (200 mg) by mouth 3 times daily for 3 days, Disp-9 tablet, R-0, Local Print             Final diagnoses:   Urinary tract infection without hematuria, site unspecified     IMelissa, am serving as a trained medical scribe to document services personally performed by Chau Gandara MD, based on the provider's statements to me.      IChau MD, was physically present and have reviewed and verified the accuracy of this note documented by Melissa Ram.     \"This dictation was performed with the assistance of voice recognition software and may contain inadvertant transcription  errors,  omissions and/or  inadvertent word substitution.\" --Chau Gandara MD     10/18/2019   [facility], [place], EMERGENCY DEPARTMENT     Chau Gandara MD  10/19/19 0048    "

## 2019-10-19 NOTE — ED TRIAGE NOTES
Arrived via triage, states she thinks she has a bladder infection. Complains of urinary frequency, and pain with urination that has been going on for about 1 week. States she has intermittent pelvic pain but that has persisted throughout treatment for cervical cancer. Recently finished chemo therapy treatments.

## 2019-10-20 LAB
BACTERIA SPEC CULT: ABNORMAL
Lab: ABNORMAL
SPECIMEN SOURCE: ABNORMAL

## 2019-10-24 NOTE — PROGRESS NOTES
[facility], [place]  Radiation Oncology Follow-up Note  Oct 28, 2019    Nicole Fritsche   MRN: [id number]  : 1970     DISEASE TREATED:  Squamous cell carcinoma of the cervix,  FIGO stage IB1 (clinical T1b1 N1 M0)     INTERVAL SINCE COMPLETION OF RADIATION THERAPY: 1 month; completed treatment on 19.     TYPE OF RADIATION THERAPY ADMINISTERED:   Radiation Oncology - Course: 1  Treatment Site            Current Dose           Modality         From               To                   Elapsed Days                                     Fx.  Pelvis and PANs               4,500 cGy                     06 X          7/29/2019         2019       32                      25  Cervix                               2,750 cGy                 Implant          2019        9                        5     Dose per Fraction: External beam to pelvis: 180 cGy, Brachytherapy: 550 cGy  Total Dose: Pelvis: 4,500 cGy, Cervix: 7,250 cGy (84.3 Gy EQD2)     SUBJECTIVE/HPI:  Ms. Fritsche is a 49-year-old woman with cervical cancer as above. She presented with irregular vaginal/postcoital bleeding. Eventual endometrial biopsy demonstrated invasive well-differentiated squamous cell carcinoma, focally keratinizing.  Examination demonstrated a friable, ulcerative lesion from 3-8 o'clock without parametrial involvement. A pelvic MRI scan demonstrated a 2.3 x 3.8 x 2.6 cm mass involving the posterior cervix without parametrial extension or pelvic adenopathy.  PET CT scan confirmed a 3.7 cm cervical mass (SUV max 13.2) with positive low periaortic adenopathy (SUV 2.7). There were no distant metastasis seen imaging.    She was treated with curative intent as described above using a two phase plan. First, 4,500 cGy in 25 daily fractions was administered to the pelvis and paraaortic nodes using intensity modulated radiotherapy. Then high dose rate brachytherapy was administered in 5  fractions every other day using tandem and ring applicator and Ir-192 source to the cervix, 2,750 cGy. She overall tolerated treatment well with expected toxicities of mild nausea and diarrhea which were managed with diet, antiemetics, and loperamide. She presents today for routine follow up.  She does not have follow-up scheduled with GYN oncology has not seen Dr. Hernandez since 7/10/2019.    Since completion of radiotherapy, she was seen in the [facility] emergency department on 10/18/2019 with complaints of dysuria and urinalysis concerning for UTI.  She was discharged with antibiotics and reports her symptoms have resolved.  However, she is still been bothered by the pelvic pain that she experienced during radiotherapy.  On further questioning and interview, the patient is uncertain when the pain began, but we have documented it beginning during the course of her radiotherapy.  It is a lower pelvic cramping pain primarily in her groins bilaterally.  It is not associated with eating, bowel movements, urination, or other known triggers.  It is helped with laying down, taking Midol and ibuprofen, and using a hot pack.  She does not have a previous history of this pain.  She denies abnormalities with her bowel movements, fevers, or constitutional symptoms other than chronic low appetite.  This pain is her primary concern right now and can increase to 9/10 in intensity.  It initially resolved after completion of radiotherapy but returned about 2 to 3 weeks ago.    She is not using her vaginal dilator as instructed.     OBJECTIVE:   /78   Wt 71.7 kg (158 lb)   BMI 24.75 kg/m      PHYSICAL EXAM:  Gen: Alert, in NAD  Eyes: EOMI, sclera anicteric  HENT      Head: NC/AT     Ears: No external auricular lesions     Nose/sinus: No rhinorrhea or epistaxis     Oral Cavity/Oropharynx: MMM, no thrush noted  Pulm: Breathing comfortably on room air, no audible wheezes or ronchi  CV: Well-perfused, no cyanosis  Abdominal: Soft,  nondistended.  Nontender and abdominal quadrants but she does have significant pain with slight palpation of her inguinal areas bilaterally.  No masses palpated.  Skin: Normal color and turgor  Neurologic/MSK: motor grossly intact, normal gait  Psych: Appropriate mood and affect    IMPRESSION: Ms. Fritsche is a 49 year old woman with squamous cell carcinoma of the cervix,  FIGO stage IB1, status post definitive chemoradiotherapy who presents for routine follow-up after 6 weeks with some persistent lower pelvic pain.  The etiology is unclear, though it is less likely infectious, gastrointestinal, or urinary based on her history.  Persistent urinary tract infection is possible, though this is less likely without flank pain and with resolution of her UTI with antibiotics previously.  She has an adnexal cyst, but this is unlikely to explain the pain as it likely was present previously and is not bilateral.  The most likely source is resolving inflammation secondary to radiotherapy.  Patients with a history of cervical cancer treated with radiotherapy have documented increased risk of pelvic and hip pain and the etiology is not always clear, but this may be related to long-term side effects of radiotherapy.  She does have fair response to ibuprofen, Midol, and hot packs.     RECOMMENDATIONS:    - Recommended reevaluation with Dr. Tidwell in 6 weeks, to be coordinated with visit with Dr. Hernandez  - Patient may continue conservative methods for pelvic pain.  Advised to contact us if it significantly worsens or is associated with new symptoms.  Expect that it may improve gradually during the next several weeks  - PET/CT in 6 weeks, which will be 12 weeks following the completion of chemoradiation  - Strongly encouraged vaginal dilator use for sexual function and pelvic examinations in the future    The patient was seen and examined with Dr. Tidwell, who agrees with the above assessment and plan.    Sandoval Meeks MD  PGY-3  Radiation Oncology, [facility], [place]  [phone] clinic  Pager [phone]    Ms. Fritsche was seen and examined by me. Note above by Dr. Meeks was reviewed and edited by me and reflects our mutual findings and plan of care.    Anne Tidwell MD  Department of Radiation Oncology  [facility]

## 2019-10-28 ENCOUNTER — OFFICE VISIT (OUTPATIENT)
Dept: RADIATION ONCOLOGY | Facility: CLINIC | Age: 49
End: 2019-10-28
Attending: RADIOLOGY
Payer: COMMERCIAL

## 2019-10-28 VITALS — BODY MASS INDEX: 24.75 KG/M2 | WEIGHT: 158 LBS | SYSTOLIC BLOOD PRESSURE: 135 MMHG | DIASTOLIC BLOOD PRESSURE: 78 MMHG

## 2019-10-28 DIAGNOSIS — C53.1 MALIGNANT NEOPLASM OF EXOCERVIX (H): Primary | ICD-10-CM

## 2019-10-28 PROCEDURE — G0463 HOSPITAL OUTPT CLINIC VISIT: HCPCS | Performed by: RADIOLOGY

## 2019-10-28 NOTE — PROGRESS NOTES
FOLLOW-UP VISIT    Patient Name: Nicole Fritsche      : 1970     Age: 49 year old        ______________________________________________________________________________     Chief Complaint   Patient presents with     Cancer     Pt is here for a follow up:Cervical Cancer: Pelvis 4500 cGy and brachytherapy completed 19     /78   Wt 71.7 kg (158 lb)   BMI 24.75 kg/m           Pain  Current history of pain associated with this visit:   Intensity: 4/10  Current: aching and crampy  Location: groin  Treatment: Ibuprofen    Labs  Other Labs: No    Imaging  None        Diarrhea: 0- None    Constipation: 0- None    Nausea: 0- None    Vomitin- No vomiting    Genitourinary system: 0- Normal    Dysuria: 0- None    Vaginal dilator use: Not using, encouraged    Other Appointments:     MD Name:  Appointment Date:    MD Name: Appointment Date:   MD Name: Appointment Date:   Other Appointment Notes:     Residual Radiation side effect: Cramping      Additional Instructions:     Nurse face-to-face time: Level 3:  10 min face to face time

## 2019-10-28 NOTE — LETTER
10/28/2019       RE: Nicole Fritsche  [address]  [place] [address]     Dear Colleague,    Thank you for referring your patient, Nicole Fritsche, to the RADIATION ONCOLOGY CLINIC. Please see a copy of my visit note below.    [facility], [place]  Radiation Oncology Follow-up Note  Oct 28, 2019    Nicole Fritsche   MRN: [id number]  : 1970     DISEASE TREATED:  Squamous cell carcinoma of the cervix,  FIGO stage IB1 (clinical T1b1 N1 M0)     INTERVAL SINCE COMPLETION OF RADIATION THERAPY: 1 month; completed treatment on 19.     TYPE OF RADIATION THERAPY ADMINISTERED:   Radiation Oncology - Course: 1  Treatment Site            Current Dose           Modality         From               To                   Elapsed Days                                     Fx.  Pelvis and PANs               4,500 cGy                     06 X          7/29/2019         2019       32                      25  Cervix                               2,750 cGy                 Implant          2019        9                        5     Dose per Fraction: External beam to pelvis: 180 cGy, Brachytherapy: 550 cGy  Total Dose: Pelvis: 4,500 cGy, Cervix: 7,250 cGy (84.3 Gy EQD2)     SUBJECTIVE/HPI:  Ms. Fritsche is a 49-year-old woman with cervical cancer as above. She presented with irregular vaginal/postcoital bleeding. Eventual endometrial biopsy demonstrated invasive well-differentiated squamous cell carcinoma, focally keratinizing.  Examination demonstrated a friable, ulcerative lesion from 3-8 o'clock without parametrial involvement. A pelvic MRI scan demonstrated a 2.3 x 3.8 x 2.6 cm mass involving the posterior cervix without parametrial extension or pelvic adenopathy.  PET CT scan confirmed a 3.7 cm cervical mass (SUV max 13.2) with positive low periaortic adenopathy (SUV 2.7). There were no distant metastasis seen imaging.    She was treated with curative intent as  described above using a two phase plan. First, 4,500 cGy in 25 daily fractions was administered to the pelvis and paraaortic nodes using intensity modulated radiotherapy. Then high dose rate brachytherapy was administered in 5 fractions every other day using tandem and ring applicator and Ir-192 source to the cervix, 2,750 cGy. She overall tolerated treatment well with expected toxicities of mild nausea and diarrhea which were managed with diet, antiemetics, and loperamide. She presents today for routine follow up.  She does not have follow-up scheduled with GYN oncology has not seen Dr. Hernandez since 7/10/2019.    Since completion of radiotherapy, she was seen in the [facility] emergency department on 10/18/2019 with complaints of dysuria and urinalysis concerning for UTI.  She was discharged with antibiotics and reports her symptoms have resolved.  However, she is still been bothered by the pelvic pain that she experienced during radiotherapy.  On further questioning and interview, the patient is uncertain when the pain began, but we have documented it beginning during the course of her radiotherapy.  It is a lower pelvic cramping pain primarily in her groins bilaterally.  It is not associated with eating, bowel movements, urination, or other known triggers.  It is helped with laying down, taking Midol and ibuprofen, and using a hot pack.  She does not have a previous history of this pain.  She denies abnormalities with her bowel movements, fevers, or constitutional symptoms other than chronic low appetite.  This pain is her primary concern right now and can increase to 9/10 in intensity.  It initially resolved after completion of radiotherapy but returned about 2 to 3 weeks ago.    She is not using her vaginal dilator as instructed.     OBJECTIVE:   /78   Wt 71.7 kg (158 lb)   BMI 24.75 kg/m       PHYSICAL EXAM:  Gen: Alert, in NAD  Eyes: EOMI, sclera anicteric  HENT      Head: NC/AT     Ears: No external  auricular lesions     Nose/sinus: No rhinorrhea or epistaxis     Oral Cavity/Oropharynx: MMM, no thrush noted  Pulm: Breathing comfortably on room air, no audible wheezes or ronchi  CV: Well-perfused, no cyanosis  Abdominal: Soft, nondistended.  Nontender and abdominal quadrants but she does have significant pain with slight palpation of her inguinal areas bilaterally.  No masses palpated.  Skin: Normal color and turgor  Neurologic/MSK: motor grossly intact, normal gait  Psych: Appropriate mood and affect    IMPRESSION: Ms. Fritsche is a 49 year old woman with squamous cell carcinoma of the cervix,  FIGO stage IB1, status post definitive chemoradiotherapy who presents for routine follow-up after 6 weeks with some persistent lower pelvic pain.  The etiology is unclear, though it is less likely infectious, gastrointestinal, or urinary based on her history.  Persistent urinary tract infection is possible, though this is less likely without flank pain and with resolution of her UTI with antibiotics previously.  She has an adnexal cyst, but this is unlikely to explain the pain as it likely was present previously and is not bilateral.  The most likely source is resolving inflammation secondary to radiotherapy.  Patients with a history of cervical cancer treated with radiotherapy have documented increased risk of pelvic and hip pain and the etiology is not always clear, but this may be related to long-term side effects of radiotherapy.  She does have fair response to ibuprofen, Midol, and hot packs.     RECOMMENDATIONS:    - Recommended reevaluation with Dr. Tidwell in 6 weeks, to be coordinated with visit with Dr. Hernandez  - Patient may continue conservative methods for pelvic pain.  Advised to contact us if it significantly worsens or is associated with new symptoms.  Expect that it may improve gradually during the next several weeks  - PET/CT in 6 weeks, which will be 12 weeks following the completion of  chemoradiation  - Strongly encouraged vaginal dilator use for sexual function and pelvic examinations in the future    The patient was seen and examined with Dr. Tidwell, who agrees with the above assessment and plan.    Sandoval Meeks MD PGY-3  Radiation Oncology, [facility]  [phone] clinic  Pager [phone]    Ms. Fritsche was seen and examined by me. Note above by Dr. Meeks was reviewed and edited by me and reflects our mutual findings and plan of care.    Anne Tidwell MD  Department of Radiation Oncology  [facility]              FOLLOW-UP VISIT    Patient Name: Nicole Fritsche      : 1970     Age: 49 year old        ______________________________________________________________________________     Chief Complaint   Patient presents with     Cancer     Pt is here for a follow up:Cervical Cancer: Pelvis 4500 cGy and brachytherapy completed 19     /78   Wt 71.7 kg (158 lb)   BMI 24.75 kg/m            Pain  Current history of pain associated with this visit:   Intensity: 4/10  Current: aching and crampy  Location: groin  Treatment: Ibuprofen    Labs  Other Labs: No    Imaging  None        Diarrhea: 0- None    Constipation: 0- None    Nausea: 0- None    Vomitin- No vomiting    Genitourinary system: 0- Normal    Dysuria: 0- None    Vaginal dilator use: Not using, encouraged    Other Appointments:     MD Name:  Appointment Date:    MD Name: Appointment Date:   MD Name: Appointment Date:   Other Appointment Notes:     Residual Radiation side effect: Cramping      Additional Instructions:     Nurse face-to-face time: Level 3:  10 min face to face time          Again, thank you for allowing me to participate in the care of your patient.      Sincerely,    Anne Tidwell MD

## 2019-11-09 ENCOUNTER — HEALTH MAINTENANCE LETTER (OUTPATIENT)
Age: 49
End: 2019-11-09

## 2019-11-24 NOTE — PROGRESS NOTES
Radiation Oncology Follow-up Visit  Date of encounter: 2019    Nicole Fritsche  MRN: [id number]   : 1970     DISEASE TREATED:  Squamous cell carcinoma of the cervix,  FIGO stage IB1 (clinical T1b1 N1 M0)     INTERVAL SINCE COMPLETION OF RADIATION THERAPY: 2 month; completed treatment on 19.     TYPE OF RADIATION THERAPY ADMINISTERED:   Radiation Oncology - Course: 1  Treatment Site            Current Dose           Modality         From               To                   Elapsed Days                                     Fx.  Pelvis and PANs               4,500 cGy                     06 X          7/29/2019         2019       32                      25  Cervix                               2,750 cGy                 Implant          2019        9                        5     Dose per Fraction: External beam to pelvis: 180 cGy, Brachytherapy: 550 cGy  Total Dose: Pelvis: 4,500 cGy, Cervix: 7,250 cGy (84.3 Gy EQD2)     SUBJECTIVE/HPI:  Ms. Fritsche is a 49-year-old woman with cervical cancer as above. She presented with irregular vaginal/postcoital bleeding. Eventual endometrial biopsy demonstrated invasive well-differentiated squamous cell carcinoma, focally keratinizing.  Examination demonstrated a friable, ulcerative lesion from 3-8 o'clock without parametrial involvement. A pelvic MRI scan demonstrated a 2.3 x 3.8 x 2.6 cm mass involving the posterior cervix without parametrial extension or pelvic adenopathy.  PET CT scan confirmed a 3.7 cm cervical mass (SUV max 13.2) with positive low periaortic adenopathy (SUV 2.7). There were no distant metastasis seen imaging. She was treated with curative intent as described above. She overall tolerated treatment well. Since completion of radiotherapy, she was seen in the [facility] emergency department on 10/18/2019 for UTI and was discharged with antibiotics. In her last follow up with us 1 month ago, she reported pelvic pain  that she experienced during radiotherapy. She most recently underwent post-treatment PET on 11/25/19, with result showing no evidence of residual disease and resolution of adenopathy.  Overall, she feels well.  She denies any persistent problems with urination.  Bowel movements are normal.  She has occasional very slightly pink-tinged vaginal discharge.  She is currently not using a dilator but is having sexual intercourse approximately 2-3 times per week without pain or difficulty.  Her energy level has improved.  She does have some low back pain that is dull and achy (recent PET CT scan from today showed degenerative changes at L5-S1).  She denies any fever, chills, unexplained weight loss, lower extremity edema.  She occasionally experiences mild nausea which is helped by wearing wrist bands.    PHYSICAL EXAM:  VITALS: /93, weight 71.22 kg, pain 0/10  GEN: Appears well, alert, oriented, and in NAD  Skin: No rashes or lesions on exposed skin  HEENT: Normocephalic atraumatic  Neck: Supple, no thyromegaly  Heart: Regular rate and rhythm, extremities warm and well-perfused  Lungs: Breathing comfortably on room air  Gyn: Normal external genitalia, no vaginal lesions on visual inspection or palpation, cervix visualized without lesion.  Bimanual exam without vaginal or parametrial involvement, cervix measures 4 cm.  Extremities: No edema  Neuro: Cranial nerves II through XII are grossly intact, gait normal.    LABS AND IMAGING:  Reviewed.  See history of present illness.    IMPRESSION:   Ms. Fritsche is a 49 year old female with squamous cell carcinoma of the cervix,  FIGO stage IB1, status post definitive chemoradiotherapy who presents for routine follow-up after 2 months.  Overall, she is doing well and is recovered from the acute effects of treatment.  Low back pain may be musculoskeletal.  Patient is not interested in a physical therapy referral at this time but will try some stretching exercises, heat and  ibuprofen.    PLAN:   1.  Arrange follow-up with gynecologic oncology, Dr. Hernandez  2.  Follow-up in radiation oncology as needed  3.  Reiterated the necessity of using either vaginal dilator 3 times weekly or having sexual intercourse to minimize potential vaginal stenosis.    Anne Tidwell MD  Department of Radiation Oncology  [facility]    CC  Patient Care Team:  [facility] as PCP - General (Clinic)  Ansley White PA-C as Assigned PCP  Stephanie Javier RN as Specialty Care Coordinator (Gyn-Onc)  Hasmukh Hernandez MD

## 2019-11-25 ENCOUNTER — ANCILLARY PROCEDURE (OUTPATIENT)
Dept: PET IMAGING | Facility: CLINIC | Age: 49
End: 2019-11-25
Attending: RADIOLOGY
Payer: COMMERCIAL

## 2019-11-25 DIAGNOSIS — C53.1 MALIGNANT NEOPLASM OF EXOCERVIX (H): ICD-10-CM

## 2019-11-25 LAB — GLUCOSE SERPL-MCNC: 85 MG/DL (ref 70–99)

## 2019-11-25 RX ORDER — FUROSEMIDE 10 MG/ML
40 INJECTION INTRAMUSCULAR; INTRAVENOUS ONCE
Status: COMPLETED | OUTPATIENT
Start: 2019-11-25 | End: 2019-11-25

## 2019-11-25 RX ADMIN — FUROSEMIDE 40 MG: 10 INJECTION INTRAMUSCULAR; INTRAVENOUS at 10:45

## 2019-11-25 NOTE — DISCHARGE INSTRUCTIONS

## 2019-11-27 ENCOUNTER — OFFICE VISIT (OUTPATIENT)
Dept: RADIATION ONCOLOGY | Facility: CLINIC | Age: 49
End: 2019-11-27
Attending: RADIOLOGY
Payer: COMMERCIAL

## 2019-11-27 VITALS — WEIGHT: 157 LBS | DIASTOLIC BLOOD PRESSURE: 93 MMHG | BODY MASS INDEX: 24.59 KG/M2 | SYSTOLIC BLOOD PRESSURE: 135 MMHG

## 2019-11-27 DIAGNOSIS — C53.1 MALIGNANT NEOPLASM OF EXOCERVIX (H): Primary | ICD-10-CM

## 2019-11-27 PROCEDURE — G0463 HOSPITAL OUTPT CLINIC VISIT: HCPCS | Performed by: RADIOLOGY

## 2019-11-27 NOTE — LETTER
2019       RE: Nicole Fritsche  [address]  [place] [address]     Dear Colleague,    Thank you for referring your patient, Nicole Fritsche, to the RADIATION ONCOLOGY CLINIC. Please see a copy of my visit note below.    Radiation Oncology Follow-up Visit  Date of encounter: 2019    Nicole Fritsche  MRN: [id number]   : 1970     DISEASE TREATED:  Squamous cell carcinoma of the cervix,  FIGO stage IB1 (clinical T1b1 N1 M0)     INTERVAL SINCE COMPLETION OF RADIATION THERAPY:  2 month; completed treatment on 19.     TYPE OF RADIATION THERAPY ADMINISTERED:   Radiation Oncology - Course: 1  Treatment Site            Current Dose           Modality         From               To                   Elapsed Days                                     Fx.  Pelvis and PANs               4,500 cGy                     06 X          7/29/2019         2019       32                      25  Cervix                               2,750 cGy                 Implant          2019        9                        5     Dose per Fraction: External beam to pelvis: 180 cGy, Brachytherapy: 550 cGy  Total Dose: Pelvis: 4,500 cGy, Cervix: 7,250 cGy (84.3 Gy EQD2)     SUBJECTIVE/HPI:  Ms. Fritsche is a 49-year-old woman with cervical cancer as above. She presented with irregular vaginal/postcoital bleeding. Eventual endometrial biopsy demonstrated invasive well-differentiated squamous cell carcinoma, focally keratinizing.  Examination demonstrated a friable, ulcerative lesion from 3-8 o'clock without parametrial involvement. A pelvic MRI scan demonstrated a 2.3 x 3.8 x 2.6 cm mass involving the posterior cervix without parametrial extension or pelvic adenopathy.  PET CT scan confirmed a 3.7 cm cervical mass (SUV max 13.2) with positive low periaortic adenopathy (SUV 2.7). There were no distant metastasis seen imaging. She was treated with curative intent as described above. She  overall tolerated treatment well. Since completion of radiotherapy, she was seen in the [facility] emergency department on 10/18/2019 for UTI and was discharged with antibiotics. In her last follow up with us 1 month ago, she reported pelvic pain that she experienced during radiotherapy. She most recently underwent post-treatment PET on 11/25/19, with result showing no evidence of residual disease and resolution of adenopathy.  Overall, she feels well.  She denies any persistent problems with urination.  Bowel movements are normal.  She has occasional very slightly pink-tinged vaginal discharge.  She is currently not using a dilator but is having sexual intercourse approximately 2-3 times per week without pain or difficulty.  Her energy level has improved.  She does have some low back pain that is dull and achy (recent PET CT scan from today showed degenerative changes at L5-S1).  She denies any fever, chills, unexplained weight loss, lower extremity edema.  She occasionally experiences mild nausea which is helped by wearing wrist bands.    PHYSICAL EXAM:  VITALS: /93, weight 71.22 kg, pain 0/10  GEN: Appears well, alert, oriented, and in NAD  Skin: No rashes or lesions on exposed skin  HEENT: Normocephalic atraumatic  Neck: Supple, no thyromegaly  Heart: Regular rate and rhythm, extremities warm and well-perfused  Lungs: Breathing comfortably on room air  Gyn: Normal external genitalia, no vaginal lesions on visual inspection or palpation, cervix visualized without lesion.  Bimanual exam without vaginal or parametrial involvement, cervix measures 4 cm.  Extremities: No edema  Neuro: Cranial nerves II through XII are grossly intact, gait normal.    LABS AND IMAGING:  Reviewed.  See history of present illness.    IMPRESSION:   Ms. Fritsche is a 49 year old female with squamous cell carcinoma of the cervix,  FIGO stage IB1, status post definitive chemoradiotherapy who presents for routine follow-up after 2 months.   Overall, she is doing well and is recovered from the acute effects of treatment.  Low back pain may be musculoskeletal.  Patient is not interested in a physical therapy referral at this time but will try some stretching exercises, heat and ibuprofen.    PLAN:   1.  Arrange follow-up with gynecologic oncology, Dr. Hernandez  2.  Follow-up in radiation oncology as needed  3.  Reiterated the necessity of using either vaginal dilator 3 times weekly or having sexual intercourse to minimize potential vaginal stenosis.    Anne Tidwell MD  Department of Radiation Oncology  [facility]    CC  Patient Care Team:  [facility] as PCP - General (Clinic)  Ansley White PA-C as Assigned PCP  Stephanie Javier RN as Specialty Care Coordinator (Gyn-Onc)  Hasmukh Hernandez MD      FOLLOW-UP VISIT    Patient Name: Nicole Fritsche      : 1970     Age: 49 year old        ______________________________________________________________________________     Chief Complaint   Patient presents with     Cancer     Pt is here for a follow up:Cervical Cancer: Pelvis 4500 cGy and brachytherapy completed 19     BP (!) 135/93   Wt 71.2 kg (157 lb)   BMI 24.59 kg/m          Pain  Denies    Labs  Other Labs: No    Imaging  CT: 19        Diarrhea: 0- None    Constipation: 0- None    Nausea: 0- None    Vomitin- No vomiting    Genitourinary system: 0- Normal    Dysuria: 0- None    Vaginal dilator use: yes    Other Appointments:     MD Name:  Appointment Date:    MD Name: Appointment Date:   MD Name: Appointment Date:   Other Appointment Notes:     Residual Radiation side effect: No concerns    Additional Instructions:     Nurse face-to-face time: Level 3:  10 min face to face time

## 2019-11-27 NOTE — PROGRESS NOTES
FOLLOW-UP VISIT    Patient Name: Nicole Fritsche      : 1970     Age: 49 year old        ______________________________________________________________________________     Chief Complaint   Patient presents with     Cancer     Pt is here for a follow up:Cervical Cancer: Pelvis 4500 cGy and brachytherapy completed 19     BP (!) 135/93   Wt 71.2 kg (157 lb)   BMI 24.59 kg/m         Pain  Denies    Labs  Other Labs: No    Imaging  CT: 19        Diarrhea: 0- None    Constipation: 0- None    Nausea: 0- None    Vomitin- No vomiting    Genitourinary system: 0- Normal    Dysuria: 0- None    Vaginal dilator use: yes    Other Appointments:     MD Name:  Appointment Date:    MD Name: Appointment Date:   MD Name: Appointment Date:   Other Appointment Notes:     Residual Radiation side effect: No concerns    Additional Instructions:     Nurse face-to-face time: Level 3:  10 min face to face time

## 2019-11-27 NOTE — LETTER
2019      RE: Nicole Fritsche  [address] [place] [address]       Radiation Oncology Follow-up Visit  Date of encounter: 2019    Nicole Fritsche  MRN: [id number]   : 1970     DISEASE TREATED:  Squamous cell carcinoma of the cervix,  FIGO stage IB1 (clinical T1b1 N1 M0)     INTERVAL SINCE COMPLETION OF RADIATION THERAPY:  2 month; completed treatment on 19.     TYPE OF RADIATION THERAPY ADMINISTERED:   Radiation Oncology - Course: 1  Treatment Site            Current Dose           Modality         From               To                   Elapsed Days                                     Fx.  Pelvis and PANs               4,500 cGy                     06 X          7/29/2019         2019       32                      25  Cervix                               2,750 cGy                 Implant          2019        9                        5     Dose per Fraction: External beam to pelvis: 180 cGy, Brachytherapy: 550 cGy  Total Dose: Pelvis: 4,500 cGy, Cervix: 7,250 cGy (84.3 Gy EQD2)     SUBJECTIVE/HPI:  Ms. Fritsche is a 49-year-old woman with cervical cancer as above. She presented with irregular vaginal/postcoital bleeding. Eventual endometrial biopsy demonstrated invasive well-differentiated squamous cell carcinoma, focally keratinizing.  Examination demonstrated a friable, ulcerative lesion from 3-8 o'clock without parametrial involvement. A pelvic MRI scan demonstrated a 2.3 x 3.8 x 2.6 cm mass involving the posterior cervix without parametrial extension or pelvic adenopathy.  PET CT scan confirmed a 3.7 cm cervical mass (SUV max 13.2) with positive low periaortic adenopathy (SUV 2.7). There were no distant metastasis seen imaging. She was treated with curative intent as described above. She overall tolerated treatment well. Since completion of radiotherapy, she was seen in the [facility] emergency department on 10/18/2019 for UTI and was discharged  with antibiotics. In her last follow up with us 1 month ago, she reported pelvic pain that she experienced during radiotherapy. She most recently underwent post-treatment PET on 11/25/19, with result showing no evidence of residual disease and resolution of adenopathy.  Overall, she feels well.  She denies any persistent problems with urination.  Bowel movements are normal.  She has occasional very slightly pink-tinged vaginal discharge.  She is currently not using a dilator but is having sexual intercourse approximately 2-3 times per week without pain or difficulty.  Her energy level has improved.  She does have some low back pain that is dull and achy (recent PET CT scan from today showed degenerative changes at L5-S1).  She denies any fever, chills, unexplained weight loss, lower extremity edema.  She occasionally experiences mild nausea which is helped by wearing wrist bands.    PHYSICAL EXAM:  VITALS: /93, weight 71.22 kg, pain 0/10  GEN: Appears well, alert, oriented, and in NAD  Skin: No rashes or lesions on exposed skin  HEENT: Normocephalic atraumatic  Neck: Supple, no thyromegaly  Heart: Regular rate and rhythm, extremities warm and well-perfused  Lungs: Breathing comfortably on room air  Gyn: Normal external genitalia, no vaginal lesions on visual inspection or palpation, cervix visualized without lesion.  Bimanual exam without vaginal or parametrial involvement, cervix measures 4 cm.  Extremities: No edema  Neuro: Cranial nerves II through XII are grossly intact, gait normal.    LABS AND IMAGING:  Reviewed.  See history of present illness.    IMPRESSION:   Ms. Fritsche is a 49 year old female with squamous cell carcinoma of the cervix,  FIGO stage IB1, status post definitive chemoradiotherapy who presents for routine follow-up after 2 months.  Overall, she is doing well and is recovered from the acute effects of treatment.  Low back pain may be musculoskeletal.  Patient is not interested in a  physical therapy referral at this time but will try some stretching exercises, heat and ibuprofen.    PLAN:   1.  Arrange follow-up with gynecologic oncology, Dr. Hernandez  2.  Follow-up in radiation oncology as needed  3.  Reiterated the necessity of using either vaginal dilator 3 times weekly or having sexual intercourse to minimize potential vaginal stenosis.    Anne Tidwell MD  Department of Radiation Oncology  [facility]    CC  Patient Care Team:  [facility] as PCP - General (Clinic)  Ansley White PA-C as Assigned PCP  Stephanie Javier RN as Specialty Care Coordinator (Gyn-Onc)  Hasmukh Hernandez MD      FOLLOW-UP VISIT    Patient Name: Nicole Fritsche      : 1970     Age: 49 year old        ______________________________________________________________________________     Chief Complaint   Patient presents with     Cancer     Pt is here for a follow up:Cervical Cancer: Pelvis 4500 cGy and brachytherapy completed 19     BP (!) 135/93   Wt 71.2 kg (157 lb)   BMI 24.59 kg/m          Pain  Denies    Labs  Other Labs: No    Imaging  CT: 19        Diarrhea: 0- None    Constipation: 0- None    Nausea: 0- None    Vomitin- No vomiting    Genitourinary system: 0- Normal    Dysuria: 0- None    Vaginal dilator use: yes    Other Appointments:     MD Name:  Appointment Date:    MD Name: Appointment Date:   MD Name: Appointment Date:   Other Appointment Notes:     Residual Radiation side effect: No concerns    Additional Instructions:     Nurse face-to-face time: Level 3:  10 min face to face time          Anne Tidwell MD

## 2019-12-18 ENCOUNTER — ONCOLOGY VISIT (OUTPATIENT)
Dept: ONCOLOGY | Facility: CLINIC | Age: 49
End: 2019-12-18
Attending: OBSTETRICS & GYNECOLOGY
Payer: COMMERCIAL

## 2019-12-18 VITALS
HEART RATE: 78 BPM | TEMPERATURE: 98.6 F | OXYGEN SATURATION: 100 % | WEIGHT: 165 LBS | SYSTOLIC BLOOD PRESSURE: 120 MMHG | DIASTOLIC BLOOD PRESSURE: 82 MMHG | BODY MASS INDEX: 25.84 KG/M2 | RESPIRATION RATE: 16 BRPM

## 2019-12-18 DIAGNOSIS — C53.9 MALIGNANT NEOPLASM OF CERVIX, UNSPECIFIED SITE (H): Primary | ICD-10-CM

## 2019-12-18 PROCEDURE — G0463 HOSPITAL OUTPT CLINIC VISIT: HCPCS | Mod: ZF

## 2019-12-18 PROCEDURE — 99214 OFFICE O/P EST MOD 30 MIN: CPT | Mod: ZP | Performed by: OBSTETRICS & GYNECOLOGY

## 2019-12-18 ASSESSMENT — PAIN SCALES - GENERAL: PAINLEVEL: NO PAIN (0)

## 2019-12-18 NOTE — PROGRESS NOTES
Follow Up Notes on Referred Patient    Date: 2019       Dr. Jonatan Garcia MD  No address on file       RE: Nicole Fritsche  : 1970  DAVID: 2019    Dear Dr. Jonatan Garcia:    Nicole Fritsche is a 49 year old woman with a diagnosis of stage IIB cervical cancer.     The patient presents today for followup.  She has been doing well since finishing chemoradiation.  She is eating and drinking well.  No nausea, vomiting, fever or chills.  Normal urinary and bowel function.  No vaginal bleeding or discharge.  No B symptoms.           Past Medical History:    Past Medical History:   Diagnosis Date     Abnormal Pap smear of cervix 2019    see problem list     Arrested hydrocephalus (H) 2/3/2009    Ongoing HAs, seeing neurosurgeon. Per past notes from Dr. Resendiz, HAs most likely not secondary to the hydrocephalus, but rather vascular inorgin.  Please see scanned in records.     Asthma      Cervical cancer (H)      Cervical high risk HPV (human papillomavirus) test positive 2019    See problem list         Past Surgical History:    Past Surgical History:   Procedure Laterality Date     C VENTRICULO-CISTERNOSTOMY,3RD VENT      for treatment of HA related to hydrocephalus     EXAM UNDER ANESTHESIA, INSERT ANN MARIE SLEEVE, UTERINE PLACEMENT OF TANDEM AND RING FOR RAD, ULTRASOUND N/A 2019    Procedure: Ann Marie Sleeve Insertion, Ultrasound Guided;  Surgeon: Anne Tidwell MD;  Location: [facility] OR     EXCISE MASS TRUNK Left 2015    Procedure: EXCISE MASS TRUNK;  Surgeon: Carlos Enrique Briones MD;  Location:  OR         Health Maintenance Due   Topic Date Due     COLONOSCOPY  1979     PNEUMOCOCCAL IMMUNIZATION 19-64 HIGHEST RISK (2 of 3 - PCV13) 2015     ASTHMA ACTION PLAN  2016     INFLUENZA VACCINE (1) 2019     ASTHMA CONTROL TEST  2019     ZOSTER IMMUNIZATION (2 of 2) 2019     PAP  2020       Current Medications:     Current Outpatient  Medications   Medication Sig Dispense Refill     Acetaminophen (MIDOL PO) Take by mouth as needed       albuterol (PROAIR HFA/PROVENTIL HFA/VENTOLIN HFA) 108 (90 Base) MCG/ACT inhaler Inhale 2 puffs into the lungs every 6 hours as needed for shortness of breath / dyspnea or wheezing 18 g 1     Ferrous Sulfate (IRON SUPPLEMENT PO) Take 325 mg by mouth 2 times daily (with meals)       fluticasone (FLONASE) 50 MCG/ACT nasal spray Spray 1-2 sprays into both nostrils daily 16 g 1     fluticasone (FLOVENT HFA) 110 MCG/ACT inhaler Inhale 2 puffs into the lungs 2 times daily 3 Inhaler 3     Multiple Vitamin (MULTIVITAMINS PO) Take  by mouth daily.           Allergies:      No Known Allergies     Social History:     Social History     Tobacco Use     Smoking status: Former Smoker     Packs/day: 0.25     Years: 20.00     Pack years: 5.00     Types: Cigarettes     Last attempt to quit: 2018     Years since quittin.8     Smokeless tobacco: Former User     Quit date: 2015   Substance Use Topics     Alcohol use: Yes     Alcohol/week: 0.0 standard drinks     Comment: once every 3-4 months       History   Drug Use No         Family History:       Family History   Problem Relation Age of Onset     Diabetes Mother      Cerebrovascular Disease Mother      Ovarian Cancer Mother      Breast Cancer Mother      C.A.D. Father 40     Cerebrovascular Disease Father      C.A.D. Sister 62        stent placed     Ovarian Cancer Sister      Ovarian Cancer Sister      Ovarian Cancer Sister          Physical Exam:     /82   Pulse 78   Temp 98.6  F (37  C) (Oral)   Resp 16   Wt 74.8 kg (165 lb)   LMP  (LMP Unknown)   SpO2 100%   Breastfeeding No   BMI 25.84 kg/m    Body mass index is 25.84 kg/m .    General Appearance: healthy and alert, no distress     Musculoskeletal: extremities non tender and without edema    Neurological: normal gait, no gross defects     Psychiatric: appropriate mood and affect                                Hematological: normal cervical, supraclavicular and inguinal lymph nodes     Gastrointestinal:       abdomen soft, non-tender, non-distended, no organomegaly or masses    Genitourinary: External genitalia and urethral meatus appears normal.  Vagina is smooth without nodularity or masses.  Cervix appears normal and without lesions.  Bimanual exam reveal no masses, nodularity or fullness.  Recto-vaginal exam confirms these findings.      Assessment:    Nicole Fritsche is a 49 year old woman with a diagnosis of stage IIB cervical cancer.     A total of 25 minutes was spent with the patient, 20 minutes of which were spent in counseling the patient and/or treatment planning.      1.  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   2.  Status post definitive chemoradiation.        Reviewed with the patient the PET scan.  PET scan has still some amount of uptake in the lower uterine segment which appears to be physiologic.  We will just repeat another PET scan in 4 months to reassure resolution.  Otherwise, we will see her back every 3-4 months for the first 2 years and then every 6 months for the next 3 years and then yearly once we get to year 5.  The patient agrees with the plan, is very appreciative of her care.  All questions were answered.       Hasmukh Hernandez MD, MS    Department of Obstetrics and Gynecology   Division of Gynecologic Oncology   [facility]  Phone: [phone]        CC  Patient Care Team:  [facility] as PCP - General (Clinic)  Ansley White PA-C as Assigned PCP  Stephanie Javier, RN as Specialty Care Coordinator (Gyn-Onc)  SELF, REFERRED

## 2019-12-18 NOTE — Clinical Note
12/18/2019       RE: Nicole Fritsche  [address]  [place] [address]     Dear Colleague,    Thank you for referring your patient, Nicole Fritsche, to the [facility]ER [facility]. Please see a copy of my visit note below.    No notes on file    Again, thank you for allowing me to participate in the care of your patient.      Sincerely,    Hasmukh Hernandez MD

## 2019-12-18 NOTE — NURSING NOTE
"Oncology Rooming Note    December 18, 2019 7:39 AM   Nicole Fritsche is a 49 year old female who presents for:    Chief Complaint   Patient presents with     Oncology Clinic Visit     Return Cervical Ca     Initial Vitals: /82   Pulse 78   Temp 98.6  F (37  C) (Oral)   Resp 16   Wt 74.8 kg (165 lb)   LMP  (LMP Unknown)   SpO2 100%   Breastfeeding No   BMI 25.84 kg/m   Estimated body mass index is 25.84 kg/m  as calculated from the following:    Height as of 9/9/19: 1.702 m (5' 7\").    Weight as of this encounter: 74.8 kg (165 lb). Body surface area is 1.88 meters squared.  No Pain (0) Comment: Data Unavailable   No LMP recorded (lmp unknown). Patient is perimenopausal.  Allergies reviewed: Yes  Medications reviewed: Yes    Medications: Medication refills not needed today.  Pharmacy name entered into [employer]: [facility] [id number] [place] [address]    Clinical concerns: No new concerns.         Ayesha Mccormick CMA              "

## 2019-12-18 NOTE — LETTER
2019      RE: Nicole Fritsche  [address]  [place] [address]                   Follow Up Notes on Referred Patient    Date: 2019       Dr. Jonatan Garcia MD  No address on file       RE: Nicole Fritsche  : 1970  DAVID: 2019    Dear Dr. Jonatan Garcia:    Nicole Fritsche is a 49 year old woman with a diagnosis of stage IIB cervical cancer.     The patient presents today for followup.  She has been doing well since finishing chemoradiation.  She is eating and drinking well.  No nausea, vomiting, fever or chills.  Normal urinary and bowel function.  No vaginal bleeding or discharge.  No B symptoms.           Past Medical History:    Past Medical History:   Diagnosis Date     Abnormal Pap smear of cervix 2019    see problem list     Arrested hydrocephalus (H) 2/3/2009    Ongoing HAs, seeing neurosurgeon. Per past notes from Dr. Resendiz, HAs most likely not secondary to the hydrocephalus, but rather vascular inorgin.  Please see scanned in records.     Asthma      Cervical cancer (H)      Cervical high risk HPV (human papillomavirus) test positive 2019    See problem list         Past Surgical History:    Past Surgical History:   Procedure Laterality Date     C VENTRICULO-CISTERNOSTOMY,3RD VENT      for treatment of HA related to hydrocephalus     EXAM UNDER ANESTHESIA, INSERT ANN MARIE SLEEVE, UTERINE PLACEMENT OF TANDEM AND RING FOR RAD, ULTRASOUND N/A 2019    Procedure: Ann Marie Sleeve Insertion, Ultrasound Guided;  Surgeon: Anne Tidwell MD;  Location: [facility] OR     EXCISE MASS TRUNK Left 2015    Procedure: EXCISE MASS TRUNK;  Surgeon: Carlos Enrique Briones MD;  Location:  OR         Health Maintenance Due   Topic Date Due     COLONOSCOPY  1979     PNEUMOCOCCAL IMMUNIZATION 19-64 HIGHEST RISK (2 of 3 - PCV13) 2015     ASTHMA ACTION PLAN  2016     INFLUENZA VACCINE (1) 2019     ASTHMA CONTROL TEST  2019     ZOSTER IMMUNIZATION (2 of 2)  2019     PAP  2020       Current Medications:     Current Outpatient Medications   Medication Sig Dispense Refill     Acetaminophen (MIDOL PO) Take by mouth as needed       albuterol (PROAIR HFA/PROVENTIL HFA/VENTOLIN HFA) 108 (90 Base) MCG/ACT inhaler Inhale 2 puffs into the lungs every 6 hours as needed for shortness of breath / dyspnea or wheezing 18 g 1     Ferrous Sulfate (IRON SUPPLEMENT PO) Take 325 mg by mouth 2 times daily (with meals)       fluticasone (FLONASE) 50 MCG/ACT nasal spray Spray 1-2 sprays into both nostrils daily 16 g 1     fluticasone (FLOVENT HFA) 110 MCG/ACT inhaler Inhale 2 puffs into the lungs 2 times daily 3 Inhaler 3     Multiple Vitamin (MULTIVITAMINS PO) Take  by mouth daily.           Allergies:      No Known Allergies     Social History:     Social History     Tobacco Use     Smoking status: Former Smoker     Packs/day: 0.25     Years: 20.00     Pack years: 5.00     Types: Cigarettes     Last attempt to quit: 2018     Years since quittin.8     Smokeless tobacco: Former User     Quit date: 2015   Substance Use Topics     Alcohol use: Yes     Alcohol/week: 0.0 standard drinks     Comment: once every 3-4 months       History   Drug Use No         Family History:       Family History   Problem Relation Age of Onset     Diabetes Mother      Cerebrovascular Disease Mother      Ovarian Cancer Mother      Breast Cancer Mother      C.A.D. Father 40     Cerebrovascular Disease Father      C.A.D. Sister 62        stent placed     Ovarian Cancer Sister      Ovarian Cancer Sister      Ovarian Cancer Sister          Physical Exam:     /82   Pulse 78   Temp 98.6  F (37  C) (Oral)   Resp 16   Wt 74.8 kg (165 lb)   LMP  (LMP Unknown)   SpO2 100%   Breastfeeding No   BMI 25.84 kg/m     Body mass index is 25.84 kg/m .    General Appearance: healthy and alert, no distress     Musculoskeletal: extremities non tender and without edema    Neurological: normal gait,  no gross defects     Psychiatric: appropriate mood and affect                               Hematological: normal cervical, supraclavicular and inguinal lymph nodes     Gastrointestinal:       abdomen soft, non-tender, non-distended, no organomegaly or masses    Genitourinary: External genitalia and urethral meatus appears normal.  Vagina is smooth without nodularity or masses.  Cervix appears normal and without lesions.  Bimanual exam reveal no masses, nodularity or fullness.  Recto-vaginal exam confirms these findings.      Assessment:    Nicole Fritsche is a 49 year old woman with a diagnosis of stage IIB cervical cancer.     A total of 25 minutes was spent with the patient, 20 minutes of which were spent in counseling the patient and/or treatment planning.      1.  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   2.  Status post definitive chemoradiation.        Reviewed with the patient the PET scan.  PET scan has still some amount of uptake in the lower uterine segment which appears to be physiologic.  We will just repeat another PET scan in 4 months to reassure resolution.  Otherwise, we will see her back every 3-4 months for the first 2 years and then every 6 months for the next 3 years and then yearly once we get to year 5.  The patient agrees with the plan, is very appreciative of her care.  All questions were answered.       Hasmukh Hernandez MD, MS    Department of Obstetrics and Gynecology   Division of Gynecologic Oncology   [facility]  Phone: [phone]        CC  Patient Care Team:  [facility] as PCP - General (Clinic)  Ansley White PA-C as Assigned PCP  Stephanie Javier, RN as Specialty Care Coordinator (Gyn-Onc)

## 2020-01-23 PROBLEM — C53.9 CERVIX CANCER (H): Status: ACTIVE | Noted: 2019-07-10

## 2020-04-20 ENCOUNTER — ANCILLARY PROCEDURE (OUTPATIENT)
Dept: PET IMAGING | Facility: CLINIC | Age: 50
End: 2020-04-20
Attending: OBSTETRICS & GYNECOLOGY
Payer: COMMERCIAL

## 2020-04-20 DIAGNOSIS — C53.9 MALIGNANT NEOPLASM OF CERVIX, UNSPECIFIED SITE (H): ICD-10-CM

## 2020-04-20 LAB
CREAT BLD-MCNC: 0.7 MG/DL (ref 0.5–1.2)
GFR SERPL CREATININE-BSD FRML MDRD: NORMAL ML/MIN/{1.73_M2}
GFRB: NORMAL
GLUCOSE SERPL-MCNC: 83 MG/DL (ref 70–99)

## 2020-04-20 RX ORDER — FUROSEMIDE 10 MG/ML
40 INJECTION INTRAMUSCULAR; INTRAVENOUS ONCE
Status: COMPLETED | OUTPATIENT
Start: 2020-04-20 | End: 2020-04-20

## 2020-04-20 RX ADMIN — FUROSEMIDE 40 MG: 10 INJECTION INTRAMUSCULAR; INTRAVENOUS at 09:19

## 2020-04-20 NOTE — DISCHARGE INSTRUCTIONS

## 2020-04-21 ENCOUNTER — ONCOLOGY VISIT (OUTPATIENT)
Dept: ONCOLOGY | Facility: CLINIC | Age: 50
End: 2020-04-21
Attending: OBSTETRICS & GYNECOLOGY
Payer: COMMERCIAL

## 2020-04-21 VITALS
TEMPERATURE: 97.8 F | WEIGHT: 161 LBS | OXYGEN SATURATION: 99 % | RESPIRATION RATE: 14 BRPM | HEART RATE: 88 BPM | SYSTOLIC BLOOD PRESSURE: 117 MMHG | BODY MASS INDEX: 25.22 KG/M2 | DIASTOLIC BLOOD PRESSURE: 81 MMHG

## 2020-04-21 DIAGNOSIS — R94.8 ABNORMAL GASTROINTESTINAL PET SCAN: Primary | ICD-10-CM

## 2020-04-21 DIAGNOSIS — R94.02 ABNORMAL PET SCAN OF HEAD: ICD-10-CM

## 2020-04-21 PROCEDURE — G0463 HOSPITAL OUTPT CLINIC VISIT: HCPCS | Mod: ZF

## 2020-04-21 PROCEDURE — 99214 OFFICE O/P EST MOD 30 MIN: CPT | Mod: ZP | Performed by: OBSTETRICS & GYNECOLOGY

## 2020-04-21 ASSESSMENT — PAIN SCALES - GENERAL: PAINLEVEL: MODERATE PAIN (4)

## 2020-04-21 NOTE — NURSING NOTE
"Oncology Rooming Note    April 21, 2020 10:54 AM   Nicole Fritsche is a 50 year old female who presents for:    Chief Complaint   Patient presents with     Oncology Clinic Visit     Return Cervical Cancer     Initial Vitals: /81   Pulse 88   Temp 97.8  F (36.6  C) (Oral)   Resp 14   Wt 73 kg (161 lb)   LMP  (LMP Unknown)   SpO2 99%   Breastfeeding No   BMI 25.22 kg/m   Estimated body mass index is 25.22 kg/m  as calculated from the following:    Height as of 9/9/19: 1.702 m (5' 7\").    Weight as of this encounter: 73 kg (161 lb). Body surface area is 1.86 meters squared.  Moderate Pain (4) Comment: groin pain   No LMP recorded (lmp unknown). Patient is perimenopausal.  Allergies reviewed: Yes  Medications reviewed: Yes    Medications: Medication refills not needed today.  Pharmacy name entered into [facility]: [id number] - [place], [place] - [address] AT [address] [facility]    Clinical concerns: cramping and groin pain       Ayesha Mccormick CMA              "

## 2020-04-21 NOTE — LETTER
2020       RE: Nicole Fritsche  [address]  [place] [address]     Dear Colleague,    Thank you for referring your patient, Nicole Fritsche, to the [facility]. Please see a copy of my visit note below.                Follow Up Notes on Referred Patient    Date: 2020       Dr. Jonatan Garcia MD  No address on file       RE: Nicole Fritsche  : 1970  DAVID: 2020    Nicole Fritsche is a 50 year old woman with a diagnosis of stage IIB cervical cancer.     The patient presents today for followup.  She has been doing well since finishing chemoradiation.  She is eating and drinking well.  No nausea, vomiting, fever or chills.  Normal urinary and bowel function.  No vaginal bleeding or discharge.  No B symptoms.           Past Medical History:    Past Medical History:   Diagnosis Date     Abnormal Pap smear of cervix 2019    see problem list     Arrested hydrocephalus (H) 2/3/2009    Ongoing HAs, seeing neurosurgeon. Per past notes from Dr. Resendiz, HAs most likely not secondary to the hydrocephalus, but rather vascular inorgin.  Please see scanned in records.     Asthma      Cervical cancer (H)      Cervical high risk HPV (human papillomavirus) test positive 2019    See problem list         Past Surgical History:    Past Surgical History:   Procedure Laterality Date     C VENTRICULO-CISTERNOSTOMY,3RD VENT      for treatment of HA related to hydrocephalus     EXAM UNDER ANESTHESIA, INSERT ANN MARIE SLEEVE, UTERINE PLACEMENT OF TANDEM AND RING FOR RAD, ULTRASOUND N/A 2019    Procedure: Ann Marie Sleeve Insertion, Ultrasound Guided;  Surgeon: Anne Tidwell MD;  Location: [facility] OR     EXCISE MASS TRUNK Left 2015    Procedure: EXCISE MASS TRUNK;  Surgeon: Carlos Enrique Briones MD;  Location:  OR         Health Maintenance Due   Topic Date Due     COLORECTAL CANCER SCREENING  1979     PNEUMOCOCCAL IMMUNIZATION 19-64 HIGHEST RISK (2 of 3 - PCV13)  2015     ASTHMA ACTION PLAN  2016     INFLUENZA VACCINE (1) 2019     ASTHMA CONTROL TEST  2019     PHQ-2  2020     DTAP/TDAP/TD IMMUNIZATION (2 - Td) 2020     ZOSTER IMMUNIZATION (2 of 2) 2019     PREVENTIVE CARE VISIT  2020     LIPID  2020     HPV TEST  2020     PAP  2020       Current Medications:     Current Outpatient Medications   Medication Sig Dispense Refill     Acetaminophen (MIDOL PO) Take by mouth as needed       albuterol (PROAIR HFA/PROVENTIL HFA/VENTOLIN HFA) 108 (90 Base) MCG/ACT inhaler Inhale 2 puffs into the lungs every 6 hours as needed for shortness of breath / dyspnea or wheezing 18 g 1     Ferrous Sulfate (IRON SUPPLEMENT PO) Take 325 mg by mouth 2 times daily (with meals)       fluticasone (FLONASE) 50 MCG/ACT nasal spray Spray 1-2 sprays into both nostrils daily 16 g 1     fluticasone (FLOVENT HFA) 110 MCG/ACT inhaler Inhale 2 puffs into the lungs 2 times daily 3 Inhaler 3     Multiple Vitamin (MULTIVITAMINS PO) Take  by mouth daily.           Allergies:      No Known Allergies     Social History:     Social History     Tobacco Use     Smoking status: Former Smoker     Packs/day: 0.25     Years: 20.00     Pack years: 5.00     Types: Cigarettes     Last attempt to quit: 2018     Years since quittin.1     Smokeless tobacco: Former User     Quit date: 2015   Substance Use Topics     Alcohol use: Yes     Alcohol/week: 0.0 standard drinks     Comment: once every 3-4 months       History   Drug Use No         Family History:       Family History   Problem Relation Age of Onset     Diabetes Mother      Cerebrovascular Disease Mother      Ovarian Cancer Mother      Breast Cancer Mother      C.A.D. Father 40     Cerebrovascular Disease Father      C.A.D. Sister 62        stent placed     Ovarian Cancer Sister      Ovarian Cancer Sister      Ovarian Cancer Sister          Physical Exam:     /81   Pulse 88   Temp  97.8  F (36.6  C) (Oral)   Resp 14   Wt 73 kg (161 lb)   LMP  (LMP Unknown)   SpO2 99%   Breastfeeding No   BMI 25.22 kg/m    Body mass index is 25.22 kg/m .    General Appearance:  healthy and alert, no distress     Musculoskeletal:         extremities non tender and without edema     Neurological:   normal gait, no gross defects                Psychiatric:      appropriate mood and affect                               Hematological:            normal cervical, supraclavicular and inguinal lymph nodes                Gastrointestinal:          abdomen soft, non-tender, non-distended, no organomegaly or masses     Genitourinary: External genitalia and urethral meatus appears normal.  Vagina is smooth without nodularity or masses.  Cervix appears normal and without lesions.  Bimanual exam reveal no masses, nodularity or fullness.  Recto-vaginal exam confirms these findings.        Assessment:     Nicole Fritsche is a 50 year old woman with a diagnosis of stage IIB cervical cancer.      A total of 25 minutes was spent with the patient, 20 minutes of which were spent in counseling the patient and/or treatment planning.        1.  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   2.  Status post definitive chemoradiation.     3.  HÉCTOR     Reviewed with the patient the PET scan. It does not show any evidence of disease. There is some uptake in the stomach suggestive of gastritis, we will obtain an EGD and brain MRI given decreased FDG uptake in the right frontal gyrus which may represents encephalomalacia.  Otherwise, we will see her back every 3-4 months for the first 2 years and then every 6 months for the next 3 years and then yearly once we get to year 5.  The patient agrees with the plan, is very appreciative of her care.  All questions were answered.         Hasmukh Hernandez MD, MS    Department of Obstetrics and Gynecology   Division of Gynecologic Oncology   [facility]  [place]  Phone: [phone]        Patient Care Team:  [facility] as PCP - General (Clinic)  Ansley White PA-C as Assigned PCP  Stephanie Javier RN as Specialty Care Coordinator (Gyn-Onc)  SELF, REFERRED    Again, thank you for allowing me to participate in the care of your patient.      Sincerely,    Hasmukh Hernandez MD

## 2020-04-21 NOTE — LETTER
2020      RE: Nicole Fritsche  [address]  [place] [address]                   Follow Up Notes on Referred Patient    Date: 2020       Dr. Jonatan Garcia MD  No address on file       RE: Nicole Fritsche  : 1970  DAVID: 2020    Nicole Fritsche is a 50 year old woman with a diagnosis of stage IIB cervical cancer.     The patient presents today for followup.  She has been doing well since finishing chemoradiation.  She is eating and drinking well.  No nausea, vomiting, fever or chills.  Normal urinary and bowel function.  No vaginal bleeding or discharge.  No B symptoms.           Past Medical History:    Past Medical History:   Diagnosis Date     Abnormal Pap smear of cervix 2019    see problem list     Arrested hydrocephalus (H) 2/3/2009    Ongoing HAs, seeing neurosurgeon. Per past notes from Dr. Resendiz, HAs most likely not secondary to the hydrocephalus, but rather vascular inorgin.  Please see scanned in records.     Asthma      Cervical cancer (H)      Cervical high risk HPV (human papillomavirus) test positive 2019    See problem list         Past Surgical History:    Past Surgical History:   Procedure Laterality Date     C VENTRICULO-CISTERNOSTOMY,3RD VENT      for treatment of HA related to hydrocephalus     EXAM UNDER ANESTHESIA, INSERT ANN MARIE SLEEVE, UTERINE PLACEMENT OF TANDEM AND RING FOR RAD, ULTRASOUND N/A 2019    Procedure: Ann Marie Sleeve Insertion, Ultrasound Guided;  Surgeon: Anne Tidwell MD;  Location: [facility] OR     EXCISE MASS TRUNK Left 2015    Procedure: EXCISE MASS TRUNK;  Surgeon: Carlos Enrique Briones MD;  Location:  OR         Health Maintenance Due   Topic Date Due     COLORECTAL CANCER SCREENING  1979     PNEUMOCOCCAL IMMUNIZATION 19-64 HIGHEST RISK (2 of 3 - PCV13) 2015     ASTHMA ACTION PLAN  2016     INFLUENZA VACCINE (1) 2019     ASTHMA CONTROL TEST  2019     PHQ-2  2020     DTAP/TDAP/TD  IMMUNIZATION (2 - Td) 2020     ZOSTER IMMUNIZATION (2 of 2) 2019     PREVENTIVE CARE VISIT  2020     LIPID  2020     HPV TEST  2020     PAP  2020       Current Medications:     Current Outpatient Medications   Medication Sig Dispense Refill     Acetaminophen (MIDOL PO) Take by mouth as needed       albuterol (PROAIR HFA/PROVENTIL HFA/VENTOLIN HFA) 108 (90 Base) MCG/ACT inhaler Inhale 2 puffs into the lungs every 6 hours as needed for shortness of breath / dyspnea or wheezing 18 g 1     Ferrous Sulfate (IRON SUPPLEMENT PO) Take 325 mg by mouth 2 times daily (with meals)       fluticasone (FLONASE) 50 MCG/ACT nasal spray Spray 1-2 sprays into both nostrils daily 16 g 1     fluticasone (FLOVENT HFA) 110 MCG/ACT inhaler Inhale 2 puffs into the lungs 2 times daily 3 Inhaler 3     Multiple Vitamin (MULTIVITAMINS PO) Take  by mouth daily.           Allergies:      No Known Allergies     Social History:     Social History     Tobacco Use     Smoking status: Former Smoker     Packs/day: 0.25     Years: 20.00     Pack years: 5.00     Types: Cigarettes     Last attempt to quit: 2018     Years since quittin.1     Smokeless tobacco: Former User     Quit date: 2015   Substance Use Topics     Alcohol use: Yes     Alcohol/week: 0.0 standard drinks     Comment: once every 3-4 months       History   Drug Use No         Family History:       Family History   Problem Relation Age of Onset     Diabetes Mother      Cerebrovascular Disease Mother      Ovarian Cancer Mother      Breast Cancer Mother      C.A.D. Father 40     Cerebrovascular Disease Father      C.A.D. Sister 62        stent placed     Ovarian Cancer Sister      Ovarian Cancer Sister      Ovarian Cancer Sister          Physical Exam:     /81   Pulse 88   Temp 97.8  F (36.6  C) (Oral)   Resp 14   Wt 73 kg (161 lb)   LMP  (LMP Unknown)   SpO2 99%   Breastfeeding No   BMI 25.22 kg/m    Body mass index is 25.22  kg/m .    General Appearance:  healthy and alert, no distress     Musculoskeletal:         extremities non tender and without edema     Neurological:   normal gait, no gross defects                Psychiatric:      appropriate mood and affect                               Hematological:            normal cervical, supraclavicular and inguinal lymph nodes                Gastrointestinal:          abdomen soft, non-tender, non-distended, no organomegaly or masses     Genitourinary: External genitalia and urethral meatus appears normal.  Vagina is smooth without nodularity or masses.  Cervix appears normal and without lesions.  Bimanual exam reveal no masses, nodularity or fullness.  Recto-vaginal exam confirms these findings.        Assessment:     Nicole Fritsche is a 50 year old woman with a diagnosis of stage IIB cervical cancer.      A total of 25 minutes was spent with the patient, 20 minutes of which were spent in counseling the patient and/or treatment planning.        1.  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   2.  Status post definitive chemoradiation.     3.  HÉCTOR     Reviewed with the patient the PET scan. It does not show any evidence of disease. There is some uptake in the stomach suggestive of gastritis, we will obtain an EGD and brain MRI given decreased FDG uptake in the right frontal gyrus which may represents encephalomalacia.  Otherwise, we will see her back every 3-4 months for the first 2 years and then every 6 months for the next 3 years and then yearly once we get to year 5.  The patient agrees with the plan, is very appreciative of her care.  All questions were answered.         Hasmukh Hernandez MD, MS    Department of Obstetrics and Gynecology   Division of Gynecologic Oncology   [facility]  Phone: [phone]      CC  Patient Care Team:  [facility], [facility] as PCP - General ([facility])  Ansley White PA-C as Assigned  PCP  Stephanie Javier, RN as Specialty Care Coordinator (Gyn-Onc)

## 2020-04-30 NOTE — PROGRESS NOTES
Follow Up Notes on Referred Patient    Date: 2020       Dr. Jonatan Garcia MD  No address on file       RE: Nicole Fritsche  : 1970  DAVID: 2020    Nicole Fritsche is a 50 year old woman with a diagnosis of stage IIB cervical cancer.     The patient presents today for followup.  She has been doing well since finishing chemoradiation.  She is eating and drinking well.  No nausea, vomiting, fever or chills.  Normal urinary and bowel function.  No vaginal bleeding or discharge.  No B symptoms.           Past Medical History:    Past Medical History:   Diagnosis Date     Abnormal Pap smear of cervix 2019    see problem list     Arrested hydrocephalus (H) 2/3/2009    Ongoing HAs, seeing neurosurgeon. Per past notes from Dr. Resendiz, HAs most likely not secondary to the hydrocephalus, but rather vascular inorgin.  Please see scanned in records.     Asthma      Cervical cancer (H)      Cervical high risk HPV (human papillomavirus) test positive 2019    See problem list         Past Surgical History:    Past Surgical History:   Procedure Laterality Date     C VENTRICULO-CISTERNOSTOMY,3RD VENT      for treatment of HA related to hydrocephalus     EXAM UNDER ANESTHESIA, INSERT ANN MARIE SLEEVE, UTERINE PLACEMENT OF TANDEM AND RING FOR RAD, ULTRASOUND N/A 2019    Procedure: Ann Marie Sleeve Insertion, Ultrasound Guided;  Surgeon: Anne Tidwell MD;  Location: [facility] OR     EXCISE MASS TRUNK Left 2015    Procedure: EXCISE MASS TRUNK;  Surgeon: Carlos Enrique Briones MD;  Location:  OR         Health Maintenance Due   Topic Date Due     COLORECTAL CANCER SCREENING  1979     PNEUMOCOCCAL IMMUNIZATION 19-64 HIGHEST RISK (2 of 3 - PCV13) 2015     ASTHMA ACTION PLAN  2016     INFLUENZA VACCINE (1) 2019     ASTHMA CONTROL TEST  2019     PHQ-2  2020     DTAP/TDAP/TD IMMUNIZATION (2 - Td) 2020     ZOSTER IMMUNIZATION (2 of 2) 2019     PREVENTIVE  CARE VISIT  2020     LIPID  2020     HPV TEST  2020     PAP  2020       Current Medications:     Current Outpatient Medications   Medication Sig Dispense Refill     Acetaminophen (MIDOL PO) Take by mouth as needed       albuterol (PROAIR HFA/PROVENTIL HFA/VENTOLIN HFA) 108 (90 Base) MCG/ACT inhaler Inhale 2 puffs into the lungs every 6 hours as needed for shortness of breath / dyspnea or wheezing 18 g 1     Ferrous Sulfate (IRON SUPPLEMENT PO) Take 325 mg by mouth 2 times daily (with meals)       fluticasone (FLONASE) 50 MCG/ACT nasal spray Spray 1-2 sprays into both nostrils daily 16 g 1     fluticasone (FLOVENT HFA) 110 MCG/ACT inhaler Inhale 2 puffs into the lungs 2 times daily 3 Inhaler 3     Multiple Vitamin (MULTIVITAMINS PO) Take  by mouth daily.           Allergies:      No Known Allergies     Social History:     Social History     Tobacco Use     Smoking status: Former Smoker     Packs/day: 0.25     Years: 20.00     Pack years: 5.00     Types: Cigarettes     Last attempt to quit: 2018     Years since quittin.1     Smokeless tobacco: Former User     Quit date: 2015   Substance Use Topics     Alcohol use: Yes     Alcohol/week: 0.0 standard drinks     Comment: once every 3-4 months       History   Drug Use No         Family History:       Family History   Problem Relation Age of Onset     Diabetes Mother      Cerebrovascular Disease Mother      Ovarian Cancer Mother      Breast Cancer Mother      C.A.D. Father 40     Cerebrovascular Disease Father      C.A.D. Sister 62        stent placed     Ovarian Cancer Sister      Ovarian Cancer Sister      Ovarian Cancer Sister          Physical Exam:     /81   Pulse 88   Temp 97.8  F (36.6  C) (Oral)   Resp 14   Wt 73 kg (161 lb)   LMP  (LMP Unknown)   SpO2 99%   Breastfeeding No   BMI 25.22 kg/m    Body mass index is 25.22 kg/m .    General Appearance:  healthy and alert, no distress     Musculoskeletal:          extremities non tender and without edema     Neurological:   normal gait, no gross defects                Psychiatric:      appropriate mood and affect                               Hematological:            normal cervical, supraclavicular and inguinal lymph nodes                Gastrointestinal:          abdomen soft, non-tender, non-distended, no organomegaly or masses     Genitourinary: External genitalia and urethral meatus appears normal.  Vagina is smooth without nodularity or masses.  Cervix appears normal and without lesions.  Bimanual exam reveal no masses, nodularity or fullness.  Recto-vaginal exam confirms these findings.        Assessment:     Nicole Fritsche is a 50 year old woman with a diagnosis of stage IIB cervical cancer.      A total of 25 minutes was spent with the patient, 20 minutes of which were spent in counseling the patient and/or treatment planning.        1.  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   2.  Status post definitive chemoradiation.     3.  HÉCTOR     Reviewed with the patient the PET scan. It does not show any evidence of disease. There is some uptake in the stomach suggestive of gastritis, we will obtain an EGD and brain MRI given decreased FDG uptake in the right frontal gyrus which may represents encephalomalacia.  Otherwise, we will see her back every 3-4 months for the first 2 years and then every 6 months for the next 3 years and then yearly once we get to year 5.  The patient agrees with the plan, is very appreciative of her care.  All questions were answered.         Hasmukh Hernandez MD, MS    Department of Obstetrics and Gynecology   Division of Gynecologic Oncology   [facility]  Phone: [phone]      CC  Patient Care Team:  [facility] as PCP - General (Clinic)  Ansley White PA-C as Assigned PCP  Stephanie Javier RN as Specialty Care Coordinator (Gyn-Onc)  SELF, REFERRED

## 2020-07-06 DIAGNOSIS — Z11.59 ENCOUNTER FOR SCREENING FOR OTHER VIRAL DISEASES: Primary | ICD-10-CM

## 2020-07-09 ENCOUNTER — AMBULATORY - HEALTHEAST (OUTPATIENT)
Dept: INTERNAL MEDICINE | Facility: CLINIC | Age: 50
End: 2020-07-09

## 2020-07-09 DIAGNOSIS — Z11.59 ENCOUNTER FOR SCREENING FOR OTHER VIRAL DISEASES: ICD-10-CM

## 2020-07-10 ENCOUNTER — AMBULATORY - HEALTHEAST (OUTPATIENT)
Dept: FAMILY MEDICINE | Facility: CLINIC | Age: 50
End: 2020-07-10

## 2020-07-10 DIAGNOSIS — Z11.59 ENCOUNTER FOR SCREENING FOR OTHER VIRAL DISEASES: ICD-10-CM

## 2020-07-13 ENCOUNTER — HOSPITAL ENCOUNTER (OUTPATIENT)
Facility: AMBULATORY SURGERY CENTER | Age: 50
End: 2020-07-13
Attending: INTERNAL MEDICINE
Payer: COMMERCIAL

## 2020-07-13 ENCOUNTER — COMMUNICATION - HEALTHEAST (OUTPATIENT)
Dept: FAMILY MEDICINE | Facility: CLINIC | Age: 50
End: 2020-07-13

## 2020-07-13 VITALS
SYSTOLIC BLOOD PRESSURE: 106 MMHG | WEIGHT: 161 LBS | HEART RATE: 59 BPM | OXYGEN SATURATION: 99 % | BODY MASS INDEX: 25.27 KG/M2 | HEIGHT: 67 IN | RESPIRATION RATE: 12 BRPM | TEMPERATURE: 97.2 F | DIASTOLIC BLOOD PRESSURE: 75 MMHG

## 2020-07-13 LAB
HCG UR QL: NEGATIVE
INTERNAL QC OK POCT: YES
UPPER GI ENDOSCOPY: NORMAL

## 2020-07-13 RX ORDER — ONDANSETRON 4 MG/1
4 TABLET, ORALLY DISINTEGRATING ORAL EVERY 6 HOURS PRN
Status: DISCONTINUED | OUTPATIENT
Start: 2020-07-13 | End: 2020-07-14 | Stop reason: HOSPADM

## 2020-07-13 RX ORDER — ONDANSETRON 2 MG/ML
4 INJECTION INTRAMUSCULAR; INTRAVENOUS
Status: DISCONTINUED | OUTPATIENT
Start: 2020-07-13 | End: 2020-07-13 | Stop reason: HOSPADM

## 2020-07-13 RX ORDER — LIDOCAINE 40 MG/G
CREAM TOPICAL
Status: DISCONTINUED | OUTPATIENT
Start: 2020-07-13 | End: 2020-07-13 | Stop reason: HOSPADM

## 2020-07-13 RX ORDER — FLUMAZENIL 0.1 MG/ML
0.2 INJECTION, SOLUTION INTRAVENOUS
Status: ACTIVE | OUTPATIENT
Start: 2020-07-13 | End: 2020-07-13

## 2020-07-13 RX ORDER — NALOXONE HYDROCHLORIDE 0.4 MG/ML
.1-.4 INJECTION, SOLUTION INTRAMUSCULAR; INTRAVENOUS; SUBCUTANEOUS
Status: DISCONTINUED | OUTPATIENT
Start: 2020-07-13 | End: 2020-07-14 | Stop reason: HOSPADM

## 2020-07-13 RX ORDER — FENTANYL CITRATE 50 UG/ML
INJECTION, SOLUTION INTRAMUSCULAR; INTRAVENOUS PRN
Status: DISCONTINUED | OUTPATIENT
Start: 2020-07-13 | End: 2020-07-13 | Stop reason: HOSPADM

## 2020-07-13 RX ORDER — ONDANSETRON 2 MG/ML
4 INJECTION INTRAMUSCULAR; INTRAVENOUS EVERY 6 HOURS PRN
Status: DISCONTINUED | OUTPATIENT
Start: 2020-07-13 | End: 2020-07-14 | Stop reason: HOSPADM

## 2020-07-13 ASSESSMENT — MIFFLIN-ST. JEOR: SCORE: 1377.92

## 2020-07-15 LAB — COPATH REPORT: NORMAL

## 2020-07-20 ENCOUNTER — ANCILLARY PROCEDURE (OUTPATIENT)
Dept: MRI IMAGING | Facility: CLINIC | Age: 50
End: 2020-07-20
Attending: OBSTETRICS & GYNECOLOGY
Payer: COMMERCIAL

## 2020-07-20 DIAGNOSIS — R94.02 ABNORMAL PET SCAN OF HEAD: ICD-10-CM

## 2020-07-20 RX ORDER — GADOBUTROL 604.72 MG/ML
7.5 INJECTION INTRAVENOUS ONCE
Status: COMPLETED | OUTPATIENT
Start: 2020-07-20 | End: 2020-07-20

## 2020-07-20 RX ADMIN — GADOBUTROL 7.5 ML: 604.72 INJECTION INTRAVENOUS at 08:20

## 2020-07-20 NOTE — DISCHARGE INSTRUCTIONS
MRI Contrast Discharge Instructions    The IV contrast you received today will pass out of your body in your  urine. This will happen in the next 24 hours. You will not feel this process.  Your urine will not change color.    Drink at least 4 extra glasses of water or juice today (unless your doctor  has restricted your fluids). This reduces the stress on your kidneys.  You may take your regular medicines.    If you are on dialysis: It is best to have dialysis today.    If you have a reaction: Most reactions happen right away. If you have  any new symptoms after leaving the hospital (such as hives or swelling),  call your hospital at the correct number below. Or call your family doctor.  If you have breathing distress or wheezing, call 911.    Special instructions: ***    I have read and understand the above information.    Signature:______________________________________ Date:___________    Staff:__________________________________________ Date:___________     Time:__________    [place] [facility] Departments:    ___[place]: [phone]  ___[facility]: [phone]  ___[place]: [phone] ___[facility]: [phone]  ___[facility]: [phone]  ___[facility]: [phone]  ___[phone]  ___[facility]: [phone]  ___[phone]

## 2020-07-22 ENCOUNTER — VIRTUAL VISIT (OUTPATIENT)
Dept: ONCOLOGY | Facility: CLINIC | Age: 50
End: 2020-07-22
Attending: OBSTETRICS & GYNECOLOGY
Payer: COMMERCIAL

## 2020-07-22 DIAGNOSIS — C53.1 MALIGNANT NEOPLASM OF EXOCERVIX (H): Primary | ICD-10-CM

## 2020-07-22 DIAGNOSIS — K25.7 GASTRIC ULCER DUE TO HELICOBACTER PYLORI, CHRONIC: Primary | ICD-10-CM

## 2020-07-22 DIAGNOSIS — B96.81 GASTRIC ULCER DUE TO HELICOBACTER PYLORI, CHRONIC: Primary | ICD-10-CM

## 2020-07-22 PROCEDURE — 40001009 ZZH VIDEO/TELEPHONE VISIT; NO CHARGE

## 2020-07-22 PROCEDURE — 99213 OFFICE O/P EST LOW 20 MIN: CPT | Mod: 95 | Performed by: OBSTETRICS & GYNECOLOGY

## 2020-07-22 NOTE — PROGRESS NOTES
"Nicole Fritsche is a 50 year old female who is being evaluated via a billable video visit.      The patient has been notified of following:     \"This video visit will be conducted via a call between you and your physician/provider. We have found that certain health care needs can be provided without the need for an in-person physical exam.  This service lets us provide the care you need with a video conversation.  If a prescription is necessary we can send it directly to your pharmacy.  If lab work is needed we can place an order for that and you can then stop by our lab to have the test done at a later time.    Video visits are billed at different rates depending on your insurance coverage.  Please reach out to your insurance provider with any questions.    If during the course of the call the physician/provider feels a video visit is not appropriate, you will not be charged for this service.\"    Patient has given verbal consent for Video visit? Yes    How would you like to obtain your AVS? MyChart     Vitals - Patient Reported  Weight (Patient Reported): 71.2 kg (157 lb)  Height (Patient Reported): 170.2 cm (5' 7.01\")  BMI (Based on Pt Reported Ht/Wt): 24.58  Pain Score: Mild Pain (2)  Pain Loc: Pelvic floor    Kendall Mann LPN      Video-Visit Details    Type of service:  Video Visit    Video Start Time: 10:30  Video End Time: 10:43    Originating Location (pt. Location): Home    Distant Location (provider location):  [facility]     Platform used for Video Visit: Epunchit                Follow Up Notes on Referred Patient    Date: 2020       Dr. Jonatan Garcia MD  No address on file       RE: Nicole Fritsche  : 1970  DAVID: 2020      Nicole Fritsche is a 50 year old woman with a diagnosis of stage IIB cervical cancer.     The patient presents today for follow up after her EGD and MRI.  She has been doing well since finishing chemoradiation.  She is eating and drinking well. "  No nausea, vomiting, fever or chills.  Normal urinary and bowel function.  No vaginal bleeding or discharge.  No B symptoms.          Past Medical History:    Past Medical History:   Diagnosis Date     Abnormal Pap smear of cervix 05/21/2019    see problem list     Arrested hydrocephalus (H) 2/3/2009    Ongoing HAs, seeing neurosurgeon. Per past notes from Dr. Resendiz, HAs most likely not secondary to the hydrocephalus, but rather vascular inorgin.  Please see scanned in records.     Asthma      Cervical cancer (H)      Cervical high risk HPV (human papillomavirus) test positive 05/21/2019    See problem list         Past Surgical History:    Past Surgical History:   Procedure Laterality Date     C VENTRICULO-CISTERNOSTOMY,3RD VENT      for treatment of HA related to hydrocephalus     ESOPHAGOSCOPY, GASTROSCOPY, DUODENOSCOPY (EGD), COMBINED N/A 7/13/2020    Procedure: ESOPHAGOGASTRODUODENOSCOPY, WITH BIOPSY;  Surgeon: Steven Vazquez MD;  Location: [facility] OR     EXAM UNDER ANESTHESIA, INSERT ANN MARIE SLEEVE, UTERINE PLACEMENT OF TANDEM AND RING FOR RAD, ULTRASOUND N/A 8/30/2019    Procedure: Ann Marie Sleeve Insertion, Ultrasound Guided;  Surgeon: Anne Tidwell MD;  Location: [facility] OR     EXCISE MASS TRUNK Left 9/16/2015    Procedure: EXCISE MASS TRUNK;  Surgeon: Carlos Enrique Briones MD;  Location: [facility] OR         Health Maintenance Due   Topic Date Due     COLORECTAL CANCER SCREENING  03/12/1979     PNEUMOCOCCAL IMMUNIZATION 19-64 HIGHEST RISK (2 of 3 - PCV13) 06/17/2015     ASTHMA ACTION PLAN  09/08/2016     ZOSTER IMMUNIZATION (2 of 2) 06/26/2019     ASTHMA CONTROL TEST  11/01/2019     PHQ-2  01/01/2020     DTAP/TDAP/TD IMMUNIZATION (2 - Td) 02/16/2020     PREVENTIVE CARE VISIT  05/01/2020     LIPID  05/01/2020     HPV TEST  06/03/2020     PAP  06/03/2020       Current Medications:     Current Outpatient Medications   Medication Sig Dispense Refill     Acetaminophen (MIDOL PO) Take by mouth as needed       albuterol  (PROAIR HFA/PROVENTIL HFA/VENTOLIN HFA) 108 (90 Base) MCG/ACT inhaler Inhale 2 puffs into the lungs every 6 hours as needed for shortness of breath / dyspnea or wheezing 18 g 1     Ferrous Sulfate (IRON SUPPLEMENT PO) Take 325 mg by mouth 2 times daily (with meals)       fluticasone (FLONASE) 50 MCG/ACT nasal spray Spray 1-2 sprays into both nostrils daily 16 g 1     fluticasone (FLOVENT HFA) 110 MCG/ACT inhaler Inhale 2 puffs into the lungs 2 times daily 3 Inhaler 3     Multiple Vitamin (MULTIVITAMINS PO) Take  by mouth daily.           Allergies:      No Known Allergies     Social History:     Social History     Tobacco Use     Smoking status: Former Smoker     Packs/day: 0.25     Years: 20.00     Pack years: 5.00     Types: Cigarettes     Last attempt to quit: 2018     Years since quittin.4     Smokeless tobacco: Former User     Quit date: 2015   Substance Use Topics     Alcohol use: Yes     Alcohol/week: 0.0 standard drinks     Comment: once every 3-4 months       History   Drug Use No         Family History:       Family History   Problem Relation Age of Onset     Diabetes Mother      Cerebrovascular Disease Mother      Ovarian Cancer Mother      Breast Cancer Mother      C.A.D. Father 40     Cerebrovascular Disease Father      C.A.D. Sister 62        stent placed     Ovarian Cancer Sister      Ovarian Cancer Sister      Ovarian Cancer Sister          Physical Exam:     There were no vitals taken for this visit.  There is no height or weight on file to calculate BMI.    General Appearance:  healthy and alert, no distress     Psychiatric:      appropriate mood and affect                   Assessment:     Nicole Fritsche is a 50 year old woman with a diagnosis of stage IIB cervical cancer.      A total of 13 minutes was spent with the patient, 11 minutes of which were spent in counseling the patient and/or treatment planning.        1.  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   2.  Status  post definitive chemoradiation.     3.  HÉCTOR     EGD revealed gastritis and brain MRI is negative form metastatsis.  Otherwise, we will see her back every 3-4 months for the first 2 years and then every 6 months for the next 3 years and then yearly once we get to year 5.  The patient agrees with the plan, is very appreciative of her care.  All questions were answered.         Hasmukh Hernandez MD, MS    Department of Obstetrics and Gynecology   Division of Gynecologic Oncology   [facility]  Phone: [phone]      CC  Patient Care Team:  [facility] as PCP - General (Clinic)  Stephanie Javier, RN as Specialty Care Coordinator (Gyn-Onc)  Nataliya Colunga APRN CNP as Assigned PCP  SELF, REFERRED

## 2020-07-22 NOTE — RESULT ENCOUNTER NOTE
I called the patient and reviewed her pathology results from her EGD. Gastritis found in the PET avid area. H.pylori also detected. Prescribed antibiotics course with Prevpac to treat H. Pylori. She can hold her omeprazole while taking the Prevpac which contains lansoprazole and then after her course see if she still have symptomatic heartburn symptoms to see if she even needs to go back onto omeprazole. She expressed understanding. No further follow up needed. Will need H. Pylori stool antigen off PPI at some point to confirm eradication.    Steven Vazquez MD  [facility]  Division of Gastroenterology and Hepatology  [facility] [address] [phone]

## 2020-07-22 NOTE — LETTER
"    2020         RE: Nicole Fritsche  [address]  [place] [address]        Dear Colleague,    Thank you for referring your patient, Nicole Fritsche, to the [facility]. Please see a copy of my visit note below.    Nicole Fritsche is a 50 year old female who is being evaluated via a billable video visit.        Vitals - Patient Reported  Weight (Patient Reported): 71.2 kg (157 lb)  Height (Patient Reported): 170.2 cm (5' 7.01\")  BMI (Based on Pt Reported Ht/Wt): 24.58  Pain Score: Mild Pain (2)  Pain Loc: Pelvic floor    Kendall Mann LPN      Video-Visit Details    Type of service:  Video Visit    Video Start Time: 10:30  Video End Time: 10:43    Originating Location (pt. Location): Home    Distant Location (provider location):  [facility]     Platform used for Video Visit: aDealio                Follow Up Notes on Referred Patient    Date: 2020       Dr. Jonatan Garcia MD  No address on file       RE: Nicole Fritsche  : 1970  DAVID: 2020      Nicole Fritsche is a 50 year old woman with a diagnosis of stage IIB cervical cancer.     The patient presents today for follow up after her EGD and MRI.  She has been doing well since finishing chemoradiation.  She is eating and drinking well.  No nausea, vomiting, fever or chills.  Normal urinary and bowel function.  No vaginal bleeding or discharge.  No B symptoms.          Past Medical History:    Past Medical History:   Diagnosis Date     Abnormal Pap smear of cervix 2019    see problem list     Arrested hydrocephalus (H) 2/3/2009    Ongoing HAs, seeing neurosurgeon. Per past notes from Dr. Resendiz, HAs most likely not secondary to the hydrocephalus, but rather vascular inorgin.  Please see scanned in records.     Asthma      Cervical cancer (H)      Cervical high risk HPV (human papillomavirus) test positive 2019    See problem list         Past Surgical History:    Past Surgical History: "   Procedure Laterality Date     C VENTRICULO-CISTERNOSTOMY,3RD VENT      for treatment of HA related to hydrocephalus     ESOPHAGOSCOPY, GASTROSCOPY, DUODENOSCOPY (EGD), COMBINED N/A 7/13/2020    Procedure: ESOPHAGOGASTRODUODENOSCOPY, WITH BIOPSY;  Surgeon: Steven Vazquez MD;  Location: [facility] OR     EXAM UNDER ANESTHESIA, INSERT ANN MARIE SLEEVE, UTERINE PLACEMENT OF TANDEM AND RING FOR RAD, ULTRASOUND N/A 8/30/2019    Procedure: Ann Marie Sleeve Insertion, Ultrasound Guided;  Surgeon: Anne Tidwell MD;  Location: [facility] OR     EXCISE MASS TRUNK Left 9/16/2015    Procedure: EXCISE MASS TRUNK;  Surgeon: Carlos Enrique Briones MD;  Location:  OR         Health Maintenance Due   Topic Date Due     COLORECTAL CANCER SCREENING  03/12/1979     PNEUMOCOCCAL IMMUNIZATION 19-64 HIGHEST RISK (2 of 3 - PCV13) 06/17/2015     ASTHMA ACTION PLAN  09/08/2016     ZOSTER IMMUNIZATION (2 of 2) 06/26/2019     ASTHMA CONTROL TEST  11/01/2019     PHQ-2  01/01/2020     DTAP/TDAP/TD IMMUNIZATION (2 - Td) 02/16/2020     PREVENTIVE CARE VISIT  05/01/2020     LIPID  05/01/2020     HPV TEST  06/03/2020     PAP  06/03/2020       Current Medications:     Current Outpatient Medications   Medication Sig Dispense Refill     Acetaminophen (MIDOL PO) Take by mouth as needed       albuterol (PROAIR HFA/PROVENTIL HFA/VENTOLIN HFA) 108 (90 Base) MCG/ACT inhaler Inhale 2 puffs into the lungs every 6 hours as needed for shortness of breath / dyspnea or wheezing 18 g 1     Ferrous Sulfate (IRON SUPPLEMENT PO) Take 325 mg by mouth 2 times daily (with meals)       fluticasone (FLONASE) 50 MCG/ACT nasal spray Spray 1-2 sprays into both nostrils daily 16 g 1     fluticasone (FLOVENT HFA) 110 MCG/ACT inhaler Inhale 2 puffs into the lungs 2 times daily 3 Inhaler 3     Multiple Vitamin (MULTIVITAMINS PO) Take  by mouth daily.           Allergies:      No Known Allergies     Social History:     Social History     Tobacco Use     Smoking status: Former Smoker      Packs/day: 0.25     Years: 20.00     Pack years: 5.00     Types: Cigarettes     Last attempt to quit: 2018     Years since quittin.4     Smokeless tobacco: Former User     Quit date: 2015   Substance Use Topics     Alcohol use: Yes     Alcohol/week: 0.0 standard drinks     Comment: once every 3-4 months       History   Drug Use No         Family History:       Family History   Problem Relation Age of Onset     Diabetes Mother      Cerebrovascular Disease Mother      Ovarian Cancer Mother      Breast Cancer Mother      C.A.D. Father 40     Cerebrovascular Disease Father      C.A.D. Sister 62        stent placed     Ovarian Cancer Sister      Ovarian Cancer Sister      Ovarian Cancer Sister          Physical Exam:     There were no vitals taken for this visit.  There is no height or weight on file to calculate BMI.    General Appearance:  healthy and alert, no distress     Psychiatric:      appropriate mood and affect                   Assessment:     Nicole Fritsche is a 50 year old woman with a diagnosis of stage IIB cervical cancer.      A total of 13 minutes was spent with the patient, 11 minutes of which were spent in counseling the patient and/or treatment planning.        1.  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   2.  Status post definitive chemoradiation.     3.  HÉCTOR     EGD revealed gastritis and brain MRI is negative form metastatsis.  Otherwise, we will see her back every 3-4 months for the first 2 years and then every 6 months for the next 3 years and then yearly once we get to year 5.  The patient agrees with the plan, is very appreciative of her care.  All questions were answered.         Hasmukh Hernandez MD, MS    Department of Obstetrics and Gynecology   Division of Gynecologic Oncology   [facility]  Phone: [phone]      CC  Patient Care Team:  [facility] as PCP - General (Clinic)  Stephanie Javier, IKER as Specialty  Care Coordinator (Gyn-Onc)  Nataliya Colunga APRN CNP as Assigned PCP

## 2020-11-10 ENCOUNTER — MYC MEDICAL ADVICE (OUTPATIENT)
Dept: ONCOLOGY | Facility: CLINIC | Age: 50
End: 2020-11-10

## 2020-11-10 DIAGNOSIS — C53.9 CERVICAL CANCER (H): Primary | ICD-10-CM

## 2020-11-10 DIAGNOSIS — K92.1 BLOOD IN STOOL: ICD-10-CM

## 2020-11-10 NOTE — TELEPHONE ENCOUNTER
RN reached out to patient to further discuss MyChart message.     Patient has had firm stools at the time of her blood in stools. Denies constipation and has no known hemorrhoids. No change in diet.     Patient denies any other signs and symptoms of concern.     The abdominal cramping is off and on and can't be tied to anything specifically.     Patient will complete a colonoscopy as she is due for this.     Patient will reach back out to RN if symptoms increase, worsen or any new ones arise.     Patient appreciative of the RN time.     Ayde Starkey RN

## 2020-11-11 DIAGNOSIS — K92.1 BLOOD IN STOOL: ICD-10-CM

## 2020-11-11 DIAGNOSIS — C53.9 CERVICAL CANCER (H): Primary | ICD-10-CM

## 2020-11-18 DIAGNOSIS — Z11.59 ENCOUNTER FOR SCREENING FOR OTHER VIRAL DISEASES: Primary | ICD-10-CM

## 2020-11-25 ENCOUNTER — VIRTUAL VISIT (OUTPATIENT)
Dept: ONCOLOGY | Facility: CLINIC | Age: 50
End: 2020-11-25
Attending: NURSE PRACTITIONER
Payer: COMMERCIAL

## 2020-11-25 DIAGNOSIS — C53.1 MALIGNANT NEOPLASM OF EXOCERVIX (H): Primary | ICD-10-CM

## 2020-11-25 DIAGNOSIS — Z80.41 FAMILY HISTORY OF MALIGNANT NEOPLASM OF OVARY: ICD-10-CM

## 2020-11-25 PROCEDURE — 99214 OFFICE O/P EST MOD 30 MIN: CPT | Mod: 95 | Performed by: NURSE PRACTITIONER

## 2020-11-25 PROCEDURE — 999N001193 HC VIDEO/TELEPHONE VISIT; NO CHARGE

## 2020-11-25 NOTE — PROGRESS NOTES
"Nicole Fritsche is a 50 year old female who is being evaluated via a billable video visit.      The patient has been notified of following:     \"This video visit will be conducted via a call between you and your physician/provider. We have found that certain health care needs can be provided without the need for an in-person physical exam.  This service lets us provide the care you need with a video conversation.  If a prescription is necessary we can send it directly to your pharmacy.  If lab work is needed we can place an order for that and you can then stop by our lab to have the test done at a later time.    Video visits are billed at different rates depending on your insurance coverage.  Please reach out to your insurance provider with any questions.    If during the course of the call the physician/provider feels a video visit is not appropriate, you will not be charged for this service.\"    Patient has given verbal consent for Video visit? Yes  How would you like to obtain your AVS? MyChart  If you are dropped from the video visit, the video invite should be resent to: Send to e-mail at: [email or web address]  Will anyone else be joining your video visit? No      NAEEM Bailon      Video-Visit Details    Type of service:  Video Visit    Video Start Time: 812  Video End Time: 844    Originating Location (pt. Location): home    Distant Location (provider location):  [facility]     Platform used for Video Visit: [employer]                    Follow Up Notes on Referred Patient    Date: 2020         RE: Nicole Fritsche  : 1970  DAVID: 2020    Nicole Fritsche is a 50 year old woman with a history of stage IIB cervical cancer. She completed chemoradiation and brachytherapy 2019. She is here today for a surveillance visit. In light of the recent COVID19 pandemic, and in accordance with our group and national guidelines this patients care has been reviewed and it " is felt that presenting for her visit would be more likely to increase rather than decrease her relative risks.  Therefore this visit was conducted by video.    Oncology history:   6/12/19: Cervical biopsy, 4 o'clock     FINAL DIAGNOSIS:   Cervix, 4:00, biopsy:   - INVASIVE WELL-DIFFERENTIATED SQUAMOUS CELL CARCINOMA, focally keratinizing   - High grade squamous intraepithelial lesion (cervical intraepithelial neoplasia 3)      7/1/19: PET CT IMPRESSION: In this patient with newly diagnosed cervical cancer:  1. 3.7 cm cervical lesion with a max SUV of 13.16.   2. Focal uptake in the endometrium, favor adenomyosis over metastatic disease. Adenomyosis is demonstrated on MRI 7/1/2019 and can be hypermetabolic in a premenopausal woman.  3. Small left periaortic retroperitoneal lymph node borderline SUV uptake, inflammatory versus metastatic. Attention on follow-up.   4. Small left inguinal lymph nodes with SUV max of 2.45, likely inflammatory.     7/29/19-9/9/19: Cisplatin + radiation; brachytherapy    4/20/2020: PET IMPRESSION: In this patient with history of cervical cancer status post chemoradiation, there is evidence of complete treatment response, although the sensitivity of this PET/CT may be compromised secondary to diffuse muscular activation.      1. No suspicious FDG activity or CT finding in the uterus, cervix, and vagina. No FDG avid adenopathy.   2. Mild mucosal thickening and FDG avidity in the short segment of the proximal lesser curvature of the stomach, which may represents segmental gastritis in the correct clinical setting. Recommend correlation with endoscopy.  3. Small focus of decreased FDG uptake in the right frontal gyrus which may represents encephalomalacia. Consider further evaluation with brain MRI.    7/20/2020: MR brain Impression:  The previous PET/CT demonstrated decreased FDG uptake in the right frontal gyrus likely representing encephalomalacia secondary to prior ventriculostomy  "catheter placement/removal. No evidence of metastatic lesion in the brain.      Today she reports she is feeling well. She denies any vaginal bleeding, no changes in her bowel or bladder habits, no nausea/emesis, no lower extremity edema, and no difficulties eating or sleeping. She denies any abdominal discomfort/bloating, no fevers or chills, and no chest pain or shortness of breath. She does have some intermittent lower abdominal cramping at times which resolves spontaneously. She is sexually active and uses a lubricant when needed. She is using her dilator \"sometimes; maybe 1-2 times per week for a couple of minutes\". She denies any vaginal spotting after using her dilator. She is due for her annual exam and mammogram; her PCP has left so she needs to find a new one. She is scheduled for a colonoscopy 12/28. She was treated for H. Pylori in July and is now taking Prevacid (Lansoprazole) 15 mg every morning. She will also take TUMS if needed but does not need this often. She continues to take an iron supplement and her multivitamin every morning.        Review of Systems:    Systemic           no weight changes; no fever; no chills; no night sweats; no appetite changes  Skin           no rashes, or lesions  Eye           no irritation; no changes in vision  Temo-Laryngeal           no dysphagia; no hoarseness   Pulmonary    no cough; no shortness of breath  Cardiovascular    no chest pain; no palpitations  Gastrointestinal    no diarrhea; no constipation; no abdominal pain; no changes in bowel habits; no blood in stool  Genitourinary   no urinary frequency; no urinary urgency; no dysuria; no pain; no abnormal vaginal discharge; no abnormal vaginal bleeding  Breast    no breast discharge; no breast changes; no breast pain  Musculoskeletal    no myalgias; no arthralgias; no back pain  Psychiatric           no depressed mood; no anxiety    Hematologic              no tender lymph nodes; no noticeable swellings or " lumps   Endocrine    no hot flashes; no heat/cold intolerance         Neurological   no tremor; no numbness and tingling; no headaches; no difficulty sleeping      Past Medical History:    Past Medical History:   Diagnosis Date     Abnormal Pap smear of cervix 05/21/2019    see problem list     Arrested hydrocephalus (H) 2/3/2009    Ongoing HAs, seeing neurosurgeon. Per past notes from Dr. Resendiz, HAs most likely not secondary to the hydrocephalus, but rather vascular inorgin.  Please see scanned in records.     Asthma      Cervical cancer (H)      Cervical high risk HPV (human papillomavirus) test positive 05/21/2019    See problem list         Past Surgical History:    Past Surgical History:   Procedure Laterality Date     C VENTRICULO-CISTERNOSTOMY,3RD VENT      for treatment of HA related to hydrocephalus     ESOPHAGOSCOPY, GASTROSCOPY, DUODENOSCOPY (EGD), COMBINED N/A 7/13/2020    Procedure: ESOPHAGOGASTRODUODENOSCOPY, WITH BIOPSY;  Surgeon: Steven Vazquez MD;  Location: [facility] OR     EXAM UNDER ANESTHESIA, INSERT ANN MARIE SLEEVE, UTERINE PLACEMENT OF TANDEM AND RING FOR RAD, ULTRASOUND N/A 8/30/2019    Procedure: Ann Marie Sleeve Insertion, Ultrasound Guided;  Surgeon: Anne Tidwell MD;  Location: [facility] OR     EXCISE MASS TRUNK Left 9/16/2015    Procedure: EXCISE MASS TRUNK;  Surgeon: Carlos Enrique Briones MD;  Location: [facility] OR         Health Maintenance Due   Topic Date Due     COLORECTAL CANCER SCREENING  03/12/1979     HEPATITIS C SCREENING  03/12/1987     Pneumococcal Vaccine: Pediatrics (0 to 5 Years) and At-Risk Patients (6 to 64 Years) (2 of 3 - PCV13) 06/17/2015     ASTHMA ACTION PLAN  09/08/2016     ZOSTER IMMUNIZATION (2 of 2) 06/26/2019     ASTHMA CONTROL TEST  11/01/2019     PHQ-2  01/01/2020     DTAP/TDAP/TD IMMUNIZATION (2 - Td) 02/16/2020     PREVENTIVE CARE VISIT  05/01/2020     LIPID  05/01/2020     HPV TEST  06/03/2020     PAP  06/03/2020     INFLUENZA VACCINE (1) 09/01/2020       Current  Medications:     Current Outpatient Medications   Medication Sig Dispense Refill     Acetaminophen (MIDOL PO) Take by mouth as needed       albuterol (PROAIR HFA/PROVENTIL HFA/VENTOLIN HFA) 108 (90 Base) MCG/ACT inhaler Inhale 2 puffs into the lungs every 6 hours as needed for shortness of breath / dyspnea or wheezing 18 g 1     Ferrous Sulfate (IRON SUPPLEMENT PO) Take 325 mg by mouth 2 times daily (with meals)       fluticasone (FLONASE) 50 MCG/ACT nasal spray Spray 1-2 sprays into both nostrils daily 16 g 1     fluticasone (FLOVENT HFA) 110 MCG/ACT inhaler Inhale 2 puffs into the lungs 2 times daily 3 Inhaler 3     Multiple Vitamin (MULTIVITAMINS PO) Take  by mouth daily.           Allergies:      No Known Allergies     Social History:     Social History     Tobacco Use     Smoking status: Former Smoker     Packs/day: 0.25     Years: 20.00     Pack years: 5.00     Types: Cigarettes     Quit date: 2018     Years since quittin.7     Smokeless tobacco: Former User     Quit date: 2015   Substance Use Topics     Alcohol use: Yes     Alcohol/week: 0.0 standard drinks     Comment: once every 3-4 months       History   Drug Use No         Family History:       Family History   Problem Relation Age of Onset     Diabetes Mother      Cerebrovascular Disease Mother      Ovarian Cancer Mother      C.A.D. Father 40     Cerebrovascular Disease Father      C.A.D. Sister 62        stent placed     Ovarian Cancer Sister      Ovarian Cancer Sister          Physical Exam:     There were no vitals taken for this visit.  There is no height or weight on file to calculate BMI.    General Appearance: healthy and alert, no distress    Eyes:  Eyes grossly normal to inspection.  No discharge or erythema, or obvious scleral/conjunctival abnormalities.    Respiratory: No audible wheeze, cough, or visible cyanosis.  No visible retractions or increased work of breathing.     Musculoskeletal: extremities non tender and without  edema    Skin: no lesions or rashes on visible skin    Neurological: normal gait, no gross defects     Psychiatric: appropriate mood and affect. Mentation appears normal, affect normal/bright, judgement and insight intact, normal speech and appearance well-groomed                            The rest of a comprehensive physical examination is deferred due to PHE (public health emergency) video visit restrictions.      Assessment:    Nicole Fritsche is a 50 year old woman with a history of stage IIB cervical cancer. She completed chemoradiation and brachytherapy 9/2019. She is here today for a surveillance visit. In light of the recent COVID19 pandemic, and in accordance with our group and national guidelines this patients care has been reviewed and it is felt that presenting for her visit would be more likely to increase rather than decrease her relative risks.  Therefore this visit was conducted by video.    32 minutes were spent with this patient by video. Over 50% of that time was spent in symptom management, treatment planning and in counseling and coordination of care. See rooming note for consent.       Plan:     1.)        Patient to RTC in 3 months for her next surveillance visit. Discussed her annual pap (no HPV) is due now and she was offered to come into clinic for this, but she prefers to wait until her next visit in 3 months. Reviewed current recommendation from [facility] for annual pap testing in women with cervical cancer. Reviewed signs and symptoms for when she should contact the clinic or seek additional care. Patient to contact the clinic with any questions or concerns in the interim. Discussed using the dilator or having intercourse to equal 3 times per week. Reviewed use of the dilator and to keep this in for 5 minutes.  Answered all of her questions to the best of my ability.     2.) Genetic risk factors were assessed and the patient does not meet the qualifications for a referral.  Discussed there  is no genetic testing for cervical cancer, but given her family history of ovarian cancer (in her sisters in their 40's for which she believes they did not need any additional treatment other than surgery; now they are 58 and 64). Encouraged to speak with her sisters to find out additional information regarding their ovarian cancer (including if they had any genetic testing done and if so what were the results, what stages were their cancer, did they require any additional treatment beyond surgery). Updated family history (mother did not have breast cancer; she had a cyst).     3.) Labs and/or tests ordered include:  None.      4.) Health maintenance issues addressed today include annual health maintenance and non-gynecologic issues with PCP. Encouraged to establish care with a new PCP as her annual exam and mammogram are due.     5.)        H. Pylori: discussed what this is and to continue on her lansoprazole. Also discussed GERD prevention strategies. Would encourage follow up either with GI (gastroenterology) or her PCP for further management of this.     HOMERO Perez, WHNP-BC, ANP-BC  Women's Health Nurse Practitioner  Adult Nurse Pracitioner  Division of Gynecologic Oncology          CC  Patient Care Team:  [facility] as PCP - General (Clinic)  Nataliya Colunga APRN CNP as Assigned PCP  Rosio Harding APRN CNP as Assigned OBGYN Provider  Anne Tidwell MD as Assigned Cancer Care Provider  SELF, REFERRED

## 2020-11-25 NOTE — LETTER
"    2020         RE: Nicole Fritsche  [address] [place] [address]        Dear Colleague,    Thank you for referring your patient, Nicole Fritsche, to the [facility]. Please see a copy of my visit note below.    Nicole Fritsche is a 50 year old female who is being evaluated via a billable video visit.      The patient has been notified of following:     \"This video visit will be conducted via a call between you and your physician/provider. We have found that certain health care needs can be provided without the need for an in-person physical exam.  This service lets us provide the care you need with a video conversation.  If a prescription is necessary we can send it directly to your pharmacy.  If lab work is needed we can place an order for that and you can then stop by our lab to have the test done at a later time.    Video visits are billed at different rates depending on your insurance coverage.  Please reach out to your insurance provider with any questions.    If during the course of the call the physician/provider feels a video visit is not appropriate, you will not be charged for this service.\"    Patient has given verbal consent for Video visit? Yes  How would you like to obtain your AVS? MyChart  If you are dropped from the video visit, the video invite should be resent to: Send to e-mail at: [email or web address]  Will anyone else be joining your video visit? No      NAEEM Bailon      Video-Visit Details    Type of service:  Video Visit    Video Start Time: 812  Video End Time: 844    Originating Location (pt. Location): home    Distant Location (provider location):  [facility]     Platform used for Video Visit: AquaMost                    Follow Up Notes on Referred Patient    Date: 2020         RE: Nicole Fritsche  : 1970  DAVID: 2020    Nicole Fritsche is a 50 year old woman with a history of stage IIB " cervical cancer. She completed chemoradiation and brachytherapy 9/2019. She is here today for a surveillance visit. In light of the recent COVID19 pandemic, and in accordance with our group and national guidelines this patients care has been reviewed and it is felt that presenting for her visit would be more likely to increase rather than decrease her relative risks.  Therefore this visit was conducted by video.    Oncology history:   6/12/19: Cervical biopsy, 4 o'clock     FINAL DIAGNOSIS:   Cervix, 4:00, biopsy:   - INVASIVE WELL-DIFFERENTIATED SQUAMOUS CELL CARCINOMA, focally keratinizing   - High grade squamous intraepithelial lesion (cervical intraepithelial neoplasia 3)      7/1/19: PET CT IMPRESSION: In this patient with newly diagnosed cervical cancer:  1. 3.7 cm cervical lesion with a max SUV of 13.16.   2. Focal uptake in the endometrium, favor adenomyosis over metastatic disease. Adenomyosis is demonstrated on MRI 7/1/2019 and can be hypermetabolic in a premenopausal woman.  3. Small left periaortic retroperitoneal lymph node borderline SUV uptake, inflammatory versus metastatic. Attention on follow-up.   4. Small left inguinal lymph nodes with SUV max of 2.45, likely inflammatory.     7/29/19-9/9/19: Cisplatin + radiation; brachytherapy    4/20/2020: PET IMPRESSION: In this patient with history of cervical cancer status post chemoradiation, there is evidence of complete treatment response, although the sensitivity of this PET/CT may be compromised secondary to diffuse muscular activation.      1. No suspicious FDG activity or CT finding in the uterus, cervix, and vagina. No FDG avid adenopathy.   2. Mild mucosal thickening and FDG avidity in the short segment of the proximal lesser curvature of the stomach, which may represents segmental gastritis in the correct clinical setting. Recommend correlation with endoscopy.  3. Small focus of decreased FDG uptake in the right frontal gyrus which may represents  "encephalomalacia. Consider further evaluation with brain MRI.    7/20/2020: MR brain Impression:  The previous PET/CT demonstrated decreased FDG uptake in the right frontal gyrus likely representing encephalomalacia secondary to prior ventriculostomy catheter placement/removal. No evidence of metastatic lesion in the brain.      Today she reports she is feeling well. She denies any vaginal bleeding, no changes in her bowel or bladder habits, no nausea/emesis, no lower extremity edema, and no difficulties eating or sleeping. She denies any abdominal discomfort/bloating, no fevers or chills, and no chest pain or shortness of breath. She does have some intermittent lower abdominal cramping at times which resolves spontaneously. She is sexually active and uses a lubricant when needed. She is using her dilator \"sometimes; maybe 1-2 times per week for a couple of minutes\". She denies any vaginal spotting after using her dilator. She is due for her annual exam and mammogram; her PCP has left so she needs to find a new one. She is scheduled for a colonoscopy 12/28. She was treated for H. Pylori in July and is now taking Prevacid (Lansoprazole) 15 mg every morning. She will also take TUMS if needed but does not need this often. She continues to take an iron supplement and her multivitamin every morning.        Review of Systems:    Systemic           no weight changes; no fever; no chills; no night sweats; no appetite changes  Skin           no rashes, or lesions  Eye           no irritation; no changes in vision  Temo-Laryngeal           no dysphagia; no hoarseness   Pulmonary    no cough; no shortness of breath  Cardiovascular    no chest pain; no palpitations  Gastrointestinal    no diarrhea; no constipation; no abdominal pain; no changes in bowel habits; no blood in stool  Genitourinary   no urinary frequency; no urinary urgency; no dysuria; no pain; no abnormal vaginal discharge; no abnormal vaginal bleeding  Breast "    no breast discharge; no breast changes; no breast pain  Musculoskeletal    no myalgias; no arthralgias; no back pain  Psychiatric           no depressed mood; no anxiety    Hematologic              no tender lymph nodes; no noticeable swellings or lumps   Endocrine    no hot flashes; no heat/cold intolerance         Neurological   no tremor; no numbness and tingling; no headaches; no difficulty sleeping      Past Medical History:    Past Medical History:   Diagnosis Date     Abnormal Pap smear of cervix 05/21/2019    see problem list     Arrested hydrocephalus (H) 2/3/2009    Ongoing HAs, seeing neurosurgeon. Per past notes from Dr. Resendiz, HAs most likely not secondary to the hydrocephalus, but rather vascular inorgin.  Please see scanned in records.     Asthma      Cervical cancer (H)      Cervical high risk HPV (human papillomavirus) test positive 05/21/2019    See problem list         Past Surgical History:    Past Surgical History:   Procedure Laterality Date     C VENTRICULO-CISTERNOSTOMY,3RD VENT      for treatment of HA related to hydrocephalus     ESOPHAGOSCOPY, GASTROSCOPY, DUODENOSCOPY (EGD), COMBINED N/A 7/13/2020    Procedure: ESOPHAGOGASTRODUODENOSCOPY, WITH BIOPSY;  Surgeon: Steven Vazquez MD;  Location: [facility] OR     EXAM UNDER ANESTHESIA, INSERT ANN MARIE SLEEVE, UTERINE PLACEMENT OF TANDEM AND RING FOR RAD, ULTRASOUND N/A 8/30/2019    Procedure: Ann Marie Sleeve Insertion, Ultrasound Guided;  Surgeon: Anne Tidwell MD;  Location: [facility] OR     EXCISE MASS TRUNK Left 9/16/2015    Procedure: EXCISE MASS TRUNK;  Surgeon: Carlos Enrique Briones MD;  Location:  OR         Health Maintenance Due   Topic Date Due     COLORECTAL CANCER SCREENING  03/12/1979     HEPATITIS C SCREENING  03/12/1987     Pneumococcal Vaccine: Pediatrics (0 to 5 Years) and At-Risk Patients (6 to 64 Years) (2 of 3 - PCV13) 06/17/2015     ASTHMA ACTION PLAN  09/08/2016     ZOSTER IMMUNIZATION (2 of 2) 06/26/2019     ASTHMA CONTROL TEST   2019     PHQ-2  2020     DTAP/TDAP/TD IMMUNIZATION (2 - Td) 2020     PREVENTIVE CARE VISIT  2020     LIPID  2020     HPV TEST  2020     PAP  2020     INFLUENZA VACCINE (1) 2020       Current Medications:     Current Outpatient Medications   Medication Sig Dispense Refill     Acetaminophen (MIDOL PO) Take by mouth as needed       albuterol (PROAIR HFA/PROVENTIL HFA/VENTOLIN HFA) 108 (90 Base) MCG/ACT inhaler Inhale 2 puffs into the lungs every 6 hours as needed for shortness of breath / dyspnea or wheezing 18 g 1     Ferrous Sulfate (IRON SUPPLEMENT PO) Take 325 mg by mouth 2 times daily (with meals)       fluticasone (FLONASE) 50 MCG/ACT nasal spray Spray 1-2 sprays into both nostrils daily 16 g 1     fluticasone (FLOVENT HFA) 110 MCG/ACT inhaler Inhale 2 puffs into the lungs 2 times daily 3 Inhaler 3     Multiple Vitamin (MULTIVITAMINS PO) Take  by mouth daily.           Allergies:      No Known Allergies     Social History:     Social History     Tobacco Use     Smoking status: Former Smoker     Packs/day: 0.25     Years: 20.00     Pack years: 5.00     Types: Cigarettes     Quit date: 2018     Years since quittin.7     Smokeless tobacco: Former User     Quit date: 2015   Substance Use Topics     Alcohol use: Yes     Alcohol/week: 0.0 standard drinks     Comment: once every 3-4 months       History   Drug Use No         Family History:       Family History   Problem Relation Age of Onset     Diabetes Mother      Cerebrovascular Disease Mother      Ovarian Cancer Mother      C.A.D. Father 40     Cerebrovascular Disease Father      C.A.D. Sister 62        stent placed     Ovarian Cancer Sister      Ovarian Cancer Sister          Physical Exam:     There were no vitals taken for this visit.  There is no height or weight on file to calculate BMI.    General Appearance: healthy and alert, no distress    Eyes:  Eyes grossly normal to inspection.  No discharge  or erythema, or obvious scleral/conjunctival abnormalities.    Respiratory: No audible wheeze, cough, or visible cyanosis.  No visible retractions or increased work of breathing.     Musculoskeletal: extremities non tender and without edema    Skin: no lesions or rashes on visible skin    Neurological: normal gait, no gross defects     Psychiatric: appropriate mood and affect. Mentation appears normal, affect normal/bright, judgement and insight intact, normal speech and appearance well-groomed                            The rest of a comprehensive physical examination is deferred due to PHE (public health emergency) video visit restrictions.      Assessment:    Nicole Fritsche is a 50 year old woman with a history of stage IIB cervical cancer. She completed chemoradiation and brachytherapy 9/2019. She is here today for a surveillance visit. In light of the recent COVID19 pandemic, and in accordance with our group and national guidelines this patients care has been reviewed and it is felt that presenting for her visit would be more likely to increase rather than decrease her relative risks.  Therefore this visit was conducted by video.    32 minutes were spent with this patient by video. Over 50% of that time was spent in symptom management, treatment planning and in counseling and coordination of care. See rooming note for consent.       Plan:     1.)        Patient to RTC in 3 months for her next surveillance visit. Discussed her annual pap (no HPV) is due now and she was offered to come into clinic for this, but she prefers to wait until her next visit in 3 months. Reviewed current recommendation from NCCN for annual pap testing in women with cervical cancer. Reviewed signs and symptoms for when she should contact the clinic or seek additional care. Patient to contact the clinic with any questions or concerns in the interim. Discussed using the dilator or having intercourse to equal 3 times per week. Reviewed use  of the dilator and to keep this in for 5 minutes.  Answered all of her questions to the best of my ability.     2.) Genetic risk factors were assessed and the patient does not meet the qualifications for a referral.  Discussed there is no genetic testing for cervical cancer, but given her family history of ovarian cancer (in her sisters in their 40's for which she believes they did not need any additional treatment other than surgery; now they are 58 and 64). Encouraged to speak with her sisters to find out additional information regarding their ovarian cancer (including if they had any genetic testing done and if so what were the results, what stages were their cancer, did they require any additional treatment beyond surgery). Updated family history (mother did not have breast cancer; she had a cyst).     3.) Labs and/or tests ordered include:  None.      4.) Health maintenance issues addressed today include annual health maintenance and non-gynecologic issues with PCP. Encouraged to establish care with a new PCP as her annual exam and mammogram are due.     5.)        H. Pylori: discussed what this is and to continue on her lansoprazole. Also discussed GERD prevention strategies. Would encourage follow up either with GI (gastroenterology) or her PCP for further management of this.     HOMERO Perez, WHNP-BC, ANP-BC  Women's Health Nurse Practitioner  Adult Nurse Pracitioner  Division of Gynecologic Oncology      CC  Patient Care Team:  [facility] as PCP - General (Clinic)  Nataliya Colunga APRN CNP as Assigned PCP  Rosio Harding APRN CNP as Assigned OBGYN Provider  Anne Tidwell MD as Assigned Cancer Care Provider

## 2020-12-01 ENCOUNTER — TELEPHONE (OUTPATIENT)
Dept: ONCOLOGY | Facility: CLINIC | Age: 50
End: 2020-12-01

## 2020-12-01 NOTE — TELEPHONE ENCOUNTER
Oncology/Surgical Oncology Referral Request:     Specialty Requested: Medical Oncology     Referring Provider: Hasmukh Hernandez MD     Referring Clinic/Organization: [facility]    Records location: [facility]     Requested Provider (if specified): Not Specified        Called pt x 2, LVM. Sending letter

## 2020-12-01 NOTE — LETTER
"    December 1, 2020       TO: Nicole Fritsche  [address] [place] [address]         Dear Ms. Fritsche,    Our records indicate that you have not scheduled an appointment for a consult, as recommended by Dr. Hasmukh Hernandez. If you wish to schedule within [facility]alth, we have several options to help you schedule your appointment:      Call [phone]    Visit our website at [email or web address] and click \"I Want To\" (in upper right) and select Request an Appointment    Request an appointment via [employer] at [email or web address]     If you have chosen to schedule elsewhere or if you have already made an appointment, please disregard this letter.    If you have any questions or concerns regarding the information above, please contact the [facility]CER CLINIC at [phone].      Sincerely,      [facility]                  "

## 2020-12-06 ENCOUNTER — HEALTH MAINTENANCE LETTER (OUTPATIENT)
Age: 50
End: 2020-12-06

## 2020-12-15 ENCOUNTER — VIRTUAL VISIT (OUTPATIENT)
Dept: ONCOLOGY | Facility: CLINIC | Age: 50
End: 2020-12-15
Attending: OBSTETRICS & GYNECOLOGY
Payer: COMMERCIAL

## 2020-12-15 DIAGNOSIS — Z80.49 FAMILY HISTORY OF UTERINE CANCER: ICD-10-CM

## 2020-12-15 DIAGNOSIS — Z80.0 FAMILY HISTORY OF COLON CANCER: ICD-10-CM

## 2020-12-15 DIAGNOSIS — C53.1 MALIGNANT NEOPLASM OF EXOCERVIX (H): Primary | ICD-10-CM

## 2020-12-15 DIAGNOSIS — Z80.41 FAMILY HISTORY OF MALIGNANT NEOPLASM OF OVARY: ICD-10-CM

## 2020-12-15 PROCEDURE — 96040 HC GENETIC COUNSELING, EACH 30 MINUTES: CPT

## 2020-12-15 NOTE — LETTER
12/15/2020         RE: Nicole L Fritsche  [address]  [place] [address]        Dear Colleague,    Thank you for referring your patient, Nicole L Fritsche, to the [facility] CLINIC. Please see a copy of my visit note below.    12/15/2020    Referring Provider: HOMERO Perez CNP    Presenting Information:   I met with Nicole Fritsche today for genetic counseling at the Cancer Risk Management Program (virtual visit- telephone) to discuss her personal and family history of cancer.  She is here today to review this history, cancer screening recommendations, and available genetic testing options.    Personal History:  Cassi is a 50 year old female. She was diagnosed with cervical carcinoma at age 49 (abnormal pap test 5/2019) treated with chemoradiation and brachytherapy.  She has her ovaries, fallopian tubes and uterus in place.  She had her first menstrual period at age 16-17 and first child at age 23. Her most recent mammogram was 5/2/2019, and she has her first colonoscopy scheduled for later this month.      Family History: (Please see scanned pedigree for detailed family history information)    Cassi has 8 brothers and 8 sisters.  One full sister was diagnosed with gynecologic cancer (reportedly ovarian) at age 28 requiring hysterectomy with bilateral salpingo oophorectomy (CAITLYN BSO).      One maternal half sister was diagnosed with gynecologic cancer (reportedly uterine) at age 25 requiring CAITLYN BSO.  Her daughter Cassi's niece) was diagnosed with ovarian cancer in her 30's    One paternal half sister was diagnosed with gynecologic cancer (reportedly ovarian) in her 20's and passed away in her 50's    Her mother is in her late 70's and has a history of cancer (diagnosis not know today).      One maternal aunt was diagnosed (after age 50) with ovarian cancer    One maternal uncle was diagnosed with colon cancer in his 40's-50's    Her ethnicity is Uz[place]phanie/Native  American, Polish.  There is no known Ashkenazi Jainism ancestry on either side of her family.     Discussion:    Cassi's family history of cancer, including multiple relatives diagnosed with gynecologic cancers at young ages, may be suggestive of a hereditary cancer syndrome.    We reviewed the features of sporadic, familial, and hereditary cancers.  Based on her reported family history of ovarian cancer, Cassi meets current National Comprehensive Cancer Network (NCCN) criteria for genetic testing of high-penetrance breast and/or ovarian cancer susceptibility genes.      We discussed the natural history and genetics Hereditary Breast and Ovarian Cancer syndrome (BRCA1/2 genes).  We also discussed Lacy syndrome (MLH1, MSH2, MSH6, PMS2, EPCAM), as a possible hereditary cause for ovarian, uterine and colon cancer. A detailed handout regarding hereditary gynecologic cancer and the information we discussed can be found in the after visit summary. Topics included: inheritance pattern, cancer risks, cancer screening recommendations, and also risks, benefits and limitations of testing.    We discussed that there are additional genes that could cause increased risk for gynecologic and colon cancer. As many of these genes present with overlapping features in a family and accurate cancer risk cannot always be established based upon the pedigree analysis alone, it would be reasonable for Cassi to consider panel genetic testing to analyze multiple genes at once.    Cassi expressed interest in expanded genetic testing for genes related to the cancers reported in her family.  Therefore, we discussed the 36 gene CancerNext panel through [facility]  Genetic testing is available for 36 genes associated with hereditary cancer: CancerNext (APC, FLETCHER, AXIN2, BARD1, BMPR1A, BRCA1, BRCA2, BRIP1, CDH1, CDK4, CDKN2A, CHEK2, DICER1, EPCAM, GREM1, HOXB13, MLH1, MSH2, MSH3, MSH6, MUTYH, NBN, NF1, NTHL1, PALB2, PMS2, POLD1, POLE, PTEN,  RAD51C, RAD51D, RECQL, SMAD4, SMARCA4, STK11, and TP53).  We discussed that many of the genes in the CancerNext panel are associated with specific hereditary cancer syndromes and published management guidelines: Hereditary Breast and Ovarian Cancer syndrome (BRCA1, BRCA2), Lacy syndrome (MLH1, MSH2, MSH6, PMS2, EPCAM), Familial Adenomatous Polyposis (APC), Hereditary Diffuse Gastric Cancer (CDH1), Familial Atypical Multiple Mole Melanoma syndrome (CDK4, CDKN2A), Juvenile Polyposis syndrome (BMPR1A, SMAD4), Cowden syndrome (PTEN), Li Fraumeni syndrome (TP53), Peutz-Jeghers syndrome (STK11), MUTYH Associated Polyposis (MUTYH), and Neurofibromatosis type 1 (NF1).   The FLETCHER, AXIN2, BRIP1, CHEK2, GREM1, MSH3, NBN, NTHL1, PALB2, POLD1, POLE, RAD51C, and RAD51D genes are associated with increased cancer risk and have published management guidelines for certain cancers.    The remaining genes (BARD1, DICER1, HOXB13, RECQL, and SMARCA4) are associated with increased cancer risk and may allow us to make medical recommendations when mutations are identified.    Verbal consent was obtained and genetic [facility] CancerNext [facility]. Turn around time: 5 weeks.    Medical Management: For Cassi, we reviewed that the information from genetic testing may determine:    additional cancer screening for which Cassi may qualify (i.e. mammogram and breast MRI, more frequent colonoscopies, more frequent dermatologic exams, etc.),    options for risk reducing surgeries Cassi could consider (i.e. bilateral mastectomy, surgery to remove her ovaries and/or uterus, etc.),      and targeted chemotherapies for certain cancers in the future (i.e. immunotherapy for individuals with Lacy syndrome, PARP inhibitors, etc.).     These recommendations and possible targeted chemotherapies will be discussed in detail once genetic testing is completed.     Plan:  1) Today Cassi elected to proceed with the 36 gene  CancerNext hereditary cancer panel through [employer] [facility].  2) This information should be available in 4 weeks (once the saliva sample is processed by the lab).  3) Cassi will return to clinic to discuss the results.    Face to face time: 45 minutes    Mellissa Gordon MS, [facility]  Licensed, Certified Genetic Counselor  [facility]  Phone: [phone]                Again, thank you for allowing me to participate in the care of your patient.        Sincerely,        Mellissa Gordon, GC

## 2020-12-15 NOTE — PROGRESS NOTES
12/15/2020    Referring Provider: HOMERO Perez CNP    Presenting Information:   I met with Nicole Fritsche today for genetic counseling at the Cancer Risk Management Program (virtual visit- telephone) to discuss her personal and family history of cancer.  She is here today to review this history, cancer screening recommendations, and available genetic testing options.    Personal History:  Cassi is a 50 year old female. She was diagnosed with cervical carcinoma at age 49 (abnormal pap test 5/2019) treated with chemoradiation and brachytherapy.  She has her ovaries, fallopian tubes and uterus in place.  She had her first menstrual period at age 16-17 and first child at age 23. Her most recent mammogram was 5/2/2019, and she has her first colonoscopy scheduled for later this month.      Family History: (Please see scanned pedigree for detailed family history information)    Cassi has 8 brothers and 8 sisters.  One full sister was diagnosed with gynecologic cancer (reportedly ovarian) at age 28 requiring hysterectomy with bilateral salpingo oophorectomy (CAITLYN BSO).      One maternal half sister was diagnosed with gynecologic cancer (reportedly uterine) at age 25 requiring CAITLYN BSO.  Her daughter Cassi's niece) was diagnosed with ovarian cancer in her 30's    One paternal half sister was diagnosed with gynecologic cancer (reportedly ovarian) in her 20's and passed away in her 50's    Her mother is in her late 70's and has a history of cancer (diagnosis not know today).      One maternal aunt was diagnosed (after age 50) with ovarian cancer    One maternal uncle was diagnosed with colon cancer in his 40's-50's    Her ethnicity is Nepali/Malay/, French.  There is no known Ashkenazi Church ancestry on either side of her family.     Discussion:    Cassi's family history of cancer, including multiple relatives diagnosed with gynecologic cancers at young ages, may be suggestive of a hereditary cancer  syndrome.    We reviewed the features of sporadic, familial, and hereditary cancers.  Based on her reported family history of ovarian cancer, Cassi meets current National Comprehensive Cancer Network (NCCN) criteria for genetic testing of high-penetrance breast and/or ovarian cancer susceptibility genes.      We discussed the natural history and genetics Hereditary Breast and Ovarian Cancer syndrome (BRCA1/2 genes).  We also discussed Lacy syndrome (MLH1, MSH2, MSH6, PMS2, EPCAM), as a possible hereditary cause for ovarian, uterine and colon cancer. A detailed handout regarding hereditary gynecologic cancer and the information we discussed can be found in the after visit summary. Topics included: inheritance pattern, cancer risks, cancer screening recommendations, and also risks, benefits and limitations of testing.    We discussed that there are additional genes that could cause increased risk for gynecologic and colon cancer. As many of these genes present with overlapping features in a family and accurate cancer risk cannot always be established based upon the pedigree analysis alone, it would be reasonable for Cassi to consider panel genetic testing to analyze multiple genes at once.    Cassi expressed interest in expanded genetic testing for genes related to the cancers reported in her family.  Therefore, we discussed the 36 gene CancerNext panel through [facility]  Genetic testing is available for 36 genes associated with hereditary cancer: CancerNext (APC, FLETCHER, AXIN2, BARD1, BMPR1A, BRCA1, BRCA2, BRIP1, CDH1, CDK4, CDKN2A, CHEK2, DICER1, EPCAM, GREM1, HOXB13, MLH1, MSH2, MSH3, MSH6, MUTYH, NBN, NF1, NTHL1, PALB2, PMS2, POLD1, POLE, PTEN, RAD51C, RAD51D, RECQL, SMAD4, SMARCA4, STK11, and TP53).  We discussed that many of the genes in the CancerNext panel are associated with specific hereditary cancer syndromes and published management guidelines: Hereditary Breast and Ovarian Cancer syndrome (BRCA1,  BRCA2), Lacy syndrome (MLH1, MSH2, MSH6, PMS2, EPCAM), Familial Adenomatous Polyposis (APC), Hereditary Diffuse Gastric Cancer (CDH1), Familial Atypical Multiple Mole Melanoma syndrome (CDK4, CDKN2A), Juvenile Polyposis syndrome (BMPR1A, SMAD4), Cowden syndrome (PTEN), Li Fraumeni syndrome (TP53), Peutz-Jeghers syndrome (STK11), MUTYH Associated Polyposis (MUTYH), and Neurofibromatosis type 1 (NF1).   The FLETCHER, AXIN2, BRIP1, CHEK2, GREM1, MSH3, NBN, NTHL1, PALB2, POLD1, POLE, RAD51C, and RAD51D genes are associated with increased cancer risk and have published management guidelines for certain cancers.    The remaining genes (BARD1, DICER1, HOXB13, RECQL, and SMARCA4) are associated with increased cancer risk and may allow us to make medical recommendations when mutations are identified.    Verbal consent was obtained and genetic testing for CancerNext was sent to [facility]. Turn around time: 5 weeks.    Medical Management: For Cassi, we reviewed that the information from genetic testing may determine:    additional cancer screening for which Cassi may qualify (i.e. mammogram and breast MRI, more frequent colonoscopies, more frequent dermatologic exams, etc.),    options for risk reducing surgeries Cassi could consider (i.e. bilateral mastectomy, surgery to remove her ovaries and/or uterus, etc.),      and targeted chemotherapies for certain cancers in the future (i.e. immunotherapy for individuals with Lacy syndrome, PARP inhibitors, etc.).     These recommendations and possible targeted chemotherapies will be discussed in detail once genetic testing is completed.     Plan:  1) Today Cassi elected to proceed with the 36 gene CancerNext hereditary cancer panel through [facility].  2) This information should be available in 4 weeks (once the saliva sample is processed by the lab).  3) Cassi will return to clinic to discuss the results.    Face to face time: 45 minutes    Mellissa Gordon MS,  JEFFREY  Licensed, Certified Genetic Counselor  [facility]  Phone: [phone]

## 2020-12-24 DIAGNOSIS — Z11.59 ENCOUNTER FOR SCREENING FOR OTHER VIRAL DISEASES: ICD-10-CM

## 2020-12-24 LAB
SARS-COV-2 RNA SPEC QL NAA+PROBE: NOT DETECTED
SPECIMEN SOURCE: NORMAL

## 2020-12-24 PROCEDURE — U0003 INFECTIOUS AGENT DETECTION BY NUCLEIC ACID (DNA OR RNA); SEVERE ACUTE RESPIRATORY SYNDROME CORONAVIRUS 2 (SARS-COV-2) (CORONAVIRUS DISEASE [COVID-19]), AMPLIFIED PROBE TECHNIQUE, MAKING USE OF HIGH THROUGHPUT TECHNOLOGIES AS DESCRIBED BY CMS-2020-01-R: HCPCS | Mod: 90 | Performed by: PATHOLOGY

## 2020-12-24 PROCEDURE — 99000 SPECIMEN HANDLING OFFICE-LAB: CPT | Performed by: PATHOLOGY

## 2020-12-28 ENCOUNTER — HOSPITAL ENCOUNTER (OUTPATIENT)
Facility: AMBULATORY SURGERY CENTER | Age: 50
Discharge: HOME | End: 2020-12-28
Attending: INTERNAL MEDICINE | Admitting: INTERNAL MEDICINE
Payer: COMMERCIAL

## 2020-12-28 VITALS
RESPIRATION RATE: 18 BRPM | HEART RATE: 77 BPM | SYSTOLIC BLOOD PRESSURE: 132 MMHG | TEMPERATURE: 98.1 F | DIASTOLIC BLOOD PRESSURE: 68 MMHG | OXYGEN SATURATION: 97 %

## 2020-12-28 LAB — COLONOSCOPY: NORMAL

## 2020-12-28 PROCEDURE — 45380 COLONOSCOPY AND BIOPSY: CPT | Mod: 33

## 2020-12-28 PROCEDURE — 88305 TISSUE EXAM BY PATHOLOGIST: CPT | Mod: GC | Performed by: PATHOLOGY

## 2020-12-28 RX ORDER — SIMETHICONE
LIQUID (ML) MISCELLANEOUS PRN
Status: DISCONTINUED | OUTPATIENT
Start: 2020-12-28 | End: 2020-12-28 | Stop reason: HOSPADM

## 2020-12-28 RX ORDER — FENTANYL CITRATE 50 UG/ML
INJECTION, SOLUTION INTRAMUSCULAR; INTRAVENOUS PRN
Status: DISCONTINUED | OUTPATIENT
Start: 2020-12-28 | End: 2020-12-28 | Stop reason: HOSPADM

## 2020-12-28 RX ORDER — ONDANSETRON 2 MG/ML
4 INJECTION INTRAMUSCULAR; INTRAVENOUS
Status: DISCONTINUED | OUTPATIENT
Start: 2020-12-28 | End: 2020-12-29 | Stop reason: HOSPADM

## 2020-12-28 RX ORDER — LIDOCAINE 40 MG/G
CREAM TOPICAL
Status: DISCONTINUED | OUTPATIENT
Start: 2020-12-28 | End: 2020-12-29 | Stop reason: HOSPADM

## 2020-12-29 LAB — COPATH REPORT: NORMAL

## 2021-01-01 ENCOUNTER — APPOINTMENT (OUTPATIENT)
Dept: LAB | Facility: CLINIC | Age: 51
End: 2021-01-01
Attending: GENETIC COUNSELOR, MS
Payer: COMMERCIAL

## 2021-01-20 DIAGNOSIS — C53.1 MALIGNANT NEOPLASM OF EXOCERVIX (H): ICD-10-CM

## 2021-01-20 DIAGNOSIS — Z80.41 FAMILY HISTORY OF MALIGNANT NEOPLASM OF OVARY: ICD-10-CM

## 2021-01-20 DIAGNOSIS — Z80.49 FAMILY HISTORY OF UTERINE CANCER: ICD-10-CM

## 2021-01-20 DIAGNOSIS — Z80.0 FAMILY HISTORY OF COLON CANCER: ICD-10-CM

## 2021-01-20 LAB
LAB SCANNED RESULT: NORMAL
MISCELLANEOUS TEST: NORMAL

## 2021-01-27 PROBLEM — C53.9 CERVIX CANCER (H): Status: ACTIVE | Noted: 2019-06-12

## 2021-02-05 ENCOUNTER — VIRTUAL VISIT (OUTPATIENT)
Dept: ONCOLOGY | Facility: CLINIC | Age: 51
End: 2021-02-05
Attending: GENETIC COUNSELOR, MS
Payer: COMMERCIAL

## 2021-02-05 DIAGNOSIS — C53.1 MALIGNANT NEOPLASM OF EXOCERVIX (H): Primary | ICD-10-CM

## 2021-02-05 DIAGNOSIS — Z80.41 FAMILY HISTORY OF MALIGNANT NEOPLASM OF OVARY: ICD-10-CM

## 2021-02-05 DIAGNOSIS — Z80.49 FAMILY HISTORY OF UTERINE CANCER: ICD-10-CM

## 2021-02-05 DIAGNOSIS — Z80.0 FAMILY HISTORY OF COLON CANCER: ICD-10-CM

## 2021-02-05 PROCEDURE — 999N000069 HC STATISTIC GENETIC COUNSELING, < 16 MIN: Mod: GT | Performed by: GENETIC COUNSELOR, MS

## 2021-02-05 NOTE — LETTER
"    2/5/2021         RE: Nicole L Fritsche  [address]  [place] [address]        Dear Colleague,    Thank you for referring your patient, Nicole L Fritsche, to the [facility]. Please see a copy of my visit note below.    2/5/2021    Referring Provider: HOMERO Perez CNP    Presenting Information:  Cassi returned to the Cancer Risk Management Program (video visit) to discuss her genetic testing results.    Genetic Testing Result: DICER1 Variant, Unknown Significance: p.S958G  Cassi was found to have one variant of uncertain significance (VUS) in the DICER1 gene. This variant is called p.S958G.  Given the uncertain significance of this result, medical management decisions should NOT be made based on this test result alone.    No additional pathogenic mutations, variants of unknown significance, or gross deletions or duplications were detected. Genes Analyzed (36 total): APC, FLETCHER, AXIN2, BARD1, BMPR1A, BRCA1, BRCA2, BRIP1, CDH1, CDK4, CDKN2A, CHEK2, DICER1, MLH1, MSH2, MSH3, MSH6, MUTYH, NBN, NF1, NTHL1, PALB2, PMS2, PTEN, RAD51C, RAD51D, RECQL, SMAD4, SMARCA4, STK11 and TP53 (sequencing and deletion/duplication); HOXB13, POLD1 and POLE (sequencing only); EPCAM and GREM1 (deletion/duplication only).     A copy of the test report can be found in the Laboratory tab, dated 1/1/2021, and named \"[facility]". The report is scanned in as a linked document.    Interpretation:  We discussed several different interpretations of this inconclusive test result. It is not clear if this variant in the DICER1 gene is associated with increased cancer risk.  Of the VUS that are reclassified, the vast majority will be reclassified to VLB or benign (normal), although many VUS will not be reclassified at all due to lack of additional information. Only a small percentage of VUS will ultimately be reclassified to VLP or pathogenic (harmful).    1. This variant may be a benign change " that does not increase cancer risk.  2. This variant may be a harmful mutation that causes a tumor predisposition syndrome characterized by benign and malignant tumors in the lungs, kidneys, ovaries and thyroid.       Pathogenic (harmful) mutations in this gene are associated with DICER1 syndrome, also known as DICER1-pleuropulmonary blastoma familial tumor predisposition syndrome.     This syndrome is characterized by benign and malignant tumors, including pleuropulmonary blastoma, cystic nephroma, ovarian sex cord stromal tumors, multinodular goiter and thyroid cancer, embryonal rhabdomyosarcomas, ciliary body medulloepithelioma, nasal chondromesenchymal hamartomas, pituitary blastoma, as well as various other tumor types.      Due to the rarity of DICER1 syndrome and the fact that some individuals with a pathogenic variant will never develop symptoms, the specific lifetime risk of developing DICER1-related tumors is unknown. Many of the tumors develop in childhood and most present before the age of 40.    The laboratory is working to determine if this variant is harmful or benign, and they will contact me if it is reclassified. If this variant is determined to be a benign change, there may be a different gene or combination of genes and environment that are associated with the cancers in this family.    It is also important to consider that a cancer susceptibility gene may be present in Cassi's family which she did not inherit.   Genetic counseling is recommended for her siblings and close relatives due to the family history of ovarian and uterine cancer.      Inheritance:  We reviewed the autosomal dominant inheritance of this variant in the DICER gene. We discussed that Cassi has a 50% chance to pass this variant to each of her children. Likewise, each of her siblings has a 50% risk of having the same variant. Because it is unclear what, if any, risk is associated with this variant, clinical genetic testing  for this DICER1 variant alone is not recommended for relatives.    Family Studies:  The laboratory may offer additional research based testing for specific relatives in order to help reclassify this variant.    Screening:  Based on this inconclusive test result, it is important for Cassi and her relatives to refer back to the family history for appropriate cancer screening.      Cassi and her close female relatives likely remain at some increased risk for ovarian cancer, due to her family history. We discussed available ovarian cancer screening (pelvic exams, CA-125 blood tests, and transvaginal ultrasounds) as well as the significant limitations of this screening. As such, this screening is not typically recommended. That being said, women in this family should discuss this screening and the signs and symptoms of ovarian cancer with their primary OB/GYN provider, as they may have individualized recommendations.    Cassi likely also remains at slightly increased risk for uterine cancer due to her family history. We discussed available uterine cancer screening (pelvic exams, endometrial sampling, transvaginal ultrasounds) as well as the significant limitations of this screening. As such, this screening is not typically recommended. That being said, women in this family should discuss their family history, this screening, and the signs and symptoms of uterine cancer with their primary OB/GYN provider, as they may have individualized recommendations.      Other population cancer screening options, such as those recommended by the American Cancer Society and the National Comprehensive Cancer Network (NCCN), are also appropriate for Cassi and her family. These screening recommendations may change if there are changes to Cassi's personal and/or family history of cancer. Final screening recommendations should be made by each individual's managing physician.    Additional Testing Considerations:  Although Csasi's  genetic testing result is inconclusive, other relatives may still carry a harmful gene mutation associated with gynecologic cancer. Genetic counseling is recommended for her relatives who have a history of cancer to discuss genetic testing options. If any of these relatives do pursue genetic testing, Cassi is encouraged to contact me so that we may review the impact of their test results on her.    Summary:  We do not have an explanation for Cassi's family history of cancer. While no genetic changes were identified, Cassi may still be at risk for certain cancers due to family history, environmental factors, or other genetic causes not identified by this test.  Because of that, it is important that she continue with cancer screening based on her personal and family history as discussed above.    Genetic testing is rapidly advancing, and new cancer susceptibility genes will most likely be identified in the future. Therefore, I encouraged Cassi to contact me annually or if there are changes in her personal or family history. This may change how we assess her cancer risk, screening, and the testing we would offer.    Plan:  1. Genetic test results and screening recommendations were discussed today.    2.  She plans to follow-up with her managing physicians to discuss available gynecologic cancer screening.  3.  She should contact me annually, or sooner if her family history changes.  4.  I will contact Cassi if the laboratory informs me that this VUS has been reclassified.  This may change screening and testing recommendations for Cassi and her relatives.    If Cassi has any further questions, I encouraged her to contact me at [phone].      Time spent face to face (video visit): 7 minutes    Mellissa Gordon MS, [facility]  Licensed, Certified Genetic Counselor  [facility]  Phone: [phone]

## 2021-02-05 NOTE — PROGRESS NOTES
"2/5/2021    Referring Provider: HOMERO Perez CNP    Presenting Information:  Cassi returned to the Cancer Risk Management Program (video visit) to discuss her genetic testing results.    Genetic Testing Result: DICER1 Variant, Unknown Significance: p.S958G  Cassi was found to have one variant of uncertain significance (VUS) in the DICER1 gene. This variant is called p.S958G.  Given the uncertain significance of this result, medical management decisions should NOT be made based on this test result alone.    No additional pathogenic mutations, variants of unknown significance, or gross deletions or duplications were detected. Genes Analyzed (36 total): APC, FLETCHER, AXIN2, BARD1, BMPR1A, BRCA1, BRCA2, BRIP1, CDH1, CDK4, CDKN2A, CHEK2, DICER1, MLH1, MSH2, MSH3, MSH6, MUTYH, NBN, NF1, NTHL1, PALB2, PMS2, PTEN, RAD51C, RAD51D, RECQL, SMAD4, SMARCA4, STK11 and TP53 (sequencing and deletion/duplication); HOXB13, POLD1 and POLE (sequencing only); EPCAM and GREM1 (deletion/duplication only).     A copy of the test report can be found in the Laboratory tab, dated 1/1/2021, and named \"[facility]". The report is scanned in as a linked document.    Interpretation:  We discussed several different interpretations of this inconclusive test result. It is not clear if this variant in the DICER1 gene is associated with increased cancer risk.  Of the VUS that are reclassified, the vast majority will be reclassified to VLB or benign (normal), although many VUS will not be reclassified at all due to lack of additional information. Only a small percentage of VUS will ultimately be reclassified to VLP or pathogenic (harmful).    1. This variant may be a benign change that does not increase cancer risk.  2. This variant may be a harmful mutation that causes a tumor predisposition syndrome characterized by benign and malignant tumors in the lungs, kidneys, ovaries and thyroid.       Pathogenic (harmful) mutations in this gene " are associated with DICER1 syndrome, also known as DICER1-pleuropulmonary blastoma familial tumor predisposition syndrome.     This syndrome is characterized by benign and malignant tumors, including pleuropulmonary blastoma, cystic nephroma, ovarian sex cord stromal tumors, multinodular goiter and thyroid cancer, embryonal rhabdomyosarcomas, ciliary body medulloepithelioma, nasal chondromesenchymal hamartomas, pituitary blastoma, as well as various other tumor types.      Due to the rarity of DICER1 syndrome and the fact that some individuals with a pathogenic variant will never develop symptoms, the specific lifetime risk of developing DICER1-related tumors is unknown. Many of the tumors develop in childhood and most present before the age of 40.    The laboratory is working to determine if this variant is harmful or benign, and they will contact me if it is reclassified. If this variant is determined to be a benign change, there may be a different gene or combination of genes and environment that are associated with the cancers in this family.    It is also important to consider that a cancer susceptibility gene may be present in Cassi's family which she did not inherit.   Genetic counseling is recommended for her siblings and close relatives due to the family history of ovarian and uterine cancer.      Inheritance:  We reviewed the autosomal dominant inheritance of this variant in the DICER gene. We discussed that Cassi has a 50% chance to pass this variant to each of her children. Likewise, each of her siblings has a 50% risk of having the same variant. Because it is unclear what, if any, risk is associated with this variant, clinical genetic testing for this DICER1 variant alone is not recommended for relatives.    Family Studies:  The laboratory may offer additional research based testing for specific relatives in order to help reclassify this variant.    Screening:  Based on this inconclusive test result,  it is important for Cassi and her relatives to refer back to the family history for appropriate cancer screening.      Cassi and her close female relatives likely remain at some increased risk for ovarian cancer, due to her family history. We discussed available ovarian cancer screening (pelvic exams, CA-125 blood tests, and transvaginal ultrasounds) as well as the significant limitations of this screening. As such, this screening is not typically recommended. That being said, women in this family should discuss this screening and the signs and symptoms of ovarian cancer with their primary OB/GYN provider, as they may have individualized recommendations.    Cassi likely also remains at slightly increased risk for uterine cancer due to her family history. We discussed available uterine cancer screening (pelvic exams, endometrial sampling, transvaginal ultrasounds) as well as the significant limitations of this screening. As such, this screening is not typically recommended. That being said, women in this family should discuss their family history, this screening, and the signs and symptoms of uterine cancer with their primary OB/GYN provider, as they may have individualized recommendations.      Other population cancer screening options, such as those recommended by the American Cancer Society and the National Comprehensive Cancer Network (NCCN), are also appropriate for Cassi and her family. These screening recommendations may change if there are changes to Cassi's personal and/or family history of cancer. Final screening recommendations should be made by each individual's managing physician.    Additional Testing Considerations:  Although Cassi's genetic testing result is inconclusive, other relatives may still carry a harmful gene mutation associated with gynecologic cancer. Genetic counseling is recommended for her relatives who have a history of cancer to discuss genetic testing options. If any of these  relatives do pursue genetic testing, Cassi is encouraged to contact me so that we may review the impact of their test results on her.    Summary:  We do not have an explanation for Cassi's family history of cancer. While no genetic changes were identified, Cassi may still be at risk for certain cancers due to family history, environmental factors, or other genetic causes not identified by this test.  Because of that, it is important that she continue with cancer screening based on her personal and family history as discussed above.    Genetic testing is rapidly advancing, and new cancer susceptibility genes will most likely be identified in the future. Therefore, I encouraged Cassi to contact me annually or if there are changes in her personal or family history. This may change how we assess her cancer risk, screening, and the testing we would offer.    Plan:  1. Genetic test results and screening recommendations were discussed today.    2.  She plans to follow-up with her managing physicians to discuss available gynecologic cancer screening.  3.  She should contact me annually, or sooner if her family history changes.  4.  I will contact Cassi if the laboratory informs me that this VUS has been reclassified.  This may change screening and testing recommendations for Cassi and her relatives.    If Cassi has any further questions, I encouraged her to contact me at [phone].      Time spent face to face (video visit): 7 minutes    Mellissa Gordon MS, [facility]  Licensed, Certified Genetic Counselor  [facility]  Phone: [phone]

## 2021-02-08 ENCOUNTER — ONCOLOGY VISIT (OUTPATIENT)
Dept: ONCOLOGY | Facility: CLINIC | Age: 51
End: 2021-02-08
Attending: NURSE PRACTITIONER
Payer: COMMERCIAL

## 2021-02-08 VITALS
DIASTOLIC BLOOD PRESSURE: 86 MMHG | BODY MASS INDEX: 26.12 KG/M2 | HEART RATE: 77 BPM | HEIGHT: 67 IN | OXYGEN SATURATION: 98 % | RESPIRATION RATE: 16 BRPM | WEIGHT: 166.4 LBS | SYSTOLIC BLOOD PRESSURE: 125 MMHG | TEMPERATURE: 97.7 F

## 2021-02-08 DIAGNOSIS — C53.1 MALIGNANT NEOPLASM OF EXOCERVIX (H): Primary | ICD-10-CM

## 2021-02-08 PROCEDURE — 88175 CYTOPATH C/V AUTO FLUID REDO: CPT | Mod: TC | Performed by: NURSE PRACTITIONER

## 2021-02-08 PROCEDURE — 88141 CYTOPATH C/V INTERPRET: CPT | Performed by: PATHOLOGY

## 2021-02-08 PROCEDURE — G0463 HOSPITAL OUTPT CLINIC VISIT: HCPCS

## 2021-02-08 PROCEDURE — 99214 OFFICE O/P EST MOD 30 MIN: CPT | Performed by: NURSE PRACTITIONER

## 2021-02-08 ASSESSMENT — ENCOUNTER SYMPTOMS
JOINT SWELLING: 0
MUSCLE CRAMPS: 0
WEIGHT GAIN: 0
EYE WATERING: 0
RESPIRATORY PAIN: 0
DECREASED APPETITE: 0
LOSS OF CONSCIOUSNESS: 0
SPEECH CHANGE: 0
NERVOUS/ANXIOUS: 0
TINGLING: 0
EXERCISE INTOLERANCE: 0
BREAST MASS: 0
DISTURBANCES IN COORDINATION: 0
WHEEZING: 0
SPUTUM PRODUCTION: 0
FATIGUE: 0
JAUNDICE: 0
CONSTIPATION: 0
ALTERED TEMPERATURE REGULATION: 0
ARTHRALGIAS: 0
TASTE DISTURBANCE: 0
DYSURIA: 0
POSTURAL DYSPNEA: 0
COUGH DISTURBING SLEEP: 0
SWOLLEN GLANDS: 0
SINUS PAIN: 0
RECTAL BLEEDING: 0
DIZZINESS: 0
VOMITING: 0
BRUISES/BLEEDS EASILY: 0
INSOMNIA: 0
STIFFNESS: 0
LEG PAIN: 0
CHILLS: 0
HALLUCINATIONS: 0
SMELL DISTURBANCE: 0
SHORTNESS OF BREATH: 0
SYNCOPE: 0
BLOOD IN STOOL: 0
ORTHOPNEA: 0
DIFFICULTY URINATING: 0
INCREASED ENERGY: 0
SORE THROAT: 0
DECREASED CONCENTRATION: 0
HOT FLASHES: 0
EYE PAIN: 0
SINUS CONGESTION: 0
SNORES LOUDLY: 0
HOARSE VOICE: 0
HEARTBURN: 0
TACHYCARDIA: 0
EXTREMITY NUMBNESS: 0
HEADACHES: 0
BLOATING: 0
DEPRESSION: 0
DIARRHEA: 0
ABDOMINAL PAIN: 0
LIGHT-HEADEDNESS: 0
BOWEL INCONTINENCE: 0
MEMORY LOSS: 0
SEIZURES: 0
SKIN CHANGES: 0
MUSCLE WEAKNESS: 0
MYALGIAS: 0
FEVER: 0
TROUBLE SWALLOWING: 0
TREMORS: 0
PALPITATIONS: 0
WEAKNESS: 0
HYPOTENSION: 0
HEMATURIA: 0
POLYPHAGIA: 0
NECK MASS: 0
COUGH: 0
FLANK PAIN: 0
WEIGHT LOSS: 0
SLEEP DISTURBANCES DUE TO BREATHING: 0
CLAUDICATION: 0
PARALYSIS: 0
EYE REDNESS: 0
HYPERTENSION: 0
DOUBLE VISION: 0
NAIL CHANGES: 0
PANIC: 0
NECK PAIN: 0
DYSPNEA ON EXERTION: 0
HEMOPTYSIS: 0
NAUSEA: 0
EYE IRRITATION: 0
BACK PAIN: 0
NUMBNESS: 0
RECTAL PAIN: 0
DECREASED LIBIDO: 0
NIGHT SWEATS: 0
POOR WOUND HEALING: 0
LEG SWELLING: 0
POLYDIPSIA: 0
BREAST PAIN: 0

## 2021-02-08 ASSESSMENT — MIFFLIN-ST. JEOR: SCORE: 1402.42

## 2021-02-08 ASSESSMENT — PAIN SCALES - GENERAL: PAINLEVEL: MILD PAIN (3)

## 2021-02-08 NOTE — LETTER
2021         RE: Nicole L Fritsche  [address]  [place] [address]        Dear Colleague,    Thank you for referring your patient, Nicole L Fritsche, to the [facility]. Please see a copy of my visit note below.                Follow Up Notes on Referred Patient    Date: 2021      RE: Nicole L Fritsche  : 1970  DAVID: 2021      Nicole L Fritsche is a 50 year old woman with a history of stage IIB cervical cancer. She completed chemoradiation and brachytherapy 2019. She is here today for a surveillance visit and annual pap.      Oncology history:   2019: Pap ASCUS. HPV 18+  19: Cervical biopsy, 4 o'clock     FINAL DIAGNOSIS:   Cervix, 4:00, biopsy:   - INVASIVE WELL-DIFFERENTIATED SQUAMOUS CELL CARCINOMA, focally keratinizing   - High grade squamous intraepithelial lesion (cervical intraepithelial neoplasia 3)      19: PET CT IMPRESSION: In this patient with newly diagnosed cervical cancer:  1. 3.7 cm cervical lesion with a max SUV of 13.16.   2. Focal uptake in the endometrium, favor adenomyosis over metastatic disease. Adenomyosis is demonstrated on MRI 2019 and can be hypermetabolic in a premenopausal woman.  3. Small left periaortic retroperitoneal lymph node borderline SUV uptake, inflammatory versus metastatic. Attention on follow-up.   4. Small left inguinal lymph nodes with SUV max of 2.45, likely inflammatory.     19-19: Cisplatin + radiation; brachytherapy     2020: PET IMPRESSION: In this patient with history of cervical cancer status post chemoradiation, there is evidence of complete treatment response, although the sensitivity of this PET/CT may be compromised secondary to diffuse muscular activation.      1. No suspicious FDG activity or CT finding in the uterus, cervix, and vagina. No FDG avid adenopathy.   2. Mild mucosal thickening and FDG avidity in the short segment of the proximal lesser curvature of the  stomach, which may represents segmental gastritis in the correct clinical setting. Recommend correlation with endoscopy.  3. Small focus of decreased FDG uptake in the right frontal gyrus which may represents encephalomalacia. Consider further evaluation with brain MRI.     7/20/2020: MR brain Impression:  The previous PET/CT demonstrated decreased FDG uptake in the right    2/8/21: Pap pending.       Today she comes to clinic feeling well. She denies any vaginal bleeding, no changes in her bowel or bladder habits, no nausea/emesis, no lower extremity edema, and no difficulties eating or sleeping. She denies any abdominal discomfort/bloating, no fevers or chills, and no chest pain or shortness of breath. She is current with her colonoscopy, her mammogram and annual exam with her PCP are due. She is using her dilator about 3-4 times/week for 4-5 minutes and is sexually active and uses a lubricant when needed. She reports her sisters were in their 30-40's when they had surgery for ovarian cancer; neither one required any additional treatment and she does not know if either had any genetic testing done but she does not think so. She has questions regarding HPV.     Review of Systems     Constitutional:  Negative for fever, chills, weight loss, weight gain, fatigue, decreased appetite, night sweats, recent stressors, height gain, height loss, post-operative complications, incisional pain, hallucinations, increased energy, hyperactivity and confused.   HENT:  Negative for ear pain, hearing loss, tinnitus, nosebleeds, trouble swallowing, hoarse voice, mouth sores, sore throat, ear discharge, tooth pain, gum tenderness, taste disturbance, smell disturbance, hearing aid, bleeding gums, dry mouth, sinus pain, sinus congestion and neck mass.    Eyes:  Negative for double vision, pain, redness, eye pain, decreased vision, eye watering, eye bulging, eye dryness, flashing lights, spots, floaters, strabismus, tunnel vision,  jaundice and eye irritation.   Respiratory:   Negative for cough, hemoptysis, sputum production, shortness of breath, wheezing, sleep disturbances due to breathing, snores loudly, respiratory pain, dyspnea on exertion, cough disturbing sleep and postural dyspnea.    Cardiovascular:  Negative for chest pain, dyspnea on exertion, palpitations, orthopnea, claudication, leg swelling, fingers/toes turn blue, hypertension, hypotension, syncope, history of heart murmur, chest pain on exertion, chest pain at rest, pacemaker, few scattered varicosities, leg pain, sleep disturbances due to breathing, tachycardia, light-headedness, exercise intolerance and edema.   Gastrointestinal:  Negative for heartburn, nausea, vomiting, abdominal pain, diarrhea, constipation, blood in stool, melena, rectal pain, bloating, hemorrhoids, bowel incontinence, jaundice, rectal bleeding, coffee ground emesis and change in stool.   Genitourinary:  Negative for bladder incontinence, dysuria, urgency, hematuria, flank pain, vaginal discharge, difficulty urinating, genital sores, dyspareunia, decreased libido, nocturia, voiding less frequently, arousal difficulty, abnormal vaginal bleeding, excessive menstruation, menstrual changes, hot flashes, vaginal dryness and postmenopausal bleeding.   Musculoskeletal:  Negative for myalgias, back pain, joint swelling, arthralgias, stiffness, muscle cramps, neck pain, bone pain, muscle weakness and fracture.   Skin:  Negative for nail changes, itching, poor wound healing, rash, hair changes, skin changes, acne, warts, poor wound healing, scarring, flaky skin, Raynaud's phenomenon, sensitivity to sunlight and skin thickening.   Neurological:  Negative for dizziness, tingling, tremors, speech change, seizures, loss of consciousness, weakness, light-headedness, numbness, headaches, disturbances in coordination, extremity numbness, memory loss, difficulty walking and paralysis.   Endo/Heme:  Negative for anemia,  swollen glands and bruises/bleeds easily.   Psychiatric/Behavioral:  Negative for depression, hallucinations, memory loss, decreased concentration, mood swings and panic attacks.    Breast:  Negative for breast discharge, breast mass, breast pain and nipple retraction.   Endocrine:  Negative for altered temperature regulation, polyphagia, polydipsia, unwanted hair growth and change in facial hair.            Past Medical History:    Past Medical History:   Diagnosis Date     Abnormal Pap smear of cervix 05/21/2019    see problem list     Arrested hydrocephalus (H) 2/3/2009    Ongoing HAs, seeing neurosurgeon. Per past notes from Dr. Resendiz, HAs most likely not secondary to the hydrocephalus, but rather vascular inorgin.  Please see scanned in records.     Asthma      Cervical cancer (H)      Cervical high risk HPV (human papillomavirus) test positive 05/21/2019    See problem list         Past Surgical History:    Past Surgical History:   Procedure Laterality Date     C VENTRICULO-CISTERNOSTOMY,3RD VENT      for treatment of HA related to hydrocephalus     COLONOSCOPY N/A 12/28/2020    Procedure: COLONOSCOPY, WITH  polypectomy;  Surgeon: Steven Vazquez MD;  Location: [facility] OR     ESOPHAGOSCOPY, GASTROSCOPY, DUODENOSCOPY (EGD), COMBINED N/A 7/13/2020    Procedure: ESOPHAGOGASTRODUODENOSCOPY, WITH BIOPSY;  Surgeon: Steven Vazquez MD;  Location: [facility] OR     EXAM UNDER ANESTHESIA, INSERT ANN MARIE SLEEVE, UTERINE PLACEMENT OF TANDEM AND RING FOR RAD, ULTRASOUND N/A 8/30/2019    Procedure: Ann Marie Sleeve Insertion, Ultrasound Guided;  Surgeon: Anne Tidwell MD;  Location: [facility] OR     EXCISE MASS TRUNK Left 9/16/2015    Procedure: EXCISE MASS TRUNK;  Surgeon: Carlos Enrique Briones MD;  Location: [facility] OR         Health Maintenance Due   Topic Date Due     HEPATITIS C SCREENING  03/12/1987     Pneumococcal Vaccine: Pediatrics (0 to 5 Years) and At-Risk Patients (6 to 64 Years) (2 of 3 - PCV13) 06/17/2015     ASTHMA ACTION PLAN   "2016     ZOSTER IMMUNIZATION (2 of 2) 2019     ASTHMA CONTROL TEST  2019     DTAP/TDAP/TD IMMUNIZATION (2 - Td) 2020     PREVENTIVE CARE VISIT  2020     LIPID  2020     MAMMO SCREENING  2020     HPV TEST  2020     PAP  2020     INFLUENZA VACCINE (1) 2020     PHQ-2  2021       Current Medications:     Current Outpatient Medications   Medication Sig Dispense Refill     Acetaminophen (MIDOL PO) Take by mouth as needed       albuterol (PROAIR HFA/PROVENTIL HFA/VENTOLIN HFA) 108 (90 Base) MCG/ACT inhaler Inhale 2 puffs into the lungs every 6 hours as needed for shortness of breath / dyspnea or wheezing 18 g 1     Ferrous Sulfate (IRON SUPPLEMENT PO) Take 325 mg by mouth 2 times daily (with meals)       fluticasone (FLONASE) 50 MCG/ACT nasal spray Spray 1-2 sprays into both nostrils daily 16 g 1     fluticasone (FLOVENT HFA) 110 MCG/ACT inhaler Inhale 2 puffs into the lungs 2 times daily 3 Inhaler 3     Multiple Vitamin (MULTIVITAMINS PO) Take  by mouth daily.           Allergies:      No Known Allergies     Social History:     Social History     Tobacco Use     Smoking status: Former Smoker     Packs/day: 0.25     Years: 20.00     Pack years: 5.00     Types: Cigarettes     Quit date: 2018     Years since quittin.9     Smokeless tobacco: Former User     Quit date: 2015   Substance Use Topics     Alcohol use: Yes     Alcohol/week: 0.0 standard drinks     Comment: once every 3-4 months       History   Drug Use No         Family History:       Family History   Problem Relation Age of Onset     Diabetes Mother      Cerebrovascular Disease Mother      Ovarian Cancer Mother      C.A.D. Father 40     Cerebrovascular Disease Father      C.A.D. Sister 62        stent placed     Ovarian Cancer Sister      Ovarian Cancer Sister          Physical Exam:     /86   Pulse 77   Temp 97.7  F (36.5  C) (Oral)   Resp 16   Ht 1.702 m (5' 7\")   Wt 75.5 " kg (166 lb 6.4 oz)   LMP  (LMP Unknown)   SpO2 98%   BMI 26.06 kg/m    Body mass index is 26.06 kg/m .    General Appearance: healthy and alert, no distress     HEENT: no thyromegaly, no palpable nodules or masses        Cardiovascular: regular rate and rhythm, no gallops, rubs or murmurs     Respiratory: lungs clear, no rales, rhonchi or wheezes, normal diaphragmatic excursion    Musculoskeletal: extremities non tender and without edema    Skin: no lesions or rashes     Neurological: normal gait, no gross defects     Psychiatric: appropriate mood and affect                               Hematological: normal cervical, supraclavicular and inguinal lymph nodes     Gastrointestinal:       abdomen soft, non-tender, non-distended, no organomegaly or masses    Genitourinary: External genitalia and urethral meatus appears normal.  Vagina is smooth without nodularity or masses.  Cervix appears normal and without lesions; no CMT.  Bimanual exam reveal no masses, nodularity or fullness.  Recto-vaginal exam confirms these findings. Pap collected.       Assessment:    Nicole L Fritsche is a 50 year old woman with a history of stage IIB cervical cancer. She completed chemoradiation and brachytherapy 9/2019. She is here today for a surveillance visit and annual pap.     30 minutes spent on the date of the encounter doing chart review, history and exam, documentation and further activities as noted above      Plan:     1.)        Patient to RTC in 3 months for her next surveillance visit. Her annual pap (no HPV) was collected today. Reviewed recommendations from [facility] against performing colposcopy in patients treated for cervical cancer with Pap tests of LSIL or less. Colposcopy for LSIL in this group does not detect recurrence unless there is a visible lesion.  Reviewed signs and symptoms for when she should contact the clinic or seek additional care. Patient to contact the clinic with any questions or concerns in the interim.   Answered all of her questions to the best of my ability. Continue to use her dilator or have intercourse to equal 3-4 times per week.      2.) Genetic risk factors were assessed and given her family history of ovarian cancer she has met with a genetic counselor and had testing done. She has a variant of unknown significance in her DICER1, specifically called [id number]. No additional pathogenic mutations, variants of unknown significance, or gross deletions or duplications were detected. Genes Analyzed (36 total): APC, FLETCHER, AXIN2, BARD1, BMPR1A, BRCA1, BRCA2, BRIP1, CDH1, CDK4, CDKN2A, CHEK2, DICER1, MLH1, MSH2, MSH3, MSH6, MUTYH, NBN, NF1, NTHL1, PALB2, PMS2, PTEN, RAD51C, RAD51D, RECQL, SMAD4, SMARCA4, STK11 and TP53 (sequencing and deletion/duplication); HOXB13, POLD1 and POLE (sequencing only); EPCAM and GREM1 (deletion/duplication only). Encouraged to reach out to her sisters to clarify their ovarian cancer diagnosis and to discuss if they had/discussed any genetic counseling/testing done.     3.) Labs and/or tests ordered include:  Pap.      4.) Health maintenance issues addressed today include annual health maintenance and non-gynecologic issues with PCP.    HOMERO Perez, WHNP-BC, ANP-BC  Women's Health Nurse Practitioner  Adult Nurse Practitioner  Division of Gynecologic Oncology    CC  Patient Care Team:  [facility] as PCP - General (Clinic)  Nataliya Colunga APRN CNP as Assigned PCP  Anne Tidwell MD as Assigned Cancer Care Provider

## 2021-02-08 NOTE — PROGRESS NOTES
Follow Up Notes on Referred Patient    Date: 2021      RE: Nicole L Fritsche  : 1970  DAVID: 2021        Nicole L Fritsche is a 50 year old woman with a history of stage IIB cervical cancer. She completed chemoradiation and brachytherapy 2019. She is here today for a surveillance visit and annual pap.      Oncology history:   2019: Pap ASCUS. HPV 18+  19: Cervical biopsy, 4 o'clock     FINAL DIAGNOSIS:   Cervix, 4:00, biopsy:   - INVASIVE WELL-DIFFERENTIATED SQUAMOUS CELL CARCINOMA, focally keratinizing   - High grade squamous intraepithelial lesion (cervical intraepithelial neoplasia 3)      19: PET CT IMPRESSION: In this patient with newly diagnosed cervical cancer:  1. 3.7 cm cervical lesion with a max SUV of 13.16.   2. Focal uptake in the endometrium, favor adenomyosis over metastatic disease. Adenomyosis is demonstrated on MRI 2019 and can be hypermetabolic in a premenopausal woman.  3. Small left periaortic retroperitoneal lymph node borderline SUV uptake, inflammatory versus metastatic. Attention on follow-up.   4. Small left inguinal lymph nodes with SUV max of 2.45, likely inflammatory.     19-19: Cisplatin + radiation; brachytherapy     2020: PET IMPRESSION: In this patient with history of cervical cancer status post chemoradiation, there is evidence of complete treatment response, although the sensitivity of this PET/CT may be compromised secondary to diffuse muscular activation.      1. No suspicious FDG activity or CT finding in the uterus, cervix, and vagina. No FDG avid adenopathy.   2. Mild mucosal thickening and FDG avidity in the short segment of the proximal lesser curvature of the stomach, which may represents segmental gastritis in the correct clinical setting. Recommend correlation with endoscopy.  3. Small focus of decreased FDG uptake in the right frontal gyrus which may represents encephalomalacia. Consider further evaluation with  brain MRI.     7/20/2020: MR brain Impression:  The previous PET/CT demonstrated decreased FDG uptake in the right    2/8/21: Pap pending.       Today she comes to clinic feeling well. She denies any vaginal bleeding, no changes in her bowel or bladder habits, no nausea/emesis, no lower extremity edema, and no difficulties eating or sleeping. She denies any abdominal discomfort/bloating, no fevers or chills, and no chest pain or shortness of breath. She is current with her colonoscopy, her mammogram and annual exam with her PCP are due. She is using her dilator about 3-4 times/week for 4-5 minutes and is sexually active and uses a lubricant when needed. She reports her sisters were in their 30-40's when they had surgery for ovarian cancer; neither one required any additional treatment and she does not know if either had any genetic testing done but she does not think so. She has questions regarding HPV.     Review of Systems     Constitutional:  Negative for fever, chills, weight loss, weight gain, fatigue, decreased appetite, night sweats, recent stressors, height gain, height loss, post-operative complications, incisional pain, hallucinations, increased energy, hyperactivity and confused.   HENT:  Negative for ear pain, hearing loss, tinnitus, nosebleeds, trouble swallowing, hoarse voice, mouth sores, sore throat, ear discharge, tooth pain, gum tenderness, taste disturbance, smell disturbance, hearing aid, bleeding gums, dry mouth, sinus pain, sinus congestion and neck mass.    Eyes:  Negative for double vision, pain, redness, eye pain, decreased vision, eye watering, eye bulging, eye dryness, flashing lights, spots, floaters, strabismus, tunnel vision, jaundice and eye irritation.   Respiratory:   Negative for cough, hemoptysis, sputum production, shortness of breath, wheezing, sleep disturbances due to breathing, snores loudly, respiratory pain, dyspnea on exertion, cough disturbing sleep and postural dyspnea.     Cardiovascular:  Negative for chest pain, dyspnea on exertion, palpitations, orthopnea, claudication, leg swelling, fingers/toes turn blue, hypertension, hypotension, syncope, history of heart murmur, chest pain on exertion, chest pain at rest, pacemaker, few scattered varicosities, leg pain, sleep disturbances due to breathing, tachycardia, light-headedness, exercise intolerance and edema.   Gastrointestinal:  Negative for heartburn, nausea, vomiting, abdominal pain, diarrhea, constipation, blood in stool, melena, rectal pain, bloating, hemorrhoids, bowel incontinence, jaundice, rectal bleeding, coffee ground emesis and change in stool.   Genitourinary:  Negative for bladder incontinence, dysuria, urgency, hematuria, flank pain, vaginal discharge, difficulty urinating, genital sores, dyspareunia, decreased libido, nocturia, voiding less frequently, arousal difficulty, abnormal vaginal bleeding, excessive menstruation, menstrual changes, hot flashes, vaginal dryness and postmenopausal bleeding.   Musculoskeletal:  Negative for myalgias, back pain, joint swelling, arthralgias, stiffness, muscle cramps, neck pain, bone pain, muscle weakness and fracture.   Skin:  Negative for nail changes, itching, poor wound healing, rash, hair changes, skin changes, acne, warts, poor wound healing, scarring, flaky skin, Raynaud's phenomenon, sensitivity to sunlight and skin thickening.   Neurological:  Negative for dizziness, tingling, tremors, speech change, seizures, loss of consciousness, weakness, light-headedness, numbness, headaches, disturbances in coordination, extremity numbness, memory loss, difficulty walking and paralysis.   Endo/Heme:  Negative for anemia, swollen glands and bruises/bleeds easily.   Psychiatric/Behavioral:  Negative for depression, hallucinations, memory loss, decreased concentration, mood swings and panic attacks.    Breast:  Negative for breast discharge, breast mass, breast pain and nipple  retraction.   Endocrine:  Negative for altered temperature regulation, polyphagia, polydipsia, unwanted hair growth and change in facial hair.            Past Medical History:    Past Medical History:   Diagnosis Date     Abnormal Pap smear of cervix 05/21/2019    see problem list     Arrested hydrocephalus (H) 2/3/2009    Ongoing HAs, seeing neurosurgeon. Per past notes from Dr. Resendiz, HAs most likely not secondary to the hydrocephalus, but rather vascular inorgin.  Please see scanned in records.     Asthma      Cervical cancer (H)      Cervical high risk HPV (human papillomavirus) test positive 05/21/2019    See problem list         Past Surgical History:    Past Surgical History:   Procedure Laterality Date     C VENTRICULO-CISTERNOSTOMY,3RD VENT      for treatment of HA related to hydrocephalus     COLONOSCOPY N/A 12/28/2020    Procedure: COLONOSCOPY, WITH  polypectomy;  Surgeon: Steven Vazquez MD;  Location: [facility] OR     ESOPHAGOSCOPY, GASTROSCOPY, DUODENOSCOPY (EGD), COMBINED N/A 7/13/2020    Procedure: ESOPHAGOGASTRODUODENOSCOPY, WITH BIOPSY;  Surgeon: Steven Vazquez MD;  Location: [facility] OR     EXAM UNDER ANESTHESIA, INSERT ANN MARIE SLEEVE, UTERINE PLACEMENT OF TANDEM AND RING FOR RAD, ULTRASOUND N/A 8/30/2019    Procedure: Ann Marie Sleeve Insertion, Ultrasound Guided;  Surgeon: Anne Tidwell MD;  Location: [facility] OR     EXCISE MASS TRUNK Left 9/16/2015    Procedure: EXCISE MASS TRUNK;  Surgeon: Carlos Enrique Briones MD;  Location: [facility] OR         Health Maintenance Due   Topic Date Due     HEPATITIS C SCREENING  03/12/1987     Pneumococcal Vaccine: Pediatrics (0 to 5 Years) and At-Risk Patients (6 to 64 Years) (2 of 3 - PCV13) 06/17/2015     ASTHMA ACTION PLAN  09/08/2016     ZOSTER IMMUNIZATION (2 of 2) 06/26/2019     ASTHMA CONTROL TEST  11/01/2019     DTAP/TDAP/TD IMMUNIZATION (2 - Td) 02/16/2020     PREVENTIVE CARE VISIT  05/01/2020     LIPID  05/01/2020     MAMMO SCREENING  05/02/2020     HPV TEST  06/03/2020  "    PAP  2020     INFLUENZA VACCINE (1) 2020     PHQ-2  2021       Current Medications:     Current Outpatient Medications   Medication Sig Dispense Refill     Acetaminophen (MIDOL PO) Take by mouth as needed       albuterol (PROAIR HFA/PROVENTIL HFA/VENTOLIN HFA) 108 (90 Base) MCG/ACT inhaler Inhale 2 puffs into the lungs every 6 hours as needed for shortness of breath / dyspnea or wheezing 18 g 1     Ferrous Sulfate (IRON SUPPLEMENT PO) Take 325 mg by mouth 2 times daily (with meals)       fluticasone (FLONASE) 50 MCG/ACT nasal spray Spray 1-2 sprays into both nostrils daily 16 g 1     fluticasone (FLOVENT HFA) 110 MCG/ACT inhaler Inhale 2 puffs into the lungs 2 times daily 3 Inhaler 3     Multiple Vitamin (MULTIVITAMINS PO) Take  by mouth daily.           Allergies:      No Known Allergies     Social History:     Social History     Tobacco Use     Smoking status: Former Smoker     Packs/day: 0.25     Years: 20.00     Pack years: 5.00     Types: Cigarettes     Quit date: 2018     Years since quittin.9     Smokeless tobacco: Former User     Quit date: 2015   Substance Use Topics     Alcohol use: Yes     Alcohol/week: 0.0 standard drinks     Comment: once every 3-4 months       History   Drug Use No         Family History:       Family History   Problem Relation Age of Onset     Diabetes Mother      Cerebrovascular Disease Mother      Ovarian Cancer Mother      C.A.D. Father 40     Cerebrovascular Disease Father      C.A.D. Sister 62        stent placed     Ovarian Cancer Sister      Ovarian Cancer Sister          Physical Exam:     /86   Pulse 77   Temp 97.7  F (36.5  C) (Oral)   Resp 16   Ht 1.702 m (5' 7\")   Wt 75.5 kg (166 lb 6.4 oz)   LMP  (LMP Unknown)   SpO2 98%   BMI 26.06 kg/m    Body mass index is 26.06 kg/m .    General Appearance: healthy and alert, no distress     HEENT: no thyromegaly, no palpable nodules or masses        Cardiovascular: regular rate and " rhythm, no gallops, rubs or murmurs     Respiratory: lungs clear, no rales, rhonchi or wheezes, normal diaphragmatic excursion    Musculoskeletal: extremities non tender and without edema    Skin: no lesions or rashes     Neurological: normal gait, no gross defects     Psychiatric: appropriate mood and affect                               Hematological: normal cervical, supraclavicular and inguinal lymph nodes     Gastrointestinal:       abdomen soft, non-tender, non-distended, no organomegaly or masses    Genitourinary: External genitalia and urethral meatus appears normal.  Vagina is smooth without nodularity or masses.  Cervix appears normal and without lesions; no CMT.  Bimanual exam reveal no masses, nodularity or fullness.  Recto-vaginal exam confirms these findings. Pap collected.       Assessment:    Nicole L Fritsche is a 50 year old woman with a history of stage IIB cervical cancer. She completed chemoradiation and brachytherapy 9/2019. She is here today for a surveillance visit and annual pap.     30 minutes spent on the date of the encounter doing chart review, history and exam, documentation and further activities as noted above      Plan:     1.)        Patient to RTC in 3 months for her next surveillance visit. Her annual pap (no HPV) was collected today. Reviewed recommendations from [facility] against performing colposcopy in patients treated for cervical cancer with Pap tests of LSIL or less. Colposcopy for LSIL in this group does not detect recurrence unless there is a visible lesion.  Reviewed signs and symptoms for when she should contact the clinic or seek additional care. Patient to contact the clinic with any questions or concerns in the interim.  Answered all of her questions to the best of my ability. Continue to use her dilator or have intercourse to equal 3-4 times per week.      2.) Genetic risk factors were assessed and given her family history of ovarian cancer she has met with a genetic  counselor and had testing done. She has a variant of unknown significance in her DICER1, specifically called [id number]. No additional pathogenic mutations, variants of unknown significance, or gross deletions or duplications were detected. Genes Analyzed (36 total): APC, FLETCHER, AXIN2, BARD1, BMPR1A, BRCA1, BRCA2, BRIP1, CDH1, CDK4, CDKN2A, CHEK2, DICER1, MLH1, MSH2, MSH3, MSH6, MUTYH, NBN, NF1, NTHL1, PALB2, PMS2, PTEN, RAD51C, RAD51D, RECQL, SMAD4, SMARCA4, STK11 and TP53 (sequencing and deletion/duplication); HOXB13, POLD1 and POLE (sequencing only); EPCAM and GREM1 (deletion/duplication only). Encouraged to reach out to her sisters to clarify their ovarian cancer diagnosis and to discuss if they had/discussed any genetic counseling/testing done.     3.) Labs and/or tests ordered include:  Pap.      4.) Health maintenance issues addressed today include annual health maintenance and non-gynecologic issues with PCP.    HOMERO Perez, WHNP-BC, ANP-BC  Women's Health Nurse Practitioner  Adult Nurse Practitioner  Division of Gynecologic Oncology          CC  Patient Care Team:  [facility], [facility] as PCP - General (Clinic)  Nataliya Colunga APRN CNP as Assigned PCP  Rosio Harding APRN CNP as Assigned OBGYN Provider  Anne Tidwell MD as Assigned Cancer Care Provider

## 2021-02-08 NOTE — NURSING NOTE
"Oncology Rooming Note    February 8, 2021 8:12 AM   Nicole L Fritsche is a 50 year old female who presents for:    Chief Complaint   Patient presents with     Oncology Clinic Visit     CERVICAL CANCER     Initial Vitals: /86   Pulse 77   Temp 97.7  F (36.5  C) (Oral)   Resp 16   Ht 1.702 m (5' 7\")   Wt 75.5 kg (166 lb 6.4 oz)   LMP  (LMP Unknown)   SpO2 98%   BMI 26.06 kg/m   Estimated body mass index is 26.06 kg/m  as calculated from the following:    Height as of this encounter: 1.702 m (5' 7\").    Weight as of this encounter: 75.5 kg (166 lb 6.4 oz). Body surface area is 1.89 meters squared.  Mild Pain (3) Comment: Data Unavailable   No LMP recorded (lmp unknown). Patient is postmenopausal.  Allergies reviewed: Yes  Medications reviewed: Yes    Medications: Medication refills not needed today.  Pharmacy name entered into [facility]: [id number] [place], [place] - [address] AT [facility] [address]    Clinical concerns: No new concerns today  Rosio Harding  was NOT notified.      Luz Her            "

## 2021-02-10 LAB
COPATH REPORT: NORMAL
PAP: NORMAL

## 2021-02-20 ENCOUNTER — HEALTH MAINTENANCE LETTER (OUTPATIENT)
Age: 51
End: 2021-02-20

## 2021-05-10 ENCOUNTER — VIRTUAL VISIT (OUTPATIENT)
Dept: ONCOLOGY | Facility: CLINIC | Age: 51
End: 2021-05-10
Attending: NURSE PRACTITIONER
Payer: COMMERCIAL

## 2021-05-10 DIAGNOSIS — C53.1 MALIGNANT NEOPLASM OF EXOCERVIX (H): Primary | ICD-10-CM

## 2021-05-10 PROCEDURE — 99213 OFFICE O/P EST LOW 20 MIN: CPT | Mod: 95 | Performed by: NURSE PRACTITIONER

## 2021-05-10 PROCEDURE — 999N001193 HC VIDEO/TELEPHONE VISIT; NO CHARGE

## 2021-05-10 NOTE — LETTER
"    5/10/2021         RE: Nicole L Fritsche  [address]  [place] [address]        Dear Colleague,    Thank you for referring your patient, Nicole L Fritsche, to the [facility]. Please see a copy of my visit note below.    Cassi is a 51 year old who is being evaluated via a billable video visit.      How would you like to obtain your AVS? MyChart  If the video visit is dropped, the invitation should be resent by: Send to e-mail at: [email or web address]  Will anyone else be joining your video visit? No       I have reviewed and updated the patient's allergies and medication list.    Concerns: none  Refills: none     Vitals - Patient Reported  Weight (Patient Reported): 70.3 kg (155 lb)  Height (Patient Reported): 172.7 cm (5' 8\")  BMI (Based on Pt Reported Ht/Wt): 23.57  Pain Score: Moderate Pain (4)  Pain Loc: Other - see comment(pelvis)    Orly Martinez CMA        Video Start Time: 942  Video-Visit Details    Type of service:  Video Visit    Video End Time:1000    Originating Location (pt. Location): home    Distant Location (provider location):  [facility]     Platform used for Video Visit: S*Bio                    Follow Up Notes on Referred Patient    Date: 5/10/2021        RE: Nicole L Fritsche  : 1970  DAVID: 5/10/2021      Nicole L Fritsche is a 51 year old woman with a history of stage IIB cervical cancer. She completed chemoradiation and brachytherapy 2019. She is here today for a surveillance visit. In light of the ongoing COVID19 pandemic, and in accordance with our group and national guidelines this patients care has been reviewed and it is felt that presenting for her visit would be more likely to increase rather than decrease her relative risks.  Therefore this visit was conducted by video.       Oncology history:   2019: Pap ASCUS. HPV 18+  19: Cervical biopsy, 4 o'clock     FINAL DIAGNOSIS:   Cervix, 4:00, " biopsy:   - INVASIVE WELL-DIFFERENTIATED SQUAMOUS CELL CARCINOMA, focally keratinizing   - High grade squamous intraepithelial lesion (cervical intraepithelial neoplasia 3)      7/1/19: PET CT IMPRESSION: In this patient with newly diagnosed cervical cancer:  1. 3.7 cm cervical lesion with a max SUV of 13.16.   2. Focal uptake in the endometrium, favor adenomyosis over metastatic disease. Adenomyosis is demonstrated on MRI 7/1/2019 and can be hypermetabolic in a premenopausal woman.  3. Small left periaortic retroperitoneal lymph node borderline SUV uptake, inflammatory versus metastatic. Attention on follow-up.   4. Small left inguinal lymph nodes with SUV max of 2.45, likely inflammatory.     7/29/19-9/9/19: Cisplatin + radiation; brachytherapy     4/20/2020: PET IMPRESSION: In this patient with history of cervical cancer status post chemoradiation, there is evidence of complete treatment response, although the sensitivity of this PET/CT may be compromised secondary to diffuse muscular activation.      1. No suspicious FDG activity or CT finding in the uterus, cervix, and vagina. No FDG avid adenopathy.   2. Mild mucosal thickening and FDG avidity in the short segment of the proximal lesser curvature of the stomach, which may represents segmental gastritis in the correct clinical setting. Recommend correlation with endoscopy.  3. Small focus of decreased FDG uptake in the right frontal gyrus which may represents encephalomalacia. Consider further evaluation with brain MRI.     7/20/2020: MR brain Impression:  The previous PET/CT demonstrated decreased FDG uptake in the right     2/8/21: Pap NIL.          Today she reports she is doing generally well.   She denies any vaginal bleeding, no changes in her bowel (denies constipation) or bladder habits, no nausea/emesis, no lower extremity edema, and no difficulties eating or sleeping. She denies any abdominal discomfort/bloating, no fevers or chills, and no chest pain  or shortness of breath. She has had some lower abdominal cramping bilaterally, which she states resemble period cramps, which she gets about 3-4 times/week every few weeks. She has taken Midol or Tylenol which has helped. She is sexually active and using her dilator twice a day for 3-4 times/week and denies any spotting. She is due for her mammogram otherwise she is current with her health screenings.           Review of Systems:    Systemic           no weight changes; no fever; no chills; no night sweats; no appetite changes  Skin           no rashes, or lesions  Eye           no irritation; no changes in vision  Temo-Laryngeal           no dysphagia; no hoarseness   Pulmonary    no cough; no shortness of breath  Cardiovascular    no chest pain; no palpitations  Gastrointestinal    no diarrhea; no constipation; no abdominal pain; no changes in bowel habits; no blood in stool  Genitourinary   no urinary frequency; no urinary urgency; no dysuria; no pain; no abnormal vaginal discharge; no abnormal vaginal bleeding  Breast    no breast discharge; no breast changes; no breast pain  Musculoskeletal    no myalgias; no arthralgias; no back pain  Psychiatric           no depressed mood; no anxiety    Hematologic               no tender lymph nodes; no noticeable swellings or lumps   Endocrine    no hot flashes; no heat/cold intolerance         Neurological   no tremor; no numbness and tingling; no headaches; no difficulty sleeping      Past Medical History:    Past Medical History:   Diagnosis Date     Abnormal Pap smear of cervix 05/21/2019    see problem list     Arrested hydrocephalus (H) 2/3/2009    Ongoing HAs, seeing neurosurgeon. Per past notes from Dr. Resendiz, HAs most likely not secondary to the hydrocephalus, but rather vascular inorgin.  Please see scanned in records.     Asthma      Cervical cancer (H)      Cervical high risk HPV (human papillomavirus) test positive 05/21/2019    See problem list         Past  Surgical History:    Past Surgical History:   Procedure Laterality Date     C VENTRICULO-CISTERNOSTOMY,3RD VENT      for treatment of HA related to hydrocephalus     COLONOSCOPY N/A 12/28/2020    Procedure: COLONOSCOPY, WITH  polypectomy;  Surgeon: Steven Vazquez MD;  Location: [facility] OR     ESOPHAGOSCOPY, GASTROSCOPY, DUODENOSCOPY (EGD), COMBINED N/A 7/13/2020    Procedure: ESOPHAGOGASTRODUODENOSCOPY, WITH BIOPSY;  Surgeon: Steven Vazquez MD;  Location: [facility] OR     EXAM UNDER ANESTHESIA, INSERT ANN MARIE SLEEVE, UTERINE PLACEMENT OF TANDEM AND RING FOR RAD, ULTRASOUND N/A 8/30/2019    Procedure: Ann Marie Sleeve Insertion, Ultrasound Guided;  Surgeon: Anne Tidwell MD;  Location: [facility] OR     EXCISE MASS TRUNK Left 9/16/2015    Procedure: EXCISE MASS TRUNK;  Surgeon: Carlos Enrique Briones MD;  Location: [facility] OR         Health Maintenance Due   Topic Date Due     ADVANCE CARE PLANNING  Never done     HEPATITIS C SCREENING  Never done     Pneumococcal Vaccine: Pediatrics (0 to 5 Years) and At-Risk Patients (6 to 64 Years) (2 of 4 - PCV13) 06/17/2015     ASTHMA ACTION PLAN  09/08/2016     ZOSTER IMMUNIZATION (2 of 2) 06/26/2019     ASTHMA CONTROL TEST  11/01/2019     DTAP/TDAP/TD IMMUNIZATION (2 - Td) 02/16/2020     PREVENTIVE CARE VISIT  05/01/2020     LIPID  05/01/2020     MAMMO SCREENING  05/02/2020     PHQ-2  01/01/2021       Current Medications:     Current Outpatient Medications   Medication Sig Dispense Refill     Acetaminophen (MIDOL PO) Take by mouth as needed       albuterol (PROAIR HFA/PROVENTIL HFA/VENTOLIN HFA) 108 (90 Base) MCG/ACT inhaler Inhale 2 puffs into the lungs every 6 hours as needed for shortness of breath / dyspnea or wheezing 18 g 1     Ferrous Sulfate (IRON SUPPLEMENT PO) Take 325 mg by mouth 2 times daily (with meals)       fluticasone (FLONASE) 50 MCG/ACT nasal spray Spray 1-2 sprays into both nostrils daily 16 g 1     fluticasone (FLOVENT HFA) 110 MCG/ACT inhaler Inhale 2 puffs into the lungs 2  times daily 3 Inhaler 3     Multiple Vitamin (MULTIVITAMINS PO) Take  by mouth daily.           Allergies:      No Known Allergies     Social History:     Social History     Tobacco Use     Smoking status: Former Smoker     Packs/day: 0.25     Years: 20.00     Pack years: 5.00     Types: Cigarettes     Quit date: 2/18/2018     Years since quitting: 3.2     Smokeless tobacco: Former User     Quit date: 5/25/2015   Substance Use Topics     Alcohol use: Yes     Alcohol/week: 0.0 standard drinks     Comment: once every 3-4 months       History   Drug Use No         Family History:       Family History   Problem Relation Age of Onset     Diabetes Mother      Cerebrovascular Disease Mother      Ovarian Cancer Mother      C.A.D. Father 40     Cerebrovascular Disease Father      C.A.D. Sister 62        stent placed     Ovarian Cancer Sister      Ovarian Cancer Sister          Physical Exam:     LMP  (LMP Unknown)   There is no height or weight on file to calculate BMI.    General Appearance: healthy and alert, no distress    Eyes:  Eyes grossly normal to inspection.  No discharge or erythema, or obvious scleral/conjunctival abnormalities.    Respiratory: No audible wheeze, cough, or visible cyanosis.  No visible retractions or increased work of breathing.     Musculoskeletal: extremities non tender and without edema    Skin: no lesions or rashes on visible skin    Neurological: normal gait, no gross defects     Psychiatric: appropriate mood and affect. Mentation appears normal, affect normal/bright, judgement and insight intact, normal speech and appearance well-groomed                            The rest of a comprehensive physical examination is deferred due to PHE (public health emergency) video visit restrictions.      Assessment:    Nicole L Fritsche is a 51 year old woman with a history of stage IIB cervical cancer. She completed chemoradiation and brachytherapy 9/2019. She is here today for a surveillance visit. In  light of the ongoing COVID19 pandemic, and in accordance with our group and national guidelines this patients care has been reviewed and it is felt that presenting for her visit would be more likely to increase rather than decrease her relative risks.  Therefore this visit was conducted by video.    27 minutes spent on the date of the encounter doing chart review, history and exam, documentation and further activities as noted above      Plan:     1.)        Patient to RTC in 3 months for her next surveillance visit; at that time she will be 2 years post treatment and can extend her surveillance to every 6 months x 3 additional years. She will continue to have an annual pap (no HPV) which is next due 2/2022. Reviewed signs and symptoms for when she should contact the clinic or seek additional care. Patient to contact the clinic with any questions or concerns in the interim. Answered all of her questions to the best of my ability. Reviewed using her dilator or having intercourse to equal 3-4 times/week; keep dilator in vagina for 5 minutes. Encouraged to monitor her cramping to see if it is cyclical in nature; contact the clinic if this worsens/becomes consistent or she has any vaginal bleeding. She verbalized understanding.      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 This variant is called [id number]. No additional pathogenic mutations, variants of unknown significance, or gross deletions or duplications were detected. Genes Analyzed (36 total): APC, FLETCHER, AXIN2, BARD1, BMPR1A, BRCA1, BRCA2, BRIP1, CDH1, CDK4, CDKN2A, CHEK2, DICER1, MLH1, MSH2, MSH3, MSH6, MUTYH, NBN, NF1, NTHL1, PALB2, PMS2, PTEN, RAD51C, RAD51D, RECQL, SMAD4, SMARCA4, STK11 and TP53 (sequencing and deletion/duplication); HOXB13, POLD1 and POLE (sequencing only); EPCAM and GREM1 (deletion/duplication only).     3.) Labs and/or tests ordered include:  None.      4.) Health  maintenance issues addressed today include annual health maintenance and non-gynecologic issues with PCP.    HOMERO Perez, WHNP-BC, ANP-BC  Women's Health Nurse Practitioner  Adult Nurse Practitioner  Division of Gynecologic Oncology      CC  Patient Care Team:  Clinic, [facility] as PCP - General (Clinic)  Nataliya Colunga APRN CNP as Assigned PCP  Hasmukh Hernandez MD as Assigned Cancer Care Provider

## 2021-05-10 NOTE — PROGRESS NOTES
"Cassi is a 51 year old who is being evaluated via a billable video visit.      How would you like to obtain your AVS? MyChart  If the video visit is dropped, the invitation should be resent by: Send to e-mail at: [email or web address]  Will anyone else be joining your video visit? No       I have reviewed and updated the patient's allergies and medication list.    Concerns: none  Refills: none     Vitals - Patient Reported  Weight (Patient Reported): 70.3 kg (155 lb)  Height (Patient Reported): 172.7 cm (5' 8\")  BMI (Based on Pt Reported Ht/Wt): 23.57  Pain Score: Moderate Pain (4)  Pain Loc: Other - see comment(pelvis)    Orly Martinez CMA        Video Start Time: 942  Video-Visit Details    Type of service:  Video Visit    Video End Time:1000    Originating Location (pt. Location): home    Distant Location (provider location):  [facility] CANCER [facility]     Platform used for Video Visit: well                    Follow Up Notes on Referred Patient    Date: 5/10/2021        RE: Nicole L Fritsche  : 1970  DAVID: 5/10/2021      Nicole L Fritsche is a 51 year old woman with a history of stage IIB cervical cancer. She completed chemoradiation and brachytherapy 2019. She is here today for a surveillance visit. In light of the ongoing COVID19 pandemic, and in accordance with our group and national guidelines this patients care has been reviewed and it is felt that presenting for her visit would be more likely to increase rather than decrease her relative risks.  Therefore this visit was conducted by video.       Oncology history:   2019: Pap ASCUS. HPV 18+  19: Cervical biopsy, 4 o'clock     FINAL DIAGNOSIS:   Cervix, 4:00, biopsy:   - INVASIVE WELL-DIFFERENTIATED SQUAMOUS CELL CARCINOMA, focally keratinizing   - High grade squamous intraepithelial lesion (cervical intraepithelial neoplasia 3)      19: PET CT IMPRESSION: In this patient with newly diagnosed cervical cancer:  1. 3.7 " cm cervical lesion with a max SUV of 13.16.   2. Focal uptake in the endometrium, favor adenomyosis over metastatic disease. Adenomyosis is demonstrated on MRI 7/1/2019 and can be hypermetabolic in a premenopausal woman.  3. Small left periaortic retroperitoneal lymph node borderline SUV uptake, inflammatory versus metastatic. Attention on follow-up.   4. Small left inguinal lymph nodes with SUV max of 2.45, likely inflammatory.     7/29/19-9/9/19: Cisplatin + radiation; brachytherapy     4/20/2020: PET IMPRESSION: In this patient with history of cervical cancer status post chemoradiation, there is evidence of complete treatment response, although the sensitivity of this PET/CT may be compromised secondary to diffuse muscular activation.      1. No suspicious FDG activity or CT finding in the uterus, cervix, and vagina. No FDG avid adenopathy.   2. Mild mucosal thickening and FDG avidity in the short segment of the proximal lesser curvature of the stomach, which may represents segmental gastritis in the correct clinical setting. Recommend correlation with endoscopy.  3. Small focus of decreased FDG uptake in the right frontal gyrus which may represents encephalomalacia. Consider further evaluation with brain MRI.     7/20/2020: MR brain Impression:  The previous PET/CT demonstrated decreased FDG uptake in the right     2/8/21: Pap NIL.          Today she reports she is doing generally well.   She denies any vaginal bleeding, no changes in her bowel (denies constipation) or bladder habits, no nausea/emesis, no lower extremity edema, and no difficulties eating or sleeping. She denies any abdominal discomfort/bloating, no fevers or chills, and no chest pain or shortness of breath. She has had some lower abdominal cramping bilaterally, which she states resemble period cramps, which she gets about 3-4 times/week every few weeks. She has taken Midol or Tylenol which has helped. She is sexually active and using her dilator  twice a day for 3-4 times/week and denies any spotting. She is due for her mammogram otherwise she is current with her health screenings.           Review of Systems:    Systemic           no weight changes; no fever; no chills; no night sweats; no appetite changes  Skin           no rashes, or lesions  Eye           no irritation; no changes in vision  Temo-Laryngeal           no dysphagia; no hoarseness   Pulmonary    no cough; no shortness of breath  Cardiovascular    no chest pain; no palpitations  Gastrointestinal    no diarrhea; no constipation; no abdominal pain; no changes in bowel habits; no blood in stool  Genitourinary   no urinary frequency; no urinary urgency; no dysuria; no pain; no abnormal vaginal discharge; no abnormal vaginal bleeding  Breast    no breast discharge; no breast changes; no breast pain  Musculoskeletal    no myalgias; no arthralgias; no back pain  Psychiatric           no depressed mood; no anxiety    Hematologic               no tender lymph nodes; no noticeable swellings or lumps   Endocrine    no hot flashes; no heat/cold intolerance         Neurological   no tremor; no numbness and tingling; no headaches; no difficulty sleeping      Past Medical History:    Past Medical History:   Diagnosis Date     Abnormal Pap smear of cervix 05/21/2019    see problem list     Arrested hydrocephalus (H) 2/3/2009    Ongoing HAs, seeing neurosurgeon. Per past notes from Dr. Resendiz, HAs most likely not secondary to the hydrocephalus, but rather vascular inorgin.  Please see scanned in records.     Asthma      Cervical cancer (H)      Cervical high risk HPV (human papillomavirus) test positive 05/21/2019    See problem list         Past Surgical History:    Past Surgical History:   Procedure Laterality Date     C VENTRICULO-CISTERNOSTOMY,3RD VENT      for treatment of HA related to hydrocephalus     COLONOSCOPY N/A 12/28/2020    Procedure: COLONOSCOPY, WITH  polypectomy;  Surgeon: Steven Vazquez MD;   Location: [facility] OR     ESOPHAGOSCOPY, GASTROSCOPY, DUODENOSCOPY (EGD), COMBINED N/A 7/13/2020    Procedure: ESOPHAGOGASTRODUODENOSCOPY, WITH BIOPSY;  Surgeon: Steven Vazquez MD;  Location: [facility] OR     EXAM UNDER ANESTHESIA, INSERT ANN MARIE SLEEVE, UTERINE PLACEMENT OF TANDEM AND RING FOR RAD, ULTRASOUND N/A 8/30/2019    Procedure: Ann Marie Sleeve Insertion, Ultrasound Guided;  Surgeon: Anne Tidwell MD;  Location: [facility] OR     EXCISE MASS TRUNK Left 9/16/2015    Procedure: EXCISE MASS TRUNK;  Surgeon: Carlos Enrique Briones MD;  Location: [facility] OR         Health Maintenance Due   Topic Date Due     ADVANCE CARE PLANNING  Never done     HEPATITIS C SCREENING  Never done     Pneumococcal Vaccine: Pediatrics (0 to 5 Years) and At-Risk Patients (6 to 64 Years) (2 of 4 - PCV13) 06/17/2015     ASTHMA ACTION PLAN  09/08/2016     ZOSTER IMMUNIZATION (2 of 2) 06/26/2019     ASTHMA CONTROL TEST  11/01/2019     DTAP/TDAP/TD IMMUNIZATION (2 - Td) 02/16/2020     PREVENTIVE CARE VISIT  05/01/2020     LIPID  05/01/2020     MAMMO SCREENING  05/02/2020     PHQ-2  01/01/2021       Current Medications:     Current Outpatient Medications   Medication Sig Dispense Refill     Acetaminophen (MIDOL PO) Take by mouth as needed       albuterol (PROAIR HFA/PROVENTIL HFA/VENTOLIN HFA) 108 (90 Base) MCG/ACT inhaler Inhale 2 puffs into the lungs every 6 hours as needed for shortness of breath / dyspnea or wheezing 18 g 1     Ferrous Sulfate (IRON SUPPLEMENT PO) Take 325 mg by mouth 2 times daily (with meals)       fluticasone (FLONASE) 50 MCG/ACT nasal spray Spray 1-2 sprays into both nostrils daily 16 g 1     fluticasone (FLOVENT HFA) 110 MCG/ACT inhaler Inhale 2 puffs into the lungs 2 times daily 3 Inhaler 3     Multiple Vitamin (MULTIVITAMINS PO) Take  by mouth daily.           Allergies:      No Known Allergies     Social History:     Social History     Tobacco Use     Smoking status: Former Smoker     Packs/day: 0.25     Years: 20.00     Pack  years: 5.00     Types: Cigarettes     Quit date: 2/18/2018     Years since quitting: 3.2     Smokeless tobacco: Former User     Quit date: 5/25/2015   Substance Use Topics     Alcohol use: Yes     Alcohol/week: 0.0 standard drinks     Comment: once every 3-4 months       History   Drug Use No         Family History:       Family History   Problem Relation Age of Onset     Diabetes Mother      Cerebrovascular Disease Mother      Ovarian Cancer Mother      C.A.D. Father 40     Cerebrovascular Disease Father      C.A.D. Sister 62        stent placed     Ovarian Cancer Sister      Ovarian Cancer Sister          Physical Exam:     LMP  (LMP Unknown)   There is no height or weight on file to calculate BMI.    General Appearance: healthy and alert, no distress    Eyes:  Eyes grossly normal to inspection.  No discharge or erythema, or obvious scleral/conjunctival abnormalities.    Respiratory: No audible wheeze, cough, or visible cyanosis.  No visible retractions or increased work of breathing.     Musculoskeletal: extremities non tender and without edema    Skin: no lesions or rashes on visible skin    Neurological: normal gait, no gross defects     Psychiatric: appropriate mood and affect. Mentation appears normal, affect normal/bright, judgement and insight intact, normal speech and appearance well-groomed                            The rest of a comprehensive physical examination is deferred due to PHE (public health emergency) video visit restrictions.      Assessment:    Nicole L Fritsche is a 51 year old woman with a history of stage IIB cervical cancer. She completed chemoradiation and brachytherapy 9/2019. She is here today for a surveillance visit. In light of the ongoing COVID19 pandemic, and in accordance with our group and national guidelines this patients care has been reviewed and it is felt that presenting for her visit would be more likely to increase rather than decrease her relative risks.  Therefore this  visit was conducted by video.    27 minutes spent on the date of the encounter doing chart review, history and exam, documentation and further activities as noted above      Plan:     1.)        Patient to RTC in 3 months for her next surveillance visit; at that time she will be 2 years post treatment and can extend her surveillance to every 6 months x 3 additional years. She will continue to have an annual pap (no HPV) which is next due 2/2022. Reviewed signs and symptoms for when she should contact the clinic or seek additional care. Patient to contact the clinic with any questions or concerns in the interim. Answered all of her questions to the best of my ability. Reviewed using her dilator or having intercourse to equal 3-4 times/week; keep dilator in vagina for 5 minutes. Encouraged to monitor her cramping to see if it is cyclical in nature; contact the clinic if this worsens/becomes consistent or she has any vaginal bleeding. She verbalized understanding.      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 This variant is called [id number]. No additional pathogenic mutations, variants of unknown significance, or gross deletions or duplications were detected. Genes Analyzed (36 total): APC, FLETCHER, AXIN2, BARD1, BMPR1A, BRCA1, BRCA2, BRIP1, CDH1, CDK4, CDKN2A, CHEK2, DICER1, MLH1, MSH2, MSH3, MSH6, MUTYH, NBN, NF1, NTHL1, PALB2, PMS2, PTEN, RAD51C, RAD51D, RECQL, SMAD4, SMARCA4, STK11 and TP53 (sequencing and deletion/duplication); HOXB13, POLD1 and POLE (sequencing only); EPCAM and GREM1 (deletion/duplication only).     3.) Labs and/or tests ordered include:  None.      4.) Health maintenance issues addressed today include annual health maintenance and non-gynecologic issues with PCP.    HOMERO Perez, WHNP-BC, ANP-BC  Women's Health Nurse Practitioner  Adult Nurse Practitioner  Division of Gynecologic Oncology          CC  Patient Care  Team:  Clinic, [facility] as PCP - General (Clinic)  Nataliya Colunga APRN CNP as Assigned PCP  Rosio Harding APRN CNP as Assigned OBGYN Provider  Hasmukh Hernandez MD as Assigned Cancer Care Provider

## 2021-06-20 NOTE — LETTER
Letter by Severson, Tammie F, LPN at      Author: Severson, Tammie F, LPN Service: -- Author Type: --    Filed:  Encounter Date: 7/13/2020 Status: (Other)       7/13/2020        Nicole L Fritsche  [address] [place] [address]    This letter provides a written record that you were tested for COVID-19 on 7/10/20.     Your result was negative. This means that we didnt find the virus that causes COVID-19 in your sample. A test may show negative when you do actually have the virus. This can happen when the virus is in the early stages of infection, before you feel illness symptoms.    If you have symptoms   Stay home and away from others (self-isolate) until you meet ALL of the guidelines below:    Youve had no fever--and no medicine that reduces fever--for 3 full days (72 hours). And ?    Your other symptoms have gotten better. For example, your cough or breathing has improved. And?    At least 10 days have passed since your symptoms started.    During this time:    Stay home. Dont go to work, school or anywhere else.     Stay in your own room, including for meals. Use your own bathroom if you can.    Stay away from others in your home. No hugging, kissing or shaking hands. No visitors.    Clean high touch surfaces often (doorknobs, counters, handles, etc.). Use a household cleaning spray or wipes. You can find a full list on the EPA website at www.epa.gov/pesticide-registration/list-n-disinfectants-use-against-sars-cov-2.    Cover your mouth and nose with a mask, tissue or washcloth to avoid spreading germs.    Wash your hands and face often with soap and water.    Going back to work  Check with your employer for any guidelines to follow for going back to work.    Employers: This document serves as formal notice that your employee tested negative for COVID-19, as of the testing date shown above.

## 2021-06-28 ENCOUNTER — OFFICE VISIT (OUTPATIENT)
Dept: FAMILY MEDICINE | Facility: CLINIC | Age: 51
End: 2021-06-28
Payer: COMMERCIAL

## 2021-06-28 VITALS
WEIGHT: 168 LBS | DIASTOLIC BLOOD PRESSURE: 78 MMHG | OXYGEN SATURATION: 100 % | SYSTOLIC BLOOD PRESSURE: 118 MMHG | HEIGHT: 68 IN | HEART RATE: 80 BPM | BODY MASS INDEX: 25.46 KG/M2

## 2021-06-28 DIAGNOSIS — Z11.3 SCREEN FOR STD (SEXUALLY TRANSMITTED DISEASE): ICD-10-CM

## 2021-06-28 DIAGNOSIS — D50.9 IRON DEFICIENCY ANEMIA, UNSPECIFIED IRON DEFICIENCY ANEMIA TYPE: ICD-10-CM

## 2021-06-28 DIAGNOSIS — Z13.89 SCREENING FOR HEMATURIA OR PROTEINURIA: ICD-10-CM

## 2021-06-28 DIAGNOSIS — N93.9 VAGINAL SPOTTING: ICD-10-CM

## 2021-06-28 DIAGNOSIS — B96.89 BV (BACTERIAL VAGINOSIS): ICD-10-CM

## 2021-06-28 DIAGNOSIS — Z11.59 NEED FOR HEPATITIS C SCREENING TEST: ICD-10-CM

## 2021-06-28 DIAGNOSIS — Z13.1 SCREENING FOR DIABETES MELLITUS: ICD-10-CM

## 2021-06-28 DIAGNOSIS — F41.9 ANXIETY: ICD-10-CM

## 2021-06-28 DIAGNOSIS — J45.30 MILD PERSISTENT ASTHMA WITHOUT COMPLICATION: ICD-10-CM

## 2021-06-28 DIAGNOSIS — R53.83 FATIGUE, UNSPECIFIED TYPE: ICD-10-CM

## 2021-06-28 DIAGNOSIS — Z23 NEED FOR VACCINATION: ICD-10-CM

## 2021-06-28 DIAGNOSIS — L81.9 DISCOLORED SKIN: ICD-10-CM

## 2021-06-28 DIAGNOSIS — J30.2 SEASONAL ALLERGIC RHINITIS, UNSPECIFIED TRIGGER: ICD-10-CM

## 2021-06-28 DIAGNOSIS — Z13.220 SCREENING FOR HYPERLIPIDEMIA: ICD-10-CM

## 2021-06-28 DIAGNOSIS — N76.0 BV (BACTERIAL VAGINOSIS): ICD-10-CM

## 2021-06-28 DIAGNOSIS — Z00.00 WELL ADULT EXAM: Primary | ICD-10-CM

## 2021-06-28 DIAGNOSIS — Z82.49 FAMILY HISTORY OF ASCVD: ICD-10-CM

## 2021-06-28 DIAGNOSIS — Z12.31 ENCOUNTER FOR SCREENING MAMMOGRAM FOR BREAST CANCER: ICD-10-CM

## 2021-06-28 PROBLEM — M25.571 ANKLE PAIN, RIGHT: Status: RESOLVED | Noted: 2017-01-19 | Resolved: 2021-06-28

## 2021-06-28 LAB
ALBUMIN UR-MCNC: ABNORMAL MG/DL
APPEARANCE UR: CLEAR
BASOPHILS # BLD AUTO: 0 10E9/L (ref 0–0.2)
BASOPHILS NFR BLD AUTO: 0.4 %
BILIRUB UR QL STRIP: NEGATIVE
COLOR UR AUTO: YELLOW
DEPRECATED CALCIDIOL+CALCIFEROL SERPL-MC: 25 UG/L (ref 20–75)
DIFFERENTIAL METHOD BLD: NORMAL
EOSINOPHIL # BLD AUTO: 0.2 10E9/L (ref 0–0.7)
EOSINOPHIL NFR BLD AUTO: 3 %
ERYTHROCYTE [DISTWIDTH] IN BLOOD BY AUTOMATED COUNT: 12.9 % (ref 10–15)
GLUCOSE UR STRIP-MCNC: NEGATIVE MG/DL
HCT VFR BLD AUTO: 39.6 % (ref 35–47)
HCV AB SERPL QL IA: NONREACTIVE
HGB BLD-MCNC: 13 G/DL (ref 11.7–15.7)
HGB UR QL STRIP: ABNORMAL
KETONES UR STRIP-MCNC: NEGATIVE MG/DL
LEUKOCYTE ESTERASE UR QL STRIP: ABNORMAL
LYMPHOCYTES # BLD AUTO: 1.7 10E9/L (ref 0.8–5.3)
LYMPHOCYTES NFR BLD AUTO: 21.5 %
MCH RBC QN AUTO: 32.2 PG (ref 26.5–33)
MCHC RBC AUTO-ENTMCNC: 32.8 G/DL (ref 31.5–36.5)
MCV RBC AUTO: 98 FL (ref 78–100)
MONOCYTES # BLD AUTO: 1.1 10E9/L (ref 0–1.3)
MONOCYTES NFR BLD AUTO: 14.2 %
MUCOUS THREADS #/AREA URNS LPF: PRESENT /LPF
NEUTROPHILS # BLD AUTO: 4.9 10E9/L (ref 1.6–8.3)
NEUTROPHILS NFR BLD AUTO: 60.9 %
NITRATE UR QL: NEGATIVE
NON-SQ EPI CELLS #/AREA URNS LPF: ABNORMAL /LPF
PH UR STRIP: 7 PH (ref 5–7)
PLATELET # BLD AUTO: 317 10E9/L (ref 150–450)
RBC # BLD AUTO: 4.04 10E12/L (ref 3.8–5.2)
RBC #/AREA URNS AUTO: ABNORMAL /HPF
SOURCE: ABNORMAL
SP GR UR STRIP: 1.02 (ref 1–1.03)
SPECIMEN SOURCE: ABNORMAL
T PALLIDUM AB SER QL: NONREACTIVE
UROBILINOGEN UR STRIP-ACNC: 0.2 EU/DL (ref 0.2–1)
WBC # BLD AUTO: 8 10E9/L (ref 4–11)
WBC #/AREA URNS AUTO: ABNORMAL /HPF
WET PREP SPEC: ABNORMAL

## 2021-06-28 PROCEDURE — 99396 PREV VISIT EST AGE 40-64: CPT | Mod: 25 | Performed by: NURSE PRACTITIONER

## 2021-06-28 PROCEDURE — 84439 ASSAY OF FREE THYROXINE: CPT | Performed by: NURSE PRACTITIONER

## 2021-06-28 PROCEDURE — 87491 CHLMYD TRACH DNA AMP PROBE: CPT | Performed by: NURSE PRACTITIONER

## 2021-06-28 PROCEDURE — 87210 SMEAR WET MOUNT SALINE/INK: CPT | Performed by: NURSE PRACTITIONER

## 2021-06-28 PROCEDURE — 82306 VITAMIN D 25 HYDROXY: CPT | Performed by: NURSE PRACTITIONER

## 2021-06-28 PROCEDURE — 90750 HZV VACC RECOMBINANT IM: CPT | Performed by: NURSE PRACTITIONER

## 2021-06-28 PROCEDURE — 83550 IRON BINDING TEST: CPT | Performed by: NURSE PRACTITIONER

## 2021-06-28 PROCEDURE — 86803 HEPATITIS C AB TEST: CPT | Performed by: NURSE PRACTITIONER

## 2021-06-28 PROCEDURE — 82728 ASSAY OF FERRITIN: CPT | Performed by: NURSE PRACTITIONER

## 2021-06-28 PROCEDURE — 80050 GENERAL HEALTH PANEL: CPT | Performed by: NURSE PRACTITIONER

## 2021-06-28 PROCEDURE — 90471 IMMUNIZATION ADMIN: CPT | Performed by: NURSE PRACTITIONER

## 2021-06-28 PROCEDURE — 80061 LIPID PANEL: CPT | Performed by: NURSE PRACTITIONER

## 2021-06-28 PROCEDURE — 81001 URINALYSIS AUTO W/SCOPE: CPT | Performed by: NURSE PRACTITIONER

## 2021-06-28 PROCEDURE — 36415 COLL VENOUS BLD VENIPUNCTURE: CPT | Performed by: NURSE PRACTITIONER

## 2021-06-28 PROCEDURE — 86780 TREPONEMA PALLIDUM: CPT | Mod: 90 | Performed by: NURSE PRACTITIONER

## 2021-06-28 PROCEDURE — 99000 SPECIMEN HANDLING OFFICE-LAB: CPT | Performed by: NURSE PRACTITIONER

## 2021-06-28 PROCEDURE — 83540 ASSAY OF IRON: CPT | Performed by: NURSE PRACTITIONER

## 2021-06-28 PROCEDURE — 99213 OFFICE O/P EST LOW 20 MIN: CPT | Mod: 25 | Performed by: NURSE PRACTITIONER

## 2021-06-28 PROCEDURE — 87591 N.GONORRHOEAE DNA AMP PROB: CPT | Performed by: NURSE PRACTITIONER

## 2021-06-28 RX ORDER — DEXAMETHASONE 4 MG/1
2 TABLET ORAL 2 TIMES DAILY
Qty: 12 G | Refills: 4 | Status: SHIPPED | OUTPATIENT
Start: 2021-06-28 | End: 2021-08-23

## 2021-06-28 RX ORDER — FLUTICASONE PROPIONATE 50 MCG
1-2 SPRAY, SUSPENSION (ML) NASAL DAILY
Qty: 16 G | Refills: 3 | Status: SHIPPED | OUTPATIENT
Start: 2021-06-28 | End: 2021-10-05

## 2021-06-28 RX ORDER — METRONIDAZOLE 500 MG/1
500 TABLET ORAL 2 TIMES DAILY
Qty: 14 TABLET | Refills: 0 | Status: SHIPPED | OUTPATIENT
Start: 2021-06-28 | End: 2021-07-05

## 2021-06-28 RX ORDER — ALBUTEROL SULFATE 90 UG/1
2 AEROSOL, METERED RESPIRATORY (INHALATION) EVERY 6 HOURS PRN
Qty: 18 G | Refills: 3 | Status: SHIPPED | OUTPATIENT
Start: 2021-06-28 | End: 2021-08-23

## 2021-06-28 ASSESSMENT — ANXIETY QUESTIONNAIRES
3. WORRYING TOO MUCH ABOUT DIFFERENT THINGS: NEARLY EVERY DAY
6. BECOMING EASILY ANNOYED OR IRRITABLE: NEARLY EVERY DAY
7. FEELING AFRAID AS IF SOMETHING AWFUL MIGHT HAPPEN: MORE THAN HALF THE DAYS
1. FEELING NERVOUS, ANXIOUS, OR ON EDGE: MORE THAN HALF THE DAYS
GAD7 TOTAL SCORE: 17
2. NOT BEING ABLE TO STOP OR CONTROL WORRYING: NEARLY EVERY DAY
IF YOU CHECKED OFF ANY PROBLEMS ON THIS QUESTIONNAIRE, HOW DIFFICULT HAVE THESE PROBLEMS MADE IT FOR YOU TO DO YOUR WORK, TAKE CARE OF THINGS AT HOME, OR GET ALONG WITH OTHER PEOPLE: VERY DIFFICULT
5. BEING SO RESTLESS THAT IT IS HARD TO SIT STILL: MORE THAN HALF THE DAYS

## 2021-06-28 ASSESSMENT — ASTHMA QUESTIONNAIRES
QUESTION_1 LAST FOUR WEEKS HOW MUCH OF THE TIME DID YOUR ASTHMA KEEP YOU FROM GETTING AS MUCH DONE AT WORK, SCHOOL OR AT HOME: MOST OF THE TIME
QUESTION_2 LAST FOUR WEEKS HOW OFTEN HAVE YOU HAD SHORTNESS OF BREATH: THREE TO SIX TIMES A WEEK
ACT_TOTALSCORE: 12
QUESTION_3 LAST FOUR WEEKS HOW OFTEN DID YOUR ASTHMA SYMPTOMS (WHEEZING, COUGHING, SHORTNESS OF BREATH, CHEST TIGHTNESS OR PAIN) WAKE YOU UP AT NIGHT OR EARLIER THAN USUAL IN THE MORNING: TWO OR THREE NIGHTS A WEEK
QUESTION_4 LAST FOUR WEEKS HOW OFTEN HAVE YOU USED YOUR RESCUE INHALER OR NEBULIZER MEDICATION (SUCH AS ALBUTEROL): ONE OR TWO TIMES PER DAY
QUESTION_5 LAST FOUR WEEKS HOW WOULD YOU RATE YOUR ASTHMA CONTROL: SOMEWHAT CONTROLLED

## 2021-06-28 ASSESSMENT — PATIENT HEALTH QUESTIONNAIRE - PHQ9: 5. POOR APPETITE OR OVEREATING: MORE THAN HALF THE DAYS

## 2021-06-28 ASSESSMENT — MIFFLIN-ST. JEOR: SCORE: 1412.92

## 2021-06-28 NOTE — PROGRESS NOTES
SUBJECTIVE:   CC: Nicole L Fritsche is an 51 year old woman who presents for preventive health visit.       Patient has been advised of split billing requirements and indicates understanding: Yes  Healthy Habits:     Getting at least 3 servings of Calcium per day:  Yes    Bi-annual eye exam:  Yes    Dental care twice a year:  Yes    Sleep apnea or symptoms of sleep apnea:  None    Diet:  Regular (no restrictions)    Frequency of exercise:  2-3 days/week    Duration of exercise:  30-45 minutes    Taking medications regularly:  Yes    Medication side effects:  None    PHQ-2 Total Score: 0    She has worked at [employer] for the past 9 years.   shes fasting today.  She is  and has 3 sons.     Her PCP left a few years ago, just as she was diagnosed with cancer. She states she has been mostly following up with oncology since then and is due for a physical/establish care with new provider.     She reports taking iron supplements for anemia. She admits to eating fish, red meats and dark green leafy vegetables. She reports chronic fatigue. She states shes been working overtime. She is a former smoker.     She states she ran out of her inhalers a few weeks ago, shes requesting refills today. She states prior to running out of medications, her asthma and allergies tend to flare in the spring and summer months. She has been taking benadryl and a 24 hour allergy medication recently. She reports wheezing at night. Denies fevers.     She is due to see her oncologist again in August. Her last pap smear was in February 2021, she is to get these performed annually. She believes she may have been noticing spotting a few times per week over the past few weeks and wants to have this checked out. She went through chemotherapy and radiation but did not have surgery per report after her cancer diagnosis. She is sexually active, 1 partner. shes requesting STD screening today. She reports mild pelvic/bilat groin pain since going  through chemo and radiation.     She reports over the past few weeks she has noticed a red jesusita to the tops of her feet. She states if she rubs the area it goes away for a few seconds. Her sister is a nurse and recommended that she get it checked out. She voices concerns of it being related to blood flow due to strong family history of CV disease. She states her sister had an MI at age 51 and her father passed away from a stroke.     She states she has outbursts at times, both at work and at home. This has increase since her cancer diagnosis. Denies prior therapy. She reports family history of anxiety and depression. She states sometimes she removes herself from the situation by going in to another room.     Today's PHQ-2 Score:   PHQ-2 ( 1999 [employer]) 6/28/2021   Q1: Little interest or pleasure in doing things 0   Q2: Feeling down, depressed or hopeless 0   PHQ-2 Score 0   Q1: Little interest or pleasure in doing things Not at all   Q2: Feeling down, depressed or hopeless Not at all   PHQ-2 Score 0     Abuse: Current or Past (Physical, Sexual or Emotional) - No  Do you feel safe in your environment? Yes    Have you ever done Advance Care Planning? (For example, a Health Directive, POLST, or a discussion with a medical provider or your loved ones about your wishes): No, advance care planning information given to patient to review.  Patient declined advance care planning discussion at this time.    Social History     Tobacco Use     Smoking status: Former Smoker     Packs/day: 0.25     Years: 20.00     Pack years: 5.00     Types: Cigarettes     Quit date: 2/18/2018     Years since quitting: 3.3     Smokeless tobacco: Former User     Quit date: 5/25/2015   Substance Use Topics     Alcohol use: Yes     Alcohol/week: 0.0 standard drinks     Comment: once every 3-4 months     If you drink alcohol do you typically have >3 drinks per day or >7 drinks per week? No    Alcohol Use 6/28/2021   Prescreen: >3 drinks/day or >7  "drinks/week? No   Prescreen: >3 drinks/day or >7 drinks/week? -       Reviewed orders with patient.  Reviewed health maintenance and updated orders accordingly - Yes        History of abnormal Pap smear:   PAP / HPV Latest Ref Rng & Units 2/8/2021 5/21/2019 6/15/2015   PAP - NIL ASC-US(A) NIL   HPV 16 DNA NEG:Negative - Negative Negative   HPV 18 DNA NEG:Negative - Positive(A) Negative   OTHER HR HPV NEG:Negative - Negative Negative     Reviewed and updated as needed this visit by clinical staff  Tobacco  Allergies  Meds   Med Hx  Surg Hx  Fam Hx  Soc Hx        Reviewed and updated as needed this visit by Provider                    Review of Systems  CONSTITUTIONAL: NEGATIVE for fever, chills, change in weight. Fatigue   INTEGUMENTARY/SKIN: NEGATIVE for worrisome rashes, moles or lesions. Red skin to feet   EYES: NEGATIVE for vision changes or irritation  ENT: NEGATIVE for ear, mouth and throat problems  RESP: NEGATIVE for significant cough. Wheezing   BREAST: NEGATIVE for masses, tenderness or discharge  CV: NEGATIVE for chest pain, palpitations or peripheral edema  GI: NEGATIVE for nausea, abdominal pain, heartburn, or change in bowel habits  : NEGATIVE for unusual urinary. Possible vaginal spotting   MUSCULOSKELETAL: NEGATIVE for significant arthralgias or myalgia  NEURO: NEGATIVE for weakness, dizziness or paresthesias  PSYCHIATRIC: anxious      OBJECTIVE:   /78 (BP Location: Right arm, Patient Position: Sitting, Cuff Size: Adult Regular)   Pulse 80   Ht 1.715 m (5' 7.52\")   Wt 76.2 kg (168 lb)   LMP  (LMP Unknown)   SpO2 100%   BMI 25.91 kg/m    Physical Exam  Musculoskeletal:        Feet:        GENERAL: healthy, alert and no distress  EYES: Eyes grossly normal to inspection, PERRL and conjunctivae and sclerae normal  HENT: ear canals and TM's normal, nose and mouth without ulcers or lesions  NECK: no adenopathy, no asymmetry, masses, or scars and thyroid normal to palpation  RESP: lungs " clear to auscultation - no rales, rhonchi or wheezes  BREAST: normal without masses, tenderness or nipple discharge and no palpable axillary masses or adenopathy  CV: regular rate and rhythm, normal S1 S2, no S3 or S4, no murmur, click or rub, no peripheral edema and peripheral pulses strong  ABDOMEN: soft, nontender, no hepatosplenomegaly, no masses and bowel sounds normal   (female): normal female external genitalia, normal urethral meatus, vaginal mucosa pink, moist, well rugated, and normal cervix without masses or discharge/bleeding   MS: no gross musculoskeletal defects noted, no edema  SKIN: no suspicious lesions or rashes. Pea sized seborrheic keratosis noted to right upper abdominal wall below right breast.   NEURO: Normal strength and tone, mentation intact and speech normal  PSYCH: mentation appears normal, affect anxious     ASSESSMENT/PLAN:   1. Well adult exam  Colonoscopy up to date 12/28/20. Pap up to date 2/8/21. She is followed by oncology for history of cancer to exocervix- she is to have annual pap smears performed due to this history    2. Screening for hyperlipidemia  - Lipid panel reflex to direct LDL Fasting    3. Family history of ASCVD  - CT Coronary Calcium Scan; Future    4. Mild persistent asthma without complication  Pt has been out of medications for a few weeks now, medications refilled at this time   - albuterol (PROAIR HFA/PROVENTIL HFA/VENTOLIN HFA) 108 (90 Base) MCG/ACT inhaler; Inhale 2 puffs into the lungs every 6 hours as needed for shortness of breath / dyspnea or wheezing  Dispense: 18 g; Refill: 3  - fluticasone (FLOVENT HFA) 110 MCG/ACT inhaler; Inhale 2 puffs into the lungs 2 times daily  Dispense: 12 g; Refill: 4  - OFFICE/OUTPT VISIT,EST,LEVL III    5. Seasonal allergic rhinitis, unspecified trigger  Pt has been out of medications for a few weeks now, medications refilled at this time   - fluticasone (FLONASE) 50 MCG/ACT nasal spray; Spray 1-2 sprays into both nostrils  daily  Dispense: 16 g; Refill: 3  - OFFICE/OUTPT VISIT,EST,LEVL III    6. Need for vaccination  - SHINGRIX [[id number]]    7. Encounter for screening mammogram for breast cancer  - MA SCREENING DIGITAL BILAT - Future  (s+30); Future    8. Anxiety  Pt declined therapy at this time. Medication options were discussed. zoloft prescribed, discussed possible side effects of medication. Encouraged pt to take 1/2 tablet for the first 4 days and then increase to 1 full tablet daily. If she develops suicidal ideations/plans she should be evaluated in the ED right away. Recommend 1 month follow up visit   - sertraline (ZOLOFT) 50 MG tablet; Take 1 tablet (50 mg) by mouth daily  Dispense: 30 tablet; Refill: 1  - OFFICE/OUTPT VISIT,EST,LEVL III    9. Screening for diabetes mellitus  - Comprehensive metabolic panel (BMP + Alb, Alk Phos, ALT, AST, Total. Bili, TP)    10. Iron deficiency anemia, unspecified iron deficiency anemia type  - CBC with platelets and differential  - Ferritin  - Iron and iron binding capacity  - OFFICE/OUTPT VISIT,EST,LEVL III    11. Fatigue, unspecified type  - TSH with free T4 reflex  - Vitamin D Deficiency  - OFFICE/OUTPT VISIT,EST,LEVL III    12. Screening for hematuria or proteinuria  - *UA reflex to Microscopic and Culture ([facility] (except [place] and [place])    13. Vaginal spotting  Pts pap smear is up to date as of February. shes unsure if the bleeding is vaginal or coming from bladder or just discharge. Will order pelvic ultrasound at this time due to her history.   - US Pelvic Complete with Transvaginal; Future  - OFFICE/OUTPT VISIT,EST,LEVL III    14. Screen for STD (sexually transmitted disease)  - Chlamydia trachomatis PCR  - Neisseria gonorrhoeae PCR  - Treponema Abs w Reflex to RPR and Titer  - Wet prep    15. Need for hepatitis C screening test  - Hepatitis C Screen Reflex to HCV RNA Quant and Genotype    16. BV (bacterial vaginosis)  Pt should avoid alcohol intake with  "this medication   - metroNIDAZOLE (FLAGYL) 500 MG tablet; Take 1 tablet (500 mg) by mouth 2 times daily for 7 days  Dispense: 14 tablet; Refill: 0    17. Discolored skin  Pink/red discoloration noted to top of foot. Area is blanchable. There does appear to be dry/rough appearance to skin to this area. Symptoms are not consistent with PVD or PAD at this time however if symptoms persist/worsen she should notify clinic. Symptoms are so faint that it is difficult to fully assess but appears to be more of a skin issue rather than circulatory issue.     96 minutes spent reviewing chart, assessing pt and documenting. Approximately 30 minutes spent reviewing health maintenance items with pt.       COUNSELING:  Reviewed preventive health counseling, as reflected in patient instructions    Estimated body mass index is 25.91 kg/m  as calculated from the following:    Height as of this encounter: 1.715 m (5' 7.52\").    Weight as of this encounter: 76.2 kg (168 lb).    Weight management plan: Discussed healthy diet and exercise guidelines    She reports that she quit smoking about 3 years ago. Her smoking use included cigarettes. She has a 5.00 pack-year smoking history. She quit smokeless tobacco use about 6 years ago.      Counseling Resources:  ATP IV Guidelines  Pooled Cohorts Equation Calculator  Breast Cancer Risk Calculator  BRCA-Related Cancer Risk Assessment: FHS-7 Tool  FRAX Risk Assessment  ICSI Preventive Guidelines  Dietary Guidelines for Americans, 2010  USDA's MyPlate  ASA Prophylaxis  Lung CA Screening    Luz Lin NP  [facility]  "

## 2021-06-28 NOTE — PATIENT INSTRUCTIONS
Patient Education     Zoloft Oral Tablet 50 mg   Uses  This medicine is used for the following purposes:    anxiety    depression    eating disorders    obsessive compulsive disorder    post-traumatic stress disorder  Instructions  This medicine may be taken with or without food.  It is very important that you take the medicine at about the same time every day. It will work best if you do this.  Keep the medicine at room temperature. Avoid heat and direct light.  It is important that you keep taking each dose of this medicine on time even if you are feeling well.  If you forget to take a dose on time, take it as soon as you remember. If it is almost time for the next dose, do not take the missed dose. Return to your normal dosing schedule. Do not take 2 doses of this medicine at one time.  Please tell your doctor and pharmacist about all the medicines you take. Include both prescription and over-the-counter medicines. Also tell them about any vitamins, herbal medicines, or anything else you take for your health.  Do not suddenly stop taking this medicine. Check with your doctor before stopping.  Cautions  Tell your doctor and pharmacist if you ever had an allergic reaction to a medicine. Symptoms of an allergic reaction can include trouble breathing, skin rash, itching, swelling, or severe dizziness.  Do not use the medication any more than instructed.  Your ability to stay alert or to react quickly may be impaired by this medicine. Do not drive or operate machinery until you know how this medicine will affect you.  Please check with your doctor before drinking alcohol while on this medicine.  Family should check on the patient often. Call the doctor if patient becomes more depressed, has thoughts of suicide, or shows changes in behavior.  Call the doctor if there are any signs of confusion or unusual changes in behavior.  Tell the doctor or pharmacist if you are pregnant, planning to be pregnant, or  breastfeeding.  Ask your pharmacist if this medicine can interact with any of your other medicines. Be sure to tell them about all the medicines you take.  Do not start or stop any other medicines without first speaking to your doctor or pharmacist.  Call your doctor right away if you notice any unusual bleeding or bruising.  If you have painful erection or an erection for more than 4 hours, seek medical care right away.  Do not share this medicine with anyone who has not been prescribed this medicine.  This medicine can cause serious side effects in some patients. Important information from the U.[place] Food and Drug Administration (FDA) is available from your pharmacist. Please review it carefully with your pharmacist to understand the risks associated with this medicine.  Side Effects  The following is a list of some common side effects from this medicine. Please speak with your doctor about what you should do if you experience these or other side effects.    agitated feeling or trouble sleeping    decreased appetite    diarrhea    dizziness    drowsiness or sedation    dry mouth    nausea    stomach upset or abdominal pain    sweating    weight loss  Call your doctor or get medical help right away if you notice any of these more serious side effects:    loss of balance    coughing up blood or vomit that looks like coffee grounds    pain in the eye    fainting    hallucinations (unusual thoughts, seeing or hearing things that are not real)    fast or irregular heart beats    muscle weakness    dilation of the pupils    restlessness    problems with sexual functions or desire    shakiness    dark, tarry stool    blurring or changes of vision    severe or persistent vomiting    unexpected or extreme weight loss  A few people may have an allergic reactions to this medicine. Symptoms can include difficulty breathing, skin rash, itching, swelling, or severe dizziness. If you notice any of these symptoms, seek medical help  quickly.  Extra  Please speak with your doctor, nurse, or pharmacist if you have any questions about this medicine.  [email or web address]  IMPORTANT NOTE: This document tells you briefly how to take your medicine, but it does not tell you all there is to know about it.Your doctor or pharmacist may give you other documents about your medicine. Please talk to them if you have any questions.Always follow their advice. There is a more complete description of this medicine available in English.Scan this code on your smartphone or tablet or use the web address below. You can also ask your pharmacist for a printout. If you have any questions, please ask your pharmacist.     2021 [employer].

## 2021-06-29 ENCOUNTER — TELEPHONE (OUTPATIENT)
Dept: FAMILY MEDICINE | Facility: CLINIC | Age: 51
End: 2021-06-29

## 2021-06-29 DIAGNOSIS — Z92.21 HISTORY OF CHEMOTHERAPY: ICD-10-CM

## 2021-06-29 DIAGNOSIS — R79.89 ABNORMAL TSH: Primary | ICD-10-CM

## 2021-06-29 DIAGNOSIS — Z92.3 HISTORY OF RADIATION THERAPY: ICD-10-CM

## 2021-06-29 LAB
ALBUMIN SERPL-MCNC: 3.5 G/DL (ref 3.4–5)
ALP SERPL-CCNC: 99 U/L (ref 40–150)
ALT SERPL W P-5'-P-CCNC: 29 U/L (ref 0–50)
ANION GAP SERPL CALCULATED.3IONS-SCNC: 5 MMOL/L (ref 3–14)
AST SERPL W P-5'-P-CCNC: 30 U/L (ref 0–45)
BILIRUB SERPL-MCNC: 0.2 MG/DL (ref 0.2–1.3)
BUN SERPL-MCNC: 10 MG/DL (ref 7–30)
C TRACH DNA SPEC QL NAA+PROBE: NEGATIVE
CALCIUM SERPL-MCNC: 9 MG/DL (ref 8.5–10.1)
CHLORIDE SERPL-SCNC: 111 MMOL/L (ref 94–109)
CHOLEST SERPL-MCNC: 179 MG/DL
CO2 SERPL-SCNC: 27 MMOL/L (ref 20–32)
CREAT SERPL-MCNC: 0.76 MG/DL (ref 0.52–1.04)
FERRITIN SERPL-MCNC: 98 NG/ML (ref 8–252)
GFR SERPL CREATININE-BSD FRML MDRD: >90 ML/MIN/{1.73_M2}
GLUCOSE SERPL-MCNC: 90 MG/DL (ref 70–99)
HDLC SERPL-MCNC: 43 MG/DL
IRON SATN MFR SERPL: 28 % (ref 15–46)
IRON SERPL-MCNC: 69 UG/DL (ref 35–180)
LDLC SERPL CALC-MCNC: 106 MG/DL
N GONORRHOEA DNA SPEC QL NAA+PROBE: NEGATIVE
NONHDLC SERPL-MCNC: 136 MG/DL
POTASSIUM SERPL-SCNC: 3.5 MMOL/L (ref 3.4–5.3)
PROT SERPL-MCNC: 7.1 G/DL (ref 6.8–8.8)
SODIUM SERPL-SCNC: 143 MMOL/L (ref 133–144)
SPECIMEN SOURCE: NORMAL
SPECIMEN SOURCE: NORMAL
T4 FREE SERPL-MCNC: 1 NG/DL (ref 0.76–1.46)
TIBC SERPL-MCNC: 247 UG/DL (ref 240–430)
TRIGL SERPL-MCNC: 152 MG/DL
TSH SERPL DL<=0.005 MIU/L-ACNC: 7.28 MU/L (ref 0.4–4)

## 2021-06-29 ASSESSMENT — ANXIETY QUESTIONNAIRES: GAD7 TOTAL SCORE: 17

## 2021-06-29 ASSESSMENT — ASTHMA QUESTIONNAIRES: ACT_TOTALSCORE: 12

## 2021-07-07 ENCOUNTER — ANCILLARY PROCEDURE (OUTPATIENT)
Dept: ULTRASOUND IMAGING | Facility: CLINIC | Age: 51
End: 2021-07-07
Attending: NURSE PRACTITIONER
Payer: COMMERCIAL

## 2021-07-07 ENCOUNTER — HOSPITAL ENCOUNTER (OUTPATIENT)
Dept: CT IMAGING | Facility: CLINIC | Age: 51
Discharge: HOME | End: 2021-07-07
Attending: NURSE PRACTITIONER | Admitting: NURSE PRACTITIONER
Payer: COMMERCIAL

## 2021-07-07 ENCOUNTER — ANCILLARY PROCEDURE (OUTPATIENT)
Dept: MAMMOGRAPHY | Facility: CLINIC | Age: 51
End: 2021-07-07
Attending: NURSE PRACTITIONER
Payer: COMMERCIAL

## 2021-07-07 DIAGNOSIS — Z82.49 FAMILY HISTORY OF ASCVD: ICD-10-CM

## 2021-07-07 DIAGNOSIS — Z12.31 ENCOUNTER FOR SCREENING MAMMOGRAM FOR BREAST CANCER: ICD-10-CM

## 2021-07-07 DIAGNOSIS — Z92.3 HISTORY OF RADIATION THERAPY: ICD-10-CM

## 2021-07-07 DIAGNOSIS — R79.89 ABNORMAL TSH: ICD-10-CM

## 2021-07-07 DIAGNOSIS — Z92.21 HISTORY OF CHEMOTHERAPY: ICD-10-CM

## 2021-07-07 DIAGNOSIS — N93.9 VAGINAL SPOTTING: ICD-10-CM

## 2021-07-07 PROCEDURE — 75571 CT HRT W/O DYE W/CA TEST: CPT | Mod: 26 | Performed by: STUDENT IN AN ORGANIZED HEALTH CARE EDUCATION/TRAINING PROGRAM

## 2021-07-07 PROCEDURE — 76830 TRANSVAGINAL US NON-OB: CPT | Mod: GC | Performed by: RADIOLOGY

## 2021-07-07 PROCEDURE — 77067 SCR MAMMO BI INCL CAD: CPT | Mod: GC | Performed by: RADIOLOGY

## 2021-07-07 PROCEDURE — 76856 US EXAM PELVIC COMPLETE: CPT | Mod: GC | Performed by: RADIOLOGY

## 2021-07-07 PROCEDURE — 75571 CT HRT W/O DYE W/CA TEST: CPT

## 2021-07-07 PROCEDURE — 77063 BREAST TOMOSYNTHESIS BI: CPT | Mod: GC | Performed by: RADIOLOGY

## 2021-07-07 PROCEDURE — 76536 US EXAM OF HEAD AND NECK: CPT | Mod: GC | Performed by: STUDENT IN AN ORGANIZED HEALTH CARE EDUCATION/TRAINING PROGRAM

## 2021-07-08 ENCOUNTER — TELEPHONE (OUTPATIENT)
Dept: FAMILY MEDICINE | Facility: CLINIC | Age: 51
End: 2021-07-08

## 2021-07-08 DIAGNOSIS — N83.201 CYST OF RIGHT OVARY: Primary | ICD-10-CM

## 2021-07-09 ENCOUNTER — TELEPHONE (OUTPATIENT)
Dept: FAMILY MEDICINE | Facility: CLINIC | Age: 51
End: 2021-07-09

## 2021-07-09 DIAGNOSIS — E03.9 HYPOTHYROIDISM, UNSPECIFIED TYPE: Primary | ICD-10-CM

## 2021-07-09 RX ORDER — LEVOTHYROXINE SODIUM 25 UG/1
25 TABLET ORAL DAILY
Qty: 90 TABLET | Refills: 0 | Status: SHIPPED | OUTPATIENT
Start: 2021-07-09 | End: 2021-10-01

## 2021-08-09 DIAGNOSIS — J45.30 MILD PERSISTENT ASTHMA WITHOUT COMPLICATION: ICD-10-CM

## 2021-08-09 RX ORDER — ALBUTEROL SULFATE 90 UG/1
2 AEROSOL, METERED RESPIRATORY (INHALATION) EVERY 6 HOURS PRN
Qty: 18 G | Refills: 3 | Status: CANCELLED | OUTPATIENT
Start: 2021-08-09

## 2021-08-09 NOTE — TELEPHONE ENCOUNTER
Pharmacy is requesting a 90-day supply.    [facility] #[id number] faxed ALTERNATIVE REQUESTED re: albuterol (PROAIR HFA/PROVENTIL HFA/VENTOLIN HFA) 108 (90 Base) MCG/ACT inhaler    PHARMACY COMMENTS:  INS REQUESTS 90 DAY SUPPLY PLEASE.

## 2021-08-09 NOTE — TELEPHONE ENCOUNTER
Per last visit: Return in about 4 weeks (around 7/26/2021) for Medication Follow up Visit.    Patient did not follow up.     Routing to team to assist with scheduling, then may forward to PCP for gale refill.     Jena Daniel, RN, BSN, PHN  [facility]: [place]

## 2021-08-12 NOTE — PROGRESS NOTES
Follow Up Notes on Referred Patient    Date: 2021        RE: Nicole L Fritsche  : 1970  DAVID: 2021      Nicole L Fritsche is a 51 year old woman with a history of stage IIB cervical cancer. She completed chemoradiation and brachytherapy 2019. She is here today for a surveillance visit.     Oncology history:   2019: Pap ASCUS. HPV 18+  19: Cervical biopsy, 4 o'clock     FINAL DIAGNOSIS:   Cervix, 4:00, biopsy:   - INVASIVE WELL-DIFFERENTIATED SQUAMOUS CELL CARCINOMA, focally keratinizing   - High grade squamous intraepithelial lesion (cervical intraepithelial neoplasia 3)      19: PET CT IMPRESSION: In this patient with newly diagnosed cervical cancer:  1. 3.7 cm cervical lesion with a max SUV of 13.16.   2. Focal uptake in the endometrium, favor adenomyosis over metastatic disease. Adenomyosis is demonstrated on MRI 2019 and can be hypermetabolic in a premenopausal woman.  3. Small left periaortic retroperitoneal lymph node borderline SUV uptake, inflammatory versus metastatic. Attention on follow-up.   4. Small left inguinal lymph nodes with SUV max of 2.45, likely inflammatory.     19-19: Cisplatin + radiation; brachytherapy     2020: PET IMPRESSION: In this patient with history of cervical cancer status post chemoradiation, there is evidence of complete treatment response, although the sensitivity of this PET/CT may be compromised secondary to diffuse muscular activation.      1. No suspicious FDG activity or CT finding in the uterus, cervix, and vagina. No FDG avid adenopathy.   2. Mild mucosal thickening and FDG avidity in the short segment of the proximal lesser curvature of the stomach, which may represents segmental gastritis in the correct clinical setting. Recommend correlation with endoscopy.  3. Small focus of decreased FDG uptake in the right frontal gyrus which may represents encephalomalacia. Consider further evaluation with brain  MRI.     7/20/2020: MR brain Impression:  The previous PET/CT demonstrated decreased FDG uptake in the right     2/8/21: Pap NIL.    7/7/21: US IMPRESSION:   1. Right ovarian simple cyst measuring up to 6.5 cm and is increased from 3 cm in 2019. In postmenopausal patients, follow-up ultrasound in 3-6 months is recommended for simple cysts greater than 5 cm. Consider  OB/GYN consultation patient is symptomatic.  2. Small amount of fluid in the endometrium and pelvis may be physiologic.        Today she comes to clinic and denies concerns for her visit. She did have spotting in June and her urine had some blood in it; she was treated for BV at that time and also had an US which showed an increase in a right ovarian simple cyst for which she was referred to OBGyn and has an appointment in a month. She denies any spotting since that time. She is sexually active and uses a lubricant; she is also using her dilator about 2 times per week for 3-4 minutes; she denies spotting with either. She is current with her health screenings. She denies any changes in her bowel habits, no nausea/emesis, no lower extremity edema, and no difficulties eating or sleeping. She denies any abdominal discomfort/bloating, no fevers or chills, and no chest pain or shortness of breath.      Review of Systems:    Systemic           no weight changes; no fever; no chills; no night sweats; no appetite changes  Skin           no rashes, or lesions  Eye           no irritation; no changes in vision  Temo-Laryngeal           no dysphagia; no hoarseness   Pulmonary    no cough; no shortness of breath  Cardiovascular    no chest pain; no palpitations  Gastrointestinal    no diarrhea; no constipation; no abdominal pain; no changes in bowel habits; no blood in stool  Genitourinary   no urinary frequency; no urinary urgency; no dysuria; no pain; no abnormal vaginal discharge; no abnormal vaginal bleeding  Breast    no breast discharge; no breast changes; no  breast pain  Musculoskeletal    no myalgias; no arthralgias; no back pain  Psychiatric           no depressed mood; no anxiety    Hematologic              no tender lymph nodes; no noticeable swellings or lumps   Endocrine    no hot flashes; no heat/cold intolerance         Neurological   no tremor; no numbness and tingling; no headaches; no difficulty sleeping      Past Medical History:    Past Medical History:   Diagnosis Date     Abnormal Pap smear of cervix 05/21/2019    see problem list     Arrested hydrocephalus (H) 2/3/2009    Ongoing HAs, seeing neurosurgeon. Per past notes from Dr. Resendiz, HAs most likely not secondary to the hydrocephalus, but rather vascular inorgin.  Please see scanned in records.     Asthma      Cervical cancer (H)      Cervical high risk HPV (human papillomavirus) test positive 05/21/2019    See problem list         Past Surgical History:    Past Surgical History:   Procedure Laterality Date     C VENTRICULO-CISTERNOSTOMY,3RD VENT      for treatment of HA related to hydrocephalus     COLONOSCOPY N/A 12/28/2020    Procedure: COLONOSCOPY, WITH  polypectomy;  Surgeon: Steven Vazquez MD;  Location: [facility] OR     ESOPHAGOSCOPY, GASTROSCOPY, DUODENOSCOPY (EGD), COMBINED N/A 7/13/2020    Procedure: ESOPHAGOGASTRODUODENOSCOPY, WITH BIOPSY;  Surgeon: Steven Vazquez MD;  Location: [facility] OR     EXAM UNDER ANESTHESIA, INSERT ANN MARIE SLEEVE, UTERINE PLACEMENT OF TANDEM AND RING FOR RAD, ULTRASOUND N/A 8/30/2019    Procedure: Ann Marie Sleeve Insertion, Ultrasound Guided;  Surgeon: Anne Tidwell MD;  Location: [facility] OR     EXCISE MASS TRUNK Left 9/16/2015    Procedure: EXCISE MASS TRUNK;  Surgeon: Carlos Enrique Briones MD;  Location:  OR         Health Maintenance Due   Topic Date Due     Pneumococcal Vaccine: Pediatrics (0 to 5 Years) and At-Risk Patients (6 to 64 Years) (2 of 4 - PCV13) 06/17/2015     ASTHMA ACTION PLAN  09/08/2016     DTAP/TDAP/TD IMMUNIZATION (2 - Td or Tdap) 02/16/2020      PREVENTIVE CARE VISIT  05/01/2020     HPV TEST  02/08/2022     PAP  02/08/2022       Current Medications:     Current Outpatient Medications   Medication Sig Dispense Refill     Acetaminophen (MIDOL PO) Take by mouth as needed       albuterol (PROAIR HFA/PROVENTIL HFA/VENTOLIN HFA) 108 (90 Base) MCG/ACT inhaler Inhale 2 puffs into the lungs every 6 hours as needed for shortness of breath / dyspnea or wheezing 18 g 3     Ferrous Sulfate (IRON SUPPLEMENT PO) Take 325 mg by mouth 2 times daily (with meals)       fluticasone (FLONASE) 50 MCG/ACT nasal spray Spray 1-2 sprays into both nostrils daily 16 g 3     fluticasone (FLOVENT HFA) 110 MCG/ACT inhaler Inhale 2 puffs into the lungs 2 times daily 12 g 4     levothyroxine (SYNTHROID/LEVOTHROID) 25 MCG tablet Take 1 tablet (25 mcg) by mouth daily 90 tablet 0     Multiple Vitamin (MULTIVITAMINS PO) Take  by mouth daily.       sertraline (ZOLOFT) 50 MG tablet Take 1.5 tablets (75 mg) by mouth daily 135 tablet 0         Allergies:      No Known Allergies     Social History:     Social History     Tobacco Use     Smoking status: Former Smoker     Packs/day: 0.25     Years: 20.00     Pack years: 5.00     Types: Cigarettes     Quit date: 2/18/2018     Years since quitting: 3.4     Smokeless tobacco: Former User     Quit date: 5/25/2015   Substance Use Topics     Alcohol use: Yes     Alcohol/week: 0.0 standard drinks     Comment: once every 3-4 months       History   Drug Use No         Family History:       Family History   Problem Relation Age of Onset     Diabetes Mother      Cerebrovascular Disease Mother      Ovarian Cancer Mother      C.A.D. Father 40     Cerebrovascular Disease Father      C.A.D. Sister 62        stent placed     Ovarian Cancer Sister      Ovarian Cancer Sister          Physical Exam:     /86   Pulse 66   Temp 97.9  F (36.6  C) (Oral)   Resp 20   Wt 75.2 kg (165 lb 11.2 oz)   LMP  (LMP Unknown)   SpO2 99%   BMI 25.55 kg/m    Body mass index  is 25.55 kg/m .    General Appearance: healthy and alert, no distress     HEENT: no thyromegaly, no palpable nodules or masses        Cardiovascular: regular rate and rhythm, no gallops, rubs or murmurs     Respiratory: lungs clear, no rales, rhonchi or wheezes, normal diaphragmatic excursion    Musculoskeletal: extremities non tender and without edema    Skin: no lesions or rashes     Neurological: normal gait, no gross defects     Psychiatric: appropriate mood and affect                               Hematological: normal cervical, supraclavicular and inguinal lymph nodes     Gastrointestinal:       abdomen soft, non-tender, non-distended, no organomegaly or masses    Genitourinary: External genitalia and urethral meatus appears normal.  Vagina is smooth without nodularity or masses.  Cervix appears normal and without lesions; No CMT.  Bimanual exam reveal no masses, nodularity or fullness on left; palpable fullness in right ovary/cyst area.  Recto-vaginal exam confirms these findings.      Assessment:    Nicole L Fritsche is a 51 year old woman with a history of stage IIB cervical cancer. She completed chemoradiation and brachytherapy 9/2019. She is here today for a surveillance visit.    36 minutes spent on the date of the encounter doing chart review, history and exam, documentation and further activities as noted above      Plan:     1.)        She is now 2 years out from treatment completion and can extend her surveillance to every 6 months x 3 years. She will continue to have a pap (no HPV) annually and is next due 2/2022. She will be seeing OBGyn next month regarding the right ovarian cyst; reviewed radiologist recommendation for follow up US in 3-6 months and that she may end up having one done on the earlier side. Reviewed signs and symptoms for when she should contact the clinic or seek additional care. Patient to contact the clinic with any questions or concerns in the interim.  Answered all of her  questions to the best of my ability. Discussed not using Astroglide or [place] his/hers/warming as a lubricant; she CAN use [place] personal lubricant or Good Clean Love Almost Naked.      2.) Genetic risk factors were assessed and given her family history of ovarian cancer she has met with a genetic counselor and had testing done. She has a variant of unknown significance in her DICER1, specifically called [id number]. No additional pathogenic mutations, variants of unknown significance, or gross deletions or duplications were detected. Genes Analyzed (36 total): APC, FLETCHER, AXIN2, BARD1, BMPR1A, BRCA1, BRCA2, BRIP1, CDH1, CDK4, CDKN2A, CHEK2, DICER1, MLH1, MSH2, MSH3, MSH6, MUTYH, NBN, NF1, NTHL1, PALB2, PMS2, PTEN, RAD51C, RAD51D, RECQL, SMAD4, SMARCA4, STK11 and TP53 (sequencing and deletion/duplication); HOXB13, POLD1 and POLE (sequencing only); EPCAM and GREM1 (deletion/duplication only). Encouraged to reach out to her sisters to clarify their ovarian cancer diagnosis and to discuss if they had/discussed any genetic counseling/testing done.    3.) Labs and/or tests ordered include:  None.      4.) Health maintenance issues addressed today include annual health maintenance and non-gynecologic issues with PCP.    HOMERO Perez, WHNP-BC, ANP-BC  Women's Health Nurse Practitioner  Adult Nurse Practitioner  Division of Gynecologic Oncology          CC  Patient Care Team:  [facility] as PCP - General ([facility])  Rosio Harding APRN CNP as Assigned Cancer Care Provider  Luz Lin NP as Assigned PCP

## 2021-08-13 NOTE — TELEPHONE ENCOUNTER
Called and spoke with patient- she stated she requested for inhaler by accident, she does not need refill at this time.    She did agree to scheduling follow-up visit for med check. Appt scheduled for video visit on 8/23/21 @ 9am with Luz Lin. Patient will review medication and refills with provider at that visit- no refills needed at this time.    Giovana Gifford MA

## 2021-08-16 ENCOUNTER — ONCOLOGY VISIT (OUTPATIENT)
Dept: ONCOLOGY | Facility: CLINIC | Age: 51
End: 2021-08-16
Attending: NURSE PRACTITIONER
Payer: COMMERCIAL

## 2021-08-16 VITALS
WEIGHT: 165.7 LBS | DIASTOLIC BLOOD PRESSURE: 86 MMHG | TEMPERATURE: 97.9 F | OXYGEN SATURATION: 99 % | BODY MASS INDEX: 25.55 KG/M2 | SYSTOLIC BLOOD PRESSURE: 128 MMHG | RESPIRATION RATE: 20 BRPM | HEART RATE: 66 BPM

## 2021-08-16 DIAGNOSIS — C53.1 MALIGNANT NEOPLASM OF EXOCERVIX (H): Primary | ICD-10-CM

## 2021-08-16 PROCEDURE — G0463 HOSPITAL OUTPT CLINIC VISIT: HCPCS

## 2021-08-16 PROCEDURE — 99214 OFFICE O/P EST MOD 30 MIN: CPT | Performed by: NURSE PRACTITIONER

## 2021-08-16 ASSESSMENT — PAIN SCALES - GENERAL: PAINLEVEL: MILD PAIN (3)

## 2021-08-16 NOTE — NURSING NOTE
"Oncology Rooming Note    August 16, 2021 9:34 AM   Nicole L Fritsche is a 51 year old female who presents for:    Chief Complaint   Patient presents with     Oncology Clinic Visit     Return; Cervix Ca.      Initial Vitals: /86   Pulse 66   Temp 97.9  F (36.6  C) (Oral)   Resp 20   Wt 75.2 kg (165 lb 11.2 oz)   LMP  (LMP Unknown)   SpO2 99%   BMI 25.55 kg/m   Estimated body mass index is 25.55 kg/m  as calculated from the following:    Height as of 6/28/21: 1.715 m (5' 7.52\").    Weight as of this encounter: 75.2 kg (165 lb 11.2 oz). Body surface area is 1.89 meters squared.  Mild Pain (3) Comment: Data Unavailable   No LMP recorded (lmp unknown). Patient is postmenopausal.  Allergies reviewed: Yes  Medications reviewed: Yes    Medications: Medication refills not needed today.  Pharmacy name entered into [facility]: [facility] [id number] [place] [address]    Clinical concerns: No concerns.        Marina Landis,   (Student MA)        Patient's rooming completed by Mateus Vazquez MA.      All steps completed per rooming protocol.    Viviana Shipman CMA (AAMA)          "

## 2021-08-16 NOTE — LETTER
2021         RE: Nicole L Fritsche  [address]  [place] [address]        Dear Colleague,    Thank you for referring your patient, Nicole L Fritsche, to the [facility]. Please see a copy of my visit note below.                Follow Up Notes on Referred Patient    Date: 2021        RE: Nicole L Fritsche  : 1970  DAVID: 2021      Nicole L Fritsche is a 51 year old woman with a history of stage IIB cervical cancer. She completed chemoradiation and brachytherapy 2019. She is here today for a surveillance visit.     Oncology history:   2019: Pap ASCUS. HPV 18+  19: Cervical biopsy, 4 o'clock     FINAL DIAGNOSIS:   Cervix, 4:00, biopsy:   - INVASIVE WELL-DIFFERENTIATED SQUAMOUS CELL CARCINOMA, focally keratinizing   - High grade squamous intraepithelial lesion (cervical intraepithelial neoplasia 3)      19: PET CT IMPRESSION: In this patient with newly diagnosed cervical cancer:  1. 3.7 cm cervical lesion with a max SUV of 13.16.   2. Focal uptake in the endometrium, favor adenomyosis over metastatic disease. Adenomyosis is demonstrated on MRI 2019 and can be hypermetabolic in a premenopausal woman.  3. Small left periaortic retroperitoneal lymph node borderline SUV uptake, inflammatory versus metastatic. Attention on follow-up.   4. Small left inguinal lymph nodes with SUV max of 2.45, likely inflammatory.     19-19: Cisplatin + radiation; brachytherapy     2020: PET IMPRESSION: In this patient with history of cervical cancer status post chemoradiation, there is evidence of complete treatment response, although the sensitivity of this PET/CT may be compromised secondary to diffuse muscular activation.      1. No suspicious FDG activity or CT finding in the uterus, cervix, and vagina. No FDG avid adenopathy.   2. Mild mucosal thickening and FDG avidity in the short segment of the proximal lesser curvature of the stomach, which  Dr. Mariee's office called as they saw this patient over 10 years ago and no longer have records for her,. They stated they did fax this information back  Thank you   may represents segmental gastritis in the correct clinical setting. Recommend correlation with endoscopy.  3. Small focus of decreased FDG uptake in the right frontal gyrus which may represents encephalomalacia. Consider further evaluation with brain MRI.     7/20/2020: MR brain Impression:  The previous PET/CT demonstrated decreased FDG uptake in the right     2/8/21: Pap NIL.    7/7/21: US IMPRESSION:   1. Right ovarian simple cyst measuring up to 6.5 cm and is increased from 3 cm in 2019. In postmenopausal patients, follow-up ultrasound in 3-6 months is recommended for simple cysts greater than 5 cm. Consider  OB/GYN consultation patient is symptomatic.  2. Small amount of fluid in the endometrium and pelvis may be physiologic.        Today she comes to clinic and denies concerns for her visit. She did have spotting in June and her urine had some blood in it; she was treated for BV at that time and also had an US which showed an increase in a right ovarian simple cyst for which she was referred to OBGyn and has an appointment in a month. She denies any spotting since that time. She is sexually active and uses a lubricant; she is also using her dilator about 2 times per week for 3-4 minutes; she denies spotting with either. She is current with her health screenings. She denies any changes in her bowel habits, no nausea/emesis, no lower extremity edema, and no difficulties eating or sleeping. She denies any abdominal discomfort/bloating, no fevers or chills, and no chest pain or shortness of breath.      Review of Systems:    Systemic           no weight changes; no fever; no chills; no night sweats; no appetite changes  Skin           no rashes, or lesions  Eye           no irritation; no changes in vision  Temo-Laryngeal           no dysphagia; no hoarseness   Pulmonary    no cough; no shortness of breath  Cardiovascular    no chest pain; no palpitations  Gastrointestinal    no diarrhea; no constipation; no  abdominal pain; no changes in bowel habits; no blood in stool  Genitourinary   no urinary frequency; no urinary urgency; no dysuria; no pain; no abnormal vaginal discharge; no abnormal vaginal bleeding  Breast    no breast discharge; no breast changes; no breast pain  Musculoskeletal    no myalgias; no arthralgias; no back pain  Psychiatric           no depressed mood; no anxiety    Hematologic              no tender lymph nodes; no noticeable swellings or lumps   Endocrine    no hot flashes; no heat/cold intolerance         Neurological   no tremor; no numbness and tingling; no headaches; no difficulty sleeping      Past Medical History:    Past Medical History:   Diagnosis Date     Abnormal Pap smear of cervix 05/21/2019    see problem list     Arrested hydrocephalus (H) 2/3/2009    Ongoing HAs, seeing neurosurgeon. Per past notes from Dr. Resendiz, HAs most likely not secondary to the hydrocephalus, but rather vascular inorgin.  Please see scanned in records.     Asthma      Cervical cancer (H)      Cervical high risk HPV (human papillomavirus) test positive 05/21/2019    See problem list         Past Surgical History:    Past Surgical History:   Procedure Laterality Date     C VENTRICULO-CISTERNOSTOMY,3RD VENT      for treatment of HA related to hydrocephalus     COLONOSCOPY N/A 12/28/2020    Procedure: COLONOSCOPY, WITH  polypectomy;  Surgeon: Steven Vazquez MD;  [place] [facility] [place]     ESOPHAGOSCOPY, GASTROSCOPY, DUODENOSCOPY (EGD), COMBINED N/A 7/13/2020    Procedure: ESOPHAGOGASTRODUODENOSCOPY, WITH BIOPSY;  Surgeon: Steven Vazquez MD;  Location: [facility] OR     EXAM UNDER ANESTHESIA, INSERT ANN MARIE SLEEVE, UTERINE PLACEMENT OF TANDEM AND RING FOR RAD, ULTRASOUND N/A 8/30/2019    Procedure: Ann Marie Sleeve Insertion, Ultrasound Guided;  Surgeon: Anne Tidwell MD;  Location: [facility] OR     EXCISE MASS TRUNK Left 9/16/2015    Procedure: EXCISE MASS TRUNK;  Surgeon: Carlos Enrique Briones MD;  Location:  OR         [facility]  Maintenance Due   Topic Date Due     Pneumococcal Vaccine: Pediatrics (0 to 5 Years) and At-Risk Patients (6 to 64 Years) (2 of 4 - PCV13) 06/17/2015     ASTHMA ACTION PLAN  09/08/2016     DTAP/TDAP/TD IMMUNIZATION (2 - Td or Tdap) 02/16/2020     PREVENTIVE CARE VISIT  05/01/2020     HPV TEST  02/08/2022     PAP  02/08/2022       Current Medications:     Current Outpatient Medications   Medication Sig Dispense Refill     Acetaminophen (MIDOL PO) Take by mouth as needed       albuterol (PROAIR HFA/PROVENTIL HFA/VENTOLIN HFA) 108 (90 Base) MCG/ACT inhaler Inhale 2 puffs into the lungs every 6 hours as needed for shortness of breath / dyspnea or wheezing 18 g 3     Ferrous Sulfate (IRON SUPPLEMENT PO) Take 325 mg by mouth 2 times daily (with meals)       fluticasone (FLONASE) 50 MCG/ACT nasal spray Spray 1-2 sprays into both nostrils daily 16 g 3     fluticasone (FLOVENT HFA) 110 MCG/ACT inhaler Inhale 2 puffs into the lungs 2 times daily 12 g 4     levothyroxine (SYNTHROID/LEVOTHROID) 25 MCG tablet Take 1 tablet (25 mcg) by mouth daily 90 tablet 0     Multiple Vitamin (MULTIVITAMINS PO) Take  by mouth daily.       sertraline (ZOLOFT) 50 MG tablet Take 1.5 tablets (75 mg) by mouth daily 135 tablet 0         Allergies:      No Known Allergies     Social History:     Social History     Tobacco Use     Smoking status: Former Smoker     Packs/day: 0.25     Years: 20.00     Pack years: 5.00     Types: Cigarettes     Quit date: 2/18/2018     Years since quitting: 3.4     Smokeless tobacco: Former User     Quit date: 5/25/2015   Substance Use Topics     Alcohol use: Yes     Alcohol/week: 0.0 standard drinks     Comment: once every 3-4 months       History   Drug Use No         Family History:       Family History   Problem Relation Age of Onset     Diabetes Mother      Cerebrovascular Disease Mother      Ovarian Cancer Mother      C.A.D. Father 40     Cerebrovascular Disease Father      C.A.D. Sister 62        stent placed      Ovarian Cancer Sister      Ovarian Cancer Sister          Physical Exam:     /86   Pulse 66   Temp 97.9  F (36.6  C) (Oral)   Resp 20   Wt 75.2 kg (165 lb 11.2 oz)   LMP  (LMP Unknown)   SpO2 99%   BMI 25.55 kg/m    Body mass index is 25.55 kg/m .    General Appearance: healthy and alert, no distress     HEENT: no thyromegaly, no palpable nodules or masses        Cardiovascular: regular rate and rhythm, no gallops, rubs or murmurs     Respiratory: lungs clear, no rales, rhonchi or wheezes, normal diaphragmatic excursion    Musculoskeletal: extremities non tender and without edema    Skin: no lesions or rashes     Neurological: normal gait, no gross defects     Psychiatric: appropriate mood and affect                               Hematological: normal cervical, supraclavicular and inguinal lymph nodes     Gastrointestinal:       abdomen soft, non-tender, non-distended, no organomegaly or masses    Genitourinary: External genitalia and urethral meatus appears normal.  Vagina is smooth without nodularity or masses.  Cervix appears normal and without lesions; No CMT.  Bimanual exam reveal no masses, nodularity or fullness on left; palpable fullness in right ovary/cyst area.  Recto-vaginal exam confirms these findings.      Assessment:    Nicole L Fritsche is a 51 year old woman with a history of stage IIB cervical cancer. She completed chemoradiation and brachytherapy 9/2019. She is here today for a surveillance visit.    36 minutes spent on the date of the encounter doing chart review, history and exam, documentation and further activities as noted above      Plan:     1.)        She is now 2 years out from treatment completion and can extend her surveillance to every 6 months x 3 years. She will continue to have a pap (no HPV) annually and is next due 2/2022. She will be seeing OBGyn next month regarding the right ovarian cyst; reviewed radiologist recommendation for follow up US in 3-6 months and  that she may end up having one done on the earlier side. Reviewed signs and symptoms for when she should contact the clinic or seek additional care. Patient to contact the clinic with any questions or concerns in the interim.  Answered all of her questions to the best of my ability. Discussed not using Astroglide or [place] his/hers/warming as a lubricant; she CAN use [place] personal lubricant or Good Clean Love Almost Naked.      2.) Genetic risk factors were assessed and given her family history of ovarian cancer she has met with a genetic counselor and had testing done. She has a variant of unknown significance in her DICER1, specifically called [id number]. No additional pathogenic mutations, variants of unknown significance, or gross deletions or duplications were detected. Genes Analyzed (36 total): APC, FLETCHER, AXIN2, BARD1, BMPR1A, BRCA1, BRCA2, BRIP1, CDH1, CDK4, CDKN2A, CHEK2, DICER1, MLH1, MSH2, MSH3, MSH6, MUTYH, NBN, NF1, NTHL1, PALB2, PMS2, PTEN, RAD51C, RAD51D, RECQL, SMAD4, SMARCA4, STK11 and TP53 (sequencing and deletion/duplication); HOXB13, POLD1 and POLE (sequencing only); EPCAM and GREM1 (deletion/duplication only). Encouraged to reach out to her sisters to clarify their ovarian cancer diagnosis and to discuss if they had/discussed any genetic counseling/testing done.    3.) Labs and/or tests ordered include:  None.      4.) Health maintenance issues addressed today include annual health maintenance and non-gynecologic issues with PCP.    HOMERO Perez, WHNP-BC, ANP-BC  Women's Health Nurse Practitioner  Adult Nurse Practitioner  Division of Gynecologic Oncology      CC  Patient Care Team:  [facility] as PCP - General ([facility])  Rosio Harding APRN CNP as Assigned Cancer Care Provider  Luz Lin NP as Assigned PCP

## 2021-08-23 ENCOUNTER — VIRTUAL VISIT (OUTPATIENT)
Dept: FAMILY MEDICINE | Facility: CLINIC | Age: 51
End: 2021-08-23
Payer: COMMERCIAL

## 2021-08-23 DIAGNOSIS — R31.9 HEMATURIA, UNSPECIFIED TYPE: ICD-10-CM

## 2021-08-23 DIAGNOSIS — J45.30 MILD PERSISTENT ASTHMA WITHOUT COMPLICATION: ICD-10-CM

## 2021-08-23 DIAGNOSIS — F41.9 ANXIETY: Primary | ICD-10-CM

## 2021-08-23 DIAGNOSIS — E03.9 HYPOTHYROIDISM, UNSPECIFIED TYPE: ICD-10-CM

## 2021-08-23 PROCEDURE — 99214 OFFICE O/P EST MOD 30 MIN: CPT | Mod: 95 | Performed by: NURSE PRACTITIONER

## 2021-08-23 PROCEDURE — 96127 BRIEF EMOTIONAL/BEHAV ASSMT: CPT | Mod: 95 | Performed by: NURSE PRACTITIONER

## 2021-08-23 RX ORDER — LEVOTHYROXINE SODIUM 25 UG/1
25 TABLET ORAL DAILY
Qty: 90 TABLET | Refills: 0 | Status: CANCELLED | OUTPATIENT
Start: 2021-08-23

## 2021-08-23 RX ORDER — ALBUTEROL SULFATE 90 UG/1
2 AEROSOL, METERED RESPIRATORY (INHALATION) EVERY 6 HOURS PRN
Qty: 18 G | Refills: 3 | Status: SHIPPED | OUTPATIENT
Start: 2021-08-23 | End: 2021-11-16

## 2021-08-23 RX ORDER — DEXAMETHASONE 4 MG/1
2 TABLET ORAL 2 TIMES DAILY
Qty: 12 G | Refills: 4 | Status: SHIPPED | OUTPATIENT
Start: 2021-08-23 | End: 2022-02-15

## 2021-08-23 ASSESSMENT — ANXIETY QUESTIONNAIRES
GAD7 TOTAL SCORE: 13
1. FEELING NERVOUS, ANXIOUS, OR ON EDGE: SEVERAL DAYS
IF YOU CHECKED OFF ANY PROBLEMS ON THIS QUESTIONNAIRE, HOW DIFFICULT HAVE THESE PROBLEMS MADE IT FOR YOU TO DO YOUR WORK, TAKE CARE OF THINGS AT HOME, OR GET ALONG WITH OTHER PEOPLE: SOMEWHAT DIFFICULT
2. NOT BEING ABLE TO STOP OR CONTROL WORRYING: MORE THAN HALF THE DAYS
7. FEELING AFRAID AS IF SOMETHING AWFUL MIGHT HAPPEN: MORE THAN HALF THE DAYS
6. BECOMING EASILY ANNOYED OR IRRITABLE: MORE THAN HALF THE DAYS
3. WORRYING TOO MUCH ABOUT DIFFERENT THINGS: MORE THAN HALF THE DAYS
5. BEING SO RESTLESS THAT IT IS HARD TO SIT STILL: SEVERAL DAYS

## 2021-08-23 ASSESSMENT — PATIENT HEALTH QUESTIONNAIRE - PHQ9
5. POOR APPETITE OR OVEREATING: NEARLY EVERY DAY
SUM OF ALL RESPONSES TO PHQ QUESTIONS 1-9: 10
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
## 2021-08-23 NOTE — PATIENT INSTRUCTIONS
Patient Education     Prozac Oral Capsule 20 mg  Uses  This medicine is used for the following purposes:    anxiety    depression    eating disorders    menopausal symptoms    muscle weakness    obsessive compulsive disorder    post-traumatic stress disorder  Instructions  This medicine may be taken with or without food.  It is very important that you take the medicine at about the same time every day. It will work best if you do this.  Keep the medicine at room temperature. Avoid heat and direct light.  It is important that you keep taking each dose of this medicine on time even if you are feeling well.  If you forget to take a dose on time, take it as soon as you remember. If it is almost time for the next dose, do not take the missed dose. Return to your normal dosing schedule. Do not take 2 doses of this medicine at one time.  Please tell your doctor and pharmacist about all the medicines you take. Include both prescription and over-the-counter medicines. Also tell them about any vitamins, herbal medicines, or anything else you take for your health.  Do not suddenly stop taking this medicine. Check with your doctor before stopping.  Cautions  Tell your doctor and pharmacist if you ever had an allergic reaction to a medicine. Symptoms of an allergic reaction can include trouble breathing, skin rash, itching, swelling, or severe dizziness.  Do not use the medication any more than instructed.  Your ability to stay alert or to react quickly may be impaired by this medicine. Do not drive or operate machinery until you know how this medicine will affect you.  Please check with your doctor before drinking alcohol while on this medicine.  Contact your doctor if you notice a change in the amount or darkening of your urine.  Family should check on the patient often. Call the doctor if patient becomes more depressed, has thoughts of suicide, or shows changes in behavior.  Call the doctor if there are any signs of confusion  or unusual changes in behavior.  Tell the doctor or pharmacist if you are pregnant, planning to be pregnant, or breastfeeding.  Ask your pharmacist if this medicine can interact with any of your other medicines. Be sure to tell them about all the medicines you take.  Do not start or stop any other medicines without first speaking to your doctor or pharmacist.  If you have painful erection or an erection for more than 4 hours, seek medical care right away.  Do not share this medicine with anyone who has not been prescribed this medicine.  This medicine can cause serious side effects in some patients. Important information from the [place] Food and Drug Administration (FDA) is available from your pharmacist. Please review it carefully with your pharmacist to understand the risks associated with this medicine.  Side Effects  The following is a list of some common side effects from this medicine. Please speak with your doctor about what you should do if you experience these or other side effects.    agitated feeling or trouble sleeping    decreased appetite    dizziness    drowsiness or sedation    lack of energy and tiredness    nausea    sweating  Call your doctor or get medical help right away if you notice any of these more serious side effects:    bleeding that is severe or takes longer to stop    unusual bruising or discoloration on skin    confusion    coughing up blood or vomit that looks like coffee grounds    swelling of the legs, feet, and hands    pain in the eye    fainting    hallucinations (unusual thoughts, seeing or hearing things that are not real)    fast or irregular heart beats    muscle aches, spasms or abnormal movements    muscle weakness    dilation of the pupils    problems with sexual functions or desire    blood in stool    dark, tarry stool    suicidal thoughts    blurring or changes of vision    severe or persistent vomiting    unexpected or extreme weight loss  A few people may have an  allergic reactions to this medicine. Symptoms can include difficulty breathing, skin rash, itching, swelling, or severe dizziness. If you notice any of these symptoms, seek medical help quickly.  Extra  Please speak with your doctor, nurse, or pharmacist if you have any questions about this medicine.  [email or web address]  IMPORTANT NOTE: This document tells you briefly how to take your medicine, but it does not tell you all there is to know about it.Your doctor or pharmacist may give you other documents about your medicine. Please talk to them if you have any questions.Always follow their advice. There is a more complete description of this medicine available in English.Scan this code on your smartphone or tablet or use the web address below. You can also ask your pharmacist for a printout. If you have any questions, please ask your pharmacist.     2021 [employer]         Crisis Resources    During today s visit, you let us know that you may be dealing with more stress or depression. Your mental health is a key part of your overall health. We urge you to reach out to your primary care provider. Or, ask to speak to someone at the clinic before you leave today.     Once you are home, refer to the resources below as needed.    Steps to care for yourself    1. If you are currently in counseling, call your counselor for an appointment  2. Call the local crisis resources below if needed.  3. Contact friends or family for support.  4. Get more exercise.  5. Do activities you enjoy.  6. Eat a well-balanced diet and drink plenty of fluids.  7. Rest as needed.  8. Limit alcohol and recreational drugs. These can worsen depression.    When to contact your primary care provider       You have thoughts of harming or killing yourself but have not made a plan to carry it out.    Your depression gets in the way of daily activities.    You are often unable to sleep.    You need help cutting back on  alcohol or recreational drugs.    When to call 911 or go to the Emergency Room     Get emergency help right away if you have any of the following:    You are planning to harm or kill yourself and you have a way to carry out the plan.     You have injured yourself or others. Or, you think you will.    You feel confused or are having trouble thinking or remembering.    You are having delusions (false beliefs).    You are hearing voices or seeing things that aren t there.    You are feeling psychotic (paranoid, fearful, restless, agitated, nervous, racing thoughts or speech)    Crisis Resources     These hotlines are for both adults and children. The and are open 24 hours a day, 7 days a week unless noted otherwise.    National Suicide Prevention Lifeline   6-780-762-PSHS (8745)    Crisis Text Line    www.crisistextline.org  Text HOME to 009552 from anywhere in the [place], anytime, about any type of crisis. A live, trained crisis counselor will receive the text and respond quickly.    [employer] Lifeline for LGBTQ Youth  A national crisis intervention and suicide lifeline for LGBTQ youth under 25. Provides a safe place to talk without judgement.   Call 1-970.978.8400; text START to 142426 or visit www.thetrevorproject.org to talk to a trained counselor.    For [facility] crisis numbers, visit the [facility] website at:  https://[place].gov/dhs/people-we-serve/adults/health-care/mental-health/resources/crisis-contacts.jsp

## 2021-08-24 ASSESSMENT — ANXIETY QUESTIONNAIRES: GAD7 TOTAL SCORE: 13

## 2021-09-14 ENCOUNTER — OFFICE VISIT (OUTPATIENT)
Dept: OBGYN | Facility: CLINIC | Age: 51
End: 2021-09-14
Payer: COMMERCIAL

## 2021-09-14 VITALS
SYSTOLIC BLOOD PRESSURE: 122 MMHG | HEART RATE: 73 BPM | DIASTOLIC BLOOD PRESSURE: 86 MMHG | WEIGHT: 165 LBS | BODY MASS INDEX: 25.45 KG/M2 | TEMPERATURE: 97.4 F

## 2021-09-14 DIAGNOSIS — N95.1 PERIMENOPAUSE: ICD-10-CM

## 2021-09-14 DIAGNOSIS — E55.9 VITAMIN D DEFICIENCY: ICD-10-CM

## 2021-09-14 DIAGNOSIS — C53.1 MALIGNANT NEOPLASM OF EXOCERVIX (H): ICD-10-CM

## 2021-09-14 DIAGNOSIS — N83.201 RIGHT OVARIAN CYST: Primary | ICD-10-CM

## 2021-09-14 DIAGNOSIS — R31.9 HEMATURIA, UNSPECIFIED TYPE: ICD-10-CM

## 2021-09-14 DIAGNOSIS — E03.9 HYPOTHYROIDISM, UNSPECIFIED TYPE: ICD-10-CM

## 2021-09-14 DIAGNOSIS — N92.4 ABNORMAL PERIMENOPAUSAL BLEEDING: ICD-10-CM

## 2021-09-14 DIAGNOSIS — R93.89 THICKENED ENDOMETRIUM: ICD-10-CM

## 2021-09-14 LAB
ALBUMIN UR-MCNC: NEGATIVE MG/DL
APPEARANCE UR: CLEAR
BACTERIA #/AREA URNS HPF: ABNORMAL /HPF
BILIRUB UR QL STRIP: NEGATIVE
COLOR UR AUTO: YELLOW
ESTRADIOL SERPL-MCNC: 28 PG/ML
FSH SERPL-ACNC: 92 IU/L
GLUCOSE UR STRIP-MCNC: NEGATIVE MG/DL
HGB UR QL STRIP: ABNORMAL
KETONES UR STRIP-MCNC: NEGATIVE MG/DL
LEUKOCYTE ESTERASE UR QL STRIP: NEGATIVE
NITRATE UR QL: NEGATIVE
PH UR STRIP: 8.5 [PH] (ref 5–7)
RBC #/AREA URNS AUTO: ABNORMAL /HPF
SP GR UR STRIP: 1.02 (ref 1–1.03)
SQUAMOUS #/AREA URNS AUTO: ABNORMAL /LPF
UROBILINOGEN UR STRIP-ACNC: 0.2 E.U./DL
WBC #/AREA URNS AUTO: ABNORMAL /HPF

## 2021-09-14 PROCEDURE — 99242 OFF/OP CONSLTJ NEW/EST SF 20: CPT | Performed by: OBSTETRICS & GYNECOLOGY

## 2021-09-14 PROCEDURE — 81001 URINALYSIS AUTO W/SCOPE: CPT | Performed by: OBSTETRICS & GYNECOLOGY

## 2021-09-14 PROCEDURE — 84443 ASSAY THYROID STIM HORMONE: CPT | Performed by: OBSTETRICS & GYNECOLOGY

## 2021-09-14 PROCEDURE — 36415 COLL VENOUS BLD VENIPUNCTURE: CPT | Performed by: OBSTETRICS & GYNECOLOGY

## 2021-09-14 PROCEDURE — 82670 ASSAY OF TOTAL ESTRADIOL: CPT | Performed by: OBSTETRICS & GYNECOLOGY

## 2021-09-14 PROCEDURE — 83001 ASSAY OF GONADOTROPIN (FSH): CPT | Performed by: OBSTETRICS & GYNECOLOGY

## 2021-09-14 NOTE — PROGRESS NOTES
I was asked to see Nicole L Fritsche by Rosio Harding for consultation regarding ovarian cyst     HPI:  Nicole L Fritsche is a 51 year old female  here for a consultation regarding:  Follow-up on enlarging right ovarian cyst.     Patient has a h/o stage IIB cervical cancer in 2019 for which she has completed treatment.  On surveillance she was noted to have a simple right ovarian cyst which has increased in size     21: US IMPRESSION:   1. Right ovarian simple cyst measuring up to 6.5 cm and is increased from 3 cm in 2019.    2. Small amount of fluid in the endometrium and pelvis may be physiologic.    Patient reports pain from the ovary --  crampy pain in the lower pelvis on both sides.  Feels pain 4-5 times a week and has to take tylenol and advil to  Help with the pain.  it is a menstrual type of pain.   Occasional dyspareunia.  But not all the time.    No vaginal dryness except on occasion. She is using a vaginal dilator since her chemo and radiation.  tubal ligation done for contraception yrs ago.   No regular period since her cancer treatment.   4-5 months ago she started noticing random spotting very light.  Sometimes notes light pink when she wipes or just a little on a liner.     pelvic ultrasound on 21 showed EMS at 11 mm with fluid in the endometrium.   L ovary could not bee seen but the right ovary had a 6.5 cm simple cyst with normal blood flow.   US 2019 showed the right ovary at 3 cm   MRI in 2019 showed uterine adenomyosis      Patient would like to have CAITLYN/BSO at this point in her life.      When she pees there is pink blood there.   UA showed. 2-5 RBC's 2 months ago.     Her medical hx is complicated by hypothyroidism on meds.      OBHx:    x 3  25-26 yrs old      FH:  Sister had a BSO in her 30's       Past GYN history:   Lab Results   Component Value Date    PAP NIL 2021    PAP ASC-US 2019    PAP NIL 06/15/2015       Past Medical History:   Diagnosis Date      Abnormal Pap smear of cervix 05/21/2019    see problem list     Arrested hydrocephalus (H) 2/3/2009    Ongoing HAs, seeing neurosurgeon. Per past notes from Dr. Resendiz, HAs most likely not secondary to the hydrocephalus, but rather vascular inorgin.  Please see scanned in records.     Asthma      Cervical cancer (H)      Cervical high risk HPV (human papillomavirus) test positive 05/21/2019    See problem list       Past Surgical History:   Procedure Laterality Date     C VENTRICULO-CISTERNOSTOMY,3RD VENT      for treatment of HA related to hydrocephalus     COLONOSCOPY N/A 12/28/2020    Procedure: COLONOSCOPY, WITH  polypectomy;  Surgeon: Steven Vazquez MD;  Location: [facility] OR     ESOPHAGOSCOPY, GASTROSCOPY, DUODENOSCOPY (EGD), COMBINED N/A 7/13/2020    Procedure: ESOPHAGOGASTRODUODENOSCOPY, WITH BIOPSY;  Surgeon: Steven Vazquez MD;  Location: [facility] OR     EXAM UNDER ANESTHESIA, INSERT ANN MARIE SLEEVE, UTERINE PLACEMENT OF TANDEM AND RING FOR RAD, ULTRASOUND N/A 8/30/2019    Procedure: Ann Marie Sleeve Insertion, Ultrasound Guided;  Surgeon: Anne Tidwell MD;  Location: [facility] OR     EXCISE MASS TRUNK Left 9/16/2015    Procedure: EXCISE MASS TRUNK;  Surgeon: Carlos Enrique Briones MD;  Location: [facility] OR       Family History   Problem Relation Age of Onset     Diabetes Mother      Cerebrovascular Disease Mother      Ovarian Cancer Mother      C.A.D. Father 40     Cerebrovascular Disease Father      C.A.D. Sister 62        stent placed     Ovarian Cancer Sister      Ovarian Cancer Sister          Medications:    Current Outpatient Medications:      Acetaminophen (MIDOL PO), Take by mouth as needed, Disp: , Rfl:      albuterol (PROAIR HFA/PROVENTIL HFA/VENTOLIN HFA) 108 (90 Base) MCG/ACT inhaler, Inhale 2 puffs into the lungs every 6 hours as needed for shortness of breath / dyspnea or wheezing, Disp: 18 g, Rfl: 3     cholecalciferol (VITAMIN D3) 125 mcg (5000 units) capsule, Take 1 capsule (125 mcg) by mouth daily, Disp: 100  capsule, Rfl: 5     Ferrous Sulfate (IRON SUPPLEMENT PO), Take 325 mg by mouth 2 times daily (with meals), Disp: , Rfl:      FLUoxetine (PROZAC) 20 MG capsule, Take 1 capsule (20 mg) by mouth daily, Disp: 90 capsule, Rfl: 0     fluticasone (FLOVENT HFA) 110 MCG/ACT inhaler, Inhale 2 puffs into the lungs 2 times daily, Disp: 12 g, Rfl: 4     levothyroxine (SYNTHROID/LEVOTHROID) 25 MCG tablet, Take 1 tablet (25 mcg) by mouth daily, Disp: 90 tablet, Rfl: 1     Multiple Vitamin (MULTIVITAMINS PO), Take  by mouth daily., Disp: , Rfl:      fluticasone (FLONASE) 50 MCG/ACT nasal spray, Spray 1-2 sprays into both nostrils daily, Disp: 16 g, Rfl: 3    Allergies:  Patient has no known allergies.    EXAM:  /86   Pulse 73   Temp 97.4  F (36.3  C) (Oral)   Wt 74.8 kg (165 lb)   LMP  (LMP Unknown)   Breastfeeding No   BMI 25.45 kg/m      General - pleasant female in no acute distress.  Neurological - Alert and oriented  Psych:  normal mood and affect.  Remainder of exam deferred to next visit.     ASSESSMENT:  Encounter Diagnoses   Name Primary?     Right ovarian cyst Yes     Perimenopause      Abnormal perimenopausal bleeding      Thickened endometrium      Vitamin D deficiency      Malignant neoplasm of exocervix (H)      Hypothyroidism, unspecified type      Hematuria, unspecified type     discussed persistant right ovarian cyst which is increasing in size.  Simple so most likely benign.   Additional concern in irregular random bleeding at age 51.     PLAN:   Reviewed imaging studies back to 2019 today.    Right ovarian cyst is simple but growing.  She has associated symptoms of random bleeding and routine episodes of lower pelvic cramping, along with a history of cervical cancer.   She would like to proceed with removal of not only the right ovary and tube but also the uterine, cervix and left ovary and tube as well so there are no further problems.     discussed potential risk to surrounding pelvic organs  including the bladder, ureters, bowels and large blood vessels. Given prior history of radiation therapy for cervix cancer, she may be at higher risk for injury to these structures during a major surgery.     At the least we will need to perform an endometrial biopsy to rule out endometrial hyperplasia given the random bleeding.    Would also do ECC at the same time.   discussed this may simply be related to waning hormone levels causing hormone imbalance.   Patient states she would be more comfortable with removal of all gyn organs.   Will discuss with Dr Noe from gyn/onc since he was her gyn onc provider.   Patient will come back for endometrial biopsy and further discussion of management     Orders Placed This Encounter   Procedures     Estradiol     Follicle stimulating hormone     Urine Microscopic         Tiff Loera MD

## 2021-09-15 LAB — TSH SERPL DL<=0.005 MIU/L-ACNC: 3.99 MU/L (ref 0.4–4)

## 2021-09-22 NOTE — OR NURSING
Dr. Tidwell at bedside in PACU   Incoming call from patient, notified patient of positive COVIDresult. Discussed following CDC guidelines.

## 2021-09-26 ENCOUNTER — HEALTH MAINTENANCE LETTER (OUTPATIENT)
Age: 51
End: 2021-09-26

## 2021-09-30 DIAGNOSIS — E03.9 HYPOTHYROIDISM, UNSPECIFIED TYPE: ICD-10-CM

## 2021-09-30 NOTE — TELEPHONE ENCOUNTER
Routing refill request to provider for review/approval because:  TSH   Date Value Ref Range Status   09/14/2021 3.99 0.40 - 4.00 mU/L Final   06/28/2021 7.28 (H) 0.40 - 4.00 mU/L Final       Jena Daniel, RN, BSN, PHN  [facility]: [place]

## 2021-10-01 RX ORDER — LEVOTHYROXINE SODIUM 25 UG/1
25 TABLET ORAL DAILY
Qty: 90 TABLET | Refills: 1 | Status: SHIPPED | OUTPATIENT
Start: 2021-10-01 | End: 2022-04-12

## 2021-11-05 ENCOUNTER — TELEPHONE (OUTPATIENT)
Dept: OBGYN | Facility: CLINIC | Age: 51
End: 2021-11-05

## 2021-11-05 NOTE — TELEPHONE ENCOUNTER
Provider to provider documentation  I spoke with  [facility] today in regards to the goals request for hysterectomy.  Dr. YORK Suggested that she would be a high risk for surgical complications given her previous radiation therapy.  Might be best not to perform full hyst/BSO.   Current plan is for her to have an endometrial biopsy and endocervical curetting performed in the office.  From there she will follow-up with Dr. Hernandez to discuss further management.  Tiff Loera MD

## 2021-11-16 ENCOUNTER — TELEPHONE (OUTPATIENT)
Dept: FAMILY MEDICINE | Facility: CLINIC | Age: 51
End: 2021-11-16

## 2021-11-16 ENCOUNTER — OFFICE VISIT (OUTPATIENT)
Dept: OBGYN | Facility: CLINIC | Age: 51
End: 2021-11-16
Payer: COMMERCIAL

## 2021-11-16 VITALS
BODY MASS INDEX: 25.45 KG/M2 | SYSTOLIC BLOOD PRESSURE: 126 MMHG | HEART RATE: 73 BPM | WEIGHT: 165 LBS | DIASTOLIC BLOOD PRESSURE: 83 MMHG | TEMPERATURE: 97.1 F

## 2021-11-16 DIAGNOSIS — J45.30 MILD PERSISTENT ASTHMA WITHOUT COMPLICATION: ICD-10-CM

## 2021-11-16 DIAGNOSIS — N83.201 RIGHT OVARIAN CYST: ICD-10-CM

## 2021-11-16 DIAGNOSIS — N92.4 ABNORMAL PERIMENOPAUSAL BLEEDING: Primary | ICD-10-CM

## 2021-11-16 DIAGNOSIS — Z85.41 PERSONAL HISTORY OF MALIGNANT NEOPLASM OF CERVIX UTERI: ICD-10-CM

## 2021-11-16 PROBLEM — N95.1 PERIMENOPAUSE: Status: ACTIVE | Noted: 2021-11-16

## 2021-11-16 PROCEDURE — 99213 OFFICE O/P EST LOW 20 MIN: CPT | Mod: 25 | Performed by: OBSTETRICS & GYNECOLOGY

## 2021-11-16 PROCEDURE — 58100 BIOPSY OF UTERUS LINING: CPT | Mod: 52 | Performed by: OBSTETRICS & GYNECOLOGY

## 2021-11-16 RX ORDER — ALBUTEROL SULFATE 90 UG/1
2 AEROSOL, METERED RESPIRATORY (INHALATION) EVERY 6 HOURS PRN
Qty: 54 G | Refills: 0 | Status: SHIPPED | OUTPATIENT
Start: 2021-11-16 | End: 2022-05-23

## 2021-11-16 NOTE — PROGRESS NOTES
HPI:  Nicole L Fritsche is a 51 year old female here for a consultation regarding:  Needs an EMB for abnormal bleeding and preop assessment.     Perimenopausal with hot flashes and cold flashes.   Light random bleeding   Does not recall going a full year without bleeding.   S/p chemo and rad rx for cervical cancer   Has a persistent, enlarging right ovarian cyst    S/p tubal ligation     Patient requested having a full hysterectomy but after consultation with Dr Hernandez from gyn onc, she would be at high risk for complications given her history of previous radiation therapy.      OB History    Para Term  AB Living   3 0 0 0 0 3   SAB IAB Ectopic Multiple Live Births   0 0 0 0 3      # Outcome Date GA Lbr Gene/2nd Weight Sex Delivery Anes PTL Lv   3      M    MARIO   2      M    MARIO   1      M    MARIO      x 3     Past GYN history:   Lab Results   Component Value Date    PAP NIL 2021    PAP ASC-US 2019    PAP NIL 06/15/2015           Past Medical History:   Diagnosis Date     Abnormal Pap smear of cervix 2019    see problem list     Arrested hydrocephalus (H) 2/3/2009    Ongoing HAs, seeing neurosurgeon. Per past notes from Dr. Resendiz, HAs most likely not secondary to the hydrocephalus, but rather vascular inorgin.  Please see scanned in records.     Asthma      Cervical cancer (H)      Cervical high risk HPV (human papillomavirus) test positive 2019    See problem list       Past Surgical History:   Procedure Laterality Date     C VENTRICULO-CISTERNOSTOMY,3RD VENT      for treatment of HA related to hydrocephalus     COLONOSCOPY N/A 2020    Procedure: COLONOSCOPY, WITH  polypectomy;  Surgeon: Steven Vazquez MD;  Location: [facility] OR     ESOPHAGOSCOPY, GASTROSCOPY, DUODENOSCOPY (EGD), COMBINED N/A 2020    Procedure: ESOPHAGOGASTRODUODENOSCOPY, WITH BIOPSY;  Surgeon: Steven Vazquez MD;  Location:  OR     EXAM UNDER ANESTHESIA, INSERT ANN MARIE SLEEVE,  UTERINE PLACEMENT OF TANDEM AND RING FOR RAD, ULTRASOUND N/A 8/30/2019    Procedure: Charanjit Sleeve Insertion, Ultrasound Guided;  Surgeon: Anne Tidwell MD;  Location: [facility] OR     EXCISE MASS TRUNK Left 9/16/2015    Procedure: EXCISE MASS TRUNK;  Surgeon: Carlos Enrique Briones MD;  Location: [facility] OR       Family History   Problem Relation Age of Onset     Diabetes Mother      Cerebrovascular Disease Mother      Ovarian Cancer Mother      C.A.D. Father 40     Cerebrovascular Disease Father      C.A.D. Sister 62        stent placed     Ovarian Cancer Sister      Ovarian Cancer Sister          Medications:    Current Outpatient Medications:      Acetaminophen (MIDOL PO), Take by mouth as needed, Disp: , Rfl:      albuterol (PROAIR HFA/PROVENTIL HFA/VENTOLIN HFA) 108 (90 Base) MCG/ACT inhaler, Inhale 2 puffs into the lungs every 6 hours as needed for shortness of breath / dyspnea or wheezing, Disp: 18 g, Rfl: 3     cholecalciferol (VITAMIN D3) 125 mcg (5000 units) capsule, Take 1 capsule (125 mcg) by mouth daily, Disp: 100 capsule, Rfl: 5     Ferrous Sulfate (IRON SUPPLEMENT PO), Take 325 mg by mouth 2 times daily (with meals), Disp: , Rfl:      FLUoxetine (PROZAC) 20 MG capsule, Take 1 capsule (20 mg) by mouth daily, Disp: 90 capsule, Rfl: 0     fluticasone (FLONASE) 50 MCG/ACT nasal spray, Spray 1-2 sprays into both nostrils daily, Disp: 16 g, Rfl: 3     fluticasone (FLOVENT HFA) 110 MCG/ACT inhaler, Inhale 2 puffs into the lungs 2 times daily, Disp: 12 g, Rfl: 4     levothyroxine (SYNTHROID/LEVOTHROID) 25 MCG tablet, Take 1 tablet (25 mcg) by mouth daily, Disp: 90 tablet, Rfl: 1     Multiple Vitamin (MULTIVITAMINS PO), Take  by mouth daily., Disp: , Rfl:     Allergies:  Patient has no known allergies.    ROS:  She denies abnormal vaginal bleeding, discharge or unusual pelvic pain, no dysuria, frequency or hematuria.    EXAM:  /83   Pulse 73   Temp 97.1  F (36.2  C) (Oral)   Wt 74.8 kg (165 lb)   LMP   (LMP Unknown)   BMI 25.45 kg/m      General - patient is teary and extremely anxious, states she is not getting any answers  Neurological - oriented  Psych: 5 anxiety state  normal respiratory and cardiovascular effort   Breast - deferred.  Abdomen - soft, nontender, nondistended.     Pelvic:  EG - normal adult female.  BUS - within normal limits.  Vagina - pale mucosa, no discharge.  Cervix - no lesions, no CMT.  Uterus -normal size and nontender.  Adnexae - no masses or tenderness.  RV - deferred.    PROCEDURE:    After obtaining consent from the patient, an endometrial biopsy was attempted.  A bimanual exam was first done. A speculum was placed in the vagina and the cervix visualized. A Paracervical block was placed using 12 ml of plain 1% lidocaine.  The cervix was prepped with betadine and a tenaculum placed on the cervix. An attempt to dilate the cervical os was made but the cervix was severely stenotic.   She experienced a lot of pain with attempted dilation. The procedure was discontinued for that reason. She is at higher risk for stenosis based on her previous radiation.  There were no complications.       ASSESSMENT:  Encounter Diagnoses   Name Primary?     Abnormal perimenopausal bleeding Yes     Right ovarian cyst      Personal history of malignant neoplasm of cervix uteri         PLAN:   Failed attempt at endometrial biopsy and endocervical curettage due to significant cervical stenosis from previous radiation therapy.   Will refer patient back to Dr Hernandez for further workup.  I discussed with patient possible procedure of laparoscopic RSO and D & C under anesthesia.     No orders of the defined types were placed in this encounter.    Tiff Loera MD

## 2021-11-16 NOTE — TELEPHONE ENCOUNTER
Pharmacy is requesting a 90-day supply.    [facility] #[id number] faxed ALTERNATIVE REQUESTED re: albuterol (PROAIR HFA/PROVENTIL HFA/VENTOLIN HFA) 108 (90 Base) MCG/ACT inhaler    PHARMACY COMMENTS:  90 DAY PRESCRIPTION NEEDED PER INSURANCE.  PLEASE RE SEND    Thank you,  [facility] Pharmacist

## 2021-12-01 ENCOUNTER — ONCOLOGY VISIT (OUTPATIENT)
Dept: ONCOLOGY | Facility: CLINIC | Age: 51
End: 2021-12-01
Attending: OBSTETRICS & GYNECOLOGY
Payer: COMMERCIAL

## 2021-12-01 ENCOUNTER — OFFICE VISIT (OUTPATIENT)
Dept: FAMILY MEDICINE | Facility: CLINIC | Age: 51
End: 2021-12-01
Payer: COMMERCIAL

## 2021-12-01 VITALS
BODY MASS INDEX: 25.35 KG/M2 | TEMPERATURE: 97.8 F | OXYGEN SATURATION: 99 % | DIASTOLIC BLOOD PRESSURE: 84 MMHG | WEIGHT: 164.4 LBS | HEART RATE: 64 BPM | SYSTOLIC BLOOD PRESSURE: 124 MMHG

## 2021-12-01 VITALS
DIASTOLIC BLOOD PRESSURE: 84 MMHG | BODY MASS INDEX: 25.36 KG/M2 | OXYGEN SATURATION: 99 % | WEIGHT: 164.46 LBS | TEMPERATURE: 97.8 F | SYSTOLIC BLOOD PRESSURE: 124 MMHG | HEART RATE: 64 BPM | RESPIRATION RATE: 18 BRPM

## 2021-12-01 DIAGNOSIS — N83.209 OVARIAN CYST: Primary | ICD-10-CM

## 2021-12-01 DIAGNOSIS — E03.9 HYPOTHYROIDISM, UNSPECIFIED TYPE: ICD-10-CM

## 2021-12-01 DIAGNOSIS — C53.1 MALIGNANT NEOPLASM OF EXOCERVIX (H): ICD-10-CM

## 2021-12-01 DIAGNOSIS — F32.1 CURRENT MODERATE EPISODE OF MAJOR DEPRESSIVE DISORDER WITHOUT PRIOR EPISODE (H): ICD-10-CM

## 2021-12-01 DIAGNOSIS — F41.9 ANXIETY: Primary | ICD-10-CM

## 2021-12-01 LAB — T3FREE SERPL-MCNC: 2.9 PG/ML (ref 2.3–4.2)

## 2021-12-01 PROCEDURE — 99215 OFFICE O/P EST HI 40 MIN: CPT | Performed by: OBSTETRICS & GYNECOLOGY

## 2021-12-01 PROCEDURE — 90471 IMMUNIZATION ADMIN: CPT | Performed by: FAMILY MEDICINE

## 2021-12-01 PROCEDURE — 99214 OFFICE O/P EST MOD 30 MIN: CPT | Mod: 25 | Performed by: FAMILY MEDICINE

## 2021-12-01 PROCEDURE — 84443 ASSAY THYROID STIM HORMONE: CPT | Performed by: FAMILY MEDICINE

## 2021-12-01 PROCEDURE — 91300 COVID-19,PF,PFIZER (12+ YRS): CPT | Performed by: FAMILY MEDICINE

## 2021-12-01 PROCEDURE — 88175 CYTOPATH C/V AUTO FLUID REDO: CPT | Performed by: OBSTETRICS & GYNECOLOGY

## 2021-12-01 PROCEDURE — 90714 TD VACC NO PRESV 7 YRS+ IM: CPT | Performed by: FAMILY MEDICINE

## 2021-12-01 PROCEDURE — 36415 COLL VENOUS BLD VENIPUNCTURE: CPT | Performed by: FAMILY MEDICINE

## 2021-12-01 PROCEDURE — 0004A COVID-19,PF,PFIZER (12+ YRS): CPT | Performed by: FAMILY MEDICINE

## 2021-12-01 PROCEDURE — 96127 BRIEF EMOTIONAL/BEHAV ASSMT: CPT | Mod: 59 | Performed by: FAMILY MEDICINE

## 2021-12-01 PROCEDURE — 84439 ASSAY OF FREE THYROXINE: CPT | Performed by: FAMILY MEDICINE

## 2021-12-01 PROCEDURE — 84481 FREE ASSAY (FT-3): CPT | Performed by: FAMILY MEDICINE

## 2021-12-01 PROCEDURE — G0463 HOSPITAL OUTPT CLINIC VISIT: HCPCS

## 2021-12-01 ASSESSMENT — ENCOUNTER SYMPTOMS: NERVOUS/ANXIOUS: 1

## 2021-12-01 ASSESSMENT — ANXIETY QUESTIONNAIRES
2. NOT BEING ABLE TO STOP OR CONTROL WORRYING: MORE THAN HALF THE DAYS
5. BEING SO RESTLESS THAT IT IS HARD TO SIT STILL: MORE THAN HALF THE DAYS
7. FEELING AFRAID AS IF SOMETHING AWFUL MIGHT HAPPEN: MORE THAN HALF THE DAYS
3. WORRYING TOO MUCH ABOUT DIFFERENT THINGS: NEARLY EVERY DAY
1. FEELING NERVOUS, ANXIOUS, OR ON EDGE: MORE THAN HALF THE DAYS
GAD7 TOTAL SCORE: 17
6. BECOMING EASILY ANNOYED OR IRRITABLE: NEARLY EVERY DAY
IF YOU CHECKED OFF ANY PROBLEMS ON THIS QUESTIONNAIRE, HOW DIFFICULT HAVE THESE PROBLEMS MADE IT FOR YOU TO DO YOUR WORK, TAKE CARE OF THINGS AT HOME, OR GET ALONG WITH OTHER PEOPLE: SOMEWHAT DIFFICULT

## 2021-12-01 ASSESSMENT — PATIENT HEALTH QUESTIONNAIRE - PHQ9: 5. POOR APPETITE OR OVEREATING: NEARLY EVERY DAY

## 2021-12-01 NOTE — PROGRESS NOTES
Follow Up Notes on Referred Patient    Date: 2021       Dr. Rosio Harding, APRN CNP  [address] [facility] [address]  [place] [address]       RE: Nicole L Fritsche  : 1970  DAVID: 2021    Nicole L Fritsche is a 51 year old woman with a history of stage IIB cervical cancer. She completed chemoradiation and brachytherapy 2019. She is here today for a surveillance visit.     Oncology history:   2019: Pap ASCUS. HPV 18+  19: Cervical biopsy, 4 o'clock     FINAL DIAGNOSIS:   Cervix, 4:00, biopsy:   - INVASIVE WELL-DIFFERENTIATED SQUAMOUS CELL CARCINOMA, focally keratinizing   - High grade squamous intraepithelial lesion (cervical intraepithelial neoplasia 3)      19: PET CT IMPRESSION: In this patient with newly diagnosed cervical cancer:  1. 3.7 cm cervical lesion with a max SUV of 13.16.   2. Focal uptake in the endometrium, favor adenomyosis over metastatic disease. Adenomyosis is demonstrated on MRI 2019 and can be hypermetabolic in a premenopausal woman.  3. Small left periaortic retroperitoneal lymph node borderline SUV uptake, inflammatory versus metastatic. Attention on follow-up.   4. Small left inguinal lymph nodes with SUV max of 2.45, likely inflammatory.     19-19: Cisplatin + radiation; brachytherapy     2020: PET IMPRESSION: In this patient with history of cervical cancer status post chemoradiation, there is evidence of complete treatment response, although the sensitivity of this PET/CT may be compromised secondary to diffuse muscular activation.      1. No suspicious FDG activity or CT finding in the uterus, cervix, and vagina. No FDG avid adenopathy.   2. Mild mucosal thickening and FDG avidity in the short segment of the proximal lesser curvature of the stomach, which may represents segmental gastritis in the correct clinical setting. Recommend correlation with endoscopy.  3. Small focus of decreased FDG uptake in the right frontal gyrus  which may represents encephalomalacia. Consider further evaluation with brain MRI.     7/20/2020: MR brain Impression:  The previous PET/CT demonstrated decreased FDG uptake in the right     2/8/21: Pap NIL.     7/7/21: US IMPRESSION:   1. Right ovarian simple cyst measuring up to 6.5 cm and is increased from 3 cm in 2019. In postmenopausal patients, follow-up ultrasound in 3-6 months is recommended for simple cysts greater than 5 cm. Consider  OB/GYN consultation patient is symptomatic.  2. Small amount of fluid in the endometrium and pelvis may be physiologic.           Today she comes to clinic and denies concerns for her visit. She did have spotting in June and her urine had some blood in it; she was treated for BV at that time and also had an US which showed an increase in a right ovarian simple cyst 6.5 cm for which she was referred to OBGyn and has an appointment in a month. She denies any spotting since that time. She is sexually active and uses a lubricant; she is also using her dilator about 2 times per week for 3-4 minutes; she denies spotting with either. She is current with her health screenings. She denies any changes in her bowel habits, no nausea/emesis, no lower extremity edema, and no difficulties eating or sleeping. She denies any abdominal discomfort/bloating, no fevers or chills, and no chest pain or shortness of breath.       Past Medical History:    Past Medical History:   Diagnosis Date     Abnormal Pap smear of cervix 05/21/2019    see problem list     Arrested hydrocephalus (H) 2/3/2009    Ongoing HAs, seeing neurosurgeon. Per past notes from Dr. Resendiz, HAs most likely not secondary to the hydrocephalus, but rather vascular inorgin.  Please see scanned in records.     Asthma      Cervical cancer (H)      Cervical high risk HPV (human papillomavirus) test positive 05/21/2019    See problem list     Current moderate episode of major depressive disorder without prior episode (H) 12/1/2021      Hypothyroidism, unspecified type 12/1/2021         Past Surgical History:    Past Surgical History:   Procedure Laterality Date     C VENTRICULO-CISTERNOSTOMY,3RD VENT      for treatment of HA related to hydrocephalus     COLONOSCOPY N/A 12/28/2020    Procedure: COLONOSCOPY, WITH  polypectomy;  Surgeon: Steven Vazquez MD;  Location: [facility] OR     ESOPHAGOSCOPY, GASTROSCOPY, DUODENOSCOPY (EGD), COMBINED N/A 7/13/2020    Procedure: ESOPHAGOGASTRODUODENOSCOPY, WITH BIOPSY;  Surgeon: Steven Vazquez MD;  [facility]     EXAM UNDER ANESTHESIA, INSERT ANN MARIE SLEEVE, UTERINE PLACEMENT OF TANDEM AND RING FOR RAD, ULTRASOUND N/A 8/30/2019    Procedure: Ann Marie Sleeve Insertion, Ultrasound Guided;  Surgeon: Anne Tidwell MD;  Location: [facility] OR     EXCISE MASS TRUNK Left 9/16/2015    Procedure: EXCISE MASS TRUNK;  Surgeon: Carlos Enrique Briones MD;  Location: [facility] OR         Health Maintenance Due   Topic Date Due     DEPRESSION ACTION PLAN  Never done     Pneumococcal Vaccine: Pediatrics (0 to 5 Years) and At-Risk Patients (6 to 64 Years) (2 of 4 - PCV13) 06/17/2015     ASTHMA ACTION PLAN  09/08/2016     PREVENTIVE CARE VISIT  05/01/2020     LUNG CANCER SCREENING  04/20/2021     INFLUENZA VACCINE (1) 09/01/2021     ASTHMA CONTROL TEST  12/28/2021     HPV TEST  02/08/2022     PAP  02/08/2022       Current Medications:     Current Outpatient Medications   Medication Sig Dispense Refill     Acetaminophen (MIDOL PO) Take by mouth as needed       albuterol (PROAIR HFA/PROVENTIL HFA/VENTOLIN HFA) 108 (90 Base) MCG/ACT inhaler Inhale 2 puffs into the lungs every 6 hours as needed for shortness of breath / dyspnea or wheezing 54 g 0     cholecalciferol (VITAMIN D3) 125 mcg (5000 units) capsule Take 1 capsule (125 mcg) by mouth daily 100 capsule 5     Ferrous Sulfate (IRON SUPPLEMENT PO) Take 325 mg by mouth 2 times daily (with meals)       FLUoxetine (PROZAC) 20 MG capsule Take 1 capsule (20 mg) by mouth daily 90 capsule 1      fluticasone (FLONASE) 50 MCG/ACT nasal spray Spray 1-2 sprays into both nostrils daily 16 g 3     fluticasone (FLOVENT HFA) 110 MCG/ACT inhaler Inhale 2 puffs into the lungs 2 times daily 12 g 4     levothyroxine (SYNTHROID/LEVOTHROID) 25 MCG tablet Take 1 tablet (25 mcg) by mouth daily 90 tablet 1     Multiple Vitamin (MULTIVITAMINS PO) Take  by mouth daily.           Allergies:      No Known Allergies     Social History:     Social History     Tobacco Use     Smoking status: Former Smoker     Packs/day: 0.25     Years: 20.00     Pack years: 5.00     Types: Cigarettes     Quit date: 2/18/2018     Years since quitting: 3.7     Smokeless tobacco: Former User     Quit date: 5/25/2015   Substance Use Topics     Alcohol use: Not Currently     Alcohol/week: 0.0 standard drinks     Comment: once every 3-4 months       History   Drug Use No         Family History:       Family History   Problem Relation Age of Onset     Diabetes Mother      Cerebrovascular Disease Mother      Ovarian Cancer Mother      C.A.D. Father 40     Cerebrovascular Disease Father      C.A.D. Sister 62        stent placed     Ovarian Cancer Sister      Ovarian Cancer Sister          Physical Exam:     /84   Pulse 64   Temp 97.8  F (36.6  C) (Oral)   Resp 18   Wt 74.6 kg (164 lb 7.4 oz)   LMP  (LMP Unknown)   SpO2 99%   BMI 25.36 kg/m    Body mass index is 25.36 kg/m .    General Appearance:  healthy and alert, no distress     Musculoskeletal:         extremities non tender and without edema     Neurological:   normal gait, no gross defects                Psychiatric:      appropriate mood and affect                               Hematological:            normal cervical, supraclavicular and inguinal lymph nodes                Gastrointestinal:          abdomen soft, non-tender, non-distended, no organomegaly or masses     Genitourinary: External genitalia and urethral meatus appears normal.  Vagina is smooth without nodularity or masses.   Cervix appears normal and without lesions.  Bimanual exam reveal no masses, nodularity or fullness.  Recto-vaginal exam confirms these findings. Papa smear was taken.        Assessment:     Nicole Fritsche is a 51 year old woman with a diagnosis of stage IIB cervical cancer.      A total of 40 minutes was spent with the patient, 30 minutes of which were spent in counseling the patient and/or treatment planning.        1.  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   2.  Status post definitive chemoradiation.     3.  HÉCTOR  4. Simple 6.5 cm ovarian cyst  5. Episode of vaginal spotting     We will obtain a pelvic MRI to evaluate the cyst and possible vaginal spotting and follow up accordingly.  Otherwise, we will see her back every 3-4 months for the first 2 years and then every 6 months for the next 3 years and then yearly once we get to year 5.  The patient agrees with the plan, is very appreciative of her care.  All questions were answered.     Hasmukh Hernandez MD, MS    Department of Obstetrics and Gynecology   Division of Gynecologic Oncology   [facility]  Phone: [phone]    CC  Patient Care Team:  Gin Johns DO as PCP - General (Family Medicine)  Rosio Chambers APRN CNP as Assigned Cancer Care Provider  Luz Lin NP as Assigned PCP  Tiff Loera MD as Assigned OBGYN Provider  ROSIO CHAMBERS

## 2021-12-01 NOTE — LETTER
"2021       RE: Nicole L Fritsche  [address]  [place] [address]     Dear Colleague,    Thank you for referring your patient, Nicole L Fritsche[facility]. Please see a copy of my visit note below.                Follow Up Notes on Referred Patient    Date: 2021       Dr. Rosio Harding, APRN [facility]  [address]  [place] [address]       RE: Nicole L Fritsche  : 1970  DAVID: 2021    Dear Dr. Rosio Harding:    Nicole L Fritsche is a 51 year old woman with a diagnosis of  .   She is here today for ***.     ***    Review of Systems:    Systemic           {yes - /no :004359::\"no weight changes; no fever; no chills; no night sweats; no appetite changes\"}  Skin           {yes - /no :300091::\"no rashes, or lesions\"}  Eye           {yes - /no :897364::\"no irritation; no changes in vision\"}  Temo-Laryngeal           {yes - /no :876861::\"no dysphagia; no hoarseness\"}   Pulmonary    {yes - /no :671430::\"no cough; no shortness of breath\"}  Cardiovascular    {yes - /no :499323::\"no chest pain; no palpitations\"}  Gastrointestinal    {yes - /no :391646::\"no diarrhea; no constipation; no abdominal pain; no changes in bowel  habits; no blood in stool\"}  Genitourinary   {yes - /no :248593::\"no urinary frequency; no urinary urgency; no dysuria; no pain; no abnormal vaginal discharge; no abnormal vaginal bleeding\"}  Breast   {yes - /no :709797::\"no breast discharge; no breast changes; no breast pain\"}  Musculoskeletal    {yes - /no :182310::\"no myalgias; no arthralgias; no back pain\"}  Psychiatric           {yes - /no :481282::\"no depressed mood; no anxiety\"}    Hematologic           {yes - /no :145490::\"no tender lymph nodes; no noticeable swellings or lumps\"}   Endocrine    {yes - /no :175749::\"no hot flashes; no heat/cold intolerance\"}         Neurological   {yes - /no " ":111211::\"no tremor; no numbness and tingling; no headaches; no difficulty  sleeping\"}      Past Medical History:    Past Medical History:   Diagnosis Date     Abnormal Pap smear of cervix 05/21/2019    see problem list     Arrested hydrocephalus (H) 2/3/2009    Ongoing HAs, seeing neurosurgeon. Per past notes from Dr. Resendiz, HAs most likely not secondary to the hydrocephalus, but rather vascular inorgin.  Please see scanned in records.     Asthma      Cervical cancer (H)      Cervical high risk HPV (human papillomavirus) test positive 05/21/2019    See problem list     Current moderate episode of major depressive disorder without prior episode (H) 12/1/2021     Hypothyroidism, unspecified type 12/1/2021         Past Surgical History:    Past Surgical History:   Procedure Laterality Date     C VENTRICULO-CISTERNOSTOMY,3RD VENT      for treatment of HA related to hydrocephalus     COLONOSCOPY N/A 12/28/2020    Procedure: COLONOSCOPY, WITH  polypectomy;  Surgeon: Steven Vazquez MD;  Location: [facility] OR     ESOPHAGOSCOPY, GASTROSCOPY, DUODENOSCOPY (EGD), COMBINED N/A 7/13/2020    Procedure: ESOPHAGOGASTRODUODENOSCOPY, WITH BIOPSY;  Surgeon: Steven Vazquez MD;  [facility]     EXAM UNDER ANESTHESIA, INSERT ANN MARIE SLEEVE, UTERINE PLACEMENT OF TANDEM AND RING FOR RAD, ULTRASOUND N/A 8/30/2019    Procedure: Ann Marie Sleeve Insertion, Ultrasound Guided;  Surgeon: Anne Tidwell MD;  Location: [facility] OR     EXCISE MASS TRUNK Left 9/16/2015    Procedure: EXCISE MASS TRUNK;  Surgeon: Carlos Enrique Briones MD;  Location: [facility] OR         Health Maintenance Due   Topic Date Due     DEPRESSION ACTION PLAN  Never done     Pneumococcal Vaccine: Pediatrics (0 to 5 Years) and At-Risk Patients (6 to 64 Years) (2 of 4 - PCV13) 06/17/2015     ASTHMA ACTION PLAN  09/08/2016     PREVENTIVE CARE VISIT  05/01/2020     LUNG CANCER SCREENING  04/20/2021     INFLUENZA VACCINE (1) 09/01/2021     ASTHMA CONTROL TEST  12/28/2021     HPV TEST  " 02/08/2022     PAP  02/08/2022       Current Medications:     Current Outpatient Medications   Medication Sig Dispense Refill     Acetaminophen (MIDOL PO) Take by mouth as needed       albuterol (PROAIR HFA/PROVENTIL HFA/VENTOLIN HFA) 108 (90 Base) MCG/ACT inhaler Inhale 2 puffs into the lungs every 6 hours as needed for shortness of breath / dyspnea or wheezing 54 g 0     cholecalciferol (VITAMIN D3) 125 mcg (5000 units) capsule Take 1 capsule (125 mcg) by mouth daily 100 capsule 5     Ferrous Sulfate (IRON SUPPLEMENT PO) Take 325 mg by mouth 2 times daily (with meals)       FLUoxetine (PROZAC) 20 MG capsule Take 1 capsule (20 mg) by mouth daily 90 capsule 1     fluticasone (FLONASE) 50 MCG/ACT nasal spray Spray 1-2 sprays into both nostrils daily 16 g 3     fluticasone (FLOVENT HFA) 110 MCG/ACT inhaler Inhale 2 puffs into the lungs 2 times daily 12 g 4     levothyroxine (SYNTHROID/LEVOTHROID) 25 MCG tablet Take 1 tablet (25 mcg) by mouth daily 90 tablet 1     Multiple Vitamin (MULTIVITAMINS PO) Take  by mouth daily.           Allergies:      No Known Allergies     Social History:     Social History     Tobacco Use     Smoking status: Former Smoker     Packs/day: 0.25     Years: 20.00     Pack years: 5.00     Types: Cigarettes     Quit date: 2/18/2018     Years since quitting: 3.7     Smokeless tobacco: Former User     Quit date: 5/25/2015   Substance Use Topics     Alcohol use: Not Currently     Alcohol/week: 0.0 standard drinks     Comment: once every 3-4 months       History   Drug Use No         Family History:     The patient's family history is notable for ***.    Family History   Problem Relation Age of Onset     Diabetes Mother      Cerebrovascular Disease Mother      Ovarian Cancer Mother      C.A.D. Father 40     Cerebrovascular Disease Father      C.A.D. Sister 62        stent placed     Ovarian Cancer Sister      Ovarian Cancer Sister          Physical Exam:     /84   Pulse 64   Temp 97.8  F  (36.6  C) (Oral)   Resp 18   Wt 74.6 kg (164 lb 7.4 oz)   LMP  (LMP Unknown)   SpO2 99%   BMI 25.36 kg/m    Body mass index is 25.36 kg/m .    General Appearance: healthy and alert, no distress     HEENT:  no thyromegaly, no palpable nodules or masses        Cardiovascular: regular rate and rhythm, no gallops, rubs or murmurs     Respiratory: lungs clear, no rales, rhonchi or wheezes, normal diaphragmatic excursion    Musculoskeletal: extremities non tender and without edema    Skin: no lesions or rashes     Neurological: normal gait, no gross defects     Psychiatric: appropriate mood and affect                               Hematological: normal cervical, supraclavicular and inguinal lymph nodes     Gastrointestinal:       abdomen soft, non-tender, non-distended, no organomegaly or masses    Genitourinary: External genitalia and urethral meatus appears normal.  Vagina is smooth without nodularity or masses.  Cervix appears normal and without lesions.  Bimanual exam reveal no masses, nodularity or fullness.  Recto-vaginal exam confirms these findings.      Assessment:    Nicole L Fritsche is a 51 year old woman with a diagnosis of ***.     A total of *** minutes was spent with the patient, *** minutes of which were spent in counseling the patient and/or treatment planning.      Plan:     1.)   ***     2.) Genetic risk factors were assessed and the patient does not meet the qualifications for a referral.      3.) Labs and/or tests ordered include:  ***.     4.) Health maintenance issues addressed today include ***.      CC  Patient Care Team:  Gin Johns DO as PCP - General (Family Medicine)  Rosio Chambers APRN CNP as Assigned Cancer Care Provider  Luz Lin NP as Assigned PCP  Tiff Loera MD as Assigned OBGYN Provider  ROSIO CHAMBERS      Again, thank you for allowing me to participate in the care of your patient.      Sincerely,    Hasmukh Hernandez MD

## 2021-12-01 NOTE — LETTER
2021       RE: Nicole L Fritsche  [address]  [place] [address]     Dear Colleague,    Thank you for referring your patient, Nicole L Fritsche, to the [facility] at [facility]. Please see a copy of my visit note below.                Follow Up Notes on Referred Patient    Date: 2021       Dr. Rosio Harding, APRN CNP  [address]  [place] [address]       RE: Nicole L Fritsche  : 1970  DAVID: 2021    Nicole L Fritsche is a 51 year old woman with a history of stage IIB cervical cancer. She completed chemoradiation and brachytherapy 2019. She is here today for a surveillance visit.     Oncology history:   2019: Pap ASCUS. HPV 18+  19: Cervical biopsy, 4 o'clock     FINAL DIAGNOSIS:   Cervix, 4:00, biopsy:   - INVASIVE WELL-DIFFERENTIATED SQUAMOUS CELL CARCINOMA, focally keratinizing   - High grade squamous intraepithelial lesion (cervical intraepithelial neoplasia 3)      19: PET CT IMPRESSION: In this patient with newly diagnosed cervical cancer:  1. 3.7 cm cervical lesion with a max SUV of 13.16.   2. Focal uptake in the endometrium, favor adenomyosis over metastatic disease. Adenomyosis is demonstrated on MRI 2019 and can be hypermetabolic in a premenopausal woman.  3. Small left periaortic retroperitoneal lymph node borderline SUV uptake, inflammatory versus metastatic. Attention on follow-up.   4. Small left inguinal lymph nodes with SUV max of 2.45, likely inflammatory.     19-19: Cisplatin + radiation; brachytherapy     2020: PET IMPRESSION: In this patient with history of cervical cancer status post chemoradiation, there is evidence of complete treatment response, although the sensitivity of this PET/CT may be compromised secondary to diffuse muscular activation.      1. No suspicious FDG activity or CT finding in the uterus, cervix, and vagina. No  FDG avid adenopathy.   2. Mild mucosal thickening and FDG avidity in the short segment of the proximal lesser curvature of the stomach, which may represents segmental gastritis in the correct clinical setting. Recommend correlation with endoscopy.  3. Small focus of decreased FDG uptake in the right frontal gyrus which may represents encephalomalacia. Consider further evaluation with brain MRI.     7/20/2020: MR brain Impression:  The previous PET/CT demonstrated decreased FDG uptake in the right     2/8/21: Pap NIL.     7/7/21: US IMPRESSION:   1. Right ovarian simple cyst measuring up to 6.5 cm and is increased from 3 cm in 2019. In postmenopausal patients, follow-up ultrasound in 3-6 months is recommended for simple cysts greater than 5 cm. Consider  OB/GYN consultation patient is symptomatic.  2. Small amount of fluid in the endometrium and pelvis may be physiologic.           Today she comes to clinic and denies concerns for her visit. She did have spotting in June and her urine had some blood in it; she was treated for BV at that time and also had an US which showed an increase in a right ovarian simple cyst 6.5 cm for which she was referred to OBGyn and has an appointment in a month. She denies any spotting since that time. She is sexually active and uses a lubricant; she is also using her dilator about 2 times per week for 3-4 minutes; she denies spotting with either. She is current with her health screenings. She denies any changes in her bowel habits, no nausea/emesis, no lower extremity edema, and no difficulties eating or sleeping. She denies any abdominal discomfort/bloating, no fevers or chills, and no chest pain or shortness of breath.       Past Medical History:    Past Medical History:   Diagnosis Date     Abnormal Pap smear of cervix 05/21/2019    see problem list     Arrested hydrocephalus (H) 2/3/2009    Ongoing HAs, seeing neurosurgeon. Per past notes from Dr. Resendiz, HAs most likely not  secondary to the hydrocephalus, but rather vascular inorgin.  Please see scanned in records.     Asthma      Cervical cancer (H)      Cervical high risk HPV (human papillomavirus) test positive 05/21/2019    See problem list     Current moderate episode of major depressive disorder without prior episode (H) 12/1/2021     Hypothyroidism, unspecified type 12/1/2021         Past Surgical History:    Past Surgical History:   Procedure Laterality Date     C VENTRICULO-CISTERNOSTOMY,3RD VENT      for treatment of HA related to hydrocephalus     COLONOSCOPY N/A 12/28/2020    Procedure: COLONOSCOPY, WITH  polypectomy;  Surgeon: Steven Vazquez MD;  Location: [facility] OR     ESOPHAGOSCOPY, GASTROSCOPY, DUODENOSCOPY (EGD), COMBINED N/A 7/13/2020    Procedure: ESOPHAGOGASTRODUODENOSCOPY, WITH BIOPSY;  Surgeon: Steven Vazquez MD;  [facility]     EXAM UNDER ANESTHESIA, INSERT ANN MARIE SLEEVE, UTERINE PLACEMENT OF TANDEM AND RING FOR RAD, ULTRASOUND N/A 8/30/2019    Procedure: Ann Marie Sleeve Insertion, Ultrasound Guided;  Surgeon: Anne Tidwell MD;  Location: [facility] OR     EXCISE MASS TRUNK Left 9/16/2015    Procedure: EXCISE MASS TRUNK;  Surgeon: Carlos Enrique Briones MD;  Location: [facility] OR         Health Maintenance Due   Topic Date Due     DEPRESSION ACTION PLAN  Never done     Pneumococcal Vaccine: Pediatrics (0 to 5 Years) and At-Risk Patients (6 to 64 Years) (2 of 4 - PCV13) 06/17/2015     ASTHMA ACTION PLAN  09/08/2016     PREVENTIVE CARE VISIT  05/01/2020     LUNG CANCER SCREENING  04/20/2021     INFLUENZA VACCINE (1) 09/01/2021     ASTHMA CONTROL TEST  12/28/2021     HPV TEST  02/08/2022     PAP  02/08/2022       Current Medications:     Current Outpatient Medications   Medication Sig Dispense Refill     Acetaminophen (MIDOL PO) Take by mouth as needed       albuterol (PROAIR HFA/PROVENTIL HFA/VENTOLIN HFA) 108 (90 Base) MCG/ACT inhaler Inhale 2 puffs into the lungs every 6 hours as needed for shortness of breath / dyspnea  or wheezing 54 g 0     cholecalciferol (VITAMIN D3) 125 mcg (5000 units) capsule Take 1 capsule (125 mcg) by mouth daily 100 capsule 5     Ferrous Sulfate (IRON SUPPLEMENT PO) Take 325 mg by mouth 2 times daily (with meals)       FLUoxetine (PROZAC) 20 MG capsule Take 1 capsule (20 mg) by mouth daily 90 capsule 1     fluticasone (FLONASE) 50 MCG/ACT nasal spray Spray 1-2 sprays into both nostrils daily 16 g 3     fluticasone (FLOVENT HFA) 110 MCG/ACT inhaler Inhale 2 puffs into the lungs 2 times daily 12 g 4     levothyroxine (SYNTHROID/LEVOTHROID) 25 MCG tablet Take 1 tablet (25 mcg) by mouth daily 90 tablet 1     Multiple Vitamin (MULTIVITAMINS PO) Take  by mouth daily.           Allergies:      No Known Allergies     Social History:     Social History     Tobacco Use     Smoking status: Former Smoker     Packs/day: 0.25     Years: 20.00     Pack years: 5.00     Types: Cigarettes     Quit date: 2/18/2018     Years since quitting: 3.7     Smokeless tobacco: Former User     Quit date: 5/25/2015   Substance Use Topics     Alcohol use: Not Currently     Alcohol/week: 0.0 standard drinks     Comment: once every 3-4 months       History   Drug Use No         Family History:       Family History   Problem Relation Age of Onset     Diabetes Mother      Cerebrovascular Disease Mother      Ovarian Cancer Mother      C.A.D. Father 40     Cerebrovascular Disease Father      C.A.D. Sister 62        stent placed     Ovarian Cancer Sister      Ovarian Cancer Sister          Physical Exam:     /84   Pulse 64   Temp 97.8  F (36.6  C) (Oral)   Resp 18   Wt 74.6 kg (164 lb 7.4 oz)   LMP  (LMP Unknown)   SpO2 99%   BMI 25.36 kg/m    Body mass index is 25.36 kg/m .    General Appearance:  healthy and alert, no distress     Musculoskeletal:         extremities non tender and without edema     Neurological:   normal gait, no gross defects                Psychiatric:      appropriate mood and affect                                Hematological:            normal cervical, supraclavicular and inguinal lymph nodes                Gastrointestinal:          abdomen soft, non-tender, non-distended, no organomegaly or masses     Genitourinary: External genitalia and urethral meatus appears normal.  Vagina is smooth without nodularity or masses.  Cervix appears normal and without lesions.  Bimanual exam reveal no masses, nodularity or fullness.  Recto-vaginal exam confirms these findings. Papa smear was taken.        Assessment:     Nicole Fritsche is a 51 year old woman with a diagnosis of stage IIB cervical cancer.      A total of 40 minutes was spent with the patient, 30 minutes of which were spent in counseling the patient and/or treatment planning.        1.  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   2.  Status post definitive chemoradiation.     3.  HÉCTOR  4. Simple 6.5 cm ovarian cyst  5. Episode of vaginal spotting     We will obtain a pelvic MRI to evaluate the cyst and possible vaginal spotting and follow up accordingly.  Otherwise, we will see her back every 3-4 months for the first 2 years and then every 6 months for the next 3 years and then yearly once we get to year 5.  The patient agrees with the plan, is very appreciative of her care.  All questions were answered.     Hasmukh Hernandez MD, MS    Department of Obstetrics and Gynecology   Division of Gynecologic Oncology   [facility]  Phone: [phone]    CC  Patient Care Team:  Gin Johns DO as PCP - General (Family Medicine)  Luz Lin NP as Assigned PCP  Tiff Loera MD as Assigned OBGYN Provider

## 2021-12-01 NOTE — PROGRESS NOTES
Assessment & Plan     Anxiety  Current moderate episode of major depressive disorder without prior episode (H)  - Worsening recently.  Her mother in law passed away yesterday  - Advised seeking counseling, but she declines that for now  - Ran out of Prozac about a week ago.  Refilled for her today  - FLUoxetine (PROZAC) 20 MG capsule; Take 1 capsule (20 mg) by mouth daily    Hypothyroidism, unspecified type  - Started on Synthroid in August 2021.  Labs in September were WNL.  Will recheck again to assure she's on the correct dose   - TSH; Future  - T3, Free; Future  - T4, free; Future  - TSH  - T3, Free  - T4, free      Return in about 3 months (around 3/1/2022).  Advised her to follow up sooner if she feels her anxiety is worsening     Gin Johns, DO  [facility]    =====================================================================    Subjective   Cassi is a 51 year old who presents for the following health issues     Anxiety    History of Present Illness       She eats 0-1 servings of fruits and vegetables daily.She consumes 3 sweetened beverage(s) daily.She exercises with enough effort to increase her heart rate 30 to 60 minutes per day.  She exercises with enough effort to increase her heart rate 4 days per week. She is missing 2 dose(s) of medications per week.  She is not taking prescribed medications regularly due to remembering to take.       Anxiety Follow-Up    How are you doing with your anxiety since your last visit? Waxing and waning    Are you having other symptoms that might be associated with anxiety? No    Have you had a significant life event? OTHER: mother-in-law passed away last night     Are you feeling depressed? No    Do you have any concerns with your use of alcohol or other drugs? No     Patient is currently taking fluoxetine 20 mg daily.  Started fluoxetine in August (switched from Zoloft).  She does feel that it was helpful.  Caused some fatigue, but  she takes it at night which helps.  Ran out of meds about a week ago.  Unfortunately, her mother in law passed from [email or web address] last night so that's been really difficult.  She's been having outbursts at work where she yells at her coworkers.  Her boss has been really supportive to her and is helping her through this.  She is not seen a counselor and is not interested in that right now.      Social History     Tobacco Use     Smoking status: Former Smoker     Packs/day: 0.25     Years: 20.00     Pack years: 5.00     Types: Cigarettes     Quit date: 2/18/2018     Years since quitting: 3.7     Smokeless tobacco: Former User     Quit date: 5/25/2015   Vaping Use     Vaping Use: Never used   Substance Use Topics     Alcohol use: Not Currently     Alcohol/week: 0.0 standard drinks     Comment: once every 3-4 months     Drug use: No     LATOYA-7 SCORE 6/28/2021 8/23/2021 12/1/2021   Total Score 17 13 17     PHQ 8/23/2021 12/1/2021   PHQ-9 Total Score 10 12   Q9: Thoughts of better off dead/self-harm past 2 weeks Not at all Not at all     Last PHQ-9 12/1/2021   1.  Little interest or pleasure in doing things 1   2.  Feeling down, depressed, or hopeless 1   3.  Trouble falling or staying asleep, or sleeping too much 2   4.  Feeling tired or having little energy 1   5.  Poor appetite or overeating 2   6.  Feeling bad about yourself 1   7.  Trouble concentrating 2   8.  Moving slowly or restless 2   Q9: Thoughts of better off dead/self-harm past 2 weeks 0   PHQ-9 Total Score 12   Difficulty at work, home, or with people Somewhat difficult     LATOYA-7  12/1/2021   1. Feeling nervous, anxious, or on edge 2   2. Not being able to stop or control worrying 2   3. Worrying too much about different things 3   4. Trouble relaxing 3   5. Being so restless that it is hard to sit still 2   6. Becoming easily annoyed or irritable 3   7. Feeling afraid, as if something awful might happen 2   LATOYA-7 Total Score 17   If you checked any problems, how  difficult have they made it for you to do your work, take care of things at home, or get along with other people? Somewhat difficult         Hypothyroidism Follow-up      Since last visit, patient describes the following symptoms: anxiety and fatigue    Started on Synthroid around August.  Currently taking 25 mcg daily.  Had a normal thyroid US.  Last TSH in September was negative for any nodules or masses.        Review of Systems   Psychiatric/Behavioral: The patient is nervous/anxious.           Objective    /84 (BP Location: Right arm, Patient Position: Chair, Cuff Size: Adult Regular)   Pulse 64   Temp 97.8  F (36.6  C) (Oral)   Wt 74.6 kg (164 lb 6.4 oz)   LMP  (LMP Unknown)   SpO2 99%   BMI 25.35 kg/m    Body mass index is 25.35 kg/m .  Physical Exam   GENERAL: healthy, alert and no distress  PSYCH: mentation appears normal, affect flat, tearful, anxious, poor eye contact, bouncing her knees constantly for the whole visit

## 2021-12-01 NOTE — LETTER
12/1/2021         RE: Nicole L Fritsche  [address]  [place] [address]        Dear Colleague,    Thank you for referring your patient, Nicole L Fritsche, to the [facility]. Please see a copy of my visit note below.    No notes on file    Again, thank you for allowing me to participate in the care of your patient.        Sincerely,        Hasmukh Hernandez MD

## 2021-12-01 NOTE — LETTER
"2021      RE: Nicole L Fritsche  [address]  [place] [address]                   Follow Up Notes on Referred Patient    Date: 2021       Dr. Rosio Harding, APRN CNP  [address]  [place] [address]       RE: Nicole L Fritsche  : 1970  DAVID: 2021    Dear Dr. Rosio Harding:    Nicole L Fritsche is a 51 year old woman with a diagnosis of  .   She is here today for ***.     ***    Review of Systems:    Systemic           {yes - /no :098358::\"no weight changes; no fever; no chills; no night sweats; no appetite changes\"}  Skin           {yes - /no :708406::\"no rashes, or lesions\"}  Eye           {yes - /no :516944::\"no irritation; no changes in vision\"}  Temo-Laryngeal           {yes - /no :435142::\"no dysphagia; no hoarseness\"}   Pulmonary    {yes - /no :157124::\"no cough; no shortness of breath\"}  Cardiovascular    {yes - /no :962309::\"no chest pain; no palpitations\"}  Gastrointestinal    {yes - /no :605372::\"no diarrhea; no constipation; no abdominal pain; no changes in bowel  habits; no blood in stool\"}  Genitourinary   {yes - /no :881770::\"no urinary frequency; no urinary urgency; no dysuria; no pain; no abnormal vaginal discharge; no abnormal vaginal bleeding\"}  Breast   {yes - /no :943468::\"no breast discharge; no breast changes; no breast pain\"}  Musculoskeletal    {yes - /no :545036::\"no myalgias; no arthralgias; no back pain\"}  Psychiatric           {yes - /no :207999::\"no depressed mood; no anxiety\"}    Hematologic           {yes - /no :016746::\"no tender lymph nodes; no noticeable swellings or lumps\"}   Endocrine    {yes - /no :049402::\"no hot flashes; no heat/cold intolerance\"}         Neurological   {yes - /no :877732::\"no tremor; no numbness and tingling; no headaches; no difficulty  sleeping\"}      Past Medical History:    Past Medical History:   Diagnosis Date     Abnormal Pap smear of cervix 2019    see problem list     Arrested " hydrocephalus (H) 2/3/2009    Ongoing HAs, seeing neurosurgeon. Per past notes from Dr. Resendiz, HAs most likely not secondary to the hydrocephalus, but rather vascular inorgin.  Please see scanned in records.     Asthma      Cervical cancer (H)      Cervical high risk HPV (human papillomavirus) test positive 05/21/2019    See problem list     Current moderate episode of major depressive disorder without prior episode (H) 12/1/2021     Hypothyroidism, unspecified type 12/1/2021         Past Surgical History:    Past Surgical History:   Procedure Laterality Date     C VENTRICULO-CISTERNOSTOMY,3RD VENT      for treatment of HA related to hydrocephalus     COLONOSCOPY N/A 12/28/2020    Procedure: COLONOSCOPY, WITH  polypectomy;  Surgeon: Steven Vazquez MD;  Location: [facility] OR     ESOPHAGOSCOPY, GASTROSCOPY, DUODENOSCOPY (EGD), COMBINED N/A 7/13/2020    Procedure: ESOPHAGOGASTRODUODENOSCOPY, WITH BIOPSY;  Surgeon: Steven Vazquez MD;  [facility] OR     EXAM UNDER ANESTHESIA, INSERT ANN MARIE SLEEVE, UTERINE PLACEMENT OF TANDEM AND RING FOR RAD, ULTRASOUND N/A 8/30/2019    Procedure: Ann Marie Sleeve Insertion, Ultrasound Guided;  Surgeon: Anne Tidwell MD;  Location: [facility] OR     EXCISE MASS TRUNK Left 9/16/2015    Procedure: EXCISE MASS TRUNK;  Surgeon: Carlos Enrique Briones MD;  Location: [facility] OR         Health Maintenance Due   Topic Date Due     DEPRESSION ACTION PLAN  Never done     Pneumococcal Vaccine: Pediatrics (0 to 5 Years) and At-Risk Patients (6 to 64 Years) (2 of 4 - PCV13) 06/17/2015     ASTHMA ACTION PLAN  09/08/2016     PREVENTIVE CARE VISIT  05/01/2020     LUNG CANCER SCREENING  04/20/2021     INFLUENZA VACCINE (1) 09/01/2021     ASTHMA CONTROL TEST  12/28/2021     HPV TEST  02/08/2022     PAP  02/08/2022       Current Medications:     Current Outpatient Medications   Medication Sig Dispense Refill     Acetaminophen (MIDOL PO) Take by mouth as needed       albuterol (PROAIR HFA/PROVENTIL HFA/VENTOLIN HFA) 108  (90 Base) MCG/ACT inhaler Inhale 2 puffs into the lungs every 6 hours as needed for shortness of breath / dyspnea or wheezing 54 g 0     cholecalciferol (VITAMIN D3) 125 mcg (5000 units) capsule Take 1 capsule (125 mcg) by mouth daily 100 capsule 5     Ferrous Sulfate (IRON SUPPLEMENT PO) Take 325 mg by mouth 2 times daily (with meals)       FLUoxetine (PROZAC) 20 MG capsule Take 1 capsule (20 mg) by mouth daily 90 capsule 1     fluticasone (FLONASE) 50 MCG/ACT nasal spray Spray 1-2 sprays into both nostrils daily 16 g 3     fluticasone (FLOVENT HFA) 110 MCG/ACT inhaler Inhale 2 puffs into the lungs 2 times daily 12 g 4     levothyroxine (SYNTHROID/LEVOTHROID) 25 MCG tablet Take 1 tablet (25 mcg) by mouth daily 90 tablet 1     Multiple Vitamin (MULTIVITAMINS PO) Take  by mouth daily.           Allergies:      No Known Allergies     Social History:     Social History     Tobacco Use     Smoking status: Former Smoker     Packs/day: 0.25     Years: 20.00     Pack years: 5.00     Types: Cigarettes     Quit date: 2/18/2018     Years since quitting: 3.7     Smokeless tobacco: Former User     Quit date: 5/25/2015   Substance Use Topics     Alcohol use: Not Currently     Alcohol/week: 0.0 standard drinks     Comment: once every 3-4 months       History   Drug Use No         Family History:     The patient's family history is notable for ***.    Family History   Problem Relation Age of Onset     Diabetes Mother      Cerebrovascular Disease Mother      Ovarian Cancer Mother      C.A.D. Father 40     Cerebrovascular Disease Father      C.A.D. Sister 62        stent placed     Ovarian Cancer Sister      Ovarian Cancer Sister          Physical Exam:     /84   Pulse 64   Temp 97.8  F (36.6  C) (Oral)   Resp 18   Wt 74.6 kg (164 lb 7.4 oz)   LMP  (LMP Unknown)   SpO2 99%   BMI 25.36 kg/m    Body mass index is 25.36 kg/m .    General Appearance: healthy and alert, no distress     HEENT:  no thyromegaly, no palpable  nodules or masses        Cardiovascular: regular rate and rhythm, no gallops, rubs or murmurs     Respiratory: lungs clear, no rales, rhonchi or wheezes, normal diaphragmatic excursion    Musculoskeletal: extremities non tender and without edema    Skin: no lesions or rashes     Neurological: normal gait, no gross defects     Psychiatric: appropriate mood and affect                               Hematological: normal cervical, supraclavicular and inguinal lymph nodes     Gastrointestinal:       abdomen soft, non-tender, non-distended, no organomegaly or masses    Genitourinary: External genitalia and urethral meatus appears normal.  Vagina is smooth without nodularity or masses.  Cervix appears normal and without lesions.  Bimanual exam reveal no masses, nodularity or fullness.  Recto-vaginal exam confirms these findings.      Assessment:    Nicole L Fritsche is a 51 year old woman with a diagnosis of ***.     A total of *** minutes was spent with the patient, *** minutes of which were spent in counseling the patient and/or treatment planning.      Plan:     1.)   ***     2.) Genetic risk factors were assessed and the patient does not meet the qualifications for a referral.      3.) Labs and/or tests ordered include:  ***.     4.) Health maintenance issues addressed today include ***.      CC  Patient Care Team:  Gin Johns DO as PCP - General (Family Medicine)  Rosio Chambers APRN CNP as Assigned Cancer Care Provider  Luz Lin NP as Assigned PCP  Tiff Loera MD as Assigned OBGYN Provider  ROSIO CHAMBERS MD

## 2021-12-01 NOTE — LETTER
"2021       RE: Nicole L Fritsche  [address]  [place] [address]     Dear Colleague,    Thank you for referring your patient, Nicole L Fritsche, [facility]. Please see a copy of my visit note below.                Follow Up Notes on Referred Patient    Date: 2021       Dr. Rosio Harding, APRN [facility]  [address]  [place] [address]       RE: Nicole L Fritsche  : 1970  DAVID: 2021    Dear Dr. Rosio Harding:    Nicole L Fritsche is a 51 year old woman with a diagnosis of  .   She is here today for ***.     ***    Review of Systems:    Systemic           {yes - /no :923492::\"no weight changes; no fever; no chills; no night sweats; no appetite changes\"}  Skin           {yes - /no :224999::\"no rashes, or lesions\"}  Eye           {yes - /no :274007::\"no irritation; no changes in vision\"}  Temo-Laryngeal           {yes - /no :032650::\"no dysphagia; no hoarseness\"}   Pulmonary    {yes - /no :395112::\"no cough; no shortness of breath\"}  Cardiovascular    {yes - /no :275607::\"no chest pain; no palpitations\"}  Gastrointestinal    {yes - /no :628216::\"no diarrhea; no constipation; no abdominal pain; no changes in bowel  habits; no blood in stool\"}  Genitourinary   {yes - /no :382218::\"no urinary frequency; no urinary urgency; no dysuria; no pain; no abnormal vaginal discharge; no abnormal vaginal bleeding\"}  Breast   {yes - /no :010007::\"no breast discharge; no breast changes; no breast pain\"}  Musculoskeletal    {yes - /no :234038::\"no myalgias; no arthralgias; no back pain\"}  Psychiatric           {yes - /no :072780::\"no depressed mood; no anxiety\"}    Hematologic           {yes - /no :947068::\"no tender lymph nodes; no noticeable swellings or lumps\"}   Endocrine    {yes - /no :004211::\"no hot flashes; no heat/cold intolerance\"}         Neurological   {yes - /no " ":052178::\"no tremor; no numbness and tingling; no headaches; no difficulty  sleeping\"}      Past Medical History:    Past Medical History:   Diagnosis Date     Abnormal Pap smear of cervix 05/21/2019    see problem list     Arrested hydrocephalus (H) 2/3/2009    Ongoing HAs, seeing neurosurgeon. Per past notes from Dr. Resendiz, HAs most likely not secondary to the hydrocephalus, but rather vascular inorgin.  Please see scanned in records.     Asthma      Cervical cancer (H)      Cervical high risk HPV (human papillomavirus) test positive 05/21/2019    See problem list     Current moderate episode of major depressive disorder without prior episode (H) 12/1/2021     Hypothyroidism, unspecified type 12/1/2021         Past Surgical History:    Past Surgical History:   Procedure Laterality Date     C VENTRICULO-CISTERNOSTOMY,3RD VENT      for treatment of HA related to hydrocephalus     COLONOSCOPY N/A 12/28/2020    Procedure: COLONOSCOPY, WITH  polypectomy;  Surgeon: Steven Vazquez MD;  Location: [facility] OR     ESOPHAGOSCOPY, GASTROSCOPY, DUODENOSCOPY (EGD), COMBINED N/A 7/13/2020    Procedure: ESOPHAGOGASTRODUODENOSCOPY, WITH BIOPSY;  Surgeon: Steven Vazquez MD;  [facility]     EXAM UNDER ANESTHESIA, INSERT ANN MARIE SLEEVE, UTERINE PLACEMENT OF TANDEM AND RING FOR RAD, ULTRASOUND N/A 8/30/2019    Procedure: Ann Marie Sleeve Insertion, Ultrasound Guided;  Surgeon: Anne Tidwell MD;  Location: [facility] OR     EXCISE MASS TRUNK Left 9/16/2015    Procedure: EXCISE MASS TRUNK;  Surgeon: Carlos Enrique Briones MD;  Location: [facility] OR         Health Maintenance Due   Topic Date Due     DEPRESSION ACTION PLAN  Never done     Pneumococcal Vaccine: Pediatrics (0 to 5 Years) and At-Risk Patients (6 to 64 Years) (2 of 4 - PCV13) 06/17/2015     ASTHMA ACTION PLAN  09/08/2016     PREVENTIVE CARE VISIT  05/01/2020     LUNG CANCER SCREENING  04/20/2021     INFLUENZA VACCINE (1) 09/01/2021     ASTHMA CONTROL TEST  12/28/2021     HPV TEST  " 02/08/2022     PAP  02/08/2022       Current Medications:     Current Outpatient Medications   Medication Sig Dispense Refill     Acetaminophen (MIDOL PO) Take by mouth as needed       albuterol (PROAIR HFA/PROVENTIL HFA/VENTOLIN HFA) 108 (90 Base) MCG/ACT inhaler Inhale 2 puffs into the lungs every 6 hours as needed for shortness of breath / dyspnea or wheezing 54 g 0     cholecalciferol (VITAMIN D3) 125 mcg (5000 units) capsule Take 1 capsule (125 mcg) by mouth daily 100 capsule 5     Ferrous Sulfate (IRON SUPPLEMENT PO) Take 325 mg by mouth 2 times daily (with meals)       FLUoxetine (PROZAC) 20 MG capsule Take 1 capsule (20 mg) by mouth daily 90 capsule 1     fluticasone (FLONASE) 50 MCG/ACT nasal spray Spray 1-2 sprays into both nostrils daily 16 g 3     fluticasone (FLOVENT HFA) 110 MCG/ACT inhaler Inhale 2 puffs into the lungs 2 times daily 12 g 4     levothyroxine (SYNTHROID/LEVOTHROID) 25 MCG tablet Take 1 tablet (25 mcg) by mouth daily 90 tablet 1     Multiple Vitamin (MULTIVITAMINS PO) Take  by mouth daily.           Allergies:      No Known Allergies     Social History:     Social History     Tobacco Use     Smoking status: Former Smoker     Packs/day: 0.25     Years: 20.00     Pack years: 5.00     Types: Cigarettes     Quit date: 2/18/2018     Years since quitting: 3.7     Smokeless tobacco: Former User     Quit date: 5/25/2015   Substance Use Topics     Alcohol use: Not Currently     Alcohol/week: 0.0 standard drinks     Comment: once every 3-4 months       History   Drug Use No         Family History:     The patient's family history is notable for ***.    Family History   Problem Relation Age of Onset     Diabetes Mother      Cerebrovascular Disease Mother      Ovarian Cancer Mother      C.A.D. Father 40     Cerebrovascular Disease Father      C.A.D. Sister 62        stent placed     Ovarian Cancer Sister      Ovarian Cancer Sister          Physical Exam:     /84   Pulse 64   Temp 97.8  F  (36.6  C) (Oral)   Resp 18   Wt 74.6 kg (164 lb 7.4 oz)   LMP  (LMP Unknown)   SpO2 99%   BMI 25.36 kg/m    Body mass index is 25.36 kg/m .    General Appearance: healthy and alert, no distress     HEENT:  no thyromegaly, no palpable nodules or masses        Cardiovascular: regular rate and rhythm, no gallops, rubs or murmurs     Respiratory: lungs clear, no rales, rhonchi or wheezes, normal diaphragmatic excursion    Musculoskeletal: extremities non tender and without edema    Skin: no lesions or rashes     Neurological: normal gait, no gross defects     Psychiatric: appropriate mood and affect                               Hematological: normal cervical, supraclavicular and inguinal lymph nodes     Gastrointestinal:       abdomen soft, non-tender, non-distended, no organomegaly or masses    Genitourinary: External genitalia and urethral meatus appears normal.  Vagina is smooth without nodularity or masses.  Cervix appears normal and without lesions.  Bimanual exam reveal no masses, nodularity or fullness.  Recto-vaginal exam confirms these findings.      Assessment:    Nicole L Fritsche is a 51 year old woman with a diagnosis of ***.     A total of *** minutes was spent with the patient, *** minutes of which were spent in counseling the patient and/or treatment planning.      Plan:     1.)   ***     2.) Genetic risk factors were assessed and the patient does not meet the qualifications for a referral.      3.) Labs and/or tests ordered include:  ***.     4.) Health maintenance issues addressed today include ***.      CC  Patient Care Team:  Gin Johns DO as PCP - General (Family Medicine)  Rosio Chambers APRN CNP as Assigned Cancer Care Provider  Luz Lin NP as Assigned PCP  Tiff Loera MD as Assigned OBGYN Provider  ROSIO CHAMBERS      Again, thank you for allowing me to participate in the care of your patient.      Sincerely,    Hasmukh Hernandez MD

## 2021-12-01 NOTE — LETTER
2021      RE: Nicole L Fritsche  [address]  [place] [address]                   Follow Up Notes on Referred Patient    Date: 2021       Dr. Rosio Harding, APRN CNP  [address]  [place] [address]       RE: Nicole L Fritsche  : 1970  DAVID: 2021    Nicole L Fritsche is a 51 year old woman with a history of stage IIB cervical cancer. She completed chemoradiation and brachytherapy 2019. She is here today for a surveillance visit.     Oncology history:   2019: Pap ASCUS. HPV 18+  19: Cervical biopsy, 4 o'clock     FINAL DIAGNOSIS:   Cervix, 4:00, biopsy:   - INVASIVE WELL-DIFFERENTIATED SQUAMOUS CELL CARCINOMA, focally keratinizing   - High grade squamous intraepithelial lesion (cervical intraepithelial neoplasia 3)      19: PET CT IMPRESSION: In this patient with newly diagnosed cervical cancer:  1. 3.7 cm cervical lesion with a max SUV of 13.16.   2. Focal uptake in the endometrium, favor adenomyosis over metastatic disease. Adenomyosis is demonstrated on MRI 2019 and can be hypermetabolic in a premenopausal woman.  3. Small left periaortic retroperitoneal lymph node borderline SUV uptake, inflammatory versus metastatic. Attention on follow-up.   4. Small left inguinal lymph nodes with SUV max of 2.45, likely inflammatory.     19-19: Cisplatin + radiation; brachytherapy     2020: PET IMPRESSION: In this patient with history of cervical cancer status post chemoradiation, there is evidence of complete treatment response, although the sensitivity of this PET/CT may be compromised secondary to diffuse muscular activation.      1. No suspicious FDG activity or CT finding in the uterus, cervix, and vagina. No FDG avid adenopathy.   2. Mild mucosal thickening and FDG avidity in the short segment of the proximal lesser curvature of the stomach, which may represents segmental gastritis in the correct clinical setting. Recommend  correlation with endoscopy.  3. Small focus of decreased FDG uptake in the right frontal gyrus which may represents encephalomalacia. Consider further evaluation with brain MRI.     7/20/2020: MR brain Impression:  The previous PET/CT demonstrated decreased FDG uptake in the right     2/8/21: Pap NIL.     7/7/21: US IMPRESSION:   1. Right ovarian simple cyst measuring up to 6.5 cm and is increased from 3 cm in 2019. In postmenopausal patients, follow-up ultrasound in 3-6 months is recommended for simple cysts greater than 5 cm. Consider  OB/GYN consultation patient is symptomatic.  2. Small amount of fluid in the endometrium and pelvis may be physiologic.           Today she comes to clinic and denies concerns for her visit. She did have spotting in June and her urine had some blood in it; she was treated for BV at that time and also had an US which showed an increase in a right ovarian simple cyst 6.5 cm for which she was referred to OBGyn and has an appointment in a month. She denies any spotting since that time. She is sexually active and uses a lubricant; she is also using her dilator about 2 times per week for 3-4 minutes; she denies spotting with either. She is current with her health screenings. She denies any changes in her bowel habits, no nausea/emesis, no lower extremity edema, and no difficulties eating or sleeping. She denies any abdominal discomfort/bloating, no fevers or chills, and no chest pain or shortness of breath.       Past Medical History:    Past Medical History:   Diagnosis Date     Abnormal Pap smear of cervix 05/21/2019    see problem list     Arrested hydrocephalus (H) 2/3/2009    Ongoing HAs, seeing neurosurgeon. Per past notes from Dr. Resendiz, HAs most likely not secondary to the hydrocephalus, but rather vascular inorgin.  Please see scanned in records.     Asthma      Cervical cancer (H)      Cervical high risk HPV (human papillomavirus) test positive 05/21/2019    See problem list      Current moderate episode of major depressive disorder without prior episode (H) 12/1/2021     Hypothyroidism, unspecified type 12/1/2021         Past Surgical History:    Past Surgical History:   Procedure Laterality Date     C VENTRICULO-CISTERNOSTOMY,3RD VENT      for treatment of HA related to hydrocephalus     COLONOSCOPY N/A 12/28/2020    Procedure: COLONOSCOPY, WITH  polypectomy;  Surgeon: Steven Vazquez MD;  Location: [facility] OR     ESOPHAGOSCOPY, GASTROSCOPY, DUODENOSCOPY (EGD), COMBINED N/A 7/13/2020    Procedure: ESOPHAGOGASTRODUODENOSCOPY, WITH BIOPSY;  Surgeon: Steven Vazquez MD;  [facility]     EXAM UNDER ANESTHESIA, INSERT ANN MARIE SLEEVE, UTERINE PLACEMENT OF TANDEM AND RING FOR RAD, ULTRASOUND N/A 8/30/2019    Procedure: Ann Marie Sleeve Insertion, Ultrasound Guided;  Surgeon: Anne Tidwell MD;  Location: [facility] OR     EXCISE MASS TRUNK Left 9/16/2015    Procedure: EXCISE MASS TRUNK;  Surgeon: Carlos Enrique Briones MD;  Location: [facility] OR         Health Maintenance Due   Topic Date Due     DEPRESSION ACTION PLAN  Never done     Pneumococcal Vaccine: Pediatrics (0 to 5 Years) and At-Risk Patients (6 to 64 Years) (2 of 4 - PCV13) 06/17/2015     ASTHMA ACTION PLAN  09/08/2016     PREVENTIVE CARE VISIT  05/01/2020     LUNG CANCER SCREENING  04/20/2021     INFLUENZA VACCINE (1) 09/01/2021     ASTHMA CONTROL TEST  12/28/2021     HPV TEST  02/08/2022     PAP  02/08/2022       Current Medications:     Current Outpatient Medications   Medication Sig Dispense Refill     Acetaminophen (MIDOL PO) Take by mouth as needed       albuterol (PROAIR HFA/PROVENTIL HFA/VENTOLIN HFA) 108 (90 Base) MCG/ACT inhaler Inhale 2 puffs into the lungs every 6 hours as needed for shortness of breath / dyspnea or wheezing 54 g 0     cholecalciferol (VITAMIN D3) 125 mcg (5000 units) capsule Take 1 capsule (125 mcg) by mouth daily 100 capsule 5     Ferrous Sulfate (IRON SUPPLEMENT PO) Take 325 mg by mouth 2 times daily (with meals)        FLUoxetine (PROZAC) 20 MG capsule Take 1 capsule (20 mg) by mouth daily 90 capsule 1     fluticasone (FLONASE) 50 MCG/ACT nasal spray Spray 1-2 sprays into both nostrils daily 16 g 3     fluticasone (FLOVENT HFA) 110 MCG/ACT inhaler Inhale 2 puffs into the lungs 2 times daily 12 g 4     levothyroxine (SYNTHROID/LEVOTHROID) 25 MCG tablet Take 1 tablet (25 mcg) by mouth daily 90 tablet 1     Multiple Vitamin (MULTIVITAMINS PO) Take  by mouth daily.           Allergies:      No Known Allergies     Social History:     Social History     Tobacco Use     Smoking status: Former Smoker     Packs/day: 0.25     Years: 20.00     Pack years: 5.00     Types: Cigarettes     Quit date: 2/18/2018     Years since quitting: 3.7     Smokeless tobacco: Former User     Quit date: 5/25/2015   Substance Use Topics     Alcohol use: Not Currently     Alcohol/week: 0.0 standard drinks     Comment: once every 3-4 months       History   Drug Use No         Family History:       Family History   Problem Relation Age of Onset     Diabetes Mother      Cerebrovascular Disease Mother      Ovarian Cancer Mother      C.A.D. Father 40     Cerebrovascular Disease Father      C.A.D. Sister 62        stent placed     Ovarian Cancer Sister      Ovarian Cancer Sister          Physical Exam:     /84   Pulse 64   Temp 97.8  F (36.6  C) (Oral)   Resp 18   Wt 74.6 kg (164 lb 7.4 oz)   LMP  (LMP Unknown)   SpO2 99%   BMI 25.36 kg/m    Body mass index is 25.36 kg/m .    General Appearance:  healthy and alert, no distress     Musculoskeletal:         extremities non tender and without edema     Neurological:   normal gait, no gross defects                Psychiatric:      appropriate mood and affect                               Hematological:            normal cervical, supraclavicular and inguinal lymph nodes                Gastrointestinal:          abdomen soft, non-tender, non-distended, no organomegaly or masses     Genitourinary: External  genitalia and urethral meatus appears normal.  Vagina is smooth without nodularity or masses.  Cervix appears normal and without lesions.  Bimanual exam reveal no masses, nodularity or fullness.  Recto-vaginal exam confirms these findings. Papa smear was taken.        Assessment:     Nicole Fritsche is a 51 year old woman with a diagnosis of stage IIB cervical cancer.      A total of 40 minutes was spent with the patient, 30 minutes of which were spent in counseling the patient and/or treatment planning.        1.  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   2.  Status post definitive chemoradiation.     3.  HÉCTOR  4. Simple 6.5 cm ovarian cyst  5. Episode of vaginal spotting     We will obtain a pelvic MRI to evaluate the cyst and possible vaginal spotting and follow up accordingly.  Otherwise, we will see her back every 3-4 months for the first 2 years and then every 6 months for the next 3 years and then yearly once we get to year 5.  The patient agrees with the plan, is very appreciative of her care.  All questions were answered.     Hasmukh Hernandez MD, MS    Department of Obstetrics and Gynecology   Division of Gynecologic Oncology   [facility]  Phone: [phone]    CC  Patient Care Team:  Gin Johns DO as PCP - General (Family Medicine)  Rosio Chambers APRN CNP as Assigned Cancer Care Provider  Luz Lin NP as Assigned PCP  Tiff Loera MD as Assigned OBGYN Provider  ROSIO CHAMBERS MD

## 2021-12-01 NOTE — NURSING NOTE
"Oncology Rooming Note    December 1, 2021 3:55 PM   Nicole L Fritsche is a 51 year old female who presents for:    Chief Complaint   Patient presents with     Oncology Clinic Visit     Cervix cancer      Initial Vitals: /84   Pulse 64   Temp 97.8  F (36.6  C) (Oral)   Resp 18   Wt 74.6 kg (164 lb 7.4 oz)   LMP  (LMP Unknown)   SpO2 99%   BMI 25.36 kg/m   Estimated body mass index is 25.36 kg/m  as calculated from the following:    Height as of 6/28/21: 1.715 m (5' 7.52\").    Weight as of this encounter: 74.6 kg (164 lb 7.4 oz). Body surface area is 1.89 meters squared.  Data Unavailable Comment: Data Unavailable   No LMP recorded (lmp unknown). Patient is postmenopausal.  Allergies reviewed: Yes  Medications reviewed: Yes    Medications: Medication refills not needed today.  Pharmacy name entered into [facility]: [id number] [place] [address] [place]    Clinical concerns: None       Madyson Shetty LPN            "

## 2021-12-02 LAB
T4 FREE SERPL-MCNC: 1.05 NG/DL (ref 0.76–1.46)
TSH SERPL DL<=0.005 MIU/L-ACNC: 6.62 MU/L (ref 0.4–4)

## 2021-12-02 ASSESSMENT — ANXIETY QUESTIONNAIRES: GAD7 TOTAL SCORE: 17

## 2021-12-02 ASSESSMENT — PATIENT HEALTH QUESTIONNAIRE - PHQ9: SUM OF ALL RESPONSES TO PHQ QUESTIONS 1-9: 12

## 2021-12-06 LAB
BKR LAB AP GYN ADEQUACY: NORMAL
BKR LAB AP GYN INTERPRETATION: NORMAL
BKR LAB AP HPV REFLEX: NO
BKR LAB AP PREVIOUS ABNL DX: NORMAL
BKR LAB AP PREVIOUS ABNORMAL: NORMAL
PATH REPORT.COMMENTS IMP SPEC: NORMAL
PATH REPORT.COMMENTS IMP SPEC: NORMAL
PATH REPORT.RELEVANT HX SPEC: NORMAL

## 2021-12-08 PROBLEM — Z85.41 PERSONAL HISTORY OF MALIGNANT NEOPLASM OF CERVIX UTERI: Status: ACTIVE | Noted: 2021-12-08

## 2021-12-08 PROBLEM — N83.201 RIGHT OVARIAN CYST: Status: ACTIVE | Noted: 2021-12-08

## 2022-01-03 ENCOUNTER — PATIENT OUTREACH (OUTPATIENT)
Dept: ONCOLOGY | Facility: CLINIC | Age: 52
End: 2022-01-03
Payer: COMMERCIAL

## 2022-01-03 NOTE — TELEPHONE ENCOUNTER
Patient has chosen to delay follow up MRI and MD appointment until February due to deaths in the family.     Ayde Starkey RN

## 2022-01-15 ENCOUNTER — HEALTH MAINTENANCE LETTER (OUTPATIENT)
Age: 52
End: 2022-01-15

## 2022-02-08 ENCOUNTER — ANCILLARY PROCEDURE (OUTPATIENT)
Dept: MRI IMAGING | Facility: CLINIC | Age: 52
End: 2022-02-08
Attending: OBSTETRICS & GYNECOLOGY
Payer: COMMERCIAL

## 2022-02-08 DIAGNOSIS — C53.1 MALIGNANT NEOPLASM OF EXOCERVIX (H): ICD-10-CM

## 2022-02-08 PROCEDURE — A9585 GADOBUTROL INJECTION: HCPCS | Performed by: RADIOLOGY

## 2022-02-08 PROCEDURE — 72197 MRI PELVIS W/O & W/DYE: CPT | Performed by: RADIOLOGY

## 2022-02-08 RX ORDER — GADOBUTROL 604.72 MG/ML
7.5 INJECTION INTRAVENOUS ONCE
Status: COMPLETED | OUTPATIENT
Start: 2022-02-08 | End: 2022-02-08

## 2022-02-08 RX ADMIN — GADOBUTROL 7.5 ML: 604.72 INJECTION INTRAVENOUS at 15:19

## 2022-02-08 NOTE — DISCHARGE INSTRUCTIONS
MRI Contrast Discharge Instructions    The IV contrast you received today will pass out of your body in your  urine. This will happen in the next 24 hours. You will not feel this process.  Your urine will not change color.    Drink at least 4 extra glasses of water or juice today (unless your doctor  has restricted your fluids). This reduces the stress on your kidneys.  You may take your regular medicines.    If you are on dialysis: It is best to have dialysis today.    If you have a reaction: Most reactions happen right away. If you have  any new symptoms after leaving the hospital (such as hives or swelling),  call your hospital at the correct number below. Or call your family doctor.  If you have breathing distress or wheezing, call 911.    Special instructions: ***    I have read and understand the above information.    Signature:______________________________________ Date:___________    Staff:__________________________________________ Date:___________     Time:__________    [place] [facility] Departments:    ___[place]: [phone]  ___[facility]: [phone]  ___[place]: [phone] ___[facility]: [phone]  ___[facility]: [phone]  ___[facility]: [phone]  ___[phone]  ___[facility]: [phone]  ___[phone]

## 2022-02-09 ENCOUNTER — VIRTUAL VISIT (OUTPATIENT)
Dept: ONCOLOGY | Facility: CLINIC | Age: 52
End: 2022-02-09
Attending: OBSTETRICS & GYNECOLOGY
Payer: COMMERCIAL

## 2022-02-09 DIAGNOSIS — C53.1 MALIGNANT NEOPLASM OF EXOCERVIX (H): Primary | ICD-10-CM

## 2022-02-09 PROCEDURE — 99214 OFFICE O/P EST MOD 30 MIN: CPT | Mod: GT | Performed by: OBSTETRICS & GYNECOLOGY

## 2022-02-09 NOTE — PROGRESS NOTES
Cassi is a 51 year old who is being evaluated via a billable video visit.      How would you like to obtain your AVS? MyChart  If the video visit is dropped, the invitation should be resent by: Text to cell phone: [phone]  Will anyone else be joining your video visit? Cici Burris    Video-Visit Details    30 minutes                Follow Up Notes on Referred Patient    Date: 2022       Dr. Jonatan Garcia MD  No address on file       RE: Nicole L Fritsche  : 1970  DAVID: 2022    Nicole L Fritsche is a 51 year old woman with a history of stage IIB cervical cancer. She completed chemoradiation and brachytherapy 2019. She is here today for for follow up of a work up of ovarian cyst and pelvic cramping, which she still has with occasional spotting that does appear to be cyclic.     Oncology history:   2019: Pap ASCUS. HPV 18+  19: Cervical biopsy, 4 o'clock     FINAL DIAGNOSIS:   Cervix, 4:00, biopsy:   - INVASIVE WELL-DIFFERENTIATED SQUAMOUS CELL CARCINOMA, focally keratinizing   - High grade squamous intraepithelial lesion (cervical intraepithelial neoplasia 3)      19: PET CT IMPRESSION: In this patient with newly diagnosed cervical cancer:  1. 3.7 cm cervical lesion with a max SUV of 13.16.   2. Focal uptake in the endometrium, favor adenomyosis over metastatic disease. Adenomyosis is demonstrated on MRI 2019 and can be hypermetabolic in a premenopausal woman.  3. Small left periaortic retroperitoneal lymph node borderline SUV uptake, inflammatory versus metastatic. Attention on follow-up.   4. Small left inguinal lymph nodes with SUV max of 2.45, likely inflammatory.     19-19: Cisplatin + radiation; brachytherapy     2020: PET IMPRESSION: In this patient with history of cervical cancer status post chemoradiation, there is evidence of complete treatment response, although the sensitivity of this PET/CT may be compromised secondary to diffuse muscular  activation.      1. No suspicious FDG activity or CT finding in the uterus, cervix, and vagina. No FDG avid adenopathy.   2. Mild mucosal thickening and FDG avidity in the short segment of the proximal lesser curvature of the stomach, which may represents segmental gastritis in the correct clinical setting. Recommend correlation with endoscopy.  3. Small focus of decreased FDG uptake in the right frontal gyrus which may represents encephalomalacia. Consider further evaluation with brain MRI.     7/20/2020: MR brain Impression:  The previous PET/CT demonstrated decreased FDG uptake in the right     2/8/21: Pap NIL.     7/7/21: US IMPRESSION:   1. Right ovarian simple cyst measuring up to 6.5 cm and is increased from 3 cm in 2019. In postmenopausal patients, follow-up ultrasound in 3-6 months is recommended for simple cysts greater than 5 cm. Consider  OB/GYN consultation patient is symptomatic.  2. Small amount of fluid in the endometrium and pelvis may be physiologic.           Today she comes to clinic and denies concerns for her visit. She did have spotting in June and her urine had some blood in it; she was treated for BV at that time and also had an US which showed an increase in a right ovarian simple cyst 6.5 cm for which she was referred to OBGyn and has an appointment in a month. She denies any spotting since that time. She is sexually active and uses a lubricant; she is also using her dilator about 2 times per week for 3-4 minutes; she denies spotting with either. She is current with her health screenings. She denies any changes in her bowel habits, no nausea/emesis, no lower extremity edema, and no difficulties eating or sleeping. She denies any abdominal discomfort/bloating, no fevers or chills, and no chest pain or shortness of breath.      Past Medical History:    Past Medical History:   Diagnosis Date     Abnormal Pap smear of cervix 05/21/2019    see problem list     Arrested hydrocephalus (H)  2/3/2009    Ongoing HAs, seeing neurosurgeon. Per past notes from Dr. Resendiz, HAs most likely not secondary to the hydrocephalus, but rather vascular inorgin.  Please see scanned in records.     Asthma      Cervical cancer (H)      Cervical high risk HPV (human papillomavirus) test positive 05/21/2019    See problem list     Current moderate episode of major depressive disorder without prior episode (H) 12/1/2021     Hypothyroidism, unspecified type 12/1/2021         Past Surgical History:    Past Surgical History:   Procedure Laterality Date     COLONOSCOPY N/A 12/28/2020    Procedure: COLONOSCOPY, WITH  polypectomy;  Surgeon: Steven Vazquez MD;  Location: [facility] OR     ESOPHAGOSCOPY, GASTROSCOPY, DUODENOSCOPY (EGD), COMBINED N/A 7/13/2020    Procedure: ESOPHAGOGASTRODUODENOSCOPY, WITH BIOPSY;  Surgeon: Steven Vazquez MD;  [facility]     EXAM UNDER ANESTHESIA, INSERT ANN MARIE SLEEVE, UTERINE PLACEMENT OF TANDEM AND RING FOR RAD, ULTRASOUND N/A 8/30/2019    Procedure: Ann Marie Sleeve Insertion, Ultrasound Guided;  Surgeon: Anne Tidwell MD;  Location: [facility] OR     EXCISE MASS TRUNK Left 9/16/2015    Procedure: EXCISE MASS TRUNK;  Surgeon: Carlos Enrique Briones MD;  Location: [facility] OR     ZZC VENTRICULO-CISTERNOSTOMY,3RD VENT      for treatment of HA related to hydrocephalus         Health Maintenance Due   Topic Date Due     DEPRESSION ACTION PLAN  Never done     Pneumococcal Vaccine: Pediatrics (0 to 5 Years) and At-Risk Patients (6 to 64 Years) (2 of 4 - PCV13) 06/17/2015     ASTHMA ACTION PLAN  09/08/2016     PREVENTIVE CARE VISIT  05/01/2020     LUNG CANCER SCREENING  04/20/2021     INFLUENZA VACCINE (1) 09/01/2021     ASTHMA CONTROL TEST  12/28/2021       Current Medications:     Current Outpatient Medications   Medication Sig Dispense Refill     Acetaminophen (MIDOL PO) Take by mouth as needed       albuterol (PROAIR HFA/PROVENTIL HFA/VENTOLIN HFA) 108 (90 Base) MCG/ACT inhaler Inhale 2 puffs into the lungs  every 6 hours as needed for shortness of breath / dyspnea or wheezing 54 g 0     cholecalciferol (VITAMIN D3) 125 mcg (5000 units) capsule Take 1 capsule (125 mcg) by mouth daily 100 capsule 5     Ferrous Sulfate (IRON SUPPLEMENT PO) Take 325 mg by mouth 2 times daily (with meals)       FLUoxetine (PROZAC) 20 MG capsule Take 1 capsule (20 mg) by mouth daily 90 capsule 1     fluticasone (FLONASE) 50 MCG/ACT nasal spray Spray 1-2 sprays into both nostrils daily 16 g 3     fluticasone (FLOVENT HFA) 110 MCG/ACT inhaler Inhale 2 puffs into the lungs 2 times daily 12 g 4     levothyroxine (SYNTHROID/LEVOTHROID) 25 MCG tablet Take 1 tablet (25 mcg) by mouth daily 90 tablet 1     Multiple Vitamin (MULTIVITAMINS PO) Take  by mouth daily.           Allergies:      No Known Allergies     Social History:     Social History     Tobacco Use     Smoking status: Former Smoker     Packs/day: 0.25     Years: 20.00     Pack years: 5.00     Types: Cigarettes     Quit date: 2/18/2018     Years since quitting: 3.9     Smokeless tobacco: Former User     Quit date: 5/25/2015   Substance Use Topics     Alcohol use: Not Currently     Alcohol/week: 0.0 standard drinks     Comment: once every 3-4 months       History   Drug Use No         Family History:       Family History   Problem Relation Age of Onset     Diabetes Mother      Cerebrovascular Disease Mother      Ovarian Cancer Mother      C.A.D. Father 40     Cerebrovascular Disease Father      C.A.D. Sister 62        stent placed     Ovarian Cancer Sister      Ovarian Cancer Sister          Physical Exam:     LMP  (LMP Unknown)   There is no height or weight on file to calculate BMI.    General Appearance:  healthy and alert, no distress     Musculoskeletal:         extremities non tender and without edema     Neurological:   normal gait, no gross defects                Psychiatric:      appropriate mood and affect                               Hematological:            normal cervical,  supraclavicular and inguinal lymph nodes                Gastrointestinal:          abdomen soft, non-tender, non-distended, no organomegaly or masses     Genitourinary: External genitalia and urethral meatus appears normal.  Vagina is smooth without nodularity or masses.  Cervix appears normal and without lesions.  Bimanual exam reveal no masses, nodularity or fullness.  Recto-vaginal exam confirms these findings. Papa smear was taken.        Assessment:     Nicole Fritsche is a 51 year old woman with a diagnosis of stage IIB cervical cancer.      A total of 30 minutes was spent with the patient, 25 minutes of which were spent in counseling the patient and/or treatment planning.        1.  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   2.  Status post definitive chemoradiation.     3.  HÉCTOR  4. Simple 6.5 cm ovarian cyst decreased to 2cm  5. Episode of vaginal spotting     We discussed the pelvic MRI showed no cervical mass and suggestive of adenomyosis, cyst has decreased to 2 cm. We will repeat US  And follow up visit in 3 month, or earlier if needed. The patient agrees with the plan, is very appreciative of her care.  All questions were answered.      Hasmukh Hernandez MD, MS    Department of Obstetrics and Gynecology   Division of Gynecologic Oncology   [facility]  Phone: [phone]    CC  Patient Care Team:  Gin Johns DO as PCP - General (Family Medicine)  Rosio Harding APRN CNP as Assigned Cancer Care Provider  Tiff Loera MD as Assigned OBGYN Provider  Gin Johns DO as Assigned PCP  SELF, REFERRED

## 2022-02-09 NOTE — LETTER
2022         RE: Nicole L Fritsche  [address]  [place] [address]        Dear Colleague,    Thank you for referring your patient, Nicole L Fritsche, to the [facility]. Please see a copy of my visit note below.                Follow Up Notes on Referred Patient    Date: 2022         Dr. Jonatan Garcia MD  No address on file       RE: Nicole L Fritsche  : 1970  DAVID: 2022    Nicole L Fritsche is a 51 year old woman with a history of stage IIB cervical cancer. She completed chemoradiation and brachytherapy 2019. She is here today for for follow up of a work up of ovarian cyst and pelvic cramping, which she still has with occasional spotting that does appear to be cyclic.     Oncology history:   2019: Pap ASCUS. HPV 18+  19: Cervical biopsy, 4 o'clock     FINAL DIAGNOSIS:   Cervix, 4:00, biopsy:   - INVASIVE WELL-DIFFERENTIATED SQUAMOUS CELL CARCINOMA, focally keratinizing   - High grade squamous intraepithelial lesion (cervical intraepithelial neoplasia 3)      19: PET CT IMPRESSION: In this patient with newly diagnosed cervical cancer:  1. 3.7 cm cervical lesion with a max SUV of 13.16.   2. Focal uptake in the endometrium, favor adenomyosis over metastatic disease. Adenomyosis is demonstrated on MRI 2019 and can be hypermetabolic in a premenopausal woman.  3. Small left periaortic retroperitoneal lymph node borderline SUV uptake, inflammatory versus metastatic. Attention on follow-up.   4. Small left inguinal lymph nodes with SUV max of 2.45, likely inflammatory.     19-19: Cisplatin + radiation; brachytherapy     2020: PET IMPRESSION: In this patient with history of cervical cancer status post chemoradiation, there is evidence of complete treatment response, although the sensitivity of this PET/CT may be compromised secondary to diffuse muscular activation.      1. No suspicious FDG activity or CT finding in the uterus,  cervix, and vagina. No FDG avid adenopathy.   2. Mild mucosal thickening and FDG avidity in the short segment of the proximal lesser curvature of the stomach, which may represents segmental gastritis in the correct clinical setting. Recommend correlation with endoscopy.  3. Small focus of decreased FDG uptake in the right frontal gyrus which may represents encephalomalacia. Consider further evaluation with brain MRI.     7/20/2020: MR brain Impression:  The previous PET/CT demonstrated decreased FDG uptake in the right     2/8/21: Pap NIL.     7/7/21: US IMPRESSION:   1. Right ovarian simple cyst measuring up to 6.5 cm and is increased from 3 cm in 2019. In postmenopausal patients, follow-up ultrasound in 3-6 months is recommended for simple cysts greater than 5 cm. Consider  OB/GYN consultation patient is symptomatic.  2. Small amount of fluid in the endometrium and pelvis may be physiologic.           Today she comes to clinic and denies concerns for her visit. She did have spotting in June and her urine had some blood in it; she was treated for BV at that time and also had an US which showed an increase in a right ovarian simple cyst 6.5 cm for which she was referred to OBGyn and has an appointment in a month. She denies any spotting since that time. She is sexually active and uses a lubricant; she is also using her dilator about 2 times per week for 3-4 minutes; she denies spotting with either. She is current with her health screenings. She denies any changes in her bowel habits, no nausea/emesis, no lower extremity edema, and no difficulties eating or sleeping. She denies any abdominal discomfort/bloating, no fevers or chills, and no chest pain or shortness of breath.      Past Medical History:    Past Medical History:   Diagnosis Date     Abnormal Pap smear of cervix 05/21/2019    see problem list     Arrested hydrocephalus (H) 2/3/2009    Ongoing HAs, seeing neurosurgeon. Per past notes from Dr. Resendiz, HAs  most likely not secondary to the hydrocephalus, but rather vascular inorgin.  Please see scanned in records.     Asthma      Cervical cancer (H)      Cervical high risk HPV (human papillomavirus) test positive 05/21/2019    See problem list     Current moderate episode of major depressive disorder without prior episode (H) 12/1/2021     Hypothyroidism, unspecified type 12/1/2021         Past Surgical History:    Past Surgical History:   Procedure Laterality Date     COLONOSCOPY N/A 12/28/2020    Procedure: COLONOSCOPY, WITH  polypectomy;  Surgeon: Steven Vazquez MD;  Location: [facility] OR     ESOPHAGOSCOPY, GASTROSCOPY, DUODENOSCOPY (EGD), COMBINED N/A 7/13/2020    Procedure: ESOPHAGOGASTRODUODENOSCOPY, WITH BIOPSY;  Surgeon: Steven Vazquez MD;  Location: [facility]     EXAM UNDER ANESTHESIA, INSERT ANN MARIE SLEEVE, UTERINE PLACEMENT OF TANDEM AND RING FOR RAD, ULTRASOUND N/A 8/30/2019    Procedure: Ann Marie Sleeve Insertion, Ultrasound Guided;  Surgeon: Anne Tidwell MD;  Location: [facility] OR     EXCISE MASS TRUNK Left 9/16/2015    Procedure: EXCISE MASS TRUNK;  Surgeon: Carlos Enrique Briones MD;  Location: [facility] OR     ZZC VENTRICULO-CISTERNOSTOMY,3RD VENT      for treatment of HA related to hydrocephalus         Health Maintenance Due   Topic Date Due     DEPRESSION ACTION PLAN  Never done     Pneumococcal Vaccine: Pediatrics (0 to 5 Years) and At-Risk Patients (6 to 64 Years) (2 of 4 - PCV13) 06/17/2015     ASTHMA ACTION PLAN  09/08/2016     PREVENTIVE CARE VISIT  05/01/2020     LUNG CANCER SCREENING  04/20/2021     INFLUENZA VACCINE (1) 09/01/2021     ASTHMA CONTROL TEST  12/28/2021       Current Medications:     Current Outpatient Medications   Medication Sig Dispense Refill     Acetaminophen (MIDOL PO) Take by mouth as needed       albuterol (PROAIR HFA/PROVENTIL HFA/VENTOLIN HFA) 108 (90 Base) MCG/ACT inhaler Inhale 2 puffs into the lungs every 6 hours as needed for shortness of breath / dyspnea or wheezing 54 g 0      cholecalciferol (VITAMIN D3) 125 mcg (5000 units) capsule Take 1 capsule (125 mcg) by mouth daily 100 capsule 5     Ferrous Sulfate (IRON SUPPLEMENT PO) Take 325 mg by mouth 2 times daily (with meals)       FLUoxetine (PROZAC) 20 MG capsule Take 1 capsule (20 mg) by mouth daily 90 capsule 1     fluticasone (FLONASE) 50 MCG/ACT nasal spray Spray 1-2 sprays into both nostrils daily 16 g 3     fluticasone (FLOVENT HFA) 110 MCG/ACT inhaler Inhale 2 puffs into the lungs 2 times daily 12 g 4     levothyroxine (SYNTHROID/LEVOTHROID) 25 MCG tablet Take 1 tablet (25 mcg) by mouth daily 90 tablet 1     Multiple Vitamin (MULTIVITAMINS PO) Take  by mouth daily.           Allergies:      No Known Allergies     Social History:     Social History     Tobacco Use     Smoking status: Former Smoker     Packs/day: 0.25     Years: 20.00     Pack years: 5.00     Types: Cigarettes     Quit date: 2/18/2018     Years since quitting: 3.9     Smokeless tobacco: Former User     Quit date: 5/25/2015   Substance Use Topics     Alcohol use: Not Currently     Alcohol/week: 0.0 standard drinks     Comment: once every 3-4 months       History   Drug Use No         Family History:       Family History   Problem Relation Age of Onset     Diabetes Mother      Cerebrovascular Disease Mother      Ovarian Cancer Mother      C.A.D. Father 40     Cerebrovascular Disease Father      C.A.D. Sister 62        stent placed     Ovarian Cancer Sister      Ovarian Cancer Sister          Physical Exam:     LMP  (LMP Unknown)   There is no height or weight on file to calculate BMI.    General Appearance:  healthy and alert, no distress     Musculoskeletal:         extremities non tender and without edema     Neurological:   normal gait, no gross defects                Psychiatric:      appropriate mood and affect                               Hematological:            normal cervical, supraclavicular and inguinal lymph nodes                Gastrointestinal:           abdomen soft, non-tender, non-distended, no organomegaly or masses     Genitourinary: External genitalia and urethral meatus appears normal.  Vagina is smooth without nodularity or masses.  Cervix appears normal and without lesions.  Bimanual exam reveal no masses, nodularity or fullness.  Recto-vaginal exam confirms these findings. Papa smear was taken.        Assessment:     Nicole Fritsche is a 51 year old woman with a diagnosis of stage IIB cervical cancer.      A total of 30 minutes was spent with the patient, 25 minutes of which were spent in counseling the patient and/or treatment planning.        1.  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   2.  Status post definitive chemoradiation.     3.  HÉCTOR  4. Simple 6.5 cm ovarian cyst decreased to 2cm  5. Episode of vaginal spotting     We discussed the pelvic MRI showed no cervical mass and suggestive of adenomyosis, cyst has decreased to 2 cm. We will repeat US  And follow up visit in 3 month, or earlier if needed. The patient agrees with the plan, is very appreciative of her care.  All questions were answered.      Hasmukh Hernandez MD, MS    Department of Obstetrics and Gynecology   Division of Gynecologic Oncology   [facility]  Phone: [phone]    CC  Patient Care Team:  Gin Johns DO as PCP - General (Family Medicine)  Rosio Harding APRN CNP as Assigned Cancer Care Provider  Tiff Loera MD as Assigned OBGYN Provider

## 2022-02-14 DIAGNOSIS — J45.30 MILD PERSISTENT ASTHMA WITHOUT COMPLICATION: ICD-10-CM

## 2022-02-14 NOTE — TELEPHONE ENCOUNTER
Routing refill request to provider for review/approval because:  Was previously prescribed by Luz Lin NP  ACT  Saw you last 12/21    ACT Total Scores 10/11/2018 5/1/2019 6/28/2021   ACT TOTAL SCORE - - -   ASTHMA ER VISITS - - -   ASTHMA HOSPITALIZATIONS - - -   ACT TOTAL SCORE (Goal Greater than or Equal to 20) 21 20 12   In the past 12 months, how many times did you visit the emergency room for your asthma without being admitted to the hospital? 0 0 0   In the past 12 months, how many times were you hospitalized overnight because of your asthma? 0 0 0              Pending Prescriptions:                       Disp   Refills    fluticasone (FLOVENT HFA) 110 MCG/ACT inha*12 g   4        Sig: Inhale 2 puffs into the lungs 2 times daily        Fernando Mccracken RN

## 2022-02-15 RX ORDER — DEXAMETHASONE 4 MG/1
2 TABLET ORAL 2 TIMES DAILY
Qty: 12 G | Refills: 4 | Status: SHIPPED | OUTPATIENT
Start: 2022-02-15 | End: 2022-06-15

## 2022-04-15 ENCOUNTER — LAB (OUTPATIENT)
Dept: LAB | Facility: CLINIC | Age: 52
End: 2022-04-15
Payer: COMMERCIAL

## 2022-04-15 DIAGNOSIS — E03.9 HYPOTHYROIDISM, UNSPECIFIED TYPE: ICD-10-CM

## 2022-04-15 LAB — T3FREE SERPL-MCNC: 2.7 PG/ML (ref 2.3–4.2)

## 2022-04-15 PROCEDURE — 36415 COLL VENOUS BLD VENIPUNCTURE: CPT

## 2022-04-15 PROCEDURE — 84481 FREE ASSAY (FT-3): CPT

## 2022-04-15 PROCEDURE — 84443 ASSAY THYROID STIM HORMONE: CPT

## 2022-04-15 PROCEDURE — 84439 ASSAY OF FREE THYROXINE: CPT

## 2022-04-16 LAB
T4 FREE SERPL-MCNC: 1.02 NG/DL (ref 0.76–1.46)
TSH SERPL DL<=0.005 MIU/L-ACNC: 2.87 MU/L (ref 0.4–4)

## 2022-04-18 DIAGNOSIS — E03.9 HYPOTHYROIDISM, UNSPECIFIED TYPE: ICD-10-CM

## 2022-04-18 RX ORDER — LEVOTHYROXINE SODIUM 25 UG/1
25 TABLET ORAL DAILY
Qty: 90 TABLET | Refills: 3 | Status: SHIPPED | OUTPATIENT
Start: 2022-04-18 | End: 2022-11-10

## 2022-04-18 NOTE — RESULT ENCOUNTER NOTE
Dear Cassi,     Here are your recent results. Thyroid labs are looking good.  I'll make sure you have plenty of refills of your thyroid medication.     Please let us know if you have any questions or concerns.    Regards,  Gin Johns, DO

## 2022-05-23 DIAGNOSIS — J45.30 MILD PERSISTENT ASTHMA WITHOUT COMPLICATION: ICD-10-CM

## 2022-05-23 RX ORDER — ALBUTEROL SULFATE 90 UG/1
2 AEROSOL, METERED RESPIRATORY (INHALATION) EVERY 6 HOURS PRN
Qty: 54 G | Refills: 0 | Status: SHIPPED | OUTPATIENT
Start: 2022-05-23 | End: 2022-06-15

## 2022-05-23 NOTE — TELEPHONE ENCOUNTER
Routing refill request to provider for review/approval because:  ACT out of date. Has appt with PCP 6/15    ACT Total Scores 10/11/2018 5/1/2019 6/28/2021   ACT TOTAL SCORE - - -   ASTHMA ER VISITS - - -   ASTHMA HOSPITALIZATIONS - - -   ACT TOTAL SCORE (Goal Greater than or Equal to 20) 21 20 12   In the past 12 months, how many times did you visit the emergency room for your asthma without being admitted to the hospital? 0 0 0   In the past 12 months, how many times were you hospitalized overnight because of your asthma? 0 0 0

## 2022-06-15 ENCOUNTER — OFFICE VISIT (OUTPATIENT)
Dept: FAMILY MEDICINE | Facility: CLINIC | Age: 52
End: 2022-06-15
Payer: COMMERCIAL

## 2022-06-15 VITALS
RESPIRATION RATE: 16 BRPM | OXYGEN SATURATION: 99 % | TEMPERATURE: 97.7 F | HEART RATE: 71 BPM | WEIGHT: 196.8 LBS | HEIGHT: 68 IN | BODY MASS INDEX: 29.83 KG/M2 | DIASTOLIC BLOOD PRESSURE: 76 MMHG | SYSTOLIC BLOOD PRESSURE: 122 MMHG

## 2022-06-15 DIAGNOSIS — F51.02 ADJUSTMENT INSOMNIA: ICD-10-CM

## 2022-06-15 DIAGNOSIS — Z13.220 SCREENING FOR HYPERLIPIDEMIA: ICD-10-CM

## 2022-06-15 DIAGNOSIS — C53.1 MALIGNANT NEOPLASM OF EXOCERVIX (H): ICD-10-CM

## 2022-06-15 DIAGNOSIS — Z00.00 ROUTINE GENERAL MEDICAL EXAMINATION AT A HEALTH CARE FACILITY: Primary | ICD-10-CM

## 2022-06-15 DIAGNOSIS — Z12.31 VISIT FOR SCREENING MAMMOGRAM: ICD-10-CM

## 2022-06-15 DIAGNOSIS — J30.2 SEASONAL ALLERGIC RHINITIS, UNSPECIFIED TRIGGER: ICD-10-CM

## 2022-06-15 DIAGNOSIS — J45.30 MILD PERSISTENT ASTHMA WITHOUT COMPLICATION: ICD-10-CM

## 2022-06-15 DIAGNOSIS — F41.9 ANXIETY: ICD-10-CM

## 2022-06-15 LAB
ANION GAP SERPL CALCULATED.3IONS-SCNC: 5 MMOL/L (ref 3–14)
BUN SERPL-MCNC: 13 MG/DL (ref 7–30)
CALCIUM SERPL-MCNC: 9.1 MG/DL (ref 8.5–10.1)
CHLORIDE BLD-SCNC: 109 MMOL/L (ref 94–109)
CHOLEST SERPL-MCNC: 225 MG/DL
CO2 SERPL-SCNC: 28 MMOL/L (ref 20–32)
CREAT SERPL-MCNC: 0.7 MG/DL (ref 0.52–1.04)
FASTING STATUS PATIENT QL REPORTED: YES
GFR SERPL CREATININE-BSD FRML MDRD: >90 ML/MIN/1.73M2
GLUCOSE BLD-MCNC: 54 MG/DL (ref 70–99)
HDLC SERPL-MCNC: 46 MG/DL
LDLC SERPL CALC-MCNC: 149 MG/DL
NONHDLC SERPL-MCNC: 179 MG/DL
POTASSIUM BLD-SCNC: 3.6 MMOL/L (ref 3.4–5.3)
SODIUM SERPL-SCNC: 142 MMOL/L (ref 133–144)
TRIGL SERPL-MCNC: 150 MG/DL

## 2022-06-15 PROCEDURE — 80048 BASIC METABOLIC PNL TOTAL CA: CPT | Performed by: FAMILY MEDICINE

## 2022-06-15 PROCEDURE — 99396 PREV VISIT EST AGE 40-64: CPT | Mod: 25 | Performed by: FAMILY MEDICINE

## 2022-06-15 PROCEDURE — 80061 LIPID PANEL: CPT | Performed by: FAMILY MEDICINE

## 2022-06-15 PROCEDURE — 91305 COVID-19,PF,PFIZER (12+ YRS): CPT | Performed by: FAMILY MEDICINE

## 2022-06-15 PROCEDURE — 36415 COLL VENOUS BLD VENIPUNCTURE: CPT | Performed by: FAMILY MEDICINE

## 2022-06-15 PROCEDURE — 99214 OFFICE O/P EST MOD 30 MIN: CPT | Mod: 25 | Performed by: FAMILY MEDICINE

## 2022-06-15 PROCEDURE — 0054A COVID-19,PF,PFIZER (12+ YRS): CPT | Performed by: FAMILY MEDICINE

## 2022-06-15 RX ORDER — FLUTICASONE PROPIONATE 110 UG/1
2 AEROSOL, METERED RESPIRATORY (INHALATION) 2 TIMES DAILY
Qty: 12 G | Refills: 4 | Status: SHIPPED | OUTPATIENT
Start: 2022-06-15 | End: 2022-10-19

## 2022-06-15 RX ORDER — FLUTICASONE PROPIONATE 50 MCG
1-2 SPRAY, SUSPENSION (ML) NASAL DAILY
Qty: 16 G | Refills: 3 | Status: SHIPPED | OUTPATIENT
Start: 2022-06-15 | End: 2024-04-16

## 2022-06-15 RX ORDER — MONTELUKAST SODIUM 10 MG/1
10 TABLET ORAL AT BEDTIME
Qty: 90 TABLET | Refills: 3 | Status: SHIPPED | OUTPATIENT
Start: 2022-06-15 | End: 2023-06-07

## 2022-06-15 RX ORDER — ALBUTEROL SULFATE 90 UG/1
2 AEROSOL, METERED RESPIRATORY (INHALATION) EVERY 6 HOURS PRN
Qty: 54 G | Refills: 11 | Status: SHIPPED | OUTPATIENT
Start: 2022-06-15 | End: 2023-06-07

## 2022-06-15 RX ORDER — TRAZODONE HYDROCHLORIDE 50 MG/1
50-150 TABLET, FILM COATED ORAL AT BEDTIME
Qty: 90 TABLET | Refills: 1 | Status: SHIPPED | OUTPATIENT
Start: 2022-06-15 | End: 2022-07-08

## 2022-06-15 ASSESSMENT — ASTHMA QUESTIONNAIRES
QUESTION_3 LAST FOUR WEEKS HOW OFTEN DID YOUR ASTHMA SYMPTOMS (WHEEZING, COUGHING, SHORTNESS OF BREATH, CHEST TIGHTNESS OR PAIN) WAKE YOU UP AT NIGHT OR EARLIER THAN USUAL IN THE MORNING: TWO OR THREE NIGHTS A WEEK
QUESTION_2 LAST FOUR WEEKS HOW OFTEN HAVE YOU HAD SHORTNESS OF BREATH: THREE TO SIX TIMES A WEEK
ACT_TOTALSCORE: 14
QUESTION_1 LAST FOUR WEEKS HOW MUCH OF THE TIME DID YOUR ASTHMA KEEP YOU FROM GETTING AS MUCH DONE AT WORK, SCHOOL OR AT HOME: SOME OF THE TIME
QUESTION_5 LAST FOUR WEEKS HOW WOULD YOU RATE YOUR ASTHMA CONTROL: SOMEWHAT CONTROLLED
QUESTION_4 LAST FOUR WEEKS HOW OFTEN HAVE YOU USED YOUR RESCUE INHALER OR NEBULIZER MEDICATION (SUCH AS ALBUTEROL): TWO OR THREE TIMES PER WEEK
ACT_TOTALSCORE: 14

## 2022-06-15 ASSESSMENT — ENCOUNTER SYMPTOMS
CONSTIPATION: 0
BREAST MASS: 0
MYALGIAS: 0
FEVER: 0
HEARTBURN: 1
HEADACHES: 1
COUGH: 0
EYE PAIN: 0
SHORTNESS OF BREATH: 0
SORE THROAT: 0
JOINT SWELLING: 0
NERVOUS/ANXIOUS: 1
ARTHRALGIAS: 0
DIARRHEA: 1
WEAKNESS: 0
HEMATURIA: 0
HEMATOCHEZIA: 0
PALPITATIONS: 1
CHILLS: 1
DIZZINESS: 0
DYSURIA: 0
PARESTHESIAS: 0
ABDOMINAL PAIN: 0
FREQUENCY: 0

## 2022-06-15 ASSESSMENT — PATIENT HEALTH QUESTIONNAIRE - PHQ9
10. IF YOU CHECKED OFF ANY PROBLEMS, HOW DIFFICULT HAVE THESE PROBLEMS MADE IT FOR YOU TO DO YOUR WORK, TAKE CARE OF THINGS AT HOME, OR GET ALONG WITH OTHER PEOPLE: SOMEWHAT DIFFICULT
SUM OF ALL RESPONSES TO PHQ QUESTIONS 1-9: 12
SUM OF ALL RESPONSES TO PHQ QUESTIONS 1-9: 12

## 2022-06-15 ASSESSMENT — ANXIETY QUESTIONNAIRES
2. NOT BEING ABLE TO STOP OR CONTROL WORRYING: MORE THAN HALF THE DAYS
3. WORRYING TOO MUCH ABOUT DIFFERENT THINGS: MORE THAN HALF THE DAYS
8. IF YOU CHECKED OFF ANY PROBLEMS, HOW DIFFICULT HAVE THESE MADE IT FOR YOU TO DO YOUR WORK, TAKE CARE OF THINGS AT HOME, OR GET ALONG WITH OTHER PEOPLE?: SOMEWHAT DIFFICULT
GAD7 TOTAL SCORE: 12
1. FEELING NERVOUS, ANXIOUS, OR ON EDGE: MORE THAN HALF THE DAYS
GAD7 TOTAL SCORE: 12
5. BEING SO RESTLESS THAT IT IS HARD TO SIT STILL: MORE THAN HALF THE DAYS
6. BECOMING EASILY ANNOYED OR IRRITABLE: SEVERAL DAYS
GAD7 TOTAL SCORE: 12
4. TROUBLE RELAXING: MORE THAN HALF THE DAYS
7. FEELING AFRAID AS IF SOMETHING AWFUL MIGHT HAPPEN: SEVERAL DAYS
7. FEELING AFRAID AS IF SOMETHING AWFUL MIGHT HAPPEN: SEVERAL DAYS

## 2022-06-15 ASSESSMENT — PAIN SCALES - GENERAL: PAINLEVEL: NO PAIN (0)

## 2022-06-15 NOTE — PROGRESS NOTES
SUBJECTIVE:   CC: Nicole L Fritsche is an 52 year old woman who presents for preventive health visit.     Patient has been advised of split billing requirements and indicates understanding: Yes     Healthy Habits:     Getting at least 3 servings of Calcium per day:  NO    Bi-annual eye exam:  NO    Dental care twice a year:  Yes    Sleep apnea or symptoms of sleep apnea:  Daytime drowsiness    Diet:  Regular (no restrictions)    Frequency of exercise:  2-3 days/week    Duration of exercise:  45-60 minutes    Taking medications regularly:  Yes    Medication side effects:  None    PHQ-2 Total Score: 3    Additional concerns today:  No      Depression and Anxiety Follow-Up    How are you doing with your depression since your last visit? No change    How are you doing with your anxiety since your last visit?  No change    Are you having other symptoms that might be associated with depression or anxiety? Yes:   chest pain/ shortness of breath, nausea due to anxiety    Have you had a significant life event? Grief or Loss- death of mother in law and brother in law, sister and her health     Do you have any concerns with your use of alcohol or other drugs? No     Patient has been very stressed lately.  Her mother  last year.  More recently, her sister had a severe complication from a cardiac cath procedure and almost .  She's been having a lot of trouble sleeping at night.      Social History     Tobacco Use     Smoking status: Former Smoker     Packs/day: 0.25     Years: 20.00     Pack years: 5.00     Types: Cigarettes     Quit date: 2018     Years since quittin.3     Smokeless tobacco: Former User     Quit date: 2015   Vaping Use     Vaping Use: Never used   Substance Use Topics     Alcohol use: Not Currently     Alcohol/week: 0.0 standard drinks     Comment: once every 3-4 months     Drug use: No     PHQ 2021 2021 6/15/2022   PHQ-9 Total Score 10 12 12   Q9: Thoughts of better off  dead/self-harm past 2 weeks Not at all Not at all Not at all     LATYOA-7 SCORE 8/23/2021 12/1/2021 6/15/2022   Total Score - - 12 (moderate anxiety)   Total Score 13 17 12     Last PHQ-9 6/15/2022   1.  Little interest or pleasure in doing things 2   2.  Feeling down, depressed, or hopeless 1   3.  Trouble falling or staying asleep, or sleeping too much 2   4.  Feeling tired or having little energy 2   5.  Poor appetite or overeating 2   6.  Feeling bad about yourself 1   7.  Trouble concentrating 1   8.  Moving slowly or restless 1   Q9: Thoughts of better off dead/self-harm past 2 weeks 0   PHQ-9 Total Score 12   Difficulty at work, home, or with people -     LATOYA-7  6/15/2022   1. Feeling nervous, anxious, or on edge 2   2. Not being able to stop or control worrying 2   3. Worrying too much about different things 2   4. Trouble relaxing 2   5. Being so restless that it is hard to sit still 2   6. Becoming easily annoyed or irritable 1   7. Feeling afraid, as if something awful might happen 1   LATOYA-7 Total Score 12   If you checked any problems, how difficult have they made it for you to do your work, take care of things at home, or get along with other people? -       Asthma Follow-Up    Was ACT completed today?    Yes    ACT Total Scores 6/15/2022   ACT TOTAL SCORE -   ASTHMA ER VISITS -   ASTHMA HOSPITALIZATIONS -   ACT TOTAL SCORE (Goal Greater than or Equal to 20) 14   In the past 12 months, how many times did you visit the emergency room for your asthma without being admitted to the hospital? 0   In the past 12 months, how many times were you hospitalized overnight because of your asthma? 0         How many days per week do you miss taking your asthma controller medication?  0    Please describe any recent triggers for your asthma: allergies    Have you had any Emergency Room Visits, Urgent Care Visits, or Hospital Admissions since your last office visit?  No    Hypothyroidism Follow-up      Since last visit,  patient describes the following symptoms: fatigue      Today's PHQ-2 Score:   PHQ-2 (  [employer]) 6/15/2022   Q1: Little interest or pleasure in doing things 2   Q2: Feeling down, depressed or hopeless 1   PHQ-2 Score 3   PHQ-2 Total Score (12-17 Years)- Positive if 3 or more points; Administer PHQ-A if positive -   Q1: Little interest or pleasure in doing things More than half the days   Q2: Feeling down, depressed or hopeless Several days   PHQ-2 Score 3       Abuse: Current or Past (Physical, Sexual or Emotional) - No  Do you feel safe in your environment? Yes      Social History     Tobacco Use     Smoking status: Former Smoker     Packs/day: 0.25     Years: 20.00     Pack years: 5.00     Types: Cigarettes     Quit date: 2018     Years since quittin.3     Smokeless tobacco: Former User     Quit date: 2015   Substance Use Topics     Alcohol use: Not Currently     Alcohol/week: 0.0 standard drinks     Comment: once every 3-4 months     If you drink alcohol do you typically have >3 drinks per day or >7 drinks per week? No    Alcohol Use 6/15/2022   Prescreen: >3 drinks/day or >7 drinks/week? Not Applicable   Prescreen: >3 drinks/day or >7 drinks/week? -   No flowsheet data found.    Reviewed orders with patient.  Reviewed health maintenance and updated orders accordingly - Yes  Patient Active Problem List   Diagnosis     Arrested hydrocephalus (H)     CARDIOVASCULAR SCREENING; LDL GOAL LESS THAN 160     Breast cancer screening     Mild persistent asthma     ASCUS with positive high risk HPV cervical     Cervix cancer (H)     Family history of malignant neoplasm of ovary     Anxiety     Iron deficiency anemia, unspecified iron deficiency anemia type     Abnormal perimenopausal bleeding     Perimenopause     Hypothyroidism, unspecified type     Current moderate episode of major depressive disorder without prior episode (H)     Personal history of malignant neoplasm of cervix uteri     Right ovarian  cyst     Past Surgical History:   Procedure Laterality Date     COLONOSCOPY N/A 2020    Procedure: COLONOSCOPY, WITH  polypectomy;  Surgeon: Steven Vazquez MD;  Location: [facility] OR     ESOPHAGOSCOPY, GASTROSCOPY, DUODENOSCOPY (EGD), COMBINED N/A 2020    Procedure: ESOPHAGOGASTRODUODENOSCOPY, WITH BIOPSY;  Surgeon: Steven Vazquez MD;  Location: [facility]     EXAM UNDER ANESTHESIA, INSERT ANN MARIE SLEEVE, UTERINE PLACEMENT OF TANDEM AND RING FOR RAD, ULTRASOUND N/A 2019    Procedure: Ann Marie Sleeve Insertion, Ultrasound Guided;  Surgeon: Anne Tidwell MD;  Location: [facility] OR     EXCISE MASS TRUNK Left 2015    Procedure: EXCISE MASS TRUNK;  Surgeon: Carlos Enrique Briones MD;  Location: [facility] OR     ZZC VENTRICULO-CISTERNOSTOMY,3RD VENT      for treatment of HA related to hydrocephalus       Social History     Tobacco Use     Smoking status: Former Smoker     Packs/day: 0.25     Years: 20.00     Pack years: 5.00     Types: Cigarettes     Quit date: 2018     Years since quittin.3     Smokeless tobacco: Former User     Quit date: 2015   Substance Use Topics     Alcohol use: Not Currently     Alcohol/week: 0.0 standard drinks     Comment: once every 3-4 months     Family History   Problem Relation Age of Onset     Diabetes Mother      Cerebrovascular Disease Mother      Ovarian Cancer Mother      C.A.D. Father 40     Cerebrovascular Disease Father      C.A.D. Sister 62        stent placed     Ovarian Cancer Sister      Ovarian Cancer Sister            Breast Cancer Screening:    FHS-7: No flowsheet data found.  Mammogram Screening: Recommended annual mammography  Pertinent mammograms are reviewed under the imaging tab.    History of abnormal Pap smear: YES - updated in Problem List and Health Maintenance accordingly  PAP / HPV Latest Ref Rng & Units 2021   PAP   Negative for Intraepithelial Lesion or Malignancy (NILM) - -   PAP (Historical) - - NIL ASC-US(A)   HPV16  "NEG:Negative - - Negative   HPV18 NEG:Negative - - Positive(A)   HRHPV NEG:Negative - - Negative       Review of Systems   Constitutional: Positive for chills. Negative for fever.   HENT: Negative for congestion, ear pain, hearing loss and sore throat.    Eyes: Negative for pain and visual disturbance.   Respiratory: Negative for cough and shortness of breath.    Cardiovascular: Positive for palpitations. Negative for chest pain and peripheral edema.   Gastrointestinal: Positive for diarrhea and heartburn. Negative for abdominal pain, constipation and hematochezia.   Breasts:  Negative for tenderness, breast mass and discharge.   Genitourinary: Negative for dysuria, frequency, genital sores, hematuria, pelvic pain, urgency, vaginal bleeding and vaginal discharge.   Musculoskeletal: Negative for arthralgias, joint swelling and myalgias.   Skin: Negative for rash.   Neurological: Positive for headaches. Negative for dizziness, weakness and paresthesias.   Psychiatric/Behavioral: Positive for mood changes. The patient is nervous/anxious.        OBJECTIVE:   /76 (BP Location: Right arm, Patient Position: Sitting, Cuff Size: Adult Regular)   Pulse 71   Temp 97.7  F (36.5  C) (Oral)   Resp 16   Ht 1.715 m (5' 7.5\")   Wt 89.3 kg (196 lb 12.8 oz)   LMP  (LMP Unknown)   SpO2 99%   BMI 30.37 kg/m    Physical Exam  GENERAL: healthy, alert and no distress  EYES: Eyes grossly normal to inspection, PERRL and conjunctivae and sclerae normal  HENT: ear canals and TM's normal, nose and mouth without ulcers or lesions  NECK: no adenopathy, no asymmetry, masses, or scars and thyroid normal to palpation  RESP: lungs clear to auscultation - no rales, rhonchi or wheezes  BREAST: normal without masses, tenderness or nipple discharge and no palpable axillary masses or adenopathy  CV: regular rates and rhythm, normal S1 S2, no S3 or S4 and no murmur, click or rub  ABDOMEN: soft, nontender, without hepatosplenomegaly or " masses  MS: no gross musculoskeletal defects noted, no edema  SKIN: no suspicious lesions or rashes  NEURO: Normal strength and tone, mentation intact and speech normal  PSYCH: mentation appears normal, affect normal/bright    ASSESSMENT/PLAN:       ICD-10-CM    1. Routine general medical examination at a health care facility  Z00.00    2. Screening for hyperlipidemia  Z13.220 Lipid panel reflex to direct LDL Fasting   3. Visit for screening mammogram  Z12.31 MA SCREENING DIGITAL BILAT - Future  (s+30)   4. Mild persistent asthma without complication  J45.30 albuterol (PROAIR HFA/PROVENTIL HFA/VENTOLIN HFA) 108 (90 Base) MCG/ACT inhaler     fluticasone (FLOVENT HFA) 110 MCG/ACT inhaler     montelukast (SINGULAIR) 10 MG tablet     Basic metabolic panel  (Ca, Cl, CO2, Creat, Gluc, K, Na, BUN)   5. Anxiety  F41.9 FLUoxetine (PROZAC) 20 MG capsule   6. Seasonal allergic rhinitis, unspecified trigger  J30.2 fluticasone (FLONASE) 50 MCG/ACT nasal spray   7. Adjustment insomnia  F51.02 traZODone (DESYREL) 50 MG tablet   8. Malignant neoplasm of exocervix (H)  C53.1      - Screening/fasting labs today  - Her asthma has not been well controlled recently due to the weather.  Advised adding Singulair.  She will continue Flovent, Flonase, Zyrtec, and albuterol.  Advised increasing Flovent dose if the Singulair doesn't help within 3-4 weeks.   - Anxiety has been worse 2/2 family stressors.  Not sleeping well.  She declines referral to a counselor.  Continue fluoxetine at 20 mg daily.  Add trazodone at bedtime to help with sleep.  Advised follow up in 4-6 weeks if not improving  - Patient has a history of cervical cancer.  Due for her pap smear in December.  Offered to complete it for her today, but she declined.  Reminded her to get the pap done by the end of the year.      Patient has been advised of split billing requirements and indicates understanding: Yes    COUNSELING:  Reviewed preventive health counseling, as reflected  "in patient instructions    Estimated body mass index is 30.37 kg/m  as calculated from the following:    Height as of this encounter: 1.715 m (5' 7.5\").    Weight as of this encounter: 89.3 kg (196 lb 12.8 oz).    She reports that she quit smoking about 4 years ago. Her smoking use included cigarettes. She has a 5.00 pack-year smoking history. She quit smokeless tobacco use about 7 years ago.      Counseling Resources:  ATP IV Guidelines  Pooled Cohorts Equation Calculator  Breast Cancer Risk Calculator  BRCA-Related Cancer Risk Assessment: FHS-7 Tool  FRAX Risk Assessment  ICSI Preventive Guidelines  Dietary Guidelines for Americans, 2010  USDA's MyPlate  ASA Prophylaxis  Lung CA Screening    Gin Johns DO  [facility]  "

## 2022-06-16 NOTE — RESULT ENCOUNTER NOTE
The 10-year ASCVD risk score (Freedommejia DOS SANTOS Jr., et al., 2013) is: 2.1%    Values used to calculate the score:      Age: 52 years      Sex: Female      Is Non- : No      Diabetic: No      Tobacco smoker: No      Systolic Blood Pressure: 122 mmHg      Is BP treated: No      HDL Cholesterol: 46 mg/dL      Total Cholesterol: 225 mg/dL    Dear Cassi,     Here are your recent results.   - Cholesterol levels have gone up quite a bit since last year.  You're still not at a point where cholesterol medication is necessary, but I'd recommend working on diet changes and weight loss to get these numbers down  - Metabolic panel looks good.  No need to worry about the low glucose.  It's most likely just because you were fasting.     Please let us know if you have any questions or concerns.    Regards,  Gin Johns, DO

## 2022-07-08 DIAGNOSIS — F51.02 ADJUSTMENT INSOMNIA: ICD-10-CM

## 2022-07-08 RX ORDER — TRAZODONE HYDROCHLORIDE 50 MG/1
50-150 TABLET, FILM COATED ORAL AT BEDTIME
Qty: 90 TABLET | Refills: 1 | Status: SHIPPED | OUTPATIENT
Start: 2022-07-08 | End: 2022-10-26

## 2022-07-08 NOTE — TELEPHONE ENCOUNTER
"Requested Prescriptions   Signed Prescriptions Disp Refills    traZODone (DESYREL) 50 MG tablet 90 tablet 1     Sig: TAKE 1-3 TABLETS ( MG) BY MOUTH AT BEDTIME       Serotonin Modulators Passed - 7/8/2022 11:31 AM        Passed - Recent (12 mo) or future (30 days) visit within the authorizing provider's specialty     Patient has had an office visit with the authorizing provider or a provider within the authorizing providers department within the previous 12 mos or has a future within next 30 days. See \"Patient Info\" tab in inbasket, or \"Choose Columns\" in Meds & Orders section of the refill encounter.              Passed - Medication is active on med list        Passed - Patient is age 18 or older        Passed - No active pregnancy on record        Passed - No positive pregnancy test in past 12 months           Maribel Patricia RN  [facility]     "

## 2022-07-22 ENCOUNTER — ANCILLARY PROCEDURE (OUTPATIENT)
Dept: MAMMOGRAPHY | Facility: CLINIC | Age: 52
End: 2022-07-22
Attending: FAMILY MEDICINE
Payer: COMMERCIAL

## 2022-07-22 DIAGNOSIS — Z12.31 VISIT FOR SCREENING MAMMOGRAM: ICD-10-CM

## 2022-07-22 PROCEDURE — 77063 BREAST TOMOSYNTHESIS BI: CPT | Mod: GC | Performed by: RADIOLOGY

## 2022-07-22 PROCEDURE — 77067 SCR MAMMO BI INCL CAD: CPT | Mod: GC | Performed by: RADIOLOGY

## 2022-08-23 NOTE — TELEPHONE ENCOUNTER
Routing refill request to provider for review/approval because:  Asthma protocol failed.    Samaria Manzanares RN  [facility]           Previously Declined (within the last year)

## 2022-10-17 DIAGNOSIS — J45.30 MILD PERSISTENT ASTHMA WITHOUT COMPLICATION: ICD-10-CM

## 2022-10-19 RX ORDER — FLUTICASONE PROPIONATE 110 UG/1
2 AEROSOL, METERED RESPIRATORY (INHALATION) 2 TIMES DAILY
Qty: 12 G | Refills: 0 | Status: SHIPPED | OUTPATIENT
Start: 2022-10-19 | End: 2022-11-10

## 2022-10-26 ENCOUNTER — OFFICE VISIT (OUTPATIENT)
Dept: FAMILY MEDICINE | Facility: CLINIC | Age: 52
End: 2022-10-26
Payer: COMMERCIAL

## 2022-10-26 VITALS
SYSTOLIC BLOOD PRESSURE: 120 MMHG | WEIGHT: 169.4 LBS | OXYGEN SATURATION: 97 % | TEMPERATURE: 97.4 F | BODY MASS INDEX: 25.67 KG/M2 | RESPIRATION RATE: 22 BRPM | HEART RATE: 88 BPM | HEIGHT: 68 IN | DIASTOLIC BLOOD PRESSURE: 78 MMHG

## 2022-10-26 DIAGNOSIS — F41.9 ANXIETY: Primary | ICD-10-CM

## 2022-10-26 DIAGNOSIS — F51.02 ADJUSTMENT INSOMNIA: ICD-10-CM

## 2022-10-26 DIAGNOSIS — J45.31 MILD PERSISTENT ASTHMA WITH ACUTE EXACERBATION: ICD-10-CM

## 2022-10-26 DIAGNOSIS — J06.9 VIRAL URI WITH COUGH: ICD-10-CM

## 2022-10-26 PROCEDURE — 99214 OFFICE O/P EST MOD 30 MIN: CPT | Performed by: FAMILY MEDICINE

## 2022-10-26 RX ORDER — PREDNISONE 20 MG/1
TABLET ORAL
Qty: 20 TABLET | Refills: 0 | Status: SHIPPED | OUTPATIENT
Start: 2022-10-26 | End: 2023-06-07

## 2022-10-26 RX ORDER — TRAZODONE HYDROCHLORIDE 100 MG/1
100 TABLET ORAL AT BEDTIME
Qty: 90 TABLET | Refills: 1 | Status: SHIPPED | OUTPATIENT
Start: 2022-10-26 | End: 2023-04-24

## 2022-10-26 RX ORDER — FLUOXETINE 40 MG/1
40 CAPSULE ORAL DAILY
Qty: 90 CAPSULE | Refills: 1 | Status: SHIPPED | OUTPATIENT
Start: 2022-10-26 | End: 2023-04-24

## 2022-10-26 ASSESSMENT — ANXIETY QUESTIONNAIRES
4. TROUBLE RELAXING: MORE THAN HALF THE DAYS
5. BEING SO RESTLESS THAT IT IS HARD TO SIT STILL: SEVERAL DAYS
6. BECOMING EASILY ANNOYED OR IRRITABLE: MORE THAN HALF THE DAYS
8. IF YOU CHECKED OFF ANY PROBLEMS, HOW DIFFICULT HAVE THESE MADE IT FOR YOU TO DO YOUR WORK, TAKE CARE OF THINGS AT HOME, OR GET ALONG WITH OTHER PEOPLE?: SOMEWHAT DIFFICULT
1. FEELING NERVOUS, ANXIOUS, OR ON EDGE: MORE THAN HALF THE DAYS
3. WORRYING TOO MUCH ABOUT DIFFERENT THINGS: MORE THAN HALF THE DAYS
7. FEELING AFRAID AS IF SOMETHING AWFUL MIGHT HAPPEN: MORE THAN HALF THE DAYS
GAD7 TOTAL SCORE: 13
IF YOU CHECKED OFF ANY PROBLEMS ON THIS QUESTIONNAIRE, HOW DIFFICULT HAVE THESE PROBLEMS MADE IT FOR YOU TO DO YOUR WORK, TAKE CARE OF THINGS AT HOME, OR GET ALONG WITH OTHER PEOPLE: SOMEWHAT DIFFICULT
GAD7 TOTAL SCORE: 13
7. FEELING AFRAID AS IF SOMETHING AWFUL MIGHT HAPPEN: MORE THAN HALF THE DAYS
GAD7 TOTAL SCORE: 13
2. NOT BEING ABLE TO STOP OR CONTROL WORRYING: MORE THAN HALF THE DAYS

## 2022-10-26 ASSESSMENT — PATIENT HEALTH QUESTIONNAIRE - PHQ9
10. IF YOU CHECKED OFF ANY PROBLEMS, HOW DIFFICULT HAVE THESE PROBLEMS MADE IT FOR YOU TO DO YOUR WORK, TAKE CARE OF THINGS AT HOME, OR GET ALONG WITH OTHER PEOPLE: SOMEWHAT DIFFICULT
SUM OF ALL RESPONSES TO PHQ QUESTIONS 1-9: 9
SUM OF ALL RESPONSES TO PHQ QUESTIONS 1-9: 9

## 2022-10-26 ASSESSMENT — ASTHMA QUESTIONNAIRES
QUESTION_2 LAST FOUR WEEKS HOW OFTEN HAVE YOU HAD SHORTNESS OF BREATH: ONCE OR TWICE A WEEK
QUESTION_3 LAST FOUR WEEKS HOW OFTEN DID YOUR ASTHMA SYMPTOMS (WHEEZING, COUGHING, SHORTNESS OF BREATH, CHEST TIGHTNESS OR PAIN) WAKE YOU UP AT NIGHT OR EARLIER THAN USUAL IN THE MORNING: TWO OR THREE NIGHTS A WEEK
ACT_TOTALSCORE: 16
QUESTION_1 LAST FOUR WEEKS HOW MUCH OF THE TIME DID YOUR ASTHMA KEEP YOU FROM GETTING AS MUCH DONE AT WORK, SCHOOL OR AT HOME: A LITTLE OF THE TIME
ACT_TOTALSCORE: 16
QUESTION_5 LAST FOUR WEEKS HOW WOULD YOU RATE YOUR ASTHMA CONTROL: SOMEWHAT CONTROLLED
QUESTION_4 LAST FOUR WEEKS HOW OFTEN HAVE YOU USED YOUR RESCUE INHALER OR NEBULIZER MEDICATION (SUCH AS ALBUTEROL): TWO OR THREE TIMES PER WEEK

## 2022-10-26 ASSESSMENT — ENCOUNTER SYMPTOMS: NERVOUS/ANXIOUS: 1

## 2022-10-26 ASSESSMENT — PAIN SCALES - GENERAL: PAINLEVEL: MILD PAIN (3)

## 2022-10-26 NOTE — LETTER
[facility]  [address] [place] [id number]  Phone: [phone]    October 26, 2022        Nicole L Fritsche  [address]  [place] [address]          To whom it may concern:    RE: Nicole L Fritsche    Patient was seen and treated today at our clinic and missed work.  Please excuse her from work today due to her recent illness.      Please contact me for questions or concerns.      Sincerely,    Gin Johns, DO

## 2022-10-26 NOTE — PROGRESS NOTES
Assessment & Plan     Anxiety  - Continues to be an issue.  Advised increasing fluoxetine to 40 mg daily   - FLUoxetine (PROZAC) 40 MG capsule; Take 1 capsule (40 mg) by mouth daily    Adjustment insomnia  - Improvement in retaining sleep, but not initiating sleep on trazodone 50 mg  - Advised her to increase trazodone to 100 mg   - traZODone (DESYREL) 100 MG tablet; Take 1 tablet (100 mg) by mouth At Bedtime    Mild persistent asthma with acute exacerbation  Viral URI with cough  - predniSONE (DELTASONE) 20 MG tablet; Take 3 tabs by mouth daily x 3 days, then 2 tabs daily x 3 days, then 1 tab daily x 3 days, then 1/2 tab daily x 3 days.      Return in about 4 weeks (around 11/23/2022) for Depression/Anxiety.    Gin Johns, [facility]    ===============================================================    Subjective   Cassi is a 52 year old, presenting for the following health issues:  Anxiety      Anxiety    History of Present Illness       Mental Health Follow-up:  Patient presents to follow-up on Anxiety.    Patient's anxiety since last visit has been:  No change  The patient is not having other symptoms associated with anxiety.  Any significant life events: other  Patient is feeling anxious or having panic attacks.  Patient has no concerns about alcohol or drug use.    Reason for visit:  Anxiety    larengities, sick since Sunday/Monday.  was sick. Having sinus drainage, hard to breathe and chest hurts a little. Have been using Mucinex.    She eats 2-3 servings of fruits and vegetables daily.She consumes 1 sweetened beverage(s) daily.She exercises with enough effort to increase her heart rate 60 or more minutes per day.  She exercises with enough effort to increase her heart rate 4 days per week. She is missing 2 dose(s) of medications per week.    Today's PHQ-9         PHQ-9 Total Score: 9    PHQ-9 Q9 Thoughts of better off dead/self-harm past 2 weeks :   Not at  "all    How difficult have these problems made it for you to do your work, take care of things at home, or get along with other people: Somewhat difficult  Today's LATOYA-7 Score: 13     Anxiety: Patient is currently taking fluoxetine 20 mg daily, trazodone 50 mg as needed at bedtime.  She has continued to feel anxious.  Gets anxious about work performance and about her son.  Unfortunately, her job doesn't have an option of working from home.  She thinks the trazodone is helping with sleep.  Still takes her awhile to fall asleep, but not waking at night.      Acute Illness  Acute illness concerns: URI symptoms   Onset/Duration: 4 days ago   Symptoms:  Fever: No  Chills/Sweats: No  Headache (location?): YES  Sinus Pressure: YES  Conjunctivitis:  No  Ear Pain: no  Rhinorrhea: YES  Congestion: YES  Sore Throat: YES  Cough: YES-non-productive  Wheeze: YES  Decreased Appetite: No  Nausea: No  Vomiting: No  Diarrhea: YES  Dysuria/Freq.: No  Dysuria or Hematuria: No  Fatigue/Achiness: YES  Sick/Strep Exposure: YES  Therapies tried and outcome: Mucinex     Patient has asthma and takes Flovent BID and Singulair daily.  Using albuterol more than usual.  Tested negative for COVID two days ago.      Review of Systems   Psychiatric/Behavioral: The patient is nervous/anxious.       Constitutional, HEENT, cardiovascular, pulmonary, gi and gu systems are negative, except as otherwise noted.      Objective    /78 (BP Location: Right arm, Patient Position: Chair, Cuff Size: Adult Regular)   Pulse 88   Temp 97.4  F (36.3  C) (Oral)   Ht 1.715 m (5' 7.5\")   LMP  (LMP Unknown)   SpO2 97%   BMI 30.37 kg/m    Body mass index is 30.37 kg/m .  Physical Exam   GENERAL: healthy, alert and no distress  EYES: Eyes grossly normal to inspection  HENT: normal cephalic/atraumatic, ear canals and TM's normal, nose and mouth without ulcers or lesions, oropharynx clear, oral mucous membranes moist and voice is hoarse   NECK: bilateral anterior " cervical adenopathy  RESP: lungs clear to auscultation - no rales, rhonchi or wheezes  CV: regular rates and rhythm, normal S1 S2, no S3 or S4 and no murmur, click or rub  PSYCH: mentation appears normal, affect flat and fatigued

## 2022-11-10 ENCOUNTER — VIRTUAL VISIT (OUTPATIENT)
Dept: FAMILY MEDICINE | Facility: CLINIC | Age: 52
End: 2022-11-10
Payer: COMMERCIAL

## 2022-11-10 DIAGNOSIS — J45.30 MILD PERSISTENT ASTHMA WITHOUT COMPLICATION: ICD-10-CM

## 2022-11-10 DIAGNOSIS — E03.9 HYPOTHYROIDISM, UNSPECIFIED TYPE: ICD-10-CM

## 2022-11-10 DIAGNOSIS — F32.1 CURRENT MODERATE EPISODE OF MAJOR DEPRESSIVE DISORDER WITHOUT PRIOR EPISODE (H): Primary | ICD-10-CM

## 2022-11-10 PROCEDURE — 99214 OFFICE O/P EST MOD 30 MIN: CPT | Mod: 95 | Performed by: FAMILY MEDICINE

## 2022-11-10 RX ORDER — LEVOTHYROXINE SODIUM 25 UG/1
25 TABLET ORAL DAILY
Qty: 90 TABLET | Refills: 1 | Status: SHIPPED | OUTPATIENT
Start: 2022-11-10 | End: 2023-06-07

## 2022-11-10 RX ORDER — FLUTICASONE PROPIONATE 110 UG/1
2 AEROSOL, METERED RESPIRATORY (INHALATION) 2 TIMES DAILY
Qty: 12 G | Refills: 1 | Status: SHIPPED | OUTPATIENT
Start: 2022-11-10 | End: 2023-06-07

## 2022-11-10 ASSESSMENT — ANXIETY QUESTIONNAIRES
4. TROUBLE RELAXING: MORE THAN HALF THE DAYS
5. BEING SO RESTLESS THAT IT IS HARD TO SIT STILL: SEVERAL DAYS
7. FEELING AFRAID AS IF SOMETHING AWFUL MIGHT HAPPEN: MORE THAN HALF THE DAYS
6. BECOMING EASILY ANNOYED OR IRRITABLE: MORE THAN HALF THE DAYS
8. IF YOU CHECKED OFF ANY PROBLEMS, HOW DIFFICULT HAVE THESE MADE IT FOR YOU TO DO YOUR WORK, TAKE CARE OF THINGS AT HOME, OR GET ALONG WITH OTHER PEOPLE?: SOMEWHAT DIFFICULT
GAD7 TOTAL SCORE: 12
7. FEELING AFRAID AS IF SOMETHING AWFUL MIGHT HAPPEN: MORE THAN HALF THE DAYS
2. NOT BEING ABLE TO STOP OR CONTROL WORRYING: MORE THAN HALF THE DAYS
1. FEELING NERVOUS, ANXIOUS, OR ON EDGE: SEVERAL DAYS
3. WORRYING TOO MUCH ABOUT DIFFERENT THINGS: MORE THAN HALF THE DAYS
GAD7 TOTAL SCORE: 12
GAD7 TOTAL SCORE: 12
IF YOU CHECKED OFF ANY PROBLEMS ON THIS QUESTIONNAIRE, HOW DIFFICULT HAVE THESE PROBLEMS MADE IT FOR YOU TO DO YOUR WORK, TAKE CARE OF THINGS AT HOME, OR GET ALONG WITH OTHER PEOPLE: SOMEWHAT DIFFICULT

## 2022-11-10 ASSESSMENT — PATIENT HEALTH QUESTIONNAIRE - PHQ9
SUM OF ALL RESPONSES TO PHQ QUESTIONS 1-9: 8
SUM OF ALL RESPONSES TO PHQ QUESTIONS 1-9: 8
10. IF YOU CHECKED OFF ANY PROBLEMS, HOW DIFFICULT HAVE THESE PROBLEMS MADE IT FOR YOU TO DO YOUR WORK, TAKE CARE OF THINGS AT HOME, OR GET ALONG WITH OTHER PEOPLE: SOMEWHAT DIFFICULT

## 2022-11-10 NOTE — PROGRESS NOTES
Cassi is a 52 year old who is being evaluated via a billable video visit.      How would you like to obtain your AVS? MyChart  If the video visit is dropped, the invitation should be resent by: Text to cell phone: [phone]  Will anyone else be joining your video visit? No    ============================================================    Assessment & Plan     Current moderate episode of major depressive disorder without prior episode (H)  - Significant improvement in symptoms since increasing her fluoxetine dose to 40 mg.  She will stay on that dose and plan to follow up with a new PCP in 6 months.  Discussed to come in earlier if symptoms are worsening     Mild persistent asthma without complication  - Stable, will need refills of Flovent before her next visit so sent that in for her   - fluticasone (FLOVENT HFA) 110 MCG/ACT inhaler; Inhale 2 puffs into the lungs 2 times daily    Hypothyroidism, unspecified type  - Stable, will need refills before her next visit so sent that in for her   - levothyroxine (SYNTHROID/LEVOTHROID) 25 MCG tablet; Take 1 tablet (25 mcg) by mouth daily      Return in about 6 months (around 5/10/2023) for Physical Exam.    Gin Johns, [facility]    ======================================================================      Subjective   Cassi is a 52 year old, presenting for the following health issues:  Depression and Anxiety      History of Present Illness       Mental Health Follow-up:  Patient presents to follow-up on Depression & Anxiety.Patient's depression since last visit has been:  Better  The patient is not having other symptoms associated with depression.  Patient's anxiety since last visit has been:  Better  The patient is not having other symptoms associated with anxiety.  Any significant life events: grief or loss and health concerns  Patient is not feeling anxious or having panic attacks.  Patient has no concerns about alcohol or  drug use.She consumes 1 sweetened beverage(s) daily.She exercises with enough effort to increase her heart rate 20 to 29 minutes per day.  She exercises with enough effort to increase her heart rate 4 days per week.   She is taking medications regularly.    Today's PHQ-9         PHQ-9 Total Score: 8    PHQ-9 Q9 Thoughts of better off dead/self-harm past 2 weeks :   Not at all    How difficult have these problems made it for you to do your work, take care of things at home, or get along with other people: Somewhat difficult  Today's LATOYA-7 Score: 12       Depression and Anxiety Follow-Up      Social History     Tobacco Use     Smoking status: Former     Packs/day: 0.25     Years: 20.00     Pack years: 5.00     Types: Cigarettes     Quit date: 2018     Years since quittin.7     Smokeless tobacco: Former     Quit date: 2015   Vaping Use     Vaping Use: Never used   Substance Use Topics     Alcohol use: Not Currently     Alcohol/week: 0.0 standard drinks     Comment: once every 3-4 months     Drug use: No     PHQ 6/15/2022 10/26/2022 11/10/2022   PHQ-9 Total Score 12 9 8   Q9: Thoughts of better off dead/self-harm past 2 weeks Not at all Not at all Not at all     LATOYA-7 SCORE 6/15/2022 10/26/2022 11/10/2022   Total Score 12 (moderate anxiety) 13 (moderate anxiety) 12 (moderate anxiety)   Total Score 12 13 12     Last PHQ-9 11/10/2022   1.  Little interest or pleasure in doing things 1   2.  Feeling down, depressed, or hopeless 1   3.  Trouble falling or staying asleep, or sleeping too much 1   4.  Feeling tired or having little energy 1   5.  Poor appetite or overeating 1   6.  Feeling bad about yourself 1   7.  Trouble concentrating 1   8.  Moving slowly or restless 1   Q9: Thoughts of better off dead/self-harm past 2 weeks 0   PHQ-9 Total Score 8   Difficulty at work, home, or with people -     LATOYA-7  11/10/2022   1. Feeling nervous, anxious, or on edge 1   2. Not being able to stop or control worrying 2    3. Worrying too much about different things 2   4. Trouble relaxing 2   5. Being so restless that it is hard to sit still 1   6. Becoming easily annoyed or irritable 2   7. Feeling afraid, as if something awful might happen 2   LATOYA-7 Total Score 12   If you checked any problems, how difficult have they made it for you to do your work, take care of things at home, or get along with other people? Somewhat difficult         Review of Systems   Constitutional, HEENT, cardiovascular, pulmonary, gi and gu systems are negative, except as otherwise noted.      Objective           Vitals:  No vitals were obtained today due to virtual visit.    Physical Exam   GENERAL: Healthy, alert and no distress  EYES: Eyes grossly normal to inspection.  No discharge or erythema, or obvious scleral/conjunctival abnormalities.  RESP: No audible wheeze, cough, or visible cyanosis.  No visible retractions or increased work of breathing.    SKIN: Visible skin clear. No significant rash, abnormal pigmentation or lesions.  NEURO: Cranial nerves grossly intact.  Mentation and speech appropriate for age.  PSYCH: Mentation appears normal, affect normal/bright, judgement and insight intact, normal speech and appearance well-groomed.          Video-Visit Details    Video Start Time: 11:00 AM    Type of service:  Video Visit    Video End Time:11:07 AM    Originating Location (pt. Location): Home        Distant Location (provider location):  On-site    Platform used for Video Visit: James

## 2023-04-23 ENCOUNTER — HEALTH MAINTENANCE LETTER (OUTPATIENT)
Age: 53
End: 2023-04-23

## 2023-06-06 ASSESSMENT — ANXIETY QUESTIONNAIRES
1. FEELING NERVOUS, ANXIOUS, OR ON EDGE: MORE THAN HALF THE DAYS
GAD7 TOTAL SCORE: 18
6. BECOMING EASILY ANNOYED OR IRRITABLE: NEARLY EVERY DAY
3. WORRYING TOO MUCH ABOUT DIFFERENT THINGS: NEARLY EVERY DAY
5. BEING SO RESTLESS THAT IT IS HARD TO SIT STILL: MORE THAN HALF THE DAYS
7. FEELING AFRAID AS IF SOMETHING AWFUL MIGHT HAPPEN: NEARLY EVERY DAY
2. NOT BEING ABLE TO STOP OR CONTROL WORRYING: NEARLY EVERY DAY
IF YOU CHECKED OFF ANY PROBLEMS ON THIS QUESTIONNAIRE, HOW DIFFICULT HAVE THESE PROBLEMS MADE IT FOR YOU TO DO YOUR WORK, TAKE CARE OF THINGS AT HOME, OR GET ALONG WITH OTHER PEOPLE: VERY DIFFICULT

## 2023-06-06 ASSESSMENT — ENCOUNTER SYMPTOMS
PARESTHESIAS: 0
BREAST MASS: 0
HEARTBURN: 1
DIZZINESS: 1
CONSTIPATION: 0
ARTHRALGIAS: 0
MYALGIAS: 0
CHILLS: 0
HEMATOCHEZIA: 1
JOINT SWELLING: 0
SHORTNESS OF BREATH: 0
NERVOUS/ANXIOUS: 1
SORE THROAT: 0
FEVER: 0
DIARRHEA: 1
EYE PAIN: 0
WEAKNESS: 0
COUGH: 0
HEMATURIA: 1
ABDOMINAL PAIN: 0
NAUSEA: 0
HEADACHES: 1
PALPITATIONS: 0
FREQUENCY: 0
DYSURIA: 0

## 2023-06-06 ASSESSMENT — ASTHMA QUESTIONNAIRES
ACT_TOTALSCORE: 19
QUESTION_1 LAST FOUR WEEKS HOW MUCH OF THE TIME DID YOUR ASTHMA KEEP YOU FROM GETTING AS MUCH DONE AT WORK, SCHOOL OR AT HOME: SOME OF THE TIME
QUESTION_5 LAST FOUR WEEKS HOW WOULD YOU RATE YOUR ASTHMA CONTROL: WELL CONTROLLED
ACT_TOTALSCORE: 19
QUESTION_4 LAST FOUR WEEKS HOW OFTEN HAVE YOU USED YOUR RESCUE INHALER OR NEBULIZER MEDICATION (SUCH AS ALBUTEROL): ONCE A WEEK OR LESS
QUESTION_3 LAST FOUR WEEKS HOW OFTEN DID YOUR ASTHMA SYMPTOMS (WHEEZING, COUGHING, SHORTNESS OF BREATH, CHEST TIGHTNESS OR PAIN) WAKE YOU UP AT NIGHT OR EARLIER THAN USUAL IN THE MORNING: ONCE OR TWICE
QUESTION_2 LAST FOUR WEEKS HOW OFTEN HAVE YOU HAD SHORTNESS OF BREATH: ONCE OR TWICE A WEEK

## 2023-06-06 ASSESSMENT — PATIENT HEALTH QUESTIONNAIRE - PHQ9
SUM OF ALL RESPONSES TO PHQ QUESTIONS 1-9: 11
10. IF YOU CHECKED OFF ANY PROBLEMS, HOW DIFFICULT HAVE THESE PROBLEMS MADE IT FOR YOU TO DO YOUR WORK, TAKE CARE OF THINGS AT HOME, OR GET ALONG WITH OTHER PEOPLE: SOMEWHAT DIFFICULT
SUM OF ALL RESPONSES TO PHQ QUESTIONS 1-9: 11
5. POOR APPETITE OR OVEREATING: MORE THAN HALF THE DAYS

## 2023-06-07 ENCOUNTER — OFFICE VISIT (OUTPATIENT)
Dept: FAMILY MEDICINE | Facility: CLINIC | Age: 53
End: 2023-06-07
Attending: NURSE PRACTITIONER
Payer: COMMERCIAL

## 2023-06-07 ENCOUNTER — TELEPHONE (OUTPATIENT)
Dept: FAMILY MEDICINE | Facility: CLINIC | Age: 53
End: 2023-06-07

## 2023-06-07 VITALS
TEMPERATURE: 98.3 F | BODY MASS INDEX: 26.43 KG/M2 | SYSTOLIC BLOOD PRESSURE: 122 MMHG | HEART RATE: 74 BPM | DIASTOLIC BLOOD PRESSURE: 64 MMHG | HEIGHT: 68 IN | RESPIRATION RATE: 16 BRPM | WEIGHT: 174.4 LBS | OXYGEN SATURATION: 100 %

## 2023-06-07 DIAGNOSIS — D50.9 IRON DEFICIENCY ANEMIA, UNSPECIFIED IRON DEFICIENCY ANEMIA TYPE: ICD-10-CM

## 2023-06-07 DIAGNOSIS — Z13.220 SCREENING FOR HYPERLIPIDEMIA: ICD-10-CM

## 2023-06-07 DIAGNOSIS — R31.9 HEMATURIA, UNSPECIFIED TYPE: ICD-10-CM

## 2023-06-07 DIAGNOSIS — F41.9 ANXIETY: ICD-10-CM

## 2023-06-07 DIAGNOSIS — Z00.00 ROUTINE HISTORY AND PHYSICAL EXAMINATION OF ADULT: Primary | ICD-10-CM

## 2023-06-07 DIAGNOSIS — Z12.31 VISIT FOR SCREENING MAMMOGRAM: ICD-10-CM

## 2023-06-07 DIAGNOSIS — G91.1: ICD-10-CM

## 2023-06-07 DIAGNOSIS — Z13.1 SCREENING FOR DIABETES MELLITUS: ICD-10-CM

## 2023-06-07 DIAGNOSIS — Z12.4 CERVICAL CANCER SCREENING: ICD-10-CM

## 2023-06-07 DIAGNOSIS — F51.02 ADJUSTMENT INSOMNIA: ICD-10-CM

## 2023-06-07 DIAGNOSIS — E03.9 HYPOTHYROIDISM, UNSPECIFIED TYPE: ICD-10-CM

## 2023-06-07 DIAGNOSIS — F32.1 CURRENT MODERATE EPISODE OF MAJOR DEPRESSIVE DISORDER WITHOUT PRIOR EPISODE (H): ICD-10-CM

## 2023-06-07 DIAGNOSIS — C53.1 MALIGNANT NEOPLASM OF EXOCERVIX (H): ICD-10-CM

## 2023-06-07 DIAGNOSIS — J45.30 MILD PERSISTENT ASTHMA WITHOUT COMPLICATION: ICD-10-CM

## 2023-06-07 LAB
ALBUMIN UR-MCNC: NEGATIVE MG/DL
APPEARANCE UR: CLEAR
BACTERIA #/AREA URNS HPF: ABNORMAL /HPF
BILIRUB UR QL STRIP: NEGATIVE
CHOLEST SERPL-MCNC: 193 MG/DL
COLOR UR AUTO: YELLOW
GLUCOSE UR STRIP-MCNC: NEGATIVE MG/DL
HBA1C MFR BLD: 5.5 % (ref 0–5.6)
HDLC SERPL-MCNC: 43 MG/DL
HGB UR QL STRIP: ABNORMAL
HYALINE CASTS #/AREA URNS LPF: ABNORMAL /LPF
KETONES UR STRIP-MCNC: NEGATIVE MG/DL
LDLC SERPL CALC-MCNC: 112 MG/DL
LEUKOCYTE ESTERASE UR QL STRIP: NEGATIVE
MUCOUS THREADS #/AREA URNS LPF: PRESENT /LPF
NITRATE UR QL: NEGATIVE
NONHDLC SERPL-MCNC: 150 MG/DL
PH UR STRIP: 6.5 [PH] (ref 5–7)
RBC #/AREA URNS AUTO: ABNORMAL /HPF
SP GR UR STRIP: 1.02 (ref 1–1.03)
SQUAMOUS #/AREA URNS AUTO: ABNORMAL /LPF
T4 FREE SERPL-MCNC: 1.37 NG/DL (ref 0.9–1.7)
TRIGL SERPL-MCNC: 189 MG/DL
TSH SERPL DL<=0.005 MIU/L-ACNC: 7.09 UIU/ML (ref 0.3–4.2)
UROBILINOGEN UR STRIP-ACNC: 0.2 E.U./DL
WBC #/AREA URNS AUTO: ABNORMAL /HPF

## 2023-06-07 PROCEDURE — 90471 IMMUNIZATION ADMIN: CPT | Performed by: NURSE PRACTITIONER

## 2023-06-07 PROCEDURE — 84443 ASSAY THYROID STIM HORMONE: CPT | Performed by: NURSE PRACTITIONER

## 2023-06-07 PROCEDURE — 83036 HEMOGLOBIN GLYCOSYLATED A1C: CPT | Performed by: NURSE PRACTITIONER

## 2023-06-07 PROCEDURE — 87624 HPV HI-RISK TYP POOLED RSLT: CPT | Performed by: NURSE PRACTITIONER

## 2023-06-07 PROCEDURE — 80061 LIPID PANEL: CPT | Performed by: NURSE PRACTITIONER

## 2023-06-07 PROCEDURE — 99214 OFFICE O/P EST MOD 30 MIN: CPT | Mod: 25 | Performed by: NURSE PRACTITIONER

## 2023-06-07 PROCEDURE — 90677 PCV20 VACCINE IM: CPT | Performed by: NURSE PRACTITIONER

## 2023-06-07 PROCEDURE — 36415 COLL VENOUS BLD VENIPUNCTURE: CPT | Performed by: NURSE PRACTITIONER

## 2023-06-07 PROCEDURE — 96127 BRIEF EMOTIONAL/BEHAV ASSMT: CPT | Performed by: NURSE PRACTITIONER

## 2023-06-07 PROCEDURE — 81001 URINALYSIS AUTO W/SCOPE: CPT | Performed by: NURSE PRACTITIONER

## 2023-06-07 PROCEDURE — 84439 ASSAY OF FREE THYROXINE: CPT | Performed by: NURSE PRACTITIONER

## 2023-06-07 PROCEDURE — G0145 SCR C/V CYTO,THINLAYER,RESCR: HCPCS | Performed by: NURSE PRACTITIONER

## 2023-06-07 PROCEDURE — 99396 PREV VISIT EST AGE 40-64: CPT | Mod: 25 | Performed by: NURSE PRACTITIONER

## 2023-06-07 RX ORDER — FLUTICASONE PROPIONATE 110 UG/1
2 AEROSOL, METERED RESPIRATORY (INHALATION) 2 TIMES DAILY
Qty: 12 G | Refills: 5 | Status: SHIPPED | OUTPATIENT
Start: 2023-06-07 | End: 2023-09-04

## 2023-06-07 RX ORDER — ALBUTEROL SULFATE 90 UG/1
2 AEROSOL, METERED RESPIRATORY (INHALATION) EVERY 6 HOURS PRN
Qty: 54 G | Refills: 4 | Status: SHIPPED | OUTPATIENT
Start: 2023-06-07 | End: 2024-02-27

## 2023-06-07 RX ORDER — LEVOTHYROXINE SODIUM 25 UG/1
25 TABLET ORAL DAILY
Qty: 90 TABLET | Refills: 3 | Status: SHIPPED | OUTPATIENT
Start: 2023-06-07 | End: 2024-07-03

## 2023-06-07 RX ORDER — FLUOXETINE 40 MG/1
40 CAPSULE ORAL DAILY
Qty: 90 CAPSULE | Refills: 1 | Status: SHIPPED | OUTPATIENT
Start: 2023-06-07 | End: 2023-08-23

## 2023-06-07 RX ORDER — MONTELUKAST SODIUM 10 MG/1
10 TABLET ORAL AT BEDTIME
Qty: 90 TABLET | Refills: 3 | Status: SHIPPED | OUTPATIENT
Start: 2023-06-07 | End: 2024-05-25

## 2023-06-07 RX ORDER — TRAZODONE HYDROCHLORIDE 100 MG/1
100 TABLET ORAL AT BEDTIME
Qty: 90 TABLET | Refills: 1 | Status: SHIPPED | OUTPATIENT
Start: 2023-06-07 | End: 2023-08-23

## 2023-06-07 ASSESSMENT — ENCOUNTER SYMPTOMS
BREAST MASS: 0
EYE PAIN: 0
FREQUENCY: 0
CHILLS: 0
SORE THROAT: 0
SHORTNESS OF BREATH: 0
HEADACHES: 1
HEARTBURN: 1
HEMATURIA: 1
DYSURIA: 0
FEVER: 0
NERVOUS/ANXIOUS: 1
DIARRHEA: 1
DIZZINESS: 1
PALPITATIONS: 0
WEAKNESS: 0
CONSTIPATION: 0
ABDOMINAL PAIN: 0
COUGH: 0
HEMATOCHEZIA: 1
MYALGIAS: 0
ARTHRALGIAS: 0
PARESTHESIAS: 0
NAUSEA: 0
JOINT SWELLING: 0

## 2023-06-07 ASSESSMENT — ANXIETY QUESTIONNAIRES: GAD7 TOTAL SCORE: 18

## 2023-06-07 ASSESSMENT — PAIN SCALES - GENERAL: PAINLEVEL: NO PAIN (0)

## 2023-06-07 NOTE — TELEPHONE ENCOUNTER
Patient Quality Outreach      Summary:    Patient has the following on her problem list/HM:   Asthma review         6/6/2023    11:04 AM   ACT Total Scores   ACT TOTAL SCORE (Goal Greater than or Equal to 20) 19   In the past 12 months, how many times did you visit the emergency room for your asthma without being admitted to the hospital? 0   In the past 12 months, how many times were you hospitalized overnight because of your asthma? 0          Patient is due/failing the following:   Asthma  -  ACT needed    Type of outreach:    donta call patient in July,  see appt 6/07/2023      Questions for provider review:    None                                                                                                                                     Kaylyn Cortes CMA

## 2023-06-07 NOTE — NURSING NOTE
Prior to immunization administration, verified patients identity using patient s name and date of birth. Please see Immunization Activity for additional information.     Screening Questionnaire for Adult Immunization    Are you sick today?   No   Do you have allergies to medications, food, a vaccine component or latex?   No   Have you ever had a serious reaction after receiving a vaccination?   No   Do you have a long-term health problem with heart, lung, kidney, or metabolic disease (e.g., diabetes), asthma, a blood disorder, no spleen, complement component deficiency, a cochlear implant, or a spinal fluid leak?  Are you on long-term aspirin therapy?   No   Do you have cancer, leukemia, HIV/AIDS, or any other immune system problem?   No   Do you have a parent, brother, or sister with an immune system problem?   No   In the past 3 months, have you taken medications that affect  your immune system, such as prednisone, other steroids, or anticancer drugs; drugs for the treatment of rheumatoid arthritis, Crohn s disease, or psoriasis; or have you had radiation treatments?   No   Have you had a seizure, or a brain or other nervous system problem?   No   During the past year, have you received a transfusion of blood or blood    products, or been given immune (gamma) globulin or antiviral drug?   No   For women: Are you pregnant or is there a chance you could become       pregnant during the next month?   No   Have you received any vaccinations in the past 4 weeks?   No     Immunization questionnaire answers were all negative.      Injection of PCV 20 given by Kaylyn Cortes CMA. Patient instructed to remain in clinic for 15 minutes afterwards, and to report any adverse reactions.     Screening performed by Kaylyn Cortes MA on 6/7/2023 at 9:07 AM.

## 2023-06-07 NOTE — PROGRESS NOTES
SUBJECTIVE:   CC: Cassi is an 53 year old who presents for preventive health visit.       2023     7:45 AM   Additional Questions   Roomed by Vy KHALIL     Healthy Habits:     Getting at least 3 servings of Calcium per day:  Yes    Bi-annual eye exam:  NO    Dental care twice a year:  Yes    Sleep apnea or symptoms of sleep apnea:  Daytime drowsiness    Diet:  Regular (no restrictions)    Frequency of exercise:  2-3 days/week    Duration of exercise:  Less than 15 minutes    Taking medications regularly:  No    Barriers to taking medications:  Problems remembering to take them    Medication side effects:  None    PHQ-2 Total Score: 2    Additional concerns today:  No    3 boys- all in there their 20's  Youngest has schizophrenia and bipolar   to .    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    H/o cervical cancer s/p radiation and chemo    Depression and Anxiety Follow-Up    How are you doing with your depression since your last visit? Worsened      How are you doing with your anxiety since your last visit?  Worsened      Are you having other symptoms that might be associated with depression or anxiety? Yes:  anger     Have you had a significant life event? OTHER: familys health concerns     Do you have any concerns with your use of alcohol or other drugs? No    Social History     Tobacco Use     Smoking status: Former     Packs/day: 0.25     Years: 20.00     Pack years: 5.00     Types: Cigarettes     Quit date: 2018     Years since quittin.3     Smokeless tobacco: Former     Quit date: 2015   Vaping Use     Vaping status: Every Day     Substances: Nicotine (3), CBD, Flavoring     Devices: Refillable tank   Substance Use Topics     Alcohol use: Not Currently     Alcohol/week: 0.0 standard drinks of alcohol     Comment: once every 3-4 months     Drug use: No         10/26/2022     6:49 AM 11/10/2022     9:57 AM 2023     10:58 AM   PHQ   PHQ-9 Total Score 9 8 11   Q9: Thoughts of better off dead/self-harm past 2 weeks Not at all Not at all Not at all         10/26/2022     6:55 AM 11/10/2022     9:57 AM 2023    10:58 AM   LATOYA-7 SCORE   Total Score 13 (moderate anxiety) 12 (moderate anxiety)    Total Score 13 12 18         2023    10:58 AM   Last PHQ-9   1.  Little interest or pleasure in doing things 1   2.  Feeling down, depressed, or hopeless 1   3.  Trouble falling or staying asleep, or sleeping too much 2   4.  Feeling tired or having little energy 1   5.  Poor appetite or overeating 2   6.  Feeling bad about yourself 1   7.  Trouble concentrating 1   8.  Moving slowly or restless 2   Q9: Thoughts of better off dead/self-harm past 2 weeks 0   PHQ-9 Total Score 11         2023    10:58 AM   LATOYA-7    1. Feeling nervous, anxious, or on edge 2   2. Not being able to stop or control worrying 3   3. Worrying too much about different things 3   4. Trouble relaxing 2   5. Being so restless that it is hard to sit still 2   6. Becoming easily annoyed or irritable 3   7. Feeling afraid, as if something awful might happen 3   LATOYA-7 Total Score 18   If you checked any problems, how difficult have they made it for you to do your work, take care of things at home, or get along with other people? Very difficult       Suicide Assessment Five-step Evaluation and Treatment (SAFE-T)    Social History     Tobacco Use     Smoking status: Former     Packs/day: 0.25     Years: 20.00     Pack years: 5.00     Types: Cigarettes     Quit date: 2018     Years since quittin.3     Smokeless tobacco: Former     Quit date: 2015   Vaping Use     Vaping status: Every Day     Substances: Nicotine (3), CBD, Flavoring     Devices: Refillable tank   Substance Use Topics     Alcohol use: Not Currently     Alcohol/week: 0.0 standard drinks of alcohol     Comment: once every 3-4 months             2023    11:03 AM   Alcohol Use   Prescreen:  >3 drinks/day or >7 drinks/week? No          View : No data to display.              Reviewed orders with patient.  Reviewed health maintenance and updated orders accordingly - Yes  BP Readings from Last 3 Encounters:   06/07/23 122/64   10/26/22 120/78   06/15/22 122/76    Wt Readings from Last 3 Encounters:   06/07/23 79.1 kg (174 lb 6.4 oz)   10/26/22 76.8 kg (169 lb 6.4 oz)   06/15/22 89.3 kg (196 lb 12.8 oz)                  Patient Active Problem List   Diagnosis     Arrested hydrocephalus (H)     CARDIOVASCULAR SCREENING; LDL GOAL LESS THAN 160     Breast cancer screening     Mild persistent asthma     HGSIL on Pap smear of cervix     Cervix cancer (H)     Family history of malignant neoplasm of ovary     Anxiety     Iron deficiency anemia, unspecified iron deficiency anemia type     Abnormal perimenopausal bleeding     Perimenopause     Hypothyroidism, unspecified type     Current moderate episode of major depressive disorder without prior episode (H)     Personal history of malignant neoplasm of cervix uteri     Right ovarian cyst     Past Surgical History:   Procedure Laterality Date     COLONOSCOPY N/A 12/28/2020    Procedure: COLONOSCOPY, WITH  polypectomy;  Surgeon: Steven Vazquez MD;  Location: [facility] OR     ESOPHAGOSCOPY, GASTROSCOPY, DUODENOSCOPY (EGD), COMBINED N/A 7/13/2020    Procedure: ESOPHAGOGASTRODUODENOSCOPY, WITH BIOPSY;  Surgeon: Steven Vazquez MD;  Location: [facility]     EXAM UNDER ANESTHESIA, INSERT ANN MARIE SLEEVE, UTERINE PLACEMENT OF TANDEM AND RING FOR RAD, ULTRASOUND N/A 8/30/2019    Procedure: Ann Marie Sleeve Insertion, Ultrasound Guided;  Surgeon: Anne Tidwell MD;  Location: [facility] OR     EXCISE MASS TRUNK Left 9/16/2015    Procedure: EXCISE MASS TRUNK;  Surgeon: Carlos Enrique Briones MD;  Location: [facility] OR     ZZC VENTRICULO-CISTERNOSTOMY,3RD VENT      for treatment of HA related to hydrocephalus       Social History     Tobacco Use     Smoking status: Former     Packs/day: 0.25      Years: 20.00     Pack years: 5.00     Types: Cigarettes     Quit date: 2018     Years since quittin.3     Smokeless tobacco: Former     Quit date: 2015   Vaping Use     Vaping status: Every Day     Substances: Nicotine (3), CBD, Flavoring     Devices: Refillable tank   Substance Use Topics     Alcohol use: Not Currently     Alcohol/week: 0.0 standard drinks of alcohol     Comment: once every 3-4 months     Family History   Problem Relation Age of Onset     Diabetes Mother      Cerebrovascular Disease Mother      Ovarian Cancer Mother      C.A.D. Father 40     Cerebrovascular Disease Father      C.A.D. Sister 62        stent placed     Ovarian Cancer Sister      Ovarian Cancer Sister      Schizophrenia Son      Bipolar Disorder Son          Current Outpatient Medications   Medication Sig Dispense Refill     albuterol (PROAIR HFA/PROVENTIL HFA/VENTOLIN HFA) 108 (90 Base) MCG/ACT inhaler Inhale 2 puffs into the lungs every 6 hours as needed for shortness of breath or wheezing 54 g 4     Ferrous Sulfate (IRON SUPPLEMENT PO) Take 325 mg by mouth 2 times daily (with meals)       FLUoxetine (PROZAC) 40 MG capsule Take 1 capsule (40 mg) by mouth daily 90 capsule 1     fluticasone (FLONASE) 50 MCG/ACT nasal spray Spray 1-2 sprays into both nostrils daily 16 g 3     fluticasone (FLOVENT HFA) 110 MCG/ACT inhaler Inhale 2 puffs into the lungs 2 times daily 12 g 5     levothyroxine (SYNTHROID/LEVOTHROID) 25 MCG tablet Take 1 tablet (25 mcg) by mouth daily 90 tablet 3     montelukast (SINGULAIR) 10 MG tablet Take 1 tablet (10 mg) by mouth At Bedtime 90 tablet 3     Multiple Vitamin (MULTIVITAMINS PO) Take  by mouth daily.       traZODone (DESYREL) 100 MG tablet Take 1 tablet (100 mg) by mouth At Bedtime 90 tablet 1     cholecalciferol (VITAMIN D3) 125 mcg (5000 units) capsule Take 1 capsule (125 mcg) by mouth daily 100 capsule 5     Allergies   Allergen Reactions     Seasonal Allergies        Breast Cancer  Screening:    FHS-7:       7/22/2022     1:10 PM   Breast CA Risk Assessment (FHS-7)   Did any of your first-degree relatives have breast or ovarian cancer? No   Did any of your relatives have bilateral breast cancer? No   Did any man in your family have breast cancer? No   Did any woman in your family have breast and ovarian cancer? No   Did any woman in your family have breast cancer before age 50 y? No   Do you have 2 or more relatives with breast and/or ovarian cancer? No   Do you have 2 or more relatives with breast and/or bowel cancer? No     Pertinent mammograms are reviewed under the imaging tab.    History of abnormal Pap smear: YES - updated in Problem List and Health Maintenance accordingly      Latest Ref Rng & Units 6/7/2023     8:51 AM 12/1/2021     4:55 PM 2/8/2021     8:40 AM   PAP / HPV   PAP  Negative for Intraepithelial Lesion or Malignancy (NILM)   Negative for Intraepithelial Lesion or Malignancy (NILM)      PAP (Historical)    NIL     HPV 16 DNA Negative Negative       HPV 18 DNA Negative Negative       Other HR HPV Negative Negative         Reviewed and updated as needed this visit by clinical staff   Tobacco   Meds   Med Hx  Surg Hx  Fam Hx          Reviewed and updated as needed this visit by Provider     Meds   Med Hx  Surg Hx  Fam Hx           Review of Systems   Constitutional: Negative for chills and fever.   HENT: Negative for congestion, ear pain, hearing loss and sore throat.    Eyes: Negative for pain and visual disturbance.   Respiratory: Negative for cough and shortness of breath.    Cardiovascular: Negative for chest pain, palpitations and peripheral edema.   Gastrointestinal: Positive for diarrhea, heartburn and hematochezia. Negative for abdominal pain, constipation and nausea.   Breasts:  Negative for tenderness, breast mass and discharge.   Genitourinary: Positive for hematuria, vaginal bleeding and vaginal discharge. Negative for dysuria, frequency, genital sores,  "pelvic pain and urgency.   Musculoskeletal: Negative for arthralgias, joint swelling and myalgias.   Skin: Negative for rash.   Neurological: Positive for dizziness and headaches. Negative for weakness and paresthesias.   Psychiatric/Behavioral: Positive for mood changes. The patient is nervous/anxious.        OBJECTIVE:   /64 (BP Location: Right arm, Patient Position: Sitting, Cuff Size: Adult Large)   Pulse 74   Temp 98.3  F (36.8  C) (Oral)   Resp 16   Ht 1.715 m (5' 7.5\")   Wt 79.1 kg (174 lb 6.4 oz)   LMP  (LMP Unknown)   SpO2 100%   BMI 26.91 kg/m    Physical Exam  GENERAL APPEARANCE: healthy, alert and no distress  EYES: Eyes grossly normal to inspection, PERRL and conjunctivae and sclerae normal  HENT: ear canals and TM's normal, nose and mouth without ulcers or lesions, oropharynx clear and oral mucous membranes moist  NECK: no adenopathy, no asymmetry, masses, or scars and thyroid normal to palpation  RESP: lungs clear to auscultation - no rales, rhonchi or wheezes  BREAST: normal without masses, tenderness or nipple discharge and no palpable axillary masses or adenopathy  CV: regular rate and rhythm, normal S1 S2, no S3 or S4, no murmur, click or rub, no peripheral edema and peripheral pulses strong  ABDOMEN: soft, nontender, no hepatosplenomegaly, no masses and bowel sounds normal   (female): normal female external genitalia, normal urethral meatus, vaginal mucosal atrophy noted, normal cervix, adnexae, and uterus without masses or abnormal discharge  MS: no musculoskeletal defects are noted and gait is age appropriate without ataxia  SKIN: no suspicious lesions or rashes  NEURO: Normal strength and tone, sensory exam grossly normal, mentation intact and speech normal  PSYCH: mentation appears normal and affect normal/bright    Diagnostic Test Results:  Labs reviewed in Epic  Results for orders placed or performed in visit on 06/07/23   TSH WITH FREE T4 REFLEX     Status: Abnormal "   Result Value Ref Range    TSH 7.09 (H) 0.30 - 4.20 uIU/mL   Lipid panel reflex to direct LDL Non-fasting     Status: Abnormal   Result Value Ref Range    Cholesterol 193 <200 mg/dL    Triglycerides 189 (H) <150 mg/dL    Direct Measure HDL 43 (L) >=50 mg/dL    LDL Cholesterol Calculated 112 (H) <=100 mg/dL    Non HDL Cholesterol 150 (H) <130 mg/dL    Narrative    Cholesterol  Desirable:  <200 mg/dL    Triglycerides  Normal:  Less than 150 mg/dL  Borderline High:  150-199 mg/dL  High:  200-499 mg/dL  Very High:  Greater than or equal to 500 mg/dL    Direct Measure HDL  Female:  Greater than or equal to 50 mg/dL   Male:  Greater than or equal to 40 mg/dL    LDL Cholesterol  Desirable:  <100mg/dL  Above Desirable:  100-129 mg/dL   Borderline High:  130-159 mg/dL   High:  160-189 mg/dL   Very High:  >= 190 mg/dL    Non HDL Cholesterol  Desirable:  130 mg/dL  Above Desirable:  130-159 mg/dL  Borderline High:  160-189 mg/dL  High:  190-219 mg/dL  Very High:  Greater than or equal to 220 mg/dL   Hemoglobin A1c     Status: Normal   Result Value Ref Range    Hemoglobin A1C 5.5 0.0 - 5.6 %   UA with Microscopic reflex to Culture - lab collect     Status: Abnormal    Specimen: Urine, Clean Catch   Result Value Ref Range    Color Urine Yellow Colorless, Straw, Light Yellow, Yellow    Appearance Urine Clear Clear    Glucose Urine Negative Negative mg/dL    Bilirubin Urine Negative Negative    Ketones Urine Negative Negative mg/dL    Specific Gravity Urine 1.025 1.003 - 1.035    Blood Urine Small (A) Negative    pH Urine 6.5 5.0 - 7.0    Protein Albumin Urine Negative Negative mg/dL    Urobilinogen Urine 0.2 0.2, 1.0 E.U./dL    Nitrite Urine Negative Negative    Leukocyte Esterase Urine Negative Negative   UA Microscopic with Reflex to Culture     Status: Abnormal   Result Value Ref Range    Bacteria Urine Few (A) None Seen /HPF    RBC Urine 5-10 (A) 0-2 /HPF /HPF    WBC Urine 0-5 0-5 /HPF /HPF    Squamous Epithelials Urine  Moderate (A) None Seen /LPF    Mucus Urine Present (A) None Seen /LPF    Hyaline Casts Urine 0-2 (A) None Seen /LPF    Narrative    Urine Culture not indicated   T4 free     Status: Normal   Result Value Ref Range    Free T4 1.37 0.90 - 1.70 ng/dL   HPV High Risk Types DNA Cervical     Status: None   Result Value Ref Range    Other HR HPV Negative Negative    HPV16 DNA Negative Negative    HPV18 DNA Negative Negative    FINAL DIAGNOSIS       This patient's sample is negative for HPV DNA.        This test was developed and its performance characteristics determined by the [facility]. It has not been cleared or approved by the FDA. The laboratory is regulated under CLIA as qualified to perform high-complexity testing. This test is used for clinical purposes. It should not be regarded as investigational or for research.    METHODOLOGY: The Roche Coby 4800 system uses automated extraction, simultaneous amplification of HPV (L1 region) and beta-globin, followed by real time detection of fluorescent labeled HPV and beta globin using specific oligonucleotide probes. The test specifically identifies types HPV 16 DNA and HPV 18 DNA while concurrently detecting the rest of the high risk types (31, 33, 35, 39, 45, 51, 52, 56, 58, 59, 66 or 68).    COMMENTS: This test is not intended for use as a screening device for woman under age 30 with normal cervical cytology. Results should be correlated with cytologic and histologic findings. Close clinical followup is recommended.       Pap Screen with HPV - recommended age 30 - 65 years     Status: None   Result Value Ref Range    Interpretation        Negative for Intraepithelial Lesion or Malignancy (NILM)    Comment         Papanicolaou Test Limitations:  Cervical cytology is a screening test with limited sensitivity, and regular screening is critical for cancer prevention.  Pap tests are primarily effective for the  diagnosis/prevention of squamous cell carcinoma, not adenocarcinoma or other cancers.        Specimen Adequacy       Satisfactory for evaluation, endocervical/transformation zone component absent    Clinical Information       abnormal bleeding      Reflex Testing Yes regardless of result     Previous Abnormal?       Yes      Previous Abnormal Diagnosis       h/o cervical cancer      Performing Labs       The technical component of this testing was completed at [facility]         ASSESSMENT/PLAN:   Cassi was seen today for physical.    Diagnoses and all orders for this visit:    Routine history and physical examination of adult  -     PRIMARY CARE FOLLOW-UP SCHEDULING  -     Pneumococcal 20 Valent Conjugate (Prevnar 20)  -     REVIEW OF HEALTH MAINTENANCE PROTOCOL ORDERS  -     UA with Microscopic reflex to Culture - lab collect; Future    Mild persistent asthma without complication   Chronic, uncontrolled.  ACT is low today, will recheck in 2 months  -     fluticasone (FLOVENT HFA) 110 MCG/ACT inhaler; Inhale 2 puffs into the lungs 2 times daily  -     albuterol (PROAIR HFA/PROVENTIL HFA/VENTOLIN HFA) 108 (90 Base) MCG/ACT inhaler; Inhale 2 puffs into the lungs every 6 hours as needed for shortness of breath or wheezing  -     montelukast (SINGULAIR) 10 MG tablet; Take 1 tablet (10 mg) by mouth At Bedtime    Hypothyroidism, unspecified type  Chronic, stable, continue current treatment.  Will adjust dosage as needed based on lab result.  -     TSH WITH FREE T4 REFLEX; Future  -     levothyroxine (SYNTHROID/LEVOTHROID) 25 MCG tablet; Take 1 tablet (25 mcg) by mouth daily  -     TSH WITH FREE T4 REFLEX  -     T4 free    Arrested hydrocephalus (H)  Known issue that I take into account for their medical decisions, no current exacerbations or new concerns.    Malignant neoplasm of exocervix (H)  -     Adult Oncology/Hematology  Referral; Future  "  Follow-up with Oncology    Iron deficiency anemia, unspecified iron deficiency anemia type    Current moderate episode of major depressive disorder without prior episode (H) and Anxiety  -     FLUoxetine (PROZAC) 40 MG capsule; Take 1 capsule (40 mg) by mouth daily   Chronic, stable, continue current treatment.     Cervical cancer screening  -     Pap Screen with HPV - recommended age 30 - 65 years  -     HPV Hold (Lab Only)  -     HPV High Risk Types DNA Cervical    Screening for hyperlipidemia  -     Lipid panel reflex to direct LDL Non-fasting; Future  -     Lipid panel reflex to direct LDL Non-fasting    Adjustment insomnia   Chronic, stable, continue current treatment.   -     traZODone (DESYREL) 100 MG tablet; Take 1 tablet (100 mg) by mouth At Bedtime    Hematuria, unspecified type  -     UA with Microscopic reflex to Culture - lab collect; Future    Visit for screening mammogram  -     MA Screen Bilateral w/Víctor; Future    Screening for diabetes mellitus  -     Hemoglobin A1c; Future  -     Hemoglobin A1c    Other orders  -     PRIMARY CARE FOLLOW-UP SCHEDULING; Future        COUNSELING:  Reviewed preventive health counseling, as reflected in patient instructions       Regular exercise       Healthy diet/nutrition      BMI:   Estimated body mass index is 26.91 kg/m  as calculated from the following:    Height as of this encounter: 1.715 m (5' 7.5\").    Weight as of this encounter: 79.1 kg (174 lb 6.4 oz).   Weight management plan: Discussed healthy diet and exercise guidelines      She reports that she quit smoking about 5 years ago. Her smoking use included cigarettes. She has a 5.00 pack-year smoking history. She quit smokeless tobacco use about 8 years ago.          Domonique Zavala NP  [facility]  Answers for HPI/ROS submitted by the patient on 6/6/2023  If you checked off any problems, how difficult have these problems made it for you to do your work, take care of things " at home, or get along with other people?: Somewhat difficult  PHQ9 TOTAL SCORE: 11

## 2023-06-12 LAB
BKR LAB AP GYN ADEQUACY: NORMAL
BKR LAB AP GYN INTERPRETATION: NORMAL
BKR LAB AP HPV REFLEX: NORMAL
BKR LAB AP PREVIOUS ABNL DX: NORMAL
BKR LAB AP PREVIOUS ABNORMAL: NORMAL
PATH REPORT.COMMENTS IMP SPEC: NORMAL
PATH REPORT.COMMENTS IMP SPEC: NORMAL
PATH REPORT.RELEVANT HX SPEC: NORMAL

## 2023-06-13 LAB
HUMAN PAPILLOMA VIRUS 16 DNA: NEGATIVE
HUMAN PAPILLOMA VIRUS 18 DNA: NEGATIVE
HUMAN PAPILLOMA VIRUS FINAL DIAGNOSIS: NORMAL
HUMAN PAPILLOMA VIRUS OTHER HR: NEGATIVE

## 2023-06-19 ENCOUNTER — PATIENT OUTREACH (OUTPATIENT)
Dept: FAMILY MEDICINE | Facility: CLINIC | Age: 53
End: 2023-06-19
Payer: COMMERCIAL

## 2023-06-22 NOTE — PROGRESS NOTES
Follow Up Notes on Referred Patient    Date: 2023        RE: Nicole L Fritsche  : 1970  DAVID: 2023          Nicole L Fritsche is a 53 year old woman with a history of stage IIB cervical cancer. She completed chemoradiation and brachytherapy 2019. She is here today for a surveillance visit.     Oncology history:   2019: Pap ASCUS. HPV 18+  19: Cervical biopsy, 4 o'clock     FINAL DIAGNOSIS:   Cervix, 4:00, biopsy:   - INVASIVE WELL-DIFFERENTIATED SQUAMOUS CELL CARCINOMA, focally keratinizing   - High grade squamous intraepithelial lesion (cervical intraepithelial neoplasia 3)      19: PET CT IMPRESSION: In this patient with newly diagnosed cervical cancer:  1. 3.7 cm cervical lesion with a max SUV of 13.16.   2. Focal uptake in the endometrium, favor adenomyosis over metastatic disease. Adenomyosis is demonstrated on MRI 2019 and can be hypermetabolic in a premenopausal woman.  3. Small left periaortic retroperitoneal lymph node borderline SUV uptake, inflammatory versus metastatic. Attention on follow-up.   4. Small left inguinal lymph nodes with SUV max of 2.45, likely inflammatory.     19-19: Cisplatin + radiation; brachytherapy     2020: PET IMPRESSION: In this patient with history of cervical cancer status post chemoradiation, there is evidence of complete treatment response, although the sensitivity of this PET/CT may be compromised secondary to diffuse muscular activation.      1. No suspicious FDG activity or CT finding in the uterus, cervix, and vagina. No FDG avid adenopathy.   2. Mild mucosal thickening and FDG avidity in the short segment of the proximal lesser curvature of the stomach, which may represents segmental gastritis in the correct clinical setting. Recommend correlation with endoscopy.  3. Small focus of decreased FDG uptake in the right frontal gyrus which may represents encephalomalacia. Consider further evaluation with brain  MRI.     7/20/2020: MR brain Impression:  The previous PET/CT demonstrated decreased FDG uptake in the right     2/8/21: Pap NIL.    7/7/21: US IMPRESSION:   1. Right ovarian simple cyst measuring up to 6.5 cm and is increased from 3 cm in 2019. In postmenopausal patients, follow-up ultrasound in 3-6 months is recommended for simple cysts greater than 5 cm. Consider  OB/GYN consultation patient is symptomatic.  2. Small amount of fluid in the endometrium and pelvis may be physiologic.    2/5/22; MR pelvis gyn: IMPRESSION:   1.  Previously described mass in the posterior cervix is no longer seen compatible with complete response to treatment. No pelvic lymphadenopathy.  2.  Thickened junctional zone is not significantly changed and remains suggestive of adenomyosis.  There is a 2.0 cm right ovarian cyst, previously 2.9 cm. Bilateral  ovaries are otherwise unremarkable.     6/7/2023: NILM neg HPV              --Today Cassi reports spotting with bowel movements and with urination. Started a few weeks ago. This is new has not occurred since radiation in 2019. No spotting or pain with intercourse. Reports pink spotting with straining with BM. Notices pink spotting with urination. No heavy bleeding with either. Pt has hemorrhoids (no itching or irritation). She does report black tarry stools (ongoing off and on over the last month). Not on a blood thinner. Not taking NSAIDs. Reports pelvic cramping over the last couple months. Reports intermittent and not daily. 2-3 times per week. Resolves quickly and is mild. Not having this cramping before a bowel movement so at random per pt. No UTI symptoms. Reports pink spotting with urination intermittently over the last couple months. She denies nausea/emesis, no lower extremity edema, and no difficulties eating or sleeping. She denies any abdominal bloating, no fevers or chills, and no chest pain or shortness of breath.              Health Maintenance  Colonoscopy- 12/28/2020  polyps benign repeat in 7-10 years   Mammogram- 7/2022 negative   Annual physical- 6/7/2023            Review of Systems:    Systemic           no weight changes; no fever; no chills; no night sweats; no appetite changes  Skin           no rashes, or lesions  Eye           no irritation; no changes in vision  Temo-Laryngeal           no dysphagia; no hoarseness   Pulmonary    no cough; no shortness of breath  Cardiovascular    no chest pain; no palpitations  Gastrointestinal    no diarrhea; + constipation; no abdominal pain; no changes in bowel habits; no blood in stool  Genitourinary   no urinary frequency; no urinary urgency; no dysuria; no pain; no abnormal vaginal discharge; no abnormal vaginal bleeding, +hematuria   Breast    no breast discharge; no breast changes; no breast pain  Musculoskeletal    no myalgias; no arthralgias; no back pain  Psychiatric           no depressed mood; no anxiety    Hematologic              no tender lymph nodes; no noticeable swellings or lumps   Endocrine    no hot flashes; no heat/cold intolerance         Neurological   no tremor; no numbness and tingling; no headaches; no difficulty sleeping      Past Medical History:    Past Medical History:   Diagnosis Date     Abnormal Pap smear of cervix 05/21/2019    see problem list     Arrested hydrocephalus (H) 2/3/2009    Ongoing HAs, seeing neurosurgeon. Per past notes from Dr. Resendiz, HAs most likely not secondary to the hydrocephalus, but rather vascular inorgin.  Please see scanned in records.     Asthma      Cervical cancer (H)      Cervical high risk HPV (human papillomavirus) test positive 05/21/2019    See problem list     Current moderate episode of major depressive disorder without prior episode (H) 12/1/2021     Hypothyroidism, unspecified type 12/1/2021         Past Surgical History:    Past Surgical History:   Procedure Laterality Date     COLONOSCOPY N/A 12/28/2020    Procedure: COLONOSCOPY, WITH  polypectomy;  Surgeon:  Steven Vazquez MD;  Location: [facility] OR     ESOPHAGOSCOPY, GASTROSCOPY, DUODENOSCOPY (EGD), COMBINED N/A 7/13/2020    Procedure: ESOPHAGOGASTRODUODENOSCOPY, WITH BIOPSY;  Surgeon: Steven Vazquez MD;  Location: [facility] OR     EXAM UNDER ANESTHESIA, INSERT ANN MARIE SLEEVE, UTERINE PLACEMENT OF TANDEM AND RING FOR RAD, ULTRASOUND N/A 8/30/2019    Procedure: Ann Marie Sleeve Insertion, Ultrasound Guided;  Surgeon: Anne Tidwell MD;  Location: [facility] OR     EXCISE MASS TRUNK Left 9/16/2015    Procedure: EXCISE MASS TRUNK;  Surgeon: Carlos Enrique Briones MD;  Location: [facility] OR     ZZC VENTRICULO-CISTERNOSTOMY,3RD VENT      for treatment of HA related to hydrocephalus         Health Maintenance Due   Topic Date Due     HEPATITIS B IMMUNIZATION (1 of 3 - 3-dose series) Never done     NICOTINE/TOBACCO CESSATION COUNSELING Q 1 YR  06/17/2015     ASTHMA ACTION PLAN  09/08/2016     COVID-19 Vaccine (5 - Pfizer series) 08/10/2022     MAMMO SCREENING  07/22/2023       Current Medications:     Current Outpatient Medications   Medication Sig Dispense Refill     albuterol (PROAIR HFA/PROVENTIL HFA/VENTOLIN HFA) 108 (90 Base) MCG/ACT inhaler Inhale 2 puffs into the lungs every 6 hours as needed for shortness of breath or wheezing 54 g 4     Ferrous Sulfate (IRON SUPPLEMENT PO) Take 325 mg by mouth 2 times daily (with meals)       FLUoxetine (PROZAC) 40 MG capsule Take 1 capsule (40 mg) by mouth daily 90 capsule 1     fluticasone (FLONASE) 50 MCG/ACT nasal spray Spray 1-2 sprays into both nostrils daily 16 g 3     fluticasone (FLOVENT HFA) 110 MCG/ACT inhaler Inhale 2 puffs into the lungs 2 times daily 12 g 5     levothyroxine (SYNTHROID/LEVOTHROID) 25 MCG tablet Take 1 tablet (25 mcg) by mouth daily 90 tablet 3     montelukast (SINGULAIR) 10 MG tablet Take 1 tablet (10 mg) by mouth At Bedtime 90 tablet 3     Multiple Vitamin (MULTIVITAMINS PO) Take  by mouth daily.       traZODone (DESYREL) 100 MG tablet Take 1 tablet (100 mg) by mouth At  "Bedtime 90 tablet 1         Allergies:        Allergies   Allergen Reactions     Seasonal Allergies         Social History:     Social History     Tobacco Use     Smoking status: Former     Packs/day: 0.25     Years: 20.00     Pack years: 5.00     Types: Cigarettes     Quit date: 2018     Years since quittin.3     Smokeless tobacco: Former     Quit date: 2015   Substance Use Topics     Alcohol use: Not Currently     Alcohol/week: 0.0 standard drinks of alcohol     Comment: once every 3-4 months       History   Drug Use No         Family History:       Family History   Problem Relation Age of Onset     Diabetes Mother      Cerebrovascular Disease Mother      Ovarian Cancer Mother      C.A.D. Father 40     Cerebrovascular Disease Father      C.A.D. Sister 62        stent placed     Ovarian Cancer Sister      Ovarian Cancer Sister      Schizophrenia Son      Bipolar Disorder Son          Physical Exam:     /78 (BP Location: Right arm, Patient Position: Chair, Cuff Size: Adult Regular)   Pulse 69   Ht 1.715 m (5' 7.5\")   Wt 77.6 kg (171 lb)   LMP  (LMP Unknown)   SpO2 100%   BMI 26.39 kg/m    Body mass index is 26.39 kg/m .    General Appearance: healthy and alert, no distress     HEENT: no thyromegaly, no palpable nodules or masses        Cardiovascular: regular rate and rhythm, no gallops, rubs or murmurs     Respiratory: lungs clear, no rales, rhonchi or wheezes    Musculoskeletal: extremities non tender and without edema    Skin: no lesions or rashes     Neurological: normal gait, no gross defects     Psychiatric: appropriate mood and affect                               Hematological: normal cervical, supraclavicular and inguinal lymph nodes     Gastrointestinal:       abdomen soft, non-tender, non-distended, no organomegaly or masses    Genitourinary: External genitalia and urethral meatus appears normal.  Vagina is smooth without nodularity or masses.  Cervix appears normal and flush " with the vagina. Radiation changes present with pallor and telangiectasias. No masses or lesions.  Bimanual exam reveal no masses, nodularity or fullness. Recto-vaginal exam confirms these findings.      Assessment:    Nicole L Fritsche is a 53 year old woman with a history of stage IIB cervical cancer. She completed chemoradiation and brachytherapy 9/2019. She is here today for a surveillance visit.    45 minutes spent on the date of the encounter doing chart review, history and exam, documentation and further activities as noted above      Plan:     1.)        HÉCTOR on exam or pap smear (6/7/2023 pap reviewed today with pt). Continue every 6 month exams (until 9/2024) then annually. Pap smear due 6/2024. Reviewed signs and symptoms for when she should contact the clinic or seek additional care. Patient to contact the clinic with any questions or concerns in the interim.  Answered all of her questions to the best of my ability.      2.) Genetic risk factors were assessed and given her family history of ovarian cancer she has met with a genetic counselor and had testing done. She has a variant of unknown significance in her DICER1, specifically called [id number]. No additional pathogenic mutations, variants of unknown significance, or gross deletions or duplications were detected. Genes Analyzed (36 total): APC, FLETCHER, AXIN2, BARD1, BMPR1A, BRCA1, BRCA2, BRIP1, CDH1, CDK4, CDKN2A, CHEK2, DICER1, MLH1, MSH2, MSH3, MSH6, MUTYH, NBN, NF1, NTHL1, PALB2, PMS2, PTEN, RAD51C, RAD51D, RECQL, SMAD4, SMARCA4, STK11 and TP53 (sequencing and deletion/duplication); HOXB13, POLD1 and POLE (sequencing only); EPCAM and GREM1 (deletion/duplication only). Encouraged to reach out to her sisters to clarify their ovarian cancer diagnosis and to discuss if they had/discussed any genetic counseling/testing done.    3.) Labs and/or tests ordered include: UA reviewed Pap 6/7/23 reviewed today; US pending      4.) Health maintenance issues  addressed today include annual health maintenance and non-gynecologic issues with PCP.    5.)       Hematochezia, Pelvic cramping and R ovarian cyst: cramping is intermittent and mild. Could be bowel related (constipation; reviewed constipation management options). Pt will schedule colonoscopy ASAP with PCP for hematochezia. Will order US abd/US pelvis to assess R ovarian cyst status and move to MRI ot CT if warranted.     6.)        Hematuria: will recheck UA today in office. Reviewed 6/7/23 UA from patient's PCP. No evidece for infection however + blood in urine. Could be related to radiation hx. Will recheck UA today and refer to Urology for possible radiation induced cystitis workup.                  HOMERO Lindsay, WHNP-BC  Women's Health Nurse Practitioner  Division of Gynecologic Oncology  [facility]        CC  Patient Care Team:  Domonique Zavala NP as PCP - General (Nurse Practitioner - Family)  Hasmukh Hernandez MD as Assigned Cancer Care Provider  Domonique Zavala NP as Assigned PCP

## 2023-06-26 ENCOUNTER — ONCOLOGY VISIT (OUTPATIENT)
Dept: ONCOLOGY | Facility: CLINIC | Age: 53
End: 2023-06-26
Payer: COMMERCIAL

## 2023-06-26 VITALS
OXYGEN SATURATION: 100 % | HEIGHT: 68 IN | BODY MASS INDEX: 25.91 KG/M2 | DIASTOLIC BLOOD PRESSURE: 78 MMHG | HEART RATE: 69 BPM | SYSTOLIC BLOOD PRESSURE: 118 MMHG | WEIGHT: 171 LBS

## 2023-06-26 DIAGNOSIS — N83.201 CYST OF RIGHT OVARY: ICD-10-CM

## 2023-06-26 DIAGNOSIS — Z00.00 ROUTINE HISTORY AND PHYSICAL EXAMINATION OF ADULT: ICD-10-CM

## 2023-06-26 DIAGNOSIS — Z92.3 HX OF RADIATION THERAPY: ICD-10-CM

## 2023-06-26 DIAGNOSIS — R31.9 HEMATURIA, UNSPECIFIED TYPE: ICD-10-CM

## 2023-06-26 DIAGNOSIS — C53.1 MALIGNANT NEOPLASM OF EXOCERVIX (H): Primary | ICD-10-CM

## 2023-06-26 LAB
ALBUMIN UR-MCNC: 10 MG/DL
AMORPH CRY #/AREA URNS HPF: ABNORMAL /HPF
APPEARANCE UR: CLEAR
BACTERIA #/AREA URNS HPF: ABNORMAL /HPF
BILIRUB UR QL STRIP: NEGATIVE
COLOR UR AUTO: YELLOW
GLUCOSE UR STRIP-MCNC: NEGATIVE MG/DL
GRAN CASTS #/AREA URNS LPF: ABNORMAL /LPF
HGB UR QL STRIP: ABNORMAL
KETONES UR STRIP-MCNC: NEGATIVE MG/DL
LEUKOCYTE ESTERASE UR QL STRIP: ABNORMAL
MUCOUS THREADS #/AREA URNS LPF: PRESENT /LPF
NITRATE UR QL: NEGATIVE
PH UR STRIP: 6.5 [PH] (ref 5–7)
RBC #/AREA URNS AUTO: ABNORMAL /HPF
SP GR UR STRIP: 1.02 (ref 1–1.03)
SQUAMOUS #/AREA URNS AUTO: ABNORMAL /LPF
UROBILINOGEN UR STRIP-MCNC: NORMAL MG/DL
WBC #/AREA URNS AUTO: ABNORMAL /HPF

## 2023-06-26 PROCEDURE — 81001 URINALYSIS AUTO W/SCOPE: CPT | Performed by: OBSTETRICS & GYNECOLOGY

## 2023-06-26 PROCEDURE — 99215 OFFICE O/P EST HI 40 MIN: CPT | Performed by: OBSTETRICS & GYNECOLOGY

## 2023-06-26 ASSESSMENT — PAIN SCALES - GENERAL: PAINLEVEL: NO PAIN (0)

## 2023-06-26 NOTE — LETTER
2023         RE: Nicole L Fritsche  [address]  [place] [address]        Dear Colleague,    Thank you for referring your patient, Nicole L Fritsche, to the [facility]. Please see a copy of my visit note below.                    Follow Up Notes on Referred Patient    Date: 2023        RE: Nicole L Fritsche  : 1970  DAVID: 2023          Nicole L Fritsche is a 53 year old woman with a history of stage IIB cervical cancer. She completed chemoradiation and brachytherapy 2019. She is here today for a surveillance visit.     Oncology history:   2019: Pap ASCUS. HPV 18+  19: Cervical biopsy, 4 o'clock     FINAL DIAGNOSIS:   Cervix, 4:00, biopsy:   - INVASIVE WELL-DIFFERENTIATED SQUAMOUS CELL CARCINOMA, focally keratinizing   - High grade squamous intraepithelial lesion (cervical intraepithelial neoplasia 3)      19: PET CT IMPRESSION: In this patient with newly diagnosed cervical cancer:  1. 3.7 cm cervical lesion with a max SUV of 13.16.   2. Focal uptake in the endometrium, favor adenomyosis over metastatic disease. Adenomyosis is demonstrated on MRI 2019 and can be hypermetabolic in a premenopausal woman.  3. Small left periaortic retroperitoneal lymph node borderline SUV uptake, inflammatory versus metastatic. Attention on follow-up.   4. Small left inguinal lymph nodes with SUV max of 2.45, likely inflammatory.     19-19: Cisplatin + radiation; brachytherapy     2020: PET IMPRESSION: In this patient with history of cervical cancer status post chemoradiation, there is evidence of complete treatment response, although the sensitivity of this PET/CT may be compromised secondary to diffuse muscular activation.      1. No suspicious FDG activity or CT finding in the uterus, cervix, and vagina. No FDG avid adenopathy.   2. Mild mucosal thickening and FDG avidity in the short segment of the proximal lesser curvature of the  stomach, which may represents segmental gastritis in the correct clinical setting. Recommend correlation with endoscopy.  3. Small focus of decreased FDG uptake in the right frontal gyrus which may represents encephalomalacia. Consider further evaluation with brain MRI.     7/20/2020: MR brain Impression:  The previous PET/CT demonstrated decreased FDG uptake in the right     2/8/21: Pap NIL.    7/7/21: US IMPRESSION:   1. Right ovarian simple cyst measuring up to 6.5 cm and is increased from 3 cm in 2019. In postmenopausal patients, follow-up ultrasound in 3-6 months is recommended for simple cysts greater than 5 cm. Consider  OB/GYN consultation patient is symptomatic.  2. Small amount of fluid in the endometrium and pelvis may be physiologic.    2/5/22; MR pelvis gyn: IMPRESSION:   1.  Previously described mass in the posterior cervix is no longer seen compatible with complete response to treatment. No pelvic lymphadenopathy.  2.  Thickened junctional zone is not significantly changed and remains suggestive of adenomyosis.  There is a 2.0 cm right ovarian cyst, previously 2.9 cm. Bilateral  ovaries are otherwise unremarkable.     6/7/2023: NILM neg HPV              --Today Cassi reports spotting with bowel movements and with urination. Started a few weeks ago. This is new has not occurred since radiation in 2019. No spotting or pain with intercourse. Reports pink spotting with straining with BM. Notices pink spotting with urination. No heavy bleeding with either. Pt has hemorrhoids (no itching or irritation). She does report black tarry stools (ongoing off and on over the last month). Not on a blood thinner. Not taking NSAIDs. Reports pelvic cramping over the last couple months. Reports intermittent and not daily. 2-3 times per week. Resolves quickly and is mild. Not having this cramping before a bowel movement so at random per pt. No UTI symptoms. Reports pink spotting with urination intermittently over the  last couple months. She denies nausea/emesis, no lower extremity edema, and no difficulties eating or sleeping. She denies any abdominal bloating, no fevers or chills, and no chest pain or shortness of breath.              Health Maintenance  Colonoscopy- 12/28/2020 polyps benign repeat in 7-10 years   Mammogram- 7/2022 negative   Annual physical- 6/7/2023            Review of Systems:    Systemic           no weight changes; no fever; no chills; no night sweats; no appetite changes  Skin           no rashes, or lesions  Eye           no irritation; no changes in vision  Temo-Laryngeal           no dysphagia; no hoarseness   Pulmonary    no cough; no shortness of breath  Cardiovascular    no chest pain; no palpitations  Gastrointestinal    no diarrhea; + constipation; no abdominal pain; no changes in bowel habits; no blood in stool  Genitourinary   no urinary frequency; no urinary urgency; no dysuria; no pain; no abnormal vaginal discharge; no abnormal vaginal bleeding, +hematuria   Breast    no breast discharge; no breast changes; no breast pain  Musculoskeletal    no myalgias; no arthralgias; no back pain  Psychiatric           no depressed mood; no anxiety    Hematologic              no tender lymph nodes; no noticeable swellings or lumps   Endocrine    no hot flashes; no heat/cold intolerance         Neurological   no tremor; no numbness and tingling; no headaches; no difficulty sleeping      Past Medical History:    Past Medical History:   Diagnosis Date     Abnormal Pap smear of cervix 05/21/2019    see problem list     Arrested hydrocephalus (H) 2/3/2009    Ongoing HAs, seeing neurosurgeon. Per past notes from Dr. Resendiz, HAs most likely not secondary to the hydrocephalus, but rather vascular inorgin.  Please see scanned in records.     Asthma      Cervical cancer (H)      Cervical high risk HPV (human papillomavirus) test positive 05/21/2019    See problem list     Current moderate episode of major depressive  disorder without prior episode (H) 12/1/2021     Hypothyroidism, unspecified type 12/1/2021         Past Surgical History:    Past Surgical History:   Procedure Laterality Date     COLONOSCOPY N/A 12/28/2020    Procedure: COLONOSCOPY, WITH  polypectomy;  Surgeon: Steven Vazquez MD;  Location: [facility] OR     ESOPHAGOSCOPY, GASTROSCOPY, DUODENOSCOPY (EGD), COMBINED N/A 7/13/2020    Procedure: ESOPHAGOGASTRODUODENOSCOPY, WITH BIOPSY;  Surgeon: Steven Vazquez MD;  Location: [facility] OR     EXAM UNDER ANESTHESIA, INSERT ANN MARIE SLEEVE, UTERINE PLACEMENT OF TANDEM AND RING FOR RAD, ULTRASOUND N/A 8/30/2019    Procedure: Ann Marie Sleeve Insertion, Ultrasound Guided;  Surgeon: Anne Tidwell MD;  Location: [facility] OR     EXCISE MASS TRUNK Left 9/16/2015    Procedure: EXCISE MASS TRUNK;  Surgeon: Carlos Enrique Briones MD;  Location: [facility] OR     ZZC VENTRICULO-CISTERNOSTOMY,3RD VENT      for treatment of HA related to hydrocephalus         Health Maintenance Due   Topic Date Due     HEPATITIS B IMMUNIZATION (1 of 3 - 3-dose series) Never done     NICOTINE/TOBACCO CESSATION COUNSELING Q 1 YR  06/17/2015     ASTHMA ACTION PLAN  09/08/2016     COVID-19 Vaccine (5 - Pfizer series) 08/10/2022     MAMMO SCREENING  07/22/2023       Current Medications:     Current Outpatient Medications   Medication Sig Dispense Refill     albuterol (PROAIR HFA/PROVENTIL HFA/VENTOLIN HFA) 108 (90 Base) MCG/ACT inhaler Inhale 2 puffs into the lungs every 6 hours as needed for shortness of breath or wheezing 54 g 4     Ferrous Sulfate (IRON SUPPLEMENT PO) Take 325 mg by mouth 2 times daily (with meals)       FLUoxetine (PROZAC) 40 MG capsule Take 1 capsule (40 mg) by mouth daily 90 capsule 1     fluticasone (FLONASE) 50 MCG/ACT nasal spray Spray 1-2 sprays into both nostrils daily 16 g 3     fluticasone (FLOVENT HFA) 110 MCG/ACT inhaler Inhale 2 puffs into the lungs 2 times daily 12 g 5     levothyroxine (SYNTHROID/LEVOTHROID) 25 MCG tablet Take 1 tablet (25 mcg)  "by mouth daily 90 tablet 3     montelukast (SINGULAIR) 10 MG tablet Take 1 tablet (10 mg) by mouth At Bedtime 90 tablet 3     Multiple Vitamin (MULTIVITAMINS PO) Take  by mouth daily.       traZODone (DESYREL) 100 MG tablet Take 1 tablet (100 mg) by mouth At Bedtime 90 tablet 1         Allergies:        Allergies   Allergen Reactions     Seasonal Allergies         Social History:     Social History     Tobacco Use     Smoking status: Former     Packs/day: 0.25     Years: 20.00     Pack years: 5.00     Types: Cigarettes     Quit date: 2018     Years since quittin.3     Smokeless tobacco: Former     Quit date: 2015   Substance Use Topics     Alcohol use: Not Currently     Alcohol/week: 0.0 standard drinks of alcohol     Comment: once every 3-4 months       History   Drug Use No         Family History:       Family History   Problem Relation Age of Onset     Diabetes Mother      Cerebrovascular Disease Mother      Ovarian Cancer Mother      C.A.D. Father 40     Cerebrovascular Disease Father      C.A.D. Sister 62        stent placed     Ovarian Cancer Sister      Ovarian Cancer Sister      Schizophrenia Son      Bipolar Disorder Son          Physical Exam:     /78 (BP Location: Right arm, Patient Position: Chair, Cuff Size: Adult Regular)   Pulse 69   Ht 1.715 m (5' 7.5\")   Wt 77.6 kg (171 lb)   LMP  (LMP Unknown)   SpO2 100%   BMI 26.39 kg/m    Body mass index is 26.39 kg/m .    General Appearance: healthy and alert, no distress     HEENT: no thyromegaly, no palpable nodules or masses        Cardiovascular: regular rate and rhythm, no gallops, rubs or murmurs     Respiratory: lungs clear, no rales, rhonchi or wheezes    Musculoskeletal: extremities non tender and without edema    Skin: no lesions or rashes     Neurological: normal gait, no gross defects     Psychiatric: appropriate mood and affect                               Hematological: normal cervical, supraclavicular and inguinal lymph " nodes     Gastrointestinal:       abdomen soft, non-tender, non-distended, no organomegaly or masses    Genitourinary: External genitalia and urethral meatus appears normal.  Vagina is smooth without nodularity or masses.  Cervix appears normal and flush with the vagina. Radiation changes present with pallor and telangiectasias. No masses or lesions.  Bimanual exam reveal no masses, nodularity or fullness. Recto-vaginal exam confirms these findings.      Assessment:    Nicole L Fritsche is a 53 year old woman with a history of stage IIB cervical cancer. She completed chemoradiation and brachytherapy 9/2019. She is here today for a surveillance visit.    45 minutes spent on the date of the encounter doing chart review, history and exam, documentation and further activities as noted above      Plan:     1.)        HÉCTOR on exam or pap smear (6/7/2023 pap reviewed today with pt). Continue every 6 month exams (until 9/2024) then annually. Pap smear due 6/2024. Reviewed signs and symptoms for when she should contact the clinic or seek additional care. Patient to contact the clinic with any questions or concerns in the interim.  Answered all of her questions to the best of my ability.      2.) Genetic risk factors were assessed and given her family history of ovarian cancer she has met with a genetic counselor and had testing done. She has a variant of unknown significance in her DICER1, specifically called [id number]. No additional pathogenic mutations, variants of unknown significance, or gross deletions or duplications were detected. Genes Analyzed (36 total): APC, FLETCHER, AXIN2, BARD1, BMPR1A, BRCA1, BRCA2, BRIP1, CDH1, CDK4, CDKN2A, CHEK2, DICER1, MLH1, MSH2, MSH3, MSH6, MUTYH, NBN, NF1, NTHL1, PALB2, PMS2, PTEN, RAD51C, RAD51D, RECQL, SMAD4, SMARCA4, STK11 and TP53 (sequencing and deletion/duplication); HOXB13, POLD1 and POLE (sequencing only); EPCAM and GREM1 (deletion/duplication only). Encouraged to reach out to her  sisters to clarify their ovarian cancer diagnosis and to discuss if they had/discussed any genetic counseling/testing done.    3.) Labs and/or tests ordered include: UA reviewed Pap 6/7/23 reviewed today; US pending      4.) Health maintenance issues addressed today include annual health maintenance and non-gynecologic issues with PCP.    5.)       Hematochezia, Pelvic cramping and R ovarian cyst: cramping is intermittent and mild. Could be bowel related (constipation; reviewed constipation management options). Pt will schedule colonoscopy ASAP with PCP for hematochezia. Will order US abd/US pelvis to assess R ovarian cyst status and move to MRI ot CT if warranted.     6.)        Hematuria: will recheck UA today in office. Reviewed 6/7/23 UA from patient's PCP. No evidece for infection however + blood in urine. Could be related to radiation hx. Will recheck UA today and refer to Urology for possible radiation induced cystitis workup.                  HOMERO Lindsay, CATIA-BC  Women's Health Nurse Practitioner  Division of Gynecologic Oncology  [facility]        CC  Patient Care Team:  Domonique Zavala NP as PCP - General (Nurse Practitioner - Family)  Hasmukh Hernandez MD as Assigned Cancer Care Provider  Domonique Zavala NP as Assigned PCP        Again, thank you for allowing me to participate in the care of your patient.        Sincerely,        HOMERO Romero CNP

## 2023-06-26 NOTE — NURSING NOTE
"Oncology Rooming Note    June 26, 2023 8:08 AM   Nicole L Fritsche is a 53 year old female who presents for:    Chief Complaint   Patient presents with     Oncology Clinic Visit     Follow up     Initial Vitals: /78 (BP Location: Right arm, Patient Position: Chair, Cuff Size: Adult Regular)   Pulse 69   Ht 1.715 m (5' 7.5\")   Wt 77.6 kg (171 lb)   LMP  (LMP Unknown)   SpO2 100%   BMI 26.39 kg/m   Estimated body mass index is 26.39 kg/m  as calculated from the following:    Height as of this encounter: 1.715 m (5' 7.5\").    Weight as of this encounter: 77.6 kg (171 lb). Body surface area is 1.92 meters squared.  No Pain (0) Comment: Data Unavailable   No LMP recorded (lmp unknown). Patient is postmenopausal.  Allergies reviewed: Yes  Medications reviewed: Yes    Medications: Medication refills not needed today.  Pharmacy name entered into [facility]: [id number] [place] [address] [facility]    Clinical concerns: Yes Yojana was notified.      Regi Bullock CMA              "

## 2023-06-26 NOTE — PATIENT INSTRUCTIONS
Schedule colonoscopy and mammogram now with pcp.   Scheduling will call you to get you back with me in 6 months  They will schedule your ultrasounds now  They will schedule your referral to urology       HOMERO Lindsay, NP-BC  Women's Health Nurse Practitioner  Division of Gynecologic Oncology  [facility]

## 2023-07-08 NOTE — LETTER
2019      RE: Nicole Fritsche  [address] [place] [address]       Radiation Oncology Follow-up Visit  Date of encounter: 2019    Nicole Fritsche  MRN: [id number]   : 1970     DISEASE TREATED:  Squamous cell carcinoma of the cervix,  FIGO stage IB1 (clinical T1b1 N1 M0)     INTERVAL SINCE COMPLETION OF RADIATION THERAPY:  2 month; completed treatment on 19.     TYPE OF RADIATION THERAPY ADMINISTERED:   Radiation Oncology - Course: 1  Treatment Site            Current Dose           Modality         From               To                   Elapsed Days                                     Fx.  Pelvis and PANs               4,500 cGy                     06 X          7/29/2019         2019       32                      25  Cervix                               2,750 cGy                 Implant          2019        9                        5     Dose per Fraction: External beam to pelvis: 180 cGy, Brachytherapy: 550 cGy  Total Dose: Pelvis: 4,500 cGy, Cervix: 7,250 cGy (84.3 Gy EQD2)     SUBJECTIVE/HPI:  Ms. Fritsche is a 49-year-old woman with cervical cancer as above. She presented with irregular vaginal/postcoital bleeding. Eventual endometrial biopsy demonstrated invasive well-differentiated squamous cell carcinoma, focally keratinizing.  Examination demonstrated a friable, ulcerative lesion from 3-8 o'clock without parametrial involvement. A pelvic MRI scan demonstrated a 2.3 x 3.8 x 2.6 cm mass involving the posterior cervix without parametrial extension or pelvic adenopathy.  PET CT scan confirmed a 3.7 cm cervical mass (SUV max 13.2) with positive low periaortic adenopathy (SUV 2.7). There were no distant metastasis seen imaging. She was treated with curative intent as described above. She overall tolerated treatment well. Since completion of radiotherapy, she was seen in the [facility] emergency department on 10/18/2019 for UTI and was discharged  General Surgery Progress Note    Date of service: 7/8/2023    Subjective:  Remains intubated and sedated.  Not following commands at this time.  Received a unit of blood overnight.  Hg 7.6 this AM.      ROS: As above    Objective:    Vital signs in last 24 hours:  Temp:  [98.9 °F (37.2 °C)-103 °F (39.4 °C)] 100.7 °F (38.2 °C)  Heart Rate:  [] 101  Resp:  [19-36] 23  BP: ()/(43-72) 161/72  Arterial Line BP: ()/(36-66) 121/52  FiO2 (%):  [30 %] 30 %  Body mass index is 40.9 kg/m².    I/O last 3 completed shifts:  In: 4160.2 [I.V.:2304.2; Blood:683; NG/GT:1173]  Out: 865 [Urine:210; Drains:605; Stool:50]    Drains: WANDA x 3 sanguinous    Exam:  General: eyes open, no acute distress, no following commands, mildly sedated  Pulmonary: intubated  Abdomen: soft, distended  Incision: Dressing clean and dry  Stoma: red/viable, edematous, gas and stool in pouch    Recent Labs   Lab 07/08/23  0629 07/08/23  0508 07/07/23  0430 07/06/23  0443 07/05/23  0408   SODIUM 133* 137 136 137 137   POTASSIUM 5.0 4.1 4.6 4.7 4.6   CHLORIDE 103 111* 103 105 105   CO2 18* 15* 22 17* 18*   BUN 80* 68* 68* 115* 108*   CREATININE 5.00* 4.25* 4.20* 6.32* 5.67*   GLUCOSE 112* 99 105* 113* 108*   CALCIUM 6.9* 5.9* 7.1* 7.3* 7.1*   PHOS 5.6* 4.6 5.7* 8.1* 7.3*   MG 2.1 1.7 2.1 2.3 2.3     Recent Labs   Lab 07/08/23  0629 07/08/23  0508 07/08/23  0403 07/07/23  2158 07/07/23  1725 07/07/23  0900 07/07/23  0430 07/06/23  1035 07/06/23  0443 07/05/23  1043 07/05/23  0408 07/04/23  1151 07/04/23  0642 07/03/23  2339 07/03/23  2324 07/03/23  1635 07/03/23  1029   WBC 27.7* 25.1*  --   --   --   --  31.2*  --  30.1*  --  26.3*  --  29.2*  --   --   --   --    HGB 7.6* 6.5* 7.6* 6.9* 7.2*   < > 7.1*   < > 7.1*   < > 7.9*   < > 8.5*   < >  --   --   --    HCT 21.6* 19.5* 22.0* 21.1* 21.5*   < > 20.9*   < > 20.1*   < > 21.9*   < > 23.7*   < >  --   --   --     370  --   --   --   --  441  --  472*  --  469*  --  478*  --   --   --    --    PT  --   --   --   --   --   --   --   --   --   --   --   --   --   --   --   --  11.5   INR  --   --   --   --   --   --   --   --   --   --   --   --   --   --   --   --  1.1   PTT  --   --   --   --   --   --   --   --   --   --   --   --  29  --  46* 33* 30    < > = values in this interval not displayed.     Assessment:   Active Hospital Problems    Diagnosis     Respiratory failure, post-operative (CMD)     Acute renal failure with tubular necrosis (CMD)     Bilateral renal artery stenosis (CMD)     Atrial fibrillation with RVR (CMD)     Acute respiratory failure with hypoxia (CMD)     Acute blood loss anemia     Retroperitoneal hematoma      Moy Melvin is a 70 year old male admitted with ischemic colitis s/p left hemicolectomy with end colostomy 07/04    Plan:  Continue current wound care to midline incision.    Continue routine WANDA drain care  Tentatively schedule trach and PEG for Tuesday, Dr. Ward will discuss goals of care with family    Patient seen in collaboration with Dr. [facility] APNP, WCC, OMS      Moy Melvin was seen and examined today by me. I reviewed the note above and agree with the documented findings and plan of care.  70M with ruptured AAA, initially with ischemic colitis that evolved into necrotic colon now s/p hemicolectomy. Likely to remain on permanent dialysis. Spiking fevers and concern for graft infection. WANDA drains serosanguinous and old hematoma though drain output increased. Does not follow commands, appears delirious but remains on sedation. Ostomy with thick stool.     I contacted his wife. She was under the impression that he would be able to eventually perform ADLs with some assistance after a prolonged recovery. I stated that his prognosis is likely worse in the setting of permanent dialysis and ischemic colitis. I defer to Adrian Reynoso and Leonel regarding likelihood of reversing his colostomy but I think it is unlikely.  He has been  with antibiotics. In her last follow up with us 1 month ago, she reported pelvic pain that she experienced during radiotherapy. She most recently underwent post-treatment PET on 11/25/19, with result showing no evidence of residual disease and resolution of adenopathy.  Overall, she feels well.  She denies any persistent problems with urination.  Bowel movements are normal.  She has occasional very slightly pink-tinged vaginal discharge.  She is currently not using a dilator but is having sexual intercourse approximately 2-3 times per week without pain or difficulty.  Her energy level has improved.  She does have some low back pain that is dull and achy (recent PET CT scan from today showed degenerative changes at L5-S1).  She denies any fever, chills, unexplained weight loss, lower extremity edema.  She occasionally experiences mild nausea which is helped by wearing wrist bands.    PHYSICAL EXAM:  VITALS: /93, weight 71.22 kg, pain 0/10  GEN: Appears well, alert, oriented, and in NAD  Skin: No rashes or lesions on exposed skin  HEENT: Normocephalic atraumatic  Neck: Supple, no thyromegaly  Heart: Regular rate and rhythm, extremities warm and well-perfused  Lungs: Breathing comfortably on room air  Gyn: Normal external genitalia, no vaginal lesions on visual inspection or palpation, cervix visualized without lesion.  Bimanual exam without vaginal or parametrial involvement, cervix measures 4 cm.  Extremities: No edema  Neuro: Cranial nerves II through XII are grossly intact, gait normal.    LABS AND IMAGING:  Reviewed.  See history of present illness.    IMPRESSION:   Ms. Fritsche is a 49 year old female with squamous cell carcinoma of the cervix,  FIGO stage IB1, status post definitive chemoradiotherapy who presents for routine follow-up after 2 months.  Overall, she is doing well and is recovered from the acute effects of treatment.  Low back pain may be musculoskeletal.  Patient is not interested in a  intubated, sedated and on bedrest since admission, as well as paralyzed for the first few days of admission, which will leave him significantly weak and debilitated. He appears to have ongoing delirium and I am not sure if this will resolve long term. During a previous conversation with his sister and brother they stated that he would want to be active and probably would not want to live if he were debilitated. I asked his wife to consider what his long term goals would be and if he would want to be dependent on others to perform ADLs for the rest of his natural life. She has been  to him for 41 years, does not have children but he has surviving siblings. 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 If we proceed with a trach/PEG she would be committing to keeping him alive and I asked her to consider whether that is in line with his wishes as she is best able to make that decision as she knew him best. She will discuss with family. I will tentatively book a trach/PEG for Tuesday but stated that the case can be cancelled or delayed based on further discussion. I contacted Dr. Adam with ICU and discussed the same. All questions answered.    Behzad Ward MD     physical therapy referral at this time but will try some stretching exercises, heat and ibuprofen.    PLAN:   1.  Arrange follow-up with gynecologic oncology, Dr. Hernandez  2.  Follow-up in radiation oncology as needed  3.  Reiterated the necessity of using either vaginal dilator 3 times weekly or having sexual intercourse to minimize potential vaginal stenosis.    Anne Tidwell MD  Department of Radiation Oncology  [facility]    CC  Patient Care Team:  [facility] as PCP - General (Clinic)  Ansley White PA-C as Assigned PCP  Stephanie Javier RN as Specialty Care Coordinator (Gyn-Onc)  Hasmukh Hernandez MD      FOLLOW-UP VISIT    Patient Name: Nicole Fritsche      : 1970     Age: 49 year old        ______________________________________________________________________________     Chief Complaint   Patient presents with     Cancer     Pt is here for a follow up:Cervical Cancer: Pelvis 4500 cGy and brachytherapy completed 19     BP (!) 135/93   Wt 71.2 kg (157 lb)   BMI 24.59 kg/m          Pain  Denies    Labs  Other Labs: No    Imaging  CT: 19        Diarrhea: 0- None    Constipation: 0- None    Nausea: 0- None    Vomitin- No vomiting    Genitourinary system: 0- Normal    Dysuria: 0- None    Vaginal dilator use: yes    Other Appointments:     MD Name:  Appointment Date:    MD Name: Appointment Date:   MD Name: Appointment Date:   Other Appointment Notes:     Residual Radiation side effect: No concerns    Additional Instructions:     Nurse face-to-face time: Level 3:  10 min face to face time          Anne Tidwell MD

## 2023-07-10 ENCOUNTER — ANCILLARY PROCEDURE (OUTPATIENT)
Dept: ULTRASOUND IMAGING | Facility: CLINIC | Age: 53
End: 2023-07-10
Attending: OBSTETRICS & GYNECOLOGY
Payer: COMMERCIAL

## 2023-07-10 DIAGNOSIS — C53.1 MALIGNANT NEOPLASM OF EXOCERVIX (H): ICD-10-CM

## 2023-07-10 DIAGNOSIS — Z92.3 HX OF RADIATION THERAPY: ICD-10-CM

## 2023-07-10 DIAGNOSIS — N83.201 CYST OF RIGHT OVARY: ICD-10-CM

## 2023-07-10 DIAGNOSIS — R31.9 HEMATURIA, UNSPECIFIED TYPE: ICD-10-CM

## 2023-07-10 PROCEDURE — 76700 US EXAM ABDOM COMPLETE: CPT | Mod: GC

## 2023-07-10 PROCEDURE — 76830 TRANSVAGINAL US NON-OB: CPT

## 2023-07-10 PROCEDURE — 76856 US EXAM PELVIC COMPLETE: CPT

## 2023-07-10 NOTE — TELEPHONE ENCOUNTER
6/26/23 GYN/ONC appt-     HÉCTOR on exam or pap smear (6/7/2023 pap reviewed today with pt). Continue every 6 month exams (until 9/2024) then annually. Pap smear due 6/2024

## 2023-07-11 ENCOUNTER — PATIENT OUTREACH (OUTPATIENT)
Dept: CARE COORDINATION | Facility: CLINIC | Age: 53
End: 2023-07-11
Payer: COMMERCIAL

## 2023-07-11 NOTE — TELEPHONE ENCOUNTER
I have attempted to contact this patient by phone with the following questions for Asthma.  She asked that I call her about 9am on wed,. 7/12/2023, she is currently at work.     Kaylyn Cortes CMA

## 2023-07-12 ASSESSMENT — ASTHMA QUESTIONNAIRES: ACT_TOTALSCORE: 24

## 2023-07-12 NOTE — TELEPHONE ENCOUNTER
I have attempted to contact this patient by phone with the following ACT questions.     ACT completed by: telephone, patient had a copy of the ACT form.  Score is 24 and questions regarding IP/ER visits were answered.    Asthma is in control if score is >=20. Chart routed to provider for review if score was less than 20.    Will forward to provider for review     Kaylyn Cortes CMA

## 2023-08-23 ENCOUNTER — OFFICE VISIT (OUTPATIENT)
Dept: FAMILY MEDICINE | Facility: CLINIC | Age: 53
End: 2023-08-23
Attending: NURSE PRACTITIONER
Payer: COMMERCIAL

## 2023-08-23 VITALS
TEMPERATURE: 98.1 F | DIASTOLIC BLOOD PRESSURE: 72 MMHG | BODY MASS INDEX: 26.7 KG/M2 | SYSTOLIC BLOOD PRESSURE: 116 MMHG | OXYGEN SATURATION: 96 % | RESPIRATION RATE: 20 BRPM | HEIGHT: 68 IN | HEART RATE: 80 BPM | WEIGHT: 176.2 LBS

## 2023-08-23 DIAGNOSIS — D50.9 IRON DEFICIENCY ANEMIA, UNSPECIFIED IRON DEFICIENCY ANEMIA TYPE: ICD-10-CM

## 2023-08-23 DIAGNOSIS — E03.9 HYPOTHYROIDISM, UNSPECIFIED TYPE: ICD-10-CM

## 2023-08-23 DIAGNOSIS — F51.02 ADJUSTMENT INSOMNIA: ICD-10-CM

## 2023-08-23 DIAGNOSIS — R31.29 MICROSCOPIC HEMATURIA: ICD-10-CM

## 2023-08-23 DIAGNOSIS — F41.9 ANXIETY: Primary | ICD-10-CM

## 2023-08-23 LAB
ALBUMIN UR-MCNC: NEGATIVE MG/DL
AMORPH CRY #/AREA URNS HPF: ABNORMAL /HPF
APPEARANCE UR: CLEAR
BACTERIA #/AREA URNS HPF: ABNORMAL /HPF
BILIRUB UR QL STRIP: NEGATIVE
COLOR UR AUTO: YELLOW
ERYTHROCYTE [DISTWIDTH] IN BLOOD BY AUTOMATED COUNT: 13.1 % (ref 10–15)
FERRITIN SERPL-MCNC: 173 NG/ML (ref 11–328)
GLUCOSE UR STRIP-MCNC: NEGATIVE MG/DL
HCT VFR BLD AUTO: 36.7 % (ref 35–47)
HGB BLD-MCNC: 12.2 G/DL (ref 11.7–15.7)
HGB UR QL STRIP: ABNORMAL
KETONES UR STRIP-MCNC: NEGATIVE MG/DL
LEUKOCYTE ESTERASE UR QL STRIP: ABNORMAL
MCH RBC QN AUTO: 32.1 PG (ref 26.5–33)
MCHC RBC AUTO-ENTMCNC: 33.2 G/DL (ref 31.5–36.5)
MCV RBC AUTO: 97 FL (ref 78–100)
MUCOUS THREADS #/AREA URNS LPF: PRESENT /LPF
NITRATE UR QL: NEGATIVE
PH UR STRIP: 7.5 [PH] (ref 5–7)
PLATELET # BLD AUTO: 335 10E3/UL (ref 150–450)
RBC # BLD AUTO: 3.8 10E6/UL (ref 3.8–5.2)
RBC #/AREA URNS AUTO: ABNORMAL /HPF
SP GR UR STRIP: 1.02 (ref 1–1.03)
SQUAMOUS #/AREA URNS AUTO: ABNORMAL /LPF
T4 FREE SERPL-MCNC: 1.21 NG/DL (ref 0.9–1.7)
TSH SERPL DL<=0.005 MIU/L-ACNC: 5.15 UIU/ML (ref 0.3–4.2)
UROBILINOGEN UR STRIP-ACNC: 0.2 E.U./DL
WBC # BLD AUTO: 8 10E3/UL (ref 4–11)
WBC #/AREA URNS AUTO: ABNORMAL /HPF

## 2023-08-23 PROCEDURE — 82728 ASSAY OF FERRITIN: CPT | Performed by: NURSE PRACTITIONER

## 2023-08-23 PROCEDURE — 84439 ASSAY OF FREE THYROXINE: CPT | Performed by: NURSE PRACTITIONER

## 2023-08-23 PROCEDURE — 84443 ASSAY THYROID STIM HORMONE: CPT | Performed by: NURSE PRACTITIONER

## 2023-08-23 PROCEDURE — 85027 COMPLETE CBC AUTOMATED: CPT | Performed by: NURSE PRACTITIONER

## 2023-08-23 PROCEDURE — 99214 OFFICE O/P EST MOD 30 MIN: CPT | Performed by: NURSE PRACTITIONER

## 2023-08-23 PROCEDURE — 81001 URINALYSIS AUTO W/SCOPE: CPT | Performed by: NURSE PRACTITIONER

## 2023-08-23 PROCEDURE — 36415 COLL VENOUS BLD VENIPUNCTURE: CPT | Performed by: NURSE PRACTITIONER

## 2023-08-23 RX ORDER — FLUOXETINE 40 MG/1
40 CAPSULE ORAL DAILY
Qty: 90 CAPSULE | Refills: 1 | Status: SHIPPED | OUTPATIENT
Start: 2023-08-23 | End: 2024-04-01

## 2023-08-23 RX ORDER — TRAZODONE HYDROCHLORIDE 100 MG/1
100 TABLET ORAL AT BEDTIME
Qty: 90 TABLET | Refills: 1 | Status: SHIPPED | OUTPATIENT
Start: 2023-08-23 | End: 2024-04-01

## 2023-08-23 ASSESSMENT — ANXIETY QUESTIONNAIRES
GAD7 TOTAL SCORE: 17
6. BECOMING EASILY ANNOYED OR IRRITABLE: MORE THAN HALF THE DAYS
7. FEELING AFRAID AS IF SOMETHING AWFUL MIGHT HAPPEN: MORE THAN HALF THE DAYS
4. TROUBLE RELAXING: NEARLY EVERY DAY
5. BEING SO RESTLESS THAT IT IS HARD TO SIT STILL: MORE THAN HALF THE DAYS
1. FEELING NERVOUS, ANXIOUS, OR ON EDGE: MORE THAN HALF THE DAYS
3. WORRYING TOO MUCH ABOUT DIFFERENT THINGS: NEARLY EVERY DAY
GAD7 TOTAL SCORE: 17
IF YOU CHECKED OFF ANY PROBLEMS ON THIS QUESTIONNAIRE, HOW DIFFICULT HAVE THESE PROBLEMS MADE IT FOR YOU TO DO YOUR WORK, TAKE CARE OF THINGS AT HOME, OR GET ALONG WITH OTHER PEOPLE: SOMEWHAT DIFFICULT
2. NOT BEING ABLE TO STOP OR CONTROL WORRYING: NEARLY EVERY DAY

## 2023-08-23 ASSESSMENT — PAIN SCALES - GENERAL: PAINLEVEL: NO PAIN (0)

## 2023-08-23 ASSESSMENT — PATIENT HEALTH QUESTIONNAIRE - PHQ9: SUM OF ALL RESPONSES TO PHQ QUESTIONS 1-9: 14

## 2023-08-23 ASSESSMENT — ENCOUNTER SYMPTOMS: NERVOUS/ANXIOUS: 1

## 2023-08-23 NOTE — PROGRESS NOTES
Assessment & Plan     Anxiety  Chronic, stable, continue current treatment.   - FLUoxetine (PROZAC) 40 MG capsule; Take 1 capsule (40 mg) by mouth daily    Adjustment insomnia  Chronic, stable, continue current treatment.   - traZODone (DESYREL) 100 MG tablet; Take 1 tablet (100 mg) by mouth At Bedtime    Iron deficiency anemia, unspecified iron deficiency anemia type  She is taking daily iron supplement, will recheck levels for monitoring  - CBC with platelets  - Ferritin    Hypothyroidism, unspecified type  Chronic, stable, continue current treatment, will adjust dosage as needed based on lab result.  Encouraged taking Levothyroxine 25 mcg on empty stomach 30 minutes before food, but patient admits she is taking daily with soda.  - TSH with free T4 reflex; Future  - TSH with free T4 reflex    Microscopic hematuria  Was referred to Urology 6/26/23 for hematuria but patient admits she did not follow up due to being busy in her life.  Recheck UA today and if hematuria persists, strongly encourage patient to follow up with Urology.  - UA with Microscopic reflex to Culture - lab collect; Future  - UA with Microscopic reflex to Culture - lab collect                 Domonique Zavala NP  [facility]    Yosvany Ye is a 53 year old, presenting for the following health issues:  Depression and Anxiety        8/23/2023     7:41 AM   Additional Questions   Roomed by Vy KHALIL       History of Present Illness       Mental Health Follow-up:  Patient presents to follow-up on Depression & Anxiety.Patient's depression since last visit has been:  No change  The patient is not having other symptoms associated with depression.  Patient's anxiety since last visit has been:  No change  The patient is not having other symptoms associated with anxiety.  Any significant life events: other  Patient is feeling anxious or having panic attacks.  Patient has no concerns about alcohol or drug use.    She eats  2-3 servings of fruits and vegetables daily.She consumes 2 sweetened beverage(s) daily.She exercises with enough effort to increase her heart rate 20 to 29 minutes per day.  She exercises with enough effort to increase her heart rate 3 or less days per week. She is missing 1 dose(s) of medications per week.  She is not taking prescribed medications regularly due to remembering to take.     Once daily iron supplement    Depression and Anxiety Follow-Up  Social History     Tobacco Use    Smoking status: Former     Packs/day: 0.25     Years: 20.00     Pack years: 5.00     Types: Cigarettes     Quit date: 2018     Years since quittin.5    Smokeless tobacco: Former     Quit date: 2015   Vaping Use    Vaping Use: Every day    Substances: Nicotine (3), CBD, Flavoring    Devices: Refillable tank   Substance Use Topics    Alcohol use: Not Currently     Alcohol/week: 0.0 standard drinks of alcohol     Comment: once every 3-4 months    Drug use: No         11/10/2022     9:57 AM 2023    10:58 AM 2023     7:09 AM   PHQ   PHQ-9 Total Score 8 11 14   Q9: Thoughts of better off dead/self-harm past 2 weeks Not at all Not at all Not at all         11/10/2022     9:57 AM 2023    10:58 AM 2023     7:09 AM   LATOYA-7 SCORE   Total Score 12 (moderate anxiety)  17 (severe anxiety)   Total Score 12 18 17         2023     7:09 AM   Last PHQ-9   1.  Little interest or pleasure in doing things 2   2.  Feeling down, depressed, or hopeless 1   3.  Trouble falling or staying asleep, or sleeping too much 2   4.  Feeling tired or having little energy 2   5.  Poor appetite or overeating 2   6.  Feeling bad about yourself 2   7.  Trouble concentrating 1   8.  Moving slowly or restless 2   Q9: Thoughts of better off dead/self-harm past 2 weeks 0   PHQ-9 Total Score 14   Difficulty at work, home, or with people Very difficult         2023     7:09 AM   LATOYA-7    1. Feeling nervous, anxious, or on edge 2   2. Not  "being able to stop or control worrying 3   3. Worrying too much about different things 3   4. Trouble relaxing 3   5. Being so restless that it is hard to sit still 2   6. Becoming easily annoyed or irritable 2   7. Feeling afraid, as if something awful might happen 2   LATOYA-7 Total Score 17   If you checked any problems, how difficult have they made it for you to do your work, take care of things at home, or get along with other people? Somewhat difficult       Suicide Assessment Five-step Evaluation and Treatment (SAFE-T)    States she ran out of Fluoxetine about 2 weeks ago so stopped.  However in reviewing last prescription sent in June 2023 she should have had refill available to last her through November.    Review of Systems   Psychiatric/Behavioral:  The patient is nervous/anxious.           Objective    /72 (BP Location: Right arm, Patient Position: Sitting, Cuff Size: Adult Regular)   Pulse 80   Temp 98.1  F (36.7  C) (Oral)   Resp 20   Ht 1.715 m (5' 7.5\")   Wt 79.9 kg (176 lb 3.2 oz)   LMP  (LMP Unknown)   SpO2 96%   BMI 27.19 kg/m    Body mass index is 27.19 kg/m .  Physical Exam   GENERAL: healthy, alert and no distress  CV: regular rates and rhythm, normal S1 S2, no S3 or S4, and no murmur, click or rub  PSYCH: mentation appears normal, affect normal/bright, judgement and insight intact, and appearance well groomed    Results for orders placed or performed in visit on 08/23/23 (from the past 24 hour(s))   CBC with platelets   Result Value Ref Range    WBC Count 8.0 4.0 - 11.0 10e3/uL    RBC Count 3.80 3.80 - 5.20 10e6/uL    Hemoglobin 12.2 11.7 - 15.7 g/dL    Hematocrit 36.7 35.0 - 47.0 %    MCV 97 78 - 100 fL    MCH 32.1 26.5 - 33.0 pg    MCHC 33.2 31.5 - 36.5 g/dL    RDW 13.1 10.0 - 15.0 %    Platelet Count 335 150 - 450 10e3/uL                     "

## 2023-09-04 DIAGNOSIS — J45.30 MILD PERSISTENT ASTHMA WITHOUT COMPLICATION: ICD-10-CM

## 2023-09-04 RX ORDER — DEXAMETHASONE 4 MG/1
2 TABLET ORAL 2 TIMES DAILY
Qty: 12 G | Refills: 5 | Status: SHIPPED | OUTPATIENT
Start: 2023-09-04 | End: 2023-09-19

## 2023-09-05 ENCOUNTER — TELEPHONE (OUTPATIENT)
Dept: FAMILY MEDICINE | Facility: CLINIC | Age: 53
End: 2023-09-05
Payer: COMMERCIAL

## 2023-09-05 DIAGNOSIS — J45.30 MILD PERSISTENT ASTHMA WITHOUT COMPLICATION: Primary | ICD-10-CM

## 2023-09-05 NOTE — TELEPHONE ENCOUNTER
Central Prior Authorization Team   Phone: [phone]      PRIOR AUTHORIZATION DENIED    Medication: FLOVENT  MCG/ACT IN AERO  Insurance Company: [employer] - Phone [phone] Fax [phone]  Denial Date: 9/5/2023  Denial Rational: TRIAL/FAILURE OF The preferred drug for your plan is: Pulmicort Flexhaler.        Appeal Information:           Patient Notified: No  Please note: Providers/Clinics are to notify the patients of the denial outcomes and steps going forward.

## 2023-09-19 DIAGNOSIS — J45.30 MILD PERSISTENT ASTHMA WITHOUT COMPLICATION: ICD-10-CM

## 2023-09-19 NOTE — TELEPHONE ENCOUNTER
I sent in Arnuity Ellipta inhaler as alternative to the Flovent HFV which was not covered.  Patient should also contact her insurance to ensure Arnuity Elllipta is covered, and if not she should ask what are her insurance covered alternatives.    Domonique Zavala, DNP, APRN, CNP

## 2023-09-20 NOTE — TELEPHONE ENCOUNTER
Tc reached out to relay message no answer VM message left to call clinic      Sarah Lainez    [facility]

## 2023-09-23 ENCOUNTER — HEALTH MAINTENANCE LETTER (OUTPATIENT)
Age: 53
End: 2023-09-23

## 2023-09-28 ENCOUNTER — OFFICE VISIT (OUTPATIENT)
Dept: UROLOGY | Facility: CLINIC | Age: 53
End: 2023-09-28
Attending: OBSTETRICS & GYNECOLOGY
Payer: COMMERCIAL

## 2023-09-28 VITALS
BODY MASS INDEX: 27.13 KG/M2 | DIASTOLIC BLOOD PRESSURE: 83 MMHG | WEIGHT: 175.8 LBS | OXYGEN SATURATION: 99 % | HEART RATE: 69 BPM | SYSTOLIC BLOOD PRESSURE: 125 MMHG

## 2023-09-28 DIAGNOSIS — C53.1 MALIGNANT NEOPLASM OF EXOCERVIX (H): ICD-10-CM

## 2023-09-28 DIAGNOSIS — Z92.3 HX OF RADIATION THERAPY: ICD-10-CM

## 2023-09-28 DIAGNOSIS — R31.9 HEMATURIA, UNSPECIFIED TYPE: ICD-10-CM

## 2023-09-28 LAB
ALBUMIN UR-MCNC: ABNORMAL MG/DL
APPEARANCE UR: CLEAR
BILIRUB UR QL STRIP: NEGATIVE
COLOR UR AUTO: YELLOW
GLUCOSE UR STRIP-MCNC: NEGATIVE MG/DL
HGB UR QL STRIP: ABNORMAL
KETONES UR STRIP-MCNC: NEGATIVE MG/DL
LEUKOCYTE ESTERASE UR QL STRIP: NEGATIVE
NITRATE UR QL: NEGATIVE
PH UR STRIP: 7 [PH] (ref 5–7)
RBC #/AREA URNS AUTO: ABNORMAL /HPF
SP GR UR STRIP: 1.02 (ref 1–1.03)
SQUAMOUS #/AREA URNS AUTO: ABNORMAL /LPF
UROBILINOGEN UR STRIP-ACNC: 0.2 E.U./DL
WBC #/AREA URNS AUTO: ABNORMAL /HPF

## 2023-09-28 PROCEDURE — 81001 URINALYSIS AUTO W/SCOPE: CPT | Performed by: UROLOGY

## 2023-09-28 PROCEDURE — 52000 CYSTOURETHROSCOPY: CPT | Performed by: UROLOGY

## 2023-09-28 PROCEDURE — 99205 OFFICE O/P NEW HI 60 MIN: CPT | Mod: 25 | Performed by: UROLOGY

## 2023-09-28 RX ORDER — TOLTERODINE 4 MG/1
4 CAPSULE, EXTENDED RELEASE ORAL DAILY
Qty: 60 CAPSULE | Refills: 0 | Status: SHIPPED | OUTPATIENT
Start: 2023-09-28 | End: 2024-04-04

## 2023-09-28 NOTE — PROGRESS NOTES
"Nicole L Fritsche is a 53 year old female seen in consultation for hematuria. Consult from Domonique Zavala.      Pt with hx (unresectable) cervical carcinoma >> chemo + XRT + intravaginal radiation (about 5 yrs ago) now with one year of intermittent gross painless hematuria, urge and urge incontinence.    Pt states that her urine was \"pink\" this morning.    Wears 1-2 liner per day, no pad at nite.    Denies dysuria, urinary frequency, significant UTI's, prior  eval, hx bladder surgery, use of bladder control meds.    Hx 3 vag deliveries. Not on HRT.    Some intermittent fecal urge and fecal urge incontinence.    Drinks 2 caf coffee, 4 [place], moderate tea and soda. (Did not complete voiding diary).    Hx tobacco noted, now vaping.      Reviewed PMH in detail including arrested hydrocephalus, hypothryoidism, asthma, cervical cancer, anxiety, adjustment disorder      Past Medical History:   Diagnosis Date    Abnormal Pap smear of cervix 05/21/2019    see problem list    Arrested hydrocephalus (H) 2/3/2009    Ongoing HAs, seeing neurosurgeon. Per past notes from Dr. Resendiz, HAs most likely not secondary to the hydrocephalus, but rather vascular inorgin.  Please see scanned in records.    Asthma     Cervical cancer (H)     Cervical high risk HPV (human papillomavirus) test positive 05/21/2019    See problem list    Current moderate episode of major depressive disorder without prior episode (H) 12/1/2021    Hypothyroidism, unspecified type 12/1/2021       Past Surgical History:   Procedure Laterality Date    COLONOSCOPY N/A 12/28/2020    Procedure: COLONOSCOPY, WITH  polypectomy;  Surgeon: Steven Vazquez MD;  Location: [facility] [place] OR    ESOPHAGOSCOPY, GASTROSCOPY, DUODENOSCOPY (EGD), COMBINED N/A 7/13/2020    Procedure: ESOPHAGOGASTRODUODENOSCOPY, WITH BIOPSY;  Surgeon: Steven Vazquez MD;  Location:  OR    EXAM UNDER ANESTHESIA, INSERT ANN MARIE SLEEVE, UTERINE PLACEMENT OF TANDEM AND RING FOR RAD, ULTRASOUND N/A 8/30/2019    " Procedure: Charanjit Sleeve Insertion, Ultrasound Guided;  Surgeon: Anne Tidwell MD;  Location: [facility] OR    EXCISE MASS TRUNK Left 2015    Procedure: EXCISE MASS TRUNK;  Surgeon: Carlos Enrique Briones MD;  Location: [facility] OR    ZZC VENTRICULO-CISTERNOSTOMY,3RD VENT      for treatment of HA related to hydrocephalus       Social History     Socioeconomic History    Marital status:      Spouse name: Not on file    Number of children: Not on file    Years of education: Not on file    Highest education level: Not on file   Occupational History    Not on file   Tobacco Use    Smoking status: Former     Packs/day: 0.25     Years: 20.00     Pack years: 5.00     Types: Cigarettes     Quit date: 2018     Years since quittin.6    Smokeless tobacco: Former     Quit date: 2015   Vaping Use    Vaping Use: Every day    Substances: Nicotine (3), CBD, Flavoring    Devices: Refillable tank   Substance and Sexual Activity    Alcohol use: Not Currently     Alcohol/week: 0.0 standard drinks of alcohol     Comment: once every 3-4 months    Drug use: No    Sexual activity: Yes     Partners: Male     Birth control/protection: Post-menopausal, Female Surgical     Comment: had tubes tied after 3rd pregnancy   Other Topics Concern    Parent/sibling w/ CABG, MI or angioplasty before 65F 55M? Yes   Social History Narrative    Not on file     Social Determinants of Health     Financial Resource Strain: Not on file   Food Insecurity: Not on file   Transportation Needs: Not on file   Physical Activity: Not on file   Stress: Not on file   Social Connections: Not on file   Interpersonal Safety: Not on file   Housing Stability: Not on file       Current Outpatient Medications   Medication Sig Dispense Refill    albuterol (PROAIR HFA/PROVENTIL HFA/VENTOLIN HFA) 108 (90 Base) MCG/ACT inhaler Inhale 2 puffs into the lungs every 6 hours as needed for shortness of breath or wheezing 54 g 4    budesonide (PULMICORT FLEXHALER) 180  MCG/ACT inhaler Inhale 2 puffs into the lungs 2 times daily 1 each 5    Ferrous Sulfate (IRON SUPPLEMENT PO) Take 325 mg by mouth 2 times daily (with meals)      FLUoxetine (PROZAC) 40 MG capsule Take 1 capsule (40 mg) by mouth daily 90 capsule 1    fluticasone (FLONASE) 50 MCG/ACT nasal spray Spray 1-2 sprays into both nostrils daily 16 g 3    levothyroxine (SYNTHROID/LEVOTHROID) 25 MCG tablet Take 1 tablet (25 mcg) by mouth daily 90 tablet 3    montelukast (SINGULAIR) 10 MG tablet Take 1 tablet (10 mg) by mouth At Bedtime 90 tablet 3    Multiple Vitamin (MULTIVITAMINS PO) Take  by mouth daily.      traZODone (DESYREL) 100 MG tablet Take 1 tablet (100 mg) by mouth At Bedtime 90 tablet 1       Physical Exam:    GENL: NAD.    ABD: Soft, non-tender, no masses.    EG: Well-estrogenized, no masses.    VAGINA: Well-estrogenized, no masses.    BN HYPERMOBILITY: None.    CYSTOCELE: Minimal.    APICAL PROLAPSE: None.    RECTOCELE: None.    BIMANUAL: No mass or tenderness.    Cysto:    (Informed consent obtained. Pause for cause performed)   Sterile prep.    17 Fr scope inserted through urethra. Systematic examination w 70 degree lens.   PVR: 5 cc   MUCOSA: Few areas of mild hyperemia   ORIFICES: Normal location and morphology   CAPACITY: 300 cc; no pain with filling   Scope withdrawn without untoward effect.    (Pt tolerated procedure without difficulty).      No results found for any visits on 09/28/23.                  Today:    Results for orders placed or performed in visit on 09/28/23   UA reflex to Microscopic     Status: Abnormal   Result Value Ref Range    Color Urine Yellow Colorless, Straw, Light Yellow, Yellow    Appearance Urine Clear Clear    Glucose Urine Negative Negative mg/dL    Bilirubin Urine Negative Negative    Ketones Urine Negative Negative mg/dL    Specific Gravity Urine 1.020 1.003 - 1.035    Blood Urine Trace (A) Negative    pH Urine 7.0 5.0 - 7.0    Protein Albumin Urine Trace (A) Negative mg/dL     Urobilinogen Urine 0.2 0.2, 1.0 E.U./dL    Nitrite Urine Negative Negative    Leukocyte Esterase Urine Negative Negative   Urine Microscopic Exam     Status: Abnormal   Result Value Ref Range    RBC Urine 0-2 0-2 /HPF /HPF    WBC Urine 0-5 0-5 /HPF /HPF    Squamous Epithelials Urine Few (A) None Seen /LPF                   IMP:  1. Hx gross hematuria, clear today; hx pelvic radiation  2. OAB wet        PLAN:  Discussed situation with patient in detail.  Consider trial Detrol; discussed rationale, mech of action, potential side effect, etc; pt elects trial  RTC 2 mos to review progress, recheck urine  Set realistic expectations  Total time spent in reviewing patient records, discussing history, performing exam, discussing diagnosis, outlining treatment plan and documentation: 60 minutes

## 2023-11-30 ENCOUNTER — OFFICE VISIT (OUTPATIENT)
Dept: UROLOGY | Facility: CLINIC | Age: 53
End: 2023-11-30
Payer: COMMERCIAL

## 2023-11-30 VITALS
BODY MASS INDEX: 26.85 KG/M2 | WEIGHT: 174 LBS | HEART RATE: 74 BPM | SYSTOLIC BLOOD PRESSURE: 118 MMHG | OXYGEN SATURATION: 99 % | DIASTOLIC BLOOD PRESSURE: 81 MMHG

## 2023-11-30 DIAGNOSIS — R31.9 HEMATURIA, UNSPECIFIED TYPE: Primary | ICD-10-CM

## 2023-11-30 DIAGNOSIS — N32.81 OAB (OVERACTIVE BLADDER): ICD-10-CM

## 2023-11-30 LAB
ALBUMIN UR-MCNC: 30 MG/DL
APPEARANCE UR: CLEAR
BILIRUB UR QL STRIP: NEGATIVE
COLOR UR AUTO: YELLOW
GLUCOSE UR STRIP-MCNC: NEGATIVE MG/DL
HGB UR QL STRIP: ABNORMAL
KETONES UR STRIP-MCNC: NEGATIVE MG/DL
LEUKOCYTE ESTERASE UR QL STRIP: ABNORMAL
NITRATE UR QL: NEGATIVE
PH UR STRIP: 5.5 [PH] (ref 5–7)
RBC #/AREA URNS AUTO: ABNORMAL /HPF
SP GR UR STRIP: 1.02 (ref 1–1.03)
SQUAMOUS #/AREA URNS AUTO: ABNORMAL /LPF
UROBILINOGEN UR STRIP-ACNC: 0.2 E.U./DL
WBC #/AREA URNS AUTO: ABNORMAL /HPF

## 2023-11-30 PROCEDURE — 81001 URINALYSIS AUTO W/SCOPE: CPT | Performed by: UROLOGY

## 2023-11-30 PROCEDURE — 99213 OFFICE O/P EST LOW 20 MIN: CPT | Performed by: UROLOGY

## 2023-11-30 RX ORDER — SOLIFENACIN SUCCINATE 5 MG/1
5 TABLET, FILM COATED ORAL DAILY
Qty: 30 TABLET | Refills: 1 | Status: SHIPPED | OUTPATIENT
Start: 2023-11-30 | End: 2024-01-29

## 2023-11-30 NOTE — PROGRESS NOTES
"F/up gross hematuria, hx pelvic radiation, OAB wet, negative pelvic NIKKY, cysto with mild hyperremia and 300 ml capacity      Reviewed PMH in detail including arrested hydrocephalus, hypothryoidism, asthma, cervical cancer, anxiety, adjustment disorder       9/28/23 UA negative, start Detrol      Compliant with Detrol, has not noticed any improvement in bladder control. Still with some urge incontinence, wears liner during the day, no pad at nite.    Also some heme spotting, believes it is coming from her vagina. Hx radiation therapy noted. Has not had thorough vag exam \"for a long time.\"    Denies dysuria, gross hematuria, frequency.    Drinks half cup of coffee, 1 bottle of tea, 1-2 soda, 3 bottles of [place] per day.        Results for orders placed or performed in visit on 11/30/23   UA reflex to Microscopic     Status: Abnormal   Result Value Ref Range    Color Urine Yellow Colorless, Straw, Light Yellow, Yellow    Appearance Urine Clear Clear    Glucose Urine Negative Negative mg/dL    Bilirubin Urine Negative Negative    Ketones Urine Negative Negative mg/dL    Specific Gravity Urine 1.025 1.003 - 1.035    Blood Urine Moderate (A) Negative    pH Urine 5.5 5.0 - 7.0    Protein Albumin Urine 30 (A) Negative mg/dL    Urobilinogen Urine 0.2 0.2, 1.0 E.U./dL    Nitrite Urine Negative Negative    Leukocyte Esterase Urine Small (A) Negative   Urine Microscopic Exam     Status: Abnormal   Result Value Ref Range    RBC Urine 2-5 (A) 0-2 /HPF /HPF    WBC Urine 0-5 0-5 /HPF /HPF    Squamous Epithelials Urine Few (A) None Seen /LPF       IMP:  OAB wet, room for improvement  Vag spotting with hx ca      PLAN:  Discussed situation with patient in detail.  Consider trial Vesicare 5; pt elects trial  Pt to see Gyn re vag exam; she expresses understanding  RTC 2 mos to review progress  Total time spent in reviewing patient's previous records, discussion with patient and documentation: 20 minutes    "

## 2023-12-09 DIAGNOSIS — J45.30 MILD PERSISTENT ASTHMA WITHOUT COMPLICATION: ICD-10-CM

## 2023-12-11 RX ORDER — BUDESONIDE 180 UG/1
2 AEROSOL, POWDER RESPIRATORY (INHALATION) 2 TIMES DAILY
Refills: 2 | OUTPATIENT
Start: 2023-12-11

## 2023-12-27 DIAGNOSIS — N32.81 OAB (OVERACTIVE BLADDER): ICD-10-CM

## 2023-12-27 RX ORDER — SOLIFENACIN SUCCINATE 5 MG/1
5 TABLET, FILM COATED ORAL DAILY
Qty: 30 TABLET | Refills: 1 | OUTPATIENT
Start: 2023-12-27

## 2023-12-27 NOTE — TELEPHONE ENCOUNTER
Trial of med Due to s/e of UR pt needs to return to clinic for PVR no fills until this is done    MARLEE Shah RN 12/27/2023 4:26 PM

## 2024-01-25 DIAGNOSIS — N32.81 OAB (OVERACTIVE BLADDER): ICD-10-CM

## 2024-01-25 RX ORDER — SOLIFENACIN SUCCINATE 5 MG/1
5 TABLET, FILM COATED ORAL DAILY
Qty: 30 TABLET | Refills: 1 | OUTPATIENT
Start: 2024-01-25

## 2024-01-25 NOTE — TELEPHONE ENCOUNTER
DENIED refill request for Solifenacin.   Last OV= 11/2023.   Does patient have an upcoming appointment? Yes.   Follow up: 1.31.24    Refused Prescriptions:                       Disp   Refills    solifenacin (VESICARE) 5 MG tablet         30 tab*1        Sig: Take 1 tablet (5 mg) by mouth daily  Refused By: LIZABETH CHAVES  Reason for Refusal: Patient has requested refill too soon        Samia BAH RN Urology 6/27/2022 1:53 PM

## 2024-01-31 ENCOUNTER — OFFICE VISIT (OUTPATIENT)
Dept: UROLOGY | Facility: CLINIC | Age: 54
End: 2024-01-31
Payer: COMMERCIAL

## 2024-01-31 VITALS
SYSTOLIC BLOOD PRESSURE: 100 MMHG | WEIGHT: 170.4 LBS | BODY MASS INDEX: 26.29 KG/M2 | HEART RATE: 70 BPM | DIASTOLIC BLOOD PRESSURE: 84 MMHG | OXYGEN SATURATION: 99 %

## 2024-01-31 DIAGNOSIS — N32.81 OAB (OVERACTIVE BLADDER): Primary | ICD-10-CM

## 2024-01-31 LAB
ALBUMIN UR-MCNC: ABNORMAL MG/DL
APPEARANCE UR: CLEAR
BILIRUB UR QL STRIP: ABNORMAL
COLOR UR AUTO: YELLOW
GLUCOSE UR STRIP-MCNC: NEGATIVE MG/DL
HGB UR QL STRIP: ABNORMAL
KETONES UR STRIP-MCNC: ABNORMAL MG/DL
LEUKOCYTE ESTERASE UR QL STRIP: ABNORMAL
NITRATE UR QL: NEGATIVE
PH UR STRIP: 5.5 [PH] (ref 5–7)
RBC #/AREA URNS AUTO: ABNORMAL /HPF
SP GR UR STRIP: 1.02 (ref 1–1.03)
SQUAMOUS #/AREA URNS AUTO: ABNORMAL /LPF
UROBILINOGEN UR STRIP-ACNC: 0.2 E.U./DL
WBC #/AREA URNS AUTO: ABNORMAL /HPF

## 2024-01-31 PROCEDURE — 81001 URINALYSIS AUTO W/SCOPE: CPT | Performed by: UROLOGY

## 2024-01-31 PROCEDURE — 99213 OFFICE O/P EST LOW 20 MIN: CPT | Performed by: UROLOGY

## 2024-01-31 RX ORDER — SOLIFENACIN SUCCINATE 5 MG/1
5 TABLET, FILM COATED ORAL DAILY
Qty: 60 TABLET | Refills: 0 | Status: SHIPPED | OUTPATIENT
Start: 2024-01-31 | End: 2024-03-27

## 2024-01-31 NOTE — PROGRESS NOTES
F/up gross hematuria, hx pelvic radiation, OAB wet, negative pelvic NIKKY, cysto with mild hyperremia and 300 ml capacity        Reviewed PMH in detail including arrested hydrocephalus, hypothryoidism, asthma, cervical cancer, anxiety, adjustment disorder         9/28/23            UA negative, start Detrol  11/30/23 Switch to Vesicare 5, to see Gyn for exam    Compliant with Vesicare 5 q AM. Reports continuing improvement in bladder control, now with much less leakage. Still with occasional urge and rare urge incontinence. Does not wear a pad but occas urine on underwear.    Denies dysuria, gross hematuria, frequency; noc x 2 (large volumes).    Drinks half cup of coffee, one cup of soda, 2 x 16 oz bottles of water with Crystal Light per day.    Thinks she might have intermittent bleeding per rectum (XRT field); will be seeing her care team for this soon.      Current Outpatient Medications   Medication    albuterol (PROAIR HFA/PROVENTIL HFA/VENTOLIN HFA) 108 (90 Base) MCG/ACT inhaler    budesonide (PULMICORT FLEXHALER) 180 MCG/ACT inhaler    Ferrous Sulfate (IRON SUPPLEMENT PO)    FLUoxetine (PROZAC) 40 MG capsule    fluticasone (FLONASE) 50 MCG/ACT nasal spray    levothyroxine (SYNTHROID/LEVOTHROID) 25 MCG tablet    montelukast (SINGULAIR) 10 MG tablet    Multiple Vitamin (MULTIVITAMINS PO)    tolterodine ER (DETROL LA) 4 MG 24 hr capsule    traZODone (DESYREL) 100 MG tablet     No current facility-administered medications for this visit.         Results for orders placed or performed in visit on 01/31/24   UA reflex to Microscopic     Status: Abnormal   Result Value Ref Range    Color Urine Yellow Colorless, Straw, Light Yellow, Yellow    Appearance Urine Clear Clear    Glucose Urine Negative Negative mg/dL    Bilirubin Urine Small (A) Negative    Ketones Urine Trace (A) Negative mg/dL    Specific Gravity Urine 1.020 1.003 - 1.035    Blood Urine Moderate (A) Negative    pH Urine 5.5 5.0 - 7.0    Protein Albumin  Urine Trace (A) Negative mg/dL    Urobilinogen Urine 0.2 0.2, 1.0 E.U./dL    Nitrite Urine Negative Negative    Leukocyte Esterase Urine Small (A) Negative   Urine Microscopic Exam     Status: Abnormal   Result Value Ref Range    RBC Urine 2-5 (A) 0-2 /HPF /HPF    WBC Urine 0-5 0-5 /HPF /HPF    Squamous Epithelials Urine Few (A) None Seen /LPF        IMP:  OAB, improving on behavioral mod + Ves 5  Perhaps blood per rectum, s/p radiation      PLAN:  Discussed situation with patient in detail.  Consider moderation of water, elimination of Crystal Light; pt expresses understanding  Consider increasing dose of Vesicare; pt elects to hold off for now, might revisit at next visit  RTC 2 mos to review progress  Total time spent in reviewing patient's previous records, discussion with patient and documentation: 20 minutes

## 2024-02-06 NOTE — PROGRESS NOTES
Follow Up Notes on Referred Patient    Date: 2024       RE: Nicole L Fritsche  : 1970  DAVID: [id number]      Nicole L Fritsche is a 53 year old woman with a history of stage IIB cervical cancer. She completed chemoradiation and brachytherapy 2019. She is here today for a surveillance visit.     Oncology history:   2019: Pap ASCUS. HPV 18+  19: Cervical biopsy, 4 o'clock     FINAL DIAGNOSIS:   Cervix, 4:00, biopsy:   - INVASIVE WELL-DIFFERENTIATED SQUAMOUS CELL CARCINOMA, focally keratinizing   - High grade squamous intraepithelial lesion (cervical intraepithelial neoplasia 3)      19: PET CT IMPRESSION: In this patient with newly diagnosed cervical cancer:  1. 3.7 cm cervical lesion with a max SUV of 13.16.   2. Focal uptake in the endometrium, favor adenomyosis over metastatic disease. Adenomyosis is demonstrated on MRI 2019 and can be hypermetabolic in a premenopausal woman.  3. Small left periaortic retroperitoneal lymph node borderline SUV uptake, inflammatory versus metastatic. Attention on follow-up.   4. Small left inguinal lymph nodes with SUV max of 2.45, likely inflammatory.     19-19: Cisplatin + radiation; brachytherapy     2020: PET IMPRESSION: In this patient with history of cervical cancer status post chemoradiation, there is evidence of complete treatment response, although the sensitivity of this PET/CT may be compromised secondary to diffuse muscular activation.      1. No suspicious FDG activity or CT finding in the uterus, cervix, and vagina. No FDG avid adenopathy.   2. Mild mucosal thickening and FDG avidity in the short segment of the proximal lesser curvature of the stomach, which may represents segmental gastritis in the correct clinical setting. Recommend correlation with endoscopy.  3. Small focus of decreased FDG uptake in the right frontal gyrus which may represents encephalomalacia. Consider further evaluation with brain MRI.      7/20/2020: MR brain Impression:  The previous PET/CT demonstrated decreased FDG uptake in the right     2/8/21: Pap NIL.     7/7/21: US IMPRESSION:   1. Right ovarian simple cyst measuring up to 6.5 cm and is increased from 3 cm in 2019. In postmenopausal patients, follow-up ultrasound in 3-6 months is recommended for simple cysts greater than 5 cm. Consider  OB/GYN consultation patient is symptomatic.  2. Small amount of fluid in the endometrium and pelvis may be physiologic.     2/5/22; MR pelvis gyn: IMPRESSION:   1.  Previously described mass in the posterior cervix is no longer seen compatible with complete response to treatment. No pelvic lymphadenopathy.  2.  Thickened junctional zone is not significantly changed and remains suggestive of adenomyosis.  There is a 2.0 cm right ovarian cyst, previously 2.9 cm. Bilateral  ovaries are otherwise unremarkable.     6/7/2023: NILM neg HPV        Today she comes to clinic and denies any new concerns. She continues to have some spotting after IC or using her dilator (doing both to equal 4 times/week); this has been going on for the past few years. She does have some blood on the toilet paper after a BM; she is taking iron once a day and does have constipation/black tarry stools (not new). She is drinking mostly crystal light and follows with Urology for hematuria. She is able to walk around at work. She is aware she is overdue for her mammogram.       Annual exam with PCP: 6/23  Mammogram: 7/22  Colonoscopy: 12/20; due 27      Review of Systems  See HPI      Past Medical History:    Past Medical History:   Diagnosis Date    Abnormal Pap smear of cervix 05/21/2019    see problem list    Arrested hydrocephalus (H) 2/3/2009    Ongoing HAs, seeing neurosurgeon. Per past notes from Dr. Resendiz, HAs most likely not secondary to the hydrocephalus, but rather vascular inorgin.  Please see scanned in records.    Asthma     Cervical cancer (H)     Cervical high risk HPV (human  papillomavirus) test positive 05/21/2019    See problem list    Current moderate episode of major depressive disorder without prior episode (H) 12/1/2021    Hypothyroidism, unspecified type 12/1/2021         Past Surgical History:    Past Surgical History:   Procedure Laterality Date    COLONOSCOPY N/A 12/28/2020    Procedure: COLONOSCOPY, WITH  polypectomy;  Surgeon: Steven Vazquez MD;  Location: [facility] OR    ESOPHAGOSCOPY, GASTROSCOPY, DUODENOSCOPY (EGD), COMBINED N/A 7/13/2020    Procedure: ESOPHAGOGASTRODUODENOSCOPY, WITH BIOPSY;  Surgeon: Steven Vazquez MD;  Location: [facility] OR    EXAM UNDER ANESTHESIA, INSERT ANN MARIE SLEEVE, UTERINE PLACEMENT OF TANDEM AND RING FOR RAD, ULTRASOUND N/A 8/30/2019    Procedure: Ann Marie Sleeve Insertion, Ultrasound Guided;  Surgeon: Anne Tidwell MD;  Location: [facility] OR    EXCISE MASS TRUNK Left 9/16/2015    Procedure: EXCISE MASS TRUNK;  Surgeon: Carlos Enrique Briones MD;  Location: [facility] OR    ZZC VENTRICULO-CISTERNOSTOMY,3RD VENT      for treatment of HA related to hydrocephalus       Current Medications:     Current Outpatient Medications   Medication Sig Dispense Refill    albuterol (PROAIR HFA/PROVENTIL HFA/VENTOLIN HFA) 108 (90 Base) MCG/ACT inhaler Inhale 2 puffs into the lungs every 6 hours as needed for shortness of breath or wheezing 54 g 4    budesonide (PULMICORT FLEXHALER) 180 MCG/ACT inhaler Inhale 2 puffs into the lungs 2 times daily 1 each 5    Ferrous Sulfate (IRON SUPPLEMENT PO) Take 325 mg by mouth 2 times daily (with meals)      FLUoxetine (PROZAC) 40 MG capsule Take 1 capsule (40 mg) by mouth daily 90 capsule 1    fluticasone (FLONASE) 50 MCG/ACT nasal spray Spray 1-2 sprays into both nostrils daily 16 g 3    levothyroxine (SYNTHROID/LEVOTHROID) 25 MCG tablet Take 1 tablet (25 mcg) by mouth daily 90 tablet 3    montelukast (SINGULAIR) 10 MG tablet Take 1 tablet (10 mg) by mouth At Bedtime 90 tablet 3    Multiple Vitamin (MULTIVITAMINS PO) Take  by mouth daily.       "solifenacin (VESICARE) 5 MG tablet Take 1 tablet (5 mg) by mouth daily for 60 doses 60 tablet 0    tolterodine ER (DETROL LA) 4 MG 24 hr capsule Take 1 capsule (4 mg) by mouth daily 60 capsule 0    traZODone (DESYREL) 100 MG tablet Take 1 tablet (100 mg) by mouth At Bedtime 90 tablet 1         Allergies:        Allergies   Allergen Reactions    Seasonal Allergies         Social History:     Social History     Tobacco Use    Smoking status: Former     Packs/day: 0.25     Years: 20.00     Additional pack years: 0.00     Total pack years: 5.00     Types: Cigarettes     Quit date: 2018     Years since quittin.9    Smokeless tobacco: Former     Quit date: 2015   Substance Use Topics    Alcohol use: Not Currently     Alcohol/week: 0.0 standard drinks of alcohol     Comment: once every 3-4 months       History   Drug Use No         Family History:       Family History   Problem Relation Age of Onset    Diabetes Mother     Cerebrovascular Disease Mother     Ovarian Cancer Mother     C.A.D. Father 40    Cerebrovascular Disease Father     C.A.D. Sister 62        stent placed    Ovarian Cancer Sister     Ovarian Cancer Sister     Schizophrenia Son     Bipolar Disorder Son          Physical Exam:     /83 (BP Location: Right arm, Patient Position: Chair, Cuff Size: Adult Regular)   Pulse 84   Ht 1.715 m (5' 7.5\")   Wt 78 kg (172 lb)   LMP  (LMP Unknown)   SpO2 98%   BMI 26.54 kg/m    Body mass index is 26.54 kg/m .    General Appearance: healthy and alert, no distress     HEENT: no thyromegaly, no palpable nodules or masses        Cardiovascular: regular rate and rhythm, no gallops, rubs or murmurs     Respiratory: lungs clear, no rales, rhonchi or wheezes, normal diaphragmatic excursion    Musculoskeletal: extremities non tender and without edema    Skin: no lesions or rashes     Neurological: normal gait, no gross defects     Psychiatric: appropriate mood and affect                             "   Hematological: normal cervical, supraclavicular and inguinal lymph nodes     Gastrointestinal:       abdomen soft, non-tender, non-distended, no organomegaly or masses    Genitourinary: External genitalia and urethral meatus appears normal.  Vagina is smooth without nodularity or masses.  Cervix with some radiation changes and semi-flush with vagina; able to palpate inferior aspect;  no CMT; very scant bleeding at lateral vaginal apex with speculum.  Bimanual exam reveal no masses, nodularity or fullness.  Recto-vaginal exam confirms these findings.      Assessment:    Nicole L Fritsche is a 53 year old woman with a history of stage IIB cervical cancer. She completed chemoradiation and brachytherapy 9/2019. She is here today for a surveillance visit.    30 minutes spent on the date of the encounter doing chart review, history and exam, documentation and further activities as noted above      Plan:     1.)       Patient to RTC in 6 months for her next surveillance visit; her annual pap (no HPV) will be due at that time. Discussed drinking water throughout the day; try to decrease amount of crystal light as this can irritate the bladder. Continue to monitor for any spotting/bleeding outside of her post-coital. 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 Reviewed signs and symptoms for when she should contact the clinic or seek additional care. Patient to contact the clinic with any questions or concerns in the interim.  Answered all of her questions to the best of my ability.     2.) Genetic risk factors were assessed and tgiven her family history of ovarian cancer she has met with a genetic counselor and had testing done. She has a variant of unknown significance in her DICER1, specifically called [id number]. No additional pathogenic  mutations, variants of unknown significance, or gross deletions or duplications were detected. Genes Analyzed (36 total): APC, FLETCHER, AXIN2, BARD1, BMPR1A, BRCA1, BRCA2, BRIP1, CDH1, CDK4, CDKN2A, CHEK2, DICER1, MLH1, MSH2, MSH3, MSH6, MUTYH, NBN, NF1, NTHL1, PALB2, PMS2, PTEN, RAD51C, RAD51D, RECQL, SMAD4, SMARCA4, STK11 and TP53 (sequencing and deletion/duplication); HOXB13, POLD1 and POLE (sequencing only); EPCAM and GREM1 (deletion/duplication only).     3.) Labs and/or tests ordered include:  none..     4.) Health maintenance issues addressed today include annual health maintenance and non-gynecologic issues with PCP.    HOMERO Perez, WHNP-BC, ANP-BC, AOCNP  Women's Health Nurse Practitioner  Adult Nurse Practitioner  Division of Gynecologic Oncology  .      CC  Patient Care Team:  Domonique Zavala NP as PCP - General (Nurse Practitioner - Family)  Domonique Zavala NP as Assigned PCP  Opal Martinez PA-C as Physician Assistant (Urology)  Yojana Scott APRN CNP as Assigned Cancer Care Provider  Terrell Cedeño MD as MD (Urology)  Terrell Cedeño MD as Assigned Surgical Provider

## 2024-02-07 ENCOUNTER — ONCOLOGY VISIT (OUTPATIENT)
Dept: ONCOLOGY | Facility: CLINIC | Age: 54
End: 2024-02-07
Attending: OBSTETRICS & GYNECOLOGY
Payer: COMMERCIAL

## 2024-02-07 VITALS
SYSTOLIC BLOOD PRESSURE: 136 MMHG | DIASTOLIC BLOOD PRESSURE: 83 MMHG | HEART RATE: 84 BPM | HEIGHT: 68 IN | WEIGHT: 172 LBS | OXYGEN SATURATION: 98 % | BODY MASS INDEX: 26.07 KG/M2

## 2024-02-07 DIAGNOSIS — C53.1 MALIGNANT NEOPLASM OF EXOCERVIX (H): Primary | ICD-10-CM

## 2024-02-07 PROCEDURE — 99213 OFFICE O/P EST LOW 20 MIN: CPT | Performed by: NURSE PRACTITIONER

## 2024-02-07 PROCEDURE — 99214 OFFICE O/P EST MOD 30 MIN: CPT | Performed by: NURSE PRACTITIONER

## 2024-02-07 ASSESSMENT — PAIN SCALES - GENERAL: PAINLEVEL: MILD PAIN (2)

## 2024-02-07 NOTE — NURSING NOTE
"Oncology Rooming Note    February 7, 2024 8:04 AM   Nicole L Fritsche is a 53 year old female who presents for:    Chief Complaint   Patient presents with    Oncology Clinic Visit     Follow up     Initial Vitals: /83 (BP Location: Right arm, Patient Position: Chair, Cuff Size: Adult Regular)   Pulse 84   Ht 1.715 m (5' 7.5\")   Wt 78 kg (172 lb)   LMP  (LMP Unknown)   SpO2 98%   BMI 26.54 kg/m   Estimated body mass index is 26.54 kg/m  as calculated from the following:    Height as of this encounter: 1.715 m (5' 7.5\").    Weight as of this encounter: 78 kg (172 lb). Body surface area is 1.93 meters squared.  Mild Pain (2) Comment: Data Unavailable   No LMP recorded (lmp unknown). Patient is postmenopausal.  Allergies reviewed: Yes  Medications reviewed: Yes    Medications: Medication refills not needed today.  Pharmacy name entered into [email or web address]: [id number] [place] [address]    Frailty Screening:   Is the patient here for a new oncology consult visit in cancer care? 2. No      Clinical concerns: Yes       Regi Bullock CMA              "

## 2024-02-07 NOTE — LETTER
2024         RE: Nicole L Fritsche  [address]  [place] [address]        Dear Colleague,    Thank you for referring your patient, Nicole L Fritsche, to the [facility]. Please see a copy of my visit note below.                Follow Up Notes on Referred Patient    Date: 2024       RE: Nicole L Fritsche  : 1970  DAVID: 2024      Nicole L Fritsche is a 53 year old woman with a history of stage IIB cervical cancer. She completed chemoradiation and brachytherapy 2019. She is here today for a surveillance visit.     Oncology history:   2019: Pap ASCUS. HPV 18+  19: Cervical biopsy, 4 o'clock     FINAL DIAGNOSIS:   Cervix, 4:00, biopsy:   - INVASIVE WELL-DIFFERENTIATED SQUAMOUS CELL CARCINOMA, focally keratinizing   - High grade squamous intraepithelial lesion (cervical intraepithelial neoplasia 3)      19: PET CT IMPRESSION: In this patient with newly diagnosed cervical cancer:  1. 3.7 cm cervical lesion with a max SUV of 13.16.   2. Focal uptake in the endometrium, favor adenomyosis over metastatic disease. Adenomyosis is demonstrated on MRI 2019 and can be hypermetabolic in a premenopausal woman.  3. Small left periaortic retroperitoneal lymph node borderline SUV uptake, inflammatory versus metastatic. Attention on follow-up.   4. Small left inguinal lymph nodes with SUV max of 2.45, likely inflammatory.     19-19: Cisplatin + radiation; brachytherapy     2020: PET IMPRESSION: In this patient with history of cervical cancer status post chemoradiation, there is evidence of complete treatment response, although the sensitivity of this PET/CT may be compromised secondary to diffuse muscular activation.      1. No suspicious FDG activity or CT finding in the uterus, cervix, and vagina. No FDG avid adenopathy.   2. Mild mucosal thickening and FDG avidity in the short segment of the proximal lesser curvature of the stomach, which  may represents segmental gastritis in the correct clinical setting. Recommend correlation with endoscopy.  3. Small focus of decreased FDG uptake in the right frontal gyrus which may represents encephalomalacia. Consider further evaluation with brain MRI.     7/20/2020: MR brain Impression:  The previous PET/CT demonstrated decreased FDG uptake in the right     2/8/21: Pap NIL.     7/7/21: US IMPRESSION:   1. Right ovarian simple cyst measuring up to 6.5 cm and is increased from 3 cm in 2019. In postmenopausal patients, follow-up ultrasound in 3-6 months is recommended for simple cysts greater than 5 cm. Consider  OB/GYN consultation patient is symptomatic.  2. Small amount of fluid in the endometrium and pelvis may be physiologic.     2/5/22; MR pelvis gyn: IMPRESSION:   1.  Previously described mass in the posterior cervix is no longer seen compatible with complete response to treatment. No pelvic lymphadenopathy.  2.  Thickened junctional zone is not significantly changed and remains suggestive of adenomyosis.  There is a 2.0 cm right ovarian cyst, previously 2.9 cm. Bilateral  ovaries are otherwise unremarkable.     6/7/2023: NILM neg HPV        Today she comes to clinic and denies any new concerns. She continues to have some spotting after IC or using her dilator (doing both to equal 4 times/week); this has been going on for the past few years. She does have some blood on the toilet paper after a BM; she is taking iron once a day and does have constipation/black tarry stools (not new). She is drinking mostly crystal light and follows with Urology for hematuria. She is able to walk around at work. She is aware she is overdue for her mammogram.       Annual exam with PCP: 6/23  Mammogram: 7/22  Colonoscopy: 12/20; due 27      Review of Systems  See HPI      Past Medical History:    Past Medical History:   Diagnosis Date     Abnormal Pap smear of cervix 05/21/2019    see problem list     Arrested hydrocephalus (H)  2/3/2009    Ongoing HAs, seeing neurosurgeon. Per past notes from Dr. Resendiz, HAs most likely not secondary to the hydrocephalus, but rather vascular inorgin.  Please see scanned in records.     Asthma      Cervical cancer (H)      Cervical high risk HPV (human papillomavirus) test positive 05/21/2019    See problem list     Current moderate episode of major depressive disorder without prior episode (H) 12/1/2021     Hypothyroidism, unspecified type 12/1/2021         Past Surgical History:    Past Surgical History:   Procedure Laterality Date     COLONOSCOPY N/A 12/28/2020    Procedure: COLONOSCOPY, WITH  polypectomy;  Surgeon: Steven Vazquez MD;  Location: [facility] OR     ESOPHAGOSCOPY, GASTROSCOPY, DUODENOSCOPY (EGD), COMBINED N/A 7/13/2020    Procedure: ESOPHAGOGASTRODUODENOSCOPY, WITH BIOPSY;  Surgeon: Steven Vazquez MD;  Location: [facility] OR     EXAM UNDER ANESTHESIA, INSERT ANN MARIE SLEEVE, UTERINE PLACEMENT OF TANDEM AND RING FOR RAD, ULTRASOUND N/A 8/30/2019    Procedure: Ann Marie Sleeve Insertion, Ultrasound Guided;  Surgeon: Anne Tidwell MD;  Location: [facility] OR     EXCISE MASS TRUNK Left 9/16/2015    Procedure: EXCISE MASS TRUNK;  Surgeon: Carlos Enrique Briones MD;  Location: [facility] OR     ZZC VENTRICULO-CISTERNOSTOMY,3RD VENT      for treatment of HA related to hydrocephalus       Current Medications:     Current Outpatient Medications   Medication Sig Dispense Refill     albuterol (PROAIR HFA/PROVENTIL HFA/VENTOLIN HFA) 108 (90 Base) MCG/ACT inhaler Inhale 2 puffs into the lungs every 6 hours as needed for shortness of breath or wheezing 54 g 4     budesonide (PULMICORT FLEXHALER) 180 MCG/ACT inhaler Inhale 2 puffs into the lungs 2 times daily 1 each 5     Ferrous Sulfate (IRON SUPPLEMENT PO) Take 325 mg by mouth 2 times daily (with meals)       FLUoxetine (PROZAC) 40 MG capsule Take 1 capsule (40 mg) by mouth daily 90 capsule 1     fluticasone (FLONASE) 50 MCG/ACT nasal spray Spray 1-2 sprays into both nostrils daily  "16 g 3     levothyroxine (SYNTHROID/LEVOTHROID) 25 MCG tablet Take 1 tablet (25 mcg) by mouth daily 90 tablet 3     montelukast (SINGULAIR) 10 MG tablet Take 1 tablet (10 mg) by mouth At Bedtime 90 tablet 3     Multiple Vitamin (MULTIVITAMINS PO) Take  by mouth daily.       solifenacin (VESICARE) 5 MG tablet Take 1 tablet (5 mg) by mouth daily for 60 doses 60 tablet 0     tolterodine ER (DETROL LA) 4 MG 24 hr capsule Take 1 capsule (4 mg) by mouth daily 60 capsule 0     traZODone (DESYREL) 100 MG tablet Take 1 tablet (100 mg) by mouth At Bedtime 90 tablet 1         Allergies:        Allergies   Allergen Reactions     Seasonal Allergies         Social History:     Social History     Tobacco Use     Smoking status: Former     Packs/day: 0.25     Years: 20.00     Additional pack years: 0.00     Total pack years: 5.00     Types: Cigarettes     Quit date: 2018     Years since quittin.9     Smokeless tobacco: Former     Quit date: 2015   Substance Use Topics     Alcohol use: Not Currently     Alcohol/week: 0.0 standard drinks of alcohol     Comment: once every 3-4 months       History   Drug Use No         Family History:       Family History   Problem Relation Age of Onset     Diabetes Mother      Cerebrovascular Disease Mother      Ovarian Cancer Mother      C.A.D. Father 40     Cerebrovascular Disease Father      C.A.D. Sister 62        stent placed     Ovarian Cancer Sister      Ovarian Cancer Sister      Schizophrenia Son      Bipolar Disorder Son          Physical Exam:     /83 (BP Location: Right arm, Patient Position: Chair, Cuff Size: Adult Regular)   Pulse 84   Ht 1.715 m (5' 7.5\")   Wt 78 kg (172 lb)   LMP  (LMP Unknown)   SpO2 98%   BMI 26.54 kg/m    Body mass index is 26.54 kg/m .    General Appearance: healthy and alert, no distress     HEENT: no thyromegaly, no palpable nodules or masses        Cardiovascular: regular rate and rhythm, no gallops, rubs or " murmurs     Respiratory: lungs clear, no rales, rhonchi or wheezes, normal diaphragmatic excursion    Musculoskeletal: extremities non tender and without edema    Skin: no lesions or rashes     Neurological: normal gait, no gross defects     Psychiatric: appropriate mood and affect                               Hematological: normal cervical, supraclavicular and inguinal lymph nodes     Gastrointestinal:       abdomen soft, non-tender, non-distended, no organomegaly or masses    Genitourinary: External genitalia and urethral meatus appears normal.  Vagina is smooth without nodularity or masses.  Cervix with some radiation changes and semi-flush with vagina; able to palpate inferior aspect;  no CMT; very scant bleeding at lateral vaginal apex with speculum.  Bimanual exam reveal no masses, nodularity or fullness.  Recto-vaginal exam confirms these findings.      Assessment:    Nicole L Fritsche is a 53 year old woman with a history of stage IIB cervical cancer. She completed chemoradiation and brachytherapy 9/2019. She is here today for a surveillance visit.    30 minutes spent on the date of the encounter doing chart review, history and exam, documentation and further activities as noted above      Plan:     1.)       Patient to RTC in 6 months for her next surveillance visit; her annual pap (no HPV) will be due at that time. Discussed drinking water throughout the day; try to decrease amount of crystal light as this can irritate the bladder. Continue to monitor for any spotting/bleeding outside of her post-coital. 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 Reviewed signs and symptoms for when she should contact the clinic or seek additional care. Patient to contact the clinic with any questions or concerns in the interim.   Answered all of her questions to the best of my ability.     2.) Genetic risk factors were assessed and tgiven her family history of ovarian cancer she has met with a genetic counselor and had testing done. She has a variant of unknown significance in her DICER1, specifically called [id number]. No additional pathogenic mutations, variants of unknown significance, or gross deletions or duplications were detected. Genes Analyzed (36 total): APC, FLETCHER, AXIN2, BARD1, BMPR1A, BRCA1, BRCA2, BRIP1, CDH1, CDK4, CDKN2A, CHEK2, DICER1, MLH1, MSH2, MSH3, MSH6, MUTYH, NBN, NF1, NTHL1, PALB2, PMS2, PTEN, RAD51C, RAD51D, RECQL, SMAD4, SMARCA4, STK11 and TP53 (sequencing and deletion/duplication); HOXB13, POLD1 and POLE (sequencing only); EPCAM and GREM1 (deletion/duplication only).     3.) Labs and/or tests ordered include:  none..     4.) Health maintenance issues addressed today include annual health maintenance and non-gynecologic issues with PCP.    HOMERO Perez, WHNP-BC, ANP-BC, AOCNP  Women's Health Nurse Practitioner  Adult Nurse Practitioner  Division of Gynecologic Oncology  .      CC  Patient Care Team:  Domonique Zavala NP as PCP - General (Nurse Practitioner - Family)  Domonique Zavala NP as Assigned PCP  Opal Martinez PA-C as Physician Assistant (Urology)  Yojana Scott APRN CNP as Assigned Cancer Care Provider  Terrell Cedeño MD as MD (Urology)  Terrell Cedeño MD as Assigned Surgical Provider      Again, thank you for allowing me to participate in the care of your patient.        Sincerely,        HOMERO Zhang CNP

## 2024-02-27 DIAGNOSIS — J45.30 MILD PERSISTENT ASTHMA WITHOUT COMPLICATION: ICD-10-CM

## 2024-02-27 RX ORDER — ALBUTEROL SULFATE 90 UG/1
2 AEROSOL, METERED RESPIRATORY (INHALATION) EVERY 6 HOURS PRN
Qty: 18 G | Refills: 1 | Status: SHIPPED | OUTPATIENT
Start: 2024-02-27 | End: 2024-04-16

## 2024-03-27 DIAGNOSIS — N32.81 OAB (OVERACTIVE BLADDER): ICD-10-CM

## 2024-03-27 RX ORDER — SOLIFENACIN SUCCINATE 5 MG/1
5 TABLET, FILM COATED ORAL DAILY
Qty: 30 TABLET | Refills: 0 | Status: SHIPPED | OUTPATIENT
Start: 2024-03-27

## 2024-03-27 NOTE — TELEPHONE ENCOUNTER
Refill prescription approved per [facility] Refill Protocol. Last OV= 01/2024.    Pending Prescriptions:                       Disp   Refills    solifenacin (VESICARE) 5 MG tablet        30 tab*0            Sig: Take 1 tablet (5 mg) by mouth daily      Samia BAH RN Urology 3/27/2024 12:37 PM

## 2024-04-01 DIAGNOSIS — F51.02 ADJUSTMENT INSOMNIA: ICD-10-CM

## 2024-04-01 DIAGNOSIS — F41.9 ANXIETY: ICD-10-CM

## 2024-04-01 RX ORDER — TRAZODONE HYDROCHLORIDE 100 MG/1
100 TABLET ORAL AT BEDTIME
Qty: 90 TABLET | Refills: 1 | Status: SHIPPED | OUTPATIENT
Start: 2024-04-01 | End: 2024-09-23

## 2024-04-01 RX ORDER — FLUOXETINE 40 MG/1
40 CAPSULE ORAL DAILY
Qty: 90 CAPSULE | Refills: 1 | Status: SHIPPED | OUTPATIENT
Start: 2024-04-01 | End: 2024-09-23

## 2024-04-04 ENCOUNTER — OFFICE VISIT (OUTPATIENT)
Dept: UROLOGY | Facility: CLINIC | Age: 54
End: 2024-04-04
Payer: COMMERCIAL

## 2024-04-04 VITALS
HEART RATE: 69 BPM | WEIGHT: 173.4 LBS | DIASTOLIC BLOOD PRESSURE: 86 MMHG | OXYGEN SATURATION: 99 % | SYSTOLIC BLOOD PRESSURE: 126 MMHG | BODY MASS INDEX: 26.76 KG/M2

## 2024-04-04 DIAGNOSIS — N32.81 OAB (OVERACTIVE BLADDER): Primary | ICD-10-CM

## 2024-04-04 LAB
ALBUMIN UR-MCNC: ABNORMAL MG/DL
APPEARANCE UR: CLEAR
BILIRUB UR QL STRIP: NEGATIVE
COLOR UR AUTO: YELLOW
GLUCOSE UR STRIP-MCNC: NEGATIVE MG/DL
HGB UR QL STRIP: ABNORMAL
KETONES UR STRIP-MCNC: ABNORMAL MG/DL
LEUKOCYTE ESTERASE UR QL STRIP: ABNORMAL
NITRATE UR QL: NEGATIVE
PH UR STRIP: 5.5 [PH] (ref 5–7)
RBC #/AREA URNS AUTO: ABNORMAL /HPF
SP GR UR STRIP: >=1.03 (ref 1–1.03)
SQUAMOUS #/AREA URNS AUTO: ABNORMAL /LPF
UROBILINOGEN UR STRIP-ACNC: 0.2 E.U./DL
WBC #/AREA URNS AUTO: ABNORMAL /HPF

## 2024-04-04 PROCEDURE — 81001 URINALYSIS AUTO W/SCOPE: CPT | Performed by: UROLOGY

## 2024-04-04 PROCEDURE — 99213 OFFICE O/P EST LOW 20 MIN: CPT | Performed by: UROLOGY

## 2024-04-04 RX ORDER — SOLIFENACIN SUCCINATE 5 MG/1
5 TABLET, FILM COATED ORAL DAILY
Qty: 90 TABLET | Refills: 3 | Status: SHIPPED | OUTPATIENT
Start: 2024-04-04 | End: 2025-03-30

## 2024-04-04 NOTE — PROGRESS NOTES
F/up gross hematuria, hx pelvic radiation, OAB wet, negative pelvic NIKKY, cysto with mild hyperremia and 300 ml capacity        Reviewed PMH in detail including arrested hydrocephalus, hypothryoidism, asthma, cervical cancer, anxiety, adjustment disorder         9/28/23            UA negative, start Detrol  11/30/23          Switch to Vesicare 5, to see Gyn for exam  1/31/24 Continue Ves 5      Compliant with Ves 5    Control is quite good. Can go 2 hrs between voids during the day; noc x 2-3 (large volumes).    Has moderated fluids a bit; now at 1 coffee, 0-1 soda, 2-3 bottles of [employer], no Crystal Light.    On Fe for anemia, occas constipation >> heme in stool; being followed closely by Onc.        Current Outpatient Medications   Medication Sig Dispense Refill    albuterol (PROAIR HFA/PROVENTIL HFA/VENTOLIN HFA) 108 (90 Base) MCG/ACT inhaler INHALE 2 PUFFS INTO THE LUNGS EVERY 6 HOURS AS NEEDED FOR SHORTNESS OF BREATH OR WHEEZING. 18 g 1    budesonide (PULMICORT FLEXHALER) 180 MCG/ACT inhaler Inhale 2 puffs into the lungs 2 times daily 1 each 5    Ferrous Sulfate (IRON SUPPLEMENT PO) Take 325 mg by mouth 2 times daily (with meals)      FLUoxetine (PROZAC) 40 MG capsule TAKE 1 CAPSULE BY MOUTH EVERY DAY 90 capsule 1    fluticasone (FLONASE) 50 MCG/ACT nasal spray Spray 1-2 sprays into both nostrils daily 16 g 3    levothyroxine (SYNTHROID/LEVOTHROID) 25 MCG tablet Take 1 tablet (25 mcg) by mouth daily 90 tablet 3    montelukast (SINGULAIR) 10 MG tablet Take 1 tablet (10 mg) by mouth At Bedtime 90 tablet 3    Multiple Vitamin (MULTIVITAMINS PO) Take  by mouth daily.      solifenacin (VESICARE) 5 MG tablet Take 1 tablet (5 mg) by mouth daily 30 tablet 0    tolterodine ER (DETROL LA) 4 MG 24 hr capsule Take 1 capsule (4 mg) by mouth daily 60 capsule 0    traZODone (DESYREL) 100 MG tablet TAKE 1 TABLET BY MOUTH AT BEDTIME 90 tablet 1     No current facility-administered medications for this visit.       PE: Looks well, in  NAD      Results for orders placed or performed in visit on 04/04/24   UA reflex to Microscopic     Status: Abnormal   Result Value Ref Range    Color Urine Yellow Colorless, Straw, Light Yellow, Yellow    Appearance Urine Clear Clear    Glucose Urine Negative Negative mg/dL    Bilirubin Urine Negative Negative    Ketones Urine Trace (A) Negative mg/dL    Specific Gravity Urine >=1.030 1.003 - 1.035    Blood Urine Moderate (A) Negative    pH Urine 5.5 5.0 - 7.0    Protein Albumin Urine Trace (A) Negative mg/dL    Urobilinogen Urine 0.2 0.2, 1.0 E.U./dL    Nitrite Urine Negative Negative    Leukocyte Esterase Urine Trace (A) Negative   Urine Microscopic Exam     Status: Abnormal   Result Value Ref Range    RBC Urine 5-10 (A) 0-2 /HPF /HPF    WBC Urine 0-5 0-5 /HPF /HPF    Squamous Epithelials Urine Few (A) None Seen /LPF       Chart review reflects prior microhematuria:                IMP:  1. Hx gross hematuria, now clear  2. OAB, doing well on Ves 5      PLAN:  1. Discussed situation with patient in detail.  2. Continue Ves 5  3. RTC 1 yr or sooner prn  4. Total time spent in reviewing patient's previous records, discussion with patient and documentation: 20 minutes

## 2024-04-16 ENCOUNTER — OFFICE VISIT (OUTPATIENT)
Dept: FAMILY MEDICINE | Facility: CLINIC | Age: 54
End: 2024-04-16
Payer: COMMERCIAL

## 2024-04-16 VITALS
TEMPERATURE: 97.9 F | SYSTOLIC BLOOD PRESSURE: 110 MMHG | WEIGHT: 172.6 LBS | BODY MASS INDEX: 26.16 KG/M2 | RESPIRATION RATE: 16 BRPM | HEART RATE: 66 BPM | HEIGHT: 68 IN | DIASTOLIC BLOOD PRESSURE: 68 MMHG | OXYGEN SATURATION: 99 %

## 2024-04-16 DIAGNOSIS — E03.9 HYPOTHYROIDISM, UNSPECIFIED TYPE: ICD-10-CM

## 2024-04-16 DIAGNOSIS — J45.30 MILD PERSISTENT ASTHMA WITHOUT COMPLICATION: ICD-10-CM

## 2024-04-16 DIAGNOSIS — J30.2 SEASONAL ALLERGIC RHINITIS, UNSPECIFIED TRIGGER: ICD-10-CM

## 2024-04-16 DIAGNOSIS — F32.1 CURRENT MODERATE EPISODE OF MAJOR DEPRESSIVE DISORDER WITHOUT PRIOR EPISODE (H): ICD-10-CM

## 2024-04-16 DIAGNOSIS — G91.1: ICD-10-CM

## 2024-04-16 DIAGNOSIS — D50.9 IRON DEFICIENCY ANEMIA, UNSPECIFIED IRON DEFICIENCY ANEMIA TYPE: ICD-10-CM

## 2024-04-16 DIAGNOSIS — R53.83 FATIGUE, UNSPECIFIED TYPE: ICD-10-CM

## 2024-04-16 DIAGNOSIS — F41.9 ANXIETY: Primary | ICD-10-CM

## 2024-04-16 LAB
ERYTHROCYTE [DISTWIDTH] IN BLOOD BY AUTOMATED COUNT: 13.1 % (ref 10–15)
HCT VFR BLD AUTO: 38.2 % (ref 35–47)
HGB BLD-MCNC: 12.6 G/DL (ref 11.7–15.7)
MCH RBC QN AUTO: 32.2 PG (ref 26.5–33)
MCHC RBC AUTO-ENTMCNC: 33 G/DL (ref 31.5–36.5)
MCV RBC AUTO: 98 FL (ref 78–100)
PLATELET # BLD AUTO: 340 10E3/UL (ref 150–450)
RBC # BLD AUTO: 3.91 10E6/UL (ref 3.8–5.2)
WBC # BLD AUTO: 7.8 10E3/UL (ref 4–11)

## 2024-04-16 PROCEDURE — 84443 ASSAY THYROID STIM HORMONE: CPT | Performed by: NURSE PRACTITIONER

## 2024-04-16 PROCEDURE — 82728 ASSAY OF FERRITIN: CPT | Performed by: NURSE PRACTITIONER

## 2024-04-16 PROCEDURE — 84439 ASSAY OF FREE THYROXINE: CPT | Performed by: NURSE PRACTITIONER

## 2024-04-16 PROCEDURE — 82306 VITAMIN D 25 HYDROXY: CPT | Performed by: NURSE PRACTITIONER

## 2024-04-16 PROCEDURE — 85027 COMPLETE CBC AUTOMATED: CPT | Performed by: NURSE PRACTITIONER

## 2024-04-16 PROCEDURE — 99214 OFFICE O/P EST MOD 30 MIN: CPT | Performed by: NURSE PRACTITIONER

## 2024-04-16 PROCEDURE — 36415 COLL VENOUS BLD VENIPUNCTURE: CPT | Performed by: NURSE PRACTITIONER

## 2024-04-16 RX ORDER — ALBUTEROL SULFATE 90 UG/1
2 AEROSOL, METERED RESPIRATORY (INHALATION) EVERY 6 HOURS PRN
Qty: 18 G | Refills: 1 | Status: SHIPPED | OUTPATIENT
Start: 2024-04-16

## 2024-04-16 RX ORDER — LEVOCETIRIZINE DIHYDROCHLORIDE 5 MG/1
5 TABLET, FILM COATED ORAL EVERY EVENING
Qty: 90 TABLET | Refills: 3 | Status: SHIPPED | OUTPATIENT
Start: 2024-04-16

## 2024-04-16 RX ORDER — FLUTICASONE PROPIONATE 50 MCG
1-2 SPRAY, SUSPENSION (ML) NASAL DAILY
Qty: 16 G | Refills: 11 | Status: SHIPPED | OUTPATIENT
Start: 2024-04-16

## 2024-04-16 ASSESSMENT — ASTHMA QUESTIONNAIRES
QUESTION_1 LAST FOUR WEEKS HOW MUCH OF THE TIME DID YOUR ASTHMA KEEP YOU FROM GETTING AS MUCH DONE AT WORK, SCHOOL OR AT HOME: A LITTLE OF THE TIME
QUESTION_4 LAST FOUR WEEKS HOW OFTEN HAVE YOU USED YOUR RESCUE INHALER OR NEBULIZER MEDICATION (SUCH AS ALBUTEROL): ONCE A WEEK OR LESS
ACT_TOTALSCORE: 17
QUESTION_3 LAST FOUR WEEKS HOW OFTEN DID YOUR ASTHMA SYMPTOMS (WHEEZING, COUGHING, SHORTNESS OF BREATH, CHEST TIGHTNESS OR PAIN) WAKE YOU UP AT NIGHT OR EARLIER THAN USUAL IN THE MORNING: TWO OR THREE NIGHTS A WEEK
ACT_TOTALSCORE: 17
QUESTION_2 LAST FOUR WEEKS HOW OFTEN HAVE YOU HAD SHORTNESS OF BREATH: ONCE OR TWICE A WEEK
QUESTION_5 LAST FOUR WEEKS HOW WOULD YOU RATE YOUR ASTHMA CONTROL: SOMEWHAT CONTROLLED

## 2024-04-16 ASSESSMENT — ANXIETY QUESTIONNAIRES
7. FEELING AFRAID AS IF SOMETHING AWFUL MIGHT HAPPEN: SEVERAL DAYS
3. WORRYING TOO MUCH ABOUT DIFFERENT THINGS: MORE THAN HALF THE DAYS
8. IF YOU CHECKED OFF ANY PROBLEMS, HOW DIFFICULT HAVE THESE MADE IT FOR YOU TO DO YOUR WORK, TAKE CARE OF THINGS AT HOME, OR GET ALONG WITH OTHER PEOPLE?: SOMEWHAT DIFFICULT
7. FEELING AFRAID AS IF SOMETHING AWFUL MIGHT HAPPEN: SEVERAL DAYS
2. NOT BEING ABLE TO STOP OR CONTROL WORRYING: MORE THAN HALF THE DAYS
5. BEING SO RESTLESS THAT IT IS HARD TO SIT STILL: MORE THAN HALF THE DAYS
IF YOU CHECKED OFF ANY PROBLEMS ON THIS QUESTIONNAIRE, HOW DIFFICULT HAVE THESE PROBLEMS MADE IT FOR YOU TO DO YOUR WORK, TAKE CARE OF THINGS AT HOME, OR GET ALONG WITH OTHER PEOPLE: SOMEWHAT DIFFICULT
GAD7 TOTAL SCORE: 12
6. BECOMING EASILY ANNOYED OR IRRITABLE: MORE THAN HALF THE DAYS
GAD7 TOTAL SCORE: 12
4. TROUBLE RELAXING: MORE THAN HALF THE DAYS
GAD7 TOTAL SCORE: 12
1. FEELING NERVOUS, ANXIOUS, OR ON EDGE: SEVERAL DAYS

## 2024-04-16 ASSESSMENT — PAIN SCALES - GENERAL: PAINLEVEL: NO PAIN (0)

## 2024-04-16 ASSESSMENT — PATIENT HEALTH QUESTIONNAIRE - PHQ9
10. IF YOU CHECKED OFF ANY PROBLEMS, HOW DIFFICULT HAVE THESE PROBLEMS MADE IT FOR YOU TO DO YOUR WORK, TAKE CARE OF THINGS AT HOME, OR GET ALONG WITH OTHER PEOPLE: SOMEWHAT DIFFICULT
SUM OF ALL RESPONSES TO PHQ QUESTIONS 1-9: 11
SUM OF ALL RESPONSES TO PHQ QUESTIONS 1-9: 11

## 2024-04-16 ASSESSMENT — ENCOUNTER SYMPTOMS: NERVOUS/ANXIOUS: 1

## 2024-04-16 NOTE — PROGRESS NOTES
"  Assessment & Plan     Anxiety  Chronic, stable, continue current treatment.     Mild persistent asthma without complication    - budesonide (PULMICORT FLEXHALER) 180 MCG/ACT inhaler; Inhale 2 puffs into the lungs 2 times daily  - albuterol (PROAIR HFA/PROVENTIL HFA/VENTOLIN HFA) 108 (90 Base) MCG/ACT inhaler; Inhale 2 puffs into the lungs every 6 hours as needed for shortness of breath or wheezing    Seasonal allergic rhinitis, unspecified trigger  Chronic, stable, continue current treatment.   - fluticasone (FLONASE) 50 MCG/ACT nasal spray; Spray 1-2 sprays into both nostrils daily  - levocetirizine (XYZAL) 5 MG tablet; Take 1 tablet (5 mg) by mouth every evening    Hypothyroidism, unspecified type  Chronic, stable, continue current treatment.   - TSH with free T4 reflex; Future  - TSH with free T4 reflex  - T4 free    Iron deficiency anemia, unspecified iron deficiency anemia type    - CBC with platelets; Future  - Ferritin; Future  - CBC with platelets  - Ferritin    Fatigue, unspecified type    - Vitamin D Deficiency; Future  - Vitamin D Deficiency    Arrested hydrocephalus (H)  Known issue that I take into account for their medical decisions, no current exacerbations or new concerns.     Current moderate episode of major depressive disorder without prior episode (H)  Chronic, stable, continue current treatment.       BMI  Estimated body mass index is 26.24 kg/m  as calculated from the following:    Height as of this encounter: 1.727 m (5' 8\").    Weight as of this encounter: 78.3 kg (172 lb 9.6 oz).         Yosvany Ye is a 54 year old, presenting for the following health issues:  Anxiety        4/16/2024     7:44 AM   Additional Questions   Roomed by Vy KHALIL     History of Present Illness       Mental Health Follow-up:  Patient presents to follow-up on Depression & Anxiety.Patient's depression since last visit has been:  Medium  The patient is having other symptoms associated with " "depression.  Patient's anxiety since last visit has been:  Medium  The patient is having other symptoms associated with anxiety.  Any significant life events: other  Patient is feeling anxious or having panic attacks.  Patient has no concerns about alcohol or drug use.    She eats 2-3 servings of fruits and vegetables daily.She consumes 1 sweetened beverage(s) daily.She exercises with enough effort to increase her heart rate 30 to 60 minutes per day.  She exercises with enough effort to increase her heart rate 5 days per week. She is missing 2 dose(s) of medications per week.     Additional provider notes:    Allergies - uses Vicks, Flonase.  Sometimes Allegra.    Anxiety -   Cares for her son, 26 year old with schizophrenia/bipolar.Brother was in car accident, now home, getting rehab.  Worries about her sister's health.  When she knows she is right she has outbursts.  Says her boss is great.  Some issues at work.  When doing something important she is focuses.  Needs personal space.    Asthma Follow-Up    Was ACT completed today?  Yes        4/16/2024     7:38 AM   ACT Total Scores   ACT TOTAL SCORE (Goal Greater than or Equal to 20) 17   In the past 12 months, how many times did you visit the emergency room for your asthma without being admitted to the hospital? 0   In the past 12 months, how many times were you hospitalized overnight because of your asthma? 0               Objective    /68 (BP Location: Right arm, Patient Position: Sitting, Cuff Size: Adult Regular)   Pulse 66   Temp 97.9  F (36.6  C) (Oral)   Resp 16   Ht 1.727 m (5' 8\")   Wt 78.3 kg (172 lb 9.6 oz)   LMP  (LMP Unknown)   SpO2 99%   BMI 26.24 kg/m    Body mass index is 26.24 kg/m .  Physical Exam   GENERAL: alert and no distress  RESP: lungs clear to auscultation - no rales, rhonchi or wheezes  CV: regular rates and rhythm, normal S1 S2, no S3 or S4, and no murmur, click or rub  PSYCH: mentation appears normal, affect " normal/bright, judgement and insight intact, and appearance well groomed    Results for orders placed or performed in visit on 04/16/24   TSH with free T4 reflex     Status: Abnormal   Result Value Ref Range    TSH 6.59 (H) 0.30 - 4.20 uIU/mL   CBC with platelets     Status: Normal   Result Value Ref Range    WBC Count 7.8 4.0 - 11.0 10e3/uL    RBC Count 3.91 3.80 - 5.20 10e6/uL    Hemoglobin 12.6 11.7 - 15.7 g/dL    Hematocrit 38.2 35.0 - 47.0 %    MCV 98 78 - 100 fL    MCH 32.2 26.5 - 33.0 pg    MCHC 33.0 31.5 - 36.5 g/dL    RDW 13.1 10.0 - 15.0 %    Platelet Count 340 150 - 450 10e3/uL   Ferritin     Status: Normal   Result Value Ref Range    Ferritin 141 11 - 328 ng/mL   Vitamin D Deficiency     Status: Normal   Result Value Ref Range    Vitamin D, Total (25-Hydroxy) 44 20 - 50 ng/mL    Narrative    Season, race, dietary intake, and treatment affect the concentration of 25-hydroxy-Vitamin D. Values may decrease during winter months and increase during summer months.    Vitamin D determination is routinely performed by an immunoassay specific for 25 hydroxyvitamin D3.  If an individual is on vitamin D2(ergocalciferol) supplementation, please specify 25 OH vitamin D2 and D3 level determination by LCMSMS test VITD23.     T4 free     Status: Normal   Result Value Ref Range    Free T4 1.18 0.90 - 1.70 ng/dL           Signed Electronically by: Domonique Zavala NP

## 2024-04-16 NOTE — LETTER
My Asthma Action Plan    Name: Nicole L Fritsche   YOB: 1970  Date: 4/16/2024   My doctor: Domonique Zavala NP   My clinic: [facility]        My Control Medicine: Budesonide (Pulmicort Flexhaler) -  180 mcg two puffs twice daily  Montelukast 10 mg evening  Allergy treatment during season - Flonase nasal spray and Levocetirizine (Xyzal) 5 mg daily  My Rescue Medicine: Albuterol (Proair/Ventolin/Proventil HFA) 2-4 puffs EVERY 4 HOURS as needed. Use a spacer if recommended by your provider.   My Asthma Severity:   Moderate Persistent  Know your asthma triggers:   allergies            GREEN ZONE   Good Control  I feel good  No cough or wheeze  Can work, sleep and play without asthma symptoms       Take your asthma control medicine every day.     If exercise triggers your asthma, take your rescue medication  15 minutes before exercise or sports, and  During exercise if you have asthma symptoms  Spacer to use with inhaler: If you have a spacer, make sure to use it with your inhaler             YELLOW ZONE Getting Worse  I have ANY of these:  I do not feel good  Cough or wheeze  Chest feels tight  Wake up at night   Keep taking your Green Zone medications  Start taking your rescue medicine:  every 20 minutes for up to 1 hour. Then every 4 hours for 24-48 hours.  If you stay in the Yellow Zone for more than 12-24 hours, contact your doctor.  If you do not return to the Green Zone in 12-24 hours or you get worse, start taking your oral steroid medicine if prescribed by your provider.           RED ZONE Medical Alert - Get Help  I have ANY of these:  I feel awful  Medicine is not helping  Breathing getting harder  Trouble walking or talking  Nose opens wide to breathe       Take your rescue medicine NOW  If your provider has prescribed an oral steroid medicine, start taking it NOW  Call your doctor NOW  If you are still in the Red Zone after 20 minutes and you have not reached your  doctor:  Take your rescue medicine again and  Call 911 or go to the emergency room right away    See your regular doctor within 2 weeks of an Emergency Room or Urgent Care visit for follow-up treatment.          Annual Reminders:  Meet with Asthma Educator,  Flu Shot in the Fall, consider Pneumonia Vaccination for patients with asthma (aged 19 and older).    Pharmacy: [facility] [id number] [place] [address]    Electronically signed by Domonique Zavala NP   Date: 04/16/24                      Asthma Triggers  How To Control Things That Make Your Asthma Worse    Triggers are things that make your asthma worse.  Look at the list below to help you find your triggers and what you can do about them.  You can help prevent asthma flare-ups by staying away from your triggers.      Trigger                                                          What you can do   Cigarette Smoke  Tobacco smoke can make asthma worse. Do not allow smoking in your home, car or around you.  Be sure no one smokes at a child s day care or school.  If you smoke, ask your health care provider for ways to help you quit.  Ask family members to quit too.  Ask your health care provider for a referral to Quit Plan to help you quit smoking, or call 5-203-009-PLAN.     Colds, Flu, Bronchitis  These are common triggers of asthma. Wash your hands often.  Don t touch your eyes, nose or mouth.  Get a flu shot every year.     Dust Mites  These are tiny bugs that live in cloth or carpet. They are too small to see. Wash sheets and blankets in hot water every week.   Encase pillows and mattress in dust mite proof covers.  Avoid having carpet if you can. If you have carpet, vacuum weekly.   Use a dust mask and HEPA vacuum.   Pollen and Outdoor Mold  Some people are allergic to trees, grass, or weed pollen, or molds. Try to keep your windows closed.  Limit time out doors when pollen count is high.   Ask you health care provider about taking  medicine during allergy season.     Animal Dander  Some people are allergic to skin flakes, urine or saliva from pets with fur or feathers. Keep pets with fur or feathers out of your home.    If you can t keep the pet outdoors, then keep the pet out of your bedroom.  Keep the bedroom door closed.  Keep pets off cloth furniture and away from stuffed toys.     Mice, Rats, and Cockroaches   Some people are allergic to the waste from these pests.   Cover food and garbage.  Clean up spills and food crumbs.  Store grease in the refrigerator.   Keep food out of the bedroom.   Indoor Mold  This can be a trigger if your home has high moisture. Fix leaking faucets, pipes, or other sources of water.   Clean moldy surfaces.  Dehumidify basement if it is damp and smelly.   Smoke, Strong Odors, and Sprays  These can reduce air quality. Stay away from strong odors and sprays, such as perfume, powder, hair spray, paints, smoke incense, paint, cleaning products, candles and new carpet.   Exercise or Sports  Some people with asthma have this trigger. Be active!  Ask your doctor about taking medicine before sports or exercise to prevent symptoms.    Warm up for 5-10 minutes before and after sports or exercise.     Other Triggers of Asthma  Cold air:  Cover your nose and mouth with a scarf.  Sometimes laughing or crying can be a trigger.  Some medicines and food can trigger asthma.

## 2024-04-17 LAB
FERRITIN SERPL-MCNC: 141 NG/ML (ref 11–328)
T4 FREE SERPL-MCNC: 1.18 NG/DL (ref 0.9–1.7)
TSH SERPL DL<=0.005 MIU/L-ACNC: 6.59 UIU/ML (ref 0.3–4.2)
VIT D+METAB SERPL-MCNC: 44 NG/ML (ref 20–50)

## 2024-05-25 DIAGNOSIS — J45.30 MILD PERSISTENT ASTHMA WITHOUT COMPLICATION: ICD-10-CM

## 2024-05-25 RX ORDER — MONTELUKAST SODIUM 10 MG/1
1 TABLET ORAL AT BEDTIME
Qty: 90 TABLET | Refills: 3 | Status: SHIPPED | OUTPATIENT
Start: 2024-05-25

## 2024-05-31 ENCOUNTER — PATIENT OUTREACH (OUTPATIENT)
Dept: FAMILY MEDICINE | Facility: CLINIC | Age: 54
End: 2024-05-31
Payer: COMMERCIAL

## 2024-05-31 PROBLEM — R87.613 HGSIL ON PAP SMEAR OF CERVIX: Status: ACTIVE | Noted: 2019-05-28

## 2024-06-11 ENCOUNTER — OFFICE VISIT (OUTPATIENT)
Dept: FAMILY MEDICINE | Facility: CLINIC | Age: 54
End: 2024-06-11
Attending: NURSE PRACTITIONER
Payer: COMMERCIAL

## 2024-06-11 VITALS
WEIGHT: 170.2 LBS | DIASTOLIC BLOOD PRESSURE: 60 MMHG | HEIGHT: 68 IN | TEMPERATURE: 97.7 F | OXYGEN SATURATION: 99 % | SYSTOLIC BLOOD PRESSURE: 112 MMHG | RESPIRATION RATE: 16 BRPM | HEART RATE: 61 BPM | BODY MASS INDEX: 25.79 KG/M2

## 2024-06-11 DIAGNOSIS — Z00.00 ROUTINE GENERAL MEDICAL EXAMINATION AT A HEALTH CARE FACILITY: Primary | ICD-10-CM

## 2024-06-11 DIAGNOSIS — F41.9 ANXIETY: ICD-10-CM

## 2024-06-11 DIAGNOSIS — Z12.31 VISIT FOR SCREENING MAMMOGRAM: ICD-10-CM

## 2024-06-11 DIAGNOSIS — Z12.4 CERVICAL CANCER SCREENING: ICD-10-CM

## 2024-06-11 DIAGNOSIS — J45.30 MILD PERSISTENT ASTHMA WITHOUT COMPLICATION: ICD-10-CM

## 2024-06-11 PROCEDURE — 99396 PREV VISIT EST AGE 40-64: CPT | Performed by: NURSE PRACTITIONER

## 2024-06-11 PROCEDURE — 87624 HPV HI-RISK TYP POOLED RSLT: CPT | Performed by: NURSE PRACTITIONER

## 2024-06-11 PROCEDURE — 99214 OFFICE O/P EST MOD 30 MIN: CPT | Mod: 25 | Performed by: NURSE PRACTITIONER

## 2024-06-11 PROCEDURE — G0145 SCR C/V CYTO,THINLAYER,RESCR: HCPCS | Performed by: NURSE PRACTITIONER

## 2024-06-11 RX ORDER — BUSPIRONE HYDROCHLORIDE 10 MG/1
10 TABLET ORAL 2 TIMES DAILY
Qty: 60 TABLET | Refills: 2 | Status: SHIPPED | OUTPATIENT
Start: 2024-06-11 | End: 2024-07-03

## 2024-06-11 SDOH — HEALTH STABILITY: PHYSICAL HEALTH: ON AVERAGE, HOW MANY DAYS PER WEEK DO YOU ENGAGE IN MODERATE TO STRENUOUS EXERCISE (LIKE A BRISK WALK)?: 5 DAYS

## 2024-06-11 SDOH — HEALTH STABILITY: PHYSICAL HEALTH: ON AVERAGE, HOW MANY MINUTES DO YOU ENGAGE IN EXERCISE AT THIS LEVEL?: 30 MIN

## 2024-06-11 ASSESSMENT — ASTHMA QUESTIONNAIRES
QUESTION_3 LAST FOUR WEEKS HOW OFTEN DID YOUR ASTHMA SYMPTOMS (WHEEZING, COUGHING, SHORTNESS OF BREATH, CHEST TIGHTNESS OR PAIN) WAKE YOU UP AT NIGHT OR EARLIER THAN USUAL IN THE MORNING: TWO OR THREE NIGHTS A WEEK
QUESTION_4 LAST FOUR WEEKS HOW OFTEN HAVE YOU USED YOUR RESCUE INHALER OR NEBULIZER MEDICATION (SUCH AS ALBUTEROL): TWO OR THREE TIMES PER WEEK
ACT_TOTALSCORE: 15
QUESTION_2 LAST FOUR WEEKS HOW OFTEN HAVE YOU HAD SHORTNESS OF BREATH: ONCE OR TWICE A WEEK
QUESTION_1 LAST FOUR WEEKS HOW MUCH OF THE TIME DID YOUR ASTHMA KEEP YOU FROM GETTING AS MUCH DONE AT WORK, SCHOOL OR AT HOME: SOME OF THE TIME
QUESTION_5 LAST FOUR WEEKS HOW WOULD YOU RATE YOUR ASTHMA CONTROL: SOMEWHAT CONTROLLED
ACT_TOTALSCORE: 15

## 2024-06-11 ASSESSMENT — PATIENT HEALTH QUESTIONNAIRE - PHQ9
10. IF YOU CHECKED OFF ANY PROBLEMS, HOW DIFFICULT HAVE THESE PROBLEMS MADE IT FOR YOU TO DO YOUR WORK, TAKE CARE OF THINGS AT HOME, OR GET ALONG WITH OTHER PEOPLE: SOMEWHAT DIFFICULT
SUM OF ALL RESPONSES TO PHQ QUESTIONS 1-9: 13
SUM OF ALL RESPONSES TO PHQ QUESTIONS 1-9: 13

## 2024-06-11 ASSESSMENT — SOCIAL DETERMINANTS OF HEALTH (SDOH): HOW OFTEN DO YOU GET TOGETHER WITH FRIENDS OR RELATIVES?: ONCE A WEEK

## 2024-06-11 ASSESSMENT — PAIN SCALES - GENERAL: PAINLEVEL: NO PAIN (0)

## 2024-06-11 NOTE — COMMUNITY RESOURCES LIST (ENGLISH)
June 11, 2024           YOUR PERSONALIZED LIST OF SERVICES & PROGRAMS           NAVIGATION    Eligibility Screening      [facility] application assistance - [facility]  [address] [place] [address] (Distance: 5.8 miles)  Phone: [phone]  Language: English, Gabonese  Fee: Free      [facility] Program - Health insurance application assistance  [address] [place] [address] [place] [address] (Distance: 2.9 miles)  Phone: (565) [phone]  Website: https://www.wvm396.org/community/community-ed/seniors  Language: English  Fee: Free  Accessibility: Ada accessible      [employer] [place] - SNAP (formerly food stamps) Screening and Application help  Phone: (686) 566-9156  Website: https://www.[employer].org/programs/[place]-food-helpline/  Language: English  Hours: Mon 10:00 AM - 5:00 PM Tue 10:00 AM - 5:00 PM Wed 10:00 AM - 5:00 PM Thu 10:00 AM - 5:00 PM Fri 10:00 AM - 5:00 PM  Fee: Free  Accessibility: [place] accessible, Blind accommodation, Deaf or hard of hearing, Translation services        ASSISTANCE    Nutrition Benefits      [facility] application assistance  [address] [place] [address] (Distance: 5.8 miles)  Language: English  Fee: Free      [facility] application assistance - [facility]  [address] [place] [address] (Distance: 5.8 miles)  Phone: [phone]) [phone]  Language: English, Gabonese  Fee: Free      [employer] [place] - SNAP (formerly food stamps) Screening and Application help  Phone: (861) 620-1405  Website: https://www.[employer].org/programs/[place]-food-helpline/  Language: English  Hours: Mon 10:00 AM - 5:00 PM Tue 10:00 AM - 5:00 PM Wed 10:00 AM - 5:00 PM Thu 10:00 AM - 5:00 PM Fri 10:00 AM - 5:00 PM  Fee: Free  Accessibility: [place] accessible, Blind accommodation, Deaf or hard of hearing, Translation services    [facility]      in the [place] - Food in  the '[facility]  [address] [place] [address] (Distance: 13.9 miles)  Phone: (251) [phone]  Website: https://www.goodintEncoding.comhood.org/our-programs/feeding-the-future/food-in-the-richard/  Language: English  Fee: Free  Accessibility: [place] accessible      [facility] Food Shelf - Food pantry  [address] [place] [address] (Distance: 4.9 miles)  Phone: (522) 324-4028  Website: http://www.FowlerRepairPal/  Language: English  Fee: Free      [employer] - [employer]  Phone: (819) 182-1992  Website: www.[employer]  Language: English, Sami  Hours: Mon 9:00 AM - 3:30 PM Tue 9:00 AM - 6:30 PM Wed 9:00 AM - 3:30 PM Thu 9:00 AM - 12:30 PM Fri 9:00 AM - 12:30 PM Sat 9:00 AM - 12:00 PM  Fee: Free               IMPORTANT NUMBERS & WEBSITES        Emergency Services  911  .   [facility]  211 http://211unitedway.org  .   Poison Control  (880) 903-8979 http://mnpoison.org http://wisconsinpoison.org  .     Suicide and Crisis Lifeline  988 http://988lifeline.org  .   Childhelp [place] Child Abuse Hotline  650.445.7032 http://Childhelphotline.org   .   National Sexual Assault Hotline  (826) 702-2822 ([place]) http://Rainn.org   .     National Runaway Safeline  (265) 543-2974 ([place]) http://IllumagearrunaVIOSO.org  .   Pregnancy & Postpartum Support  Call/text 338-513-3550  [place]: http://ppsupportmn.org  [place]: http://SimpleLegal.com/wi  .   Substance Abuse National Helpline ([facility])  591-854-HELP (0940) http://Findtreatment.gov   .                DISCLAIMER: These resources have been generated via the [employer] Platform. [employer] does not endorse any service providers mentioned in this resource list. [employer] does not guarantee that the services mentioned in this resource list will be available to you or will improve your health or wellness.    [facility]

## 2024-06-11 NOTE — PROGRESS NOTES
"Preventive Care Visit  [facility]  Domonique Zavala NP, Nurse Practitioner - Family  Jun 11, 2024      Assessment & Plan     Routine general medical examination at a health care facility    Cervical cancer screening    - Pap Screen with HPV - Recommended Age 30 - 65 Years    Mild persistent asthma without complication  Chronic, patient states due to spring time of year her allergies have been flaring up some of her asthma symptoms.  States she is taking controller inhaler regularly.    Visit for screening mammogram    - MA Screen Bilateral w/Víctor; Future    Anxiety  Uncontrolled.  She is having increased stress at work which has been flaring her anxiety.  Doesn't want to start therapy.  We discussed option of increasing Fluoxetine versus adding on Buspirone to help with anxiety.  Discussed with patient the indication and use of medication(s), risks/benefits, and potential adverse side effects.  Patient/guardian verbalized understanding and agreement with the plan.    - Therapeutic listening provided.   - Start busPIRone (BUSPAR) 10 MG tablet; Take 1 tablet (10 mg) by mouth 2 times daily Start at 1/2 tablet twice daily for 1 week then increase to 1 tablet twice daily if tolerating  - Follow-up in 4-6 weeks for recheck.            BMI  Estimated body mass index is 25.88 kg/m  as calculated from the following:    Height as of this encounter: 1.727 m (5' 8\").    Weight as of this encounter: 77.2 kg (170 lb 3.2 oz).   Weight management plan: Discussed healthy diet and exercise guidelines    Counseling  Appropriate preventive services were discussed with this patient, including applicable screening as appropriate for fall prevention, nutrition, physical activity, Tobacco-use cessation, weight loss and cognition.  Checklist reviewing preventive services available has been given to the patient.  Reviewed patient's diet, addressing concerns and/or questions.   The patient's PHQ-9 score is " consistent with moderate depression. She was provided with information regarding depression.           Yosvany Ye is a 54 year old, presenting for the following:  Physical (Fasting )        2024     7:42 AM   Additional Questions   Roomed by Vy KHALIL        Health Care Directive  Patient does not have a Health Care Directive or Living Will: Patient states has Advance Directive and will bring in a copy to clinic.    HPI  Says she is forgetful at work, thinking about other things when at work.  Has been called out at work due to this forgetfullness.  New supervisor expecting her to learn excel computer but says she has trouble learning the computer.  Feels kind of threatened.  Never misses work, always shows up.  If gets sick will let them know she isn't coming.  For example, coworkers will tell supervisor if she does not do her job.  She would rather have the coworkers direct their concerns directly to her rather than her supervisor.  Work stress.  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    Anxiety   How are you doing with your anxiety since your last visit? Worsened- work stress  Are you having other symptoms that might be associated with anxiety? Yes:  irritability, will have outbursts at work , distraction, forgetful at work.  Have you had a significant life event? Job Concerns     Social History     Tobacco Use    Smoking status: Former     Current packs/day: 0.00     Average packs/day: 0.3 packs/day for 20.0 years (5.0 ttl pk-yrs)     Types: Cigarettes     Start date: 1998     Quit date: 2018     Years since quittin.3    Smokeless tobacco: Former     Quit date: 2015   Vaping Use    Vaping status: Every Day    Substances: Nicotine (3), CBD, Flavoring    Devices: Refillable tank   Substance Use Topics    Alcohol use: Not Currently     Alcohol/week: 0.0 standard drinks of alcohol     Comment: once every 3-4 months    Drug use: No         2023    10:58 AM  8/23/2023     7:09 AM 4/16/2024     7:37 AM   LATOYA-7 SCORE   Total Score  17 (severe anxiety) 12 (moderate anxiety)   Total Score 18 17 12         8/23/2023     7:09 AM 4/16/2024     7:33 AM 6/11/2024     7:34 AM   PHQ   PHQ-9 Total Score 14 11 13   Q9: Thoughts of better off dead/self-harm past 2 weeks Not at all Not at all Not at all           6/11/2024   General Health   How would you rate your overall physical health? Good   Feel stress (tense, anxious, or unable to sleep) Very much   (!) STRESS CONCERN      6/11/2024   Nutrition   Three or more servings of calcium each day? (!) NO   Diet: Regular (no restrictions)   How many servings of fruit and vegetables per day? (!) 2-3   How many sweetened beverages each day? (!) 2         6/11/2024   Exercise   Days per week of moderate/strenous exercise 5 days   Average minutes spent exercising at this level 30 min         6/11/2024   Social Factors   Frequency of gathering with friends or relatives Once a week   Worry food won't last until get money to buy more Yes   Food not last or not have enough money for food? No   Do you have housing?  Yes   Are you worried about losing your housing? No   Lack of transportation? No   Unable to get utilities (heat,electricity)? No   (!) FOOD SECURITY CONCERN PRESENT      6/11/2024   Fall Risk   Fallen 2 or more times in the past year? No   Trouble with walking or balance? No          6/11/2024   Dental   Dentist two times every year? Yes         6/11/2024   TB Screening   Were you born outside of the [place]? No       Today's PHQ-9 Score:       6/11/2024     7:34 AM   PHQ-9 SCORE   PHQ-9 Total Score MyChart 13 (Moderate depression)   PHQ-9 Total Score 13         6/11/2024   Substance Use   Alcohol more than 3/day or more than 7/wk Not Applicable   Do you use any other substances recreationally? No     Social History     Tobacco Use    Smoking status: Former     Current packs/day: 0.00     Average packs/day: 0.3 packs/day for 20.0 years  (5.0 ttl pk-yrs)     Types: Cigarettes     Start date: 1998     Quit date: 2018     Years since quittin.3    Smokeless tobacco: Former     Quit date: 2015   Vaping Use    Vaping status: Every Day    Substances: Nicotine (3), CBD, Flavoring    Devices: Refillable tank   Substance Use Topics    Alcohol use: Not Currently     Alcohol/week: 0.0 standard drinks of alcohol     Comment: once every 3-4 months    Drug use: No           2022   LAST FHS-7 RESULTS   1st degree relative breast or ovarian cancer No   Any relative bilateral breast cancer No   Any male have breast cancer No   Any ONE woman have BOTH breast AND ovarian cancer No   Any woman with breast cancer before 50yrs No   2 or more relatives with breast AND/OR ovarian cancer No   2 or more relatives with breast AND/OR bowel cancer No        Mammogram Screening - Mammogram every 1-2 years updated in Health Maintenance based on mutual decision making        2024   STI Screening   New sexual partner(s) since last STI/HIV test? No     History of abnormal Pap smear: YES - reflected in Problem List and Health Maintenance accordingly        Latest Ref Rng & Units 2023     8:51 AM 2021     4:55 PM 2021     8:40 AM   PAP / HPV   PAP  Negative for Intraepithelial Lesion or Malignancy (NILM)  Negative for Intraepithelial Lesion or Malignancy (NILM)     PAP (Historical)    NIL    HPV 16 DNA Negative Negative      HPV 18 DNA Negative Negative      Other HR HPV Negative Negative        ASCVD Risk   The 10-year ASCVD risk score (Trevin ALBA, et al., 2019) is: 1.9%    Values used to calculate the score:      Age: 54 years      Sex: Female      Is Non- : No      Diabetic: No      Tobacco smoker: No      Systolic Blood Pressure: 112 mmHg      Is BP treated: No      HDL Cholesterol: 43 mg/dL      Total Cholesterol: 193 mg/dL         Reviewed and updated as needed this visit by Provider     Meds  Problems   Med Hx  Surg Hx  Fam Hx            BP Readings from Last 3 Encounters:   06/11/24 112/60   04/16/24 110/68   04/04/24 126/86    Wt Readings from Last 3 Encounters:   06/11/24 77.2 kg (170 lb 3.2 oz)   04/16/24 78.3 kg (172 lb 9.6 oz)   04/04/24 78.7 kg (173 lb 6.4 oz)                  Patient Active Problem List   Diagnosis    Arrested hydrocephalus (H)    CARDIOVASCULAR SCREENING; LDL GOAL LESS THAN 160    Mild persistent asthma without complication    HGSIL on Pap smear of cervix    Cervix cancer (H)    Family history of malignant neoplasm of ovary    Anxiety    Iron deficiency anemia, unspecified iron deficiency anemia type    Abnormal perimenopausal bleeding    Perimenopause    Hypothyroidism, unspecified type    Current moderate episode of major depressive disorder without prior episode (H)    Personal history of malignant neoplasm of cervix uteri    Right ovarian cyst    Seasonal allergic rhinitis, unspecified trigger     Past Surgical History:   Procedure Laterality Date    COLONOSCOPY N/A 12/28/2020    Procedure: COLONOSCOPY, WITH  polypectomy;  Surgeon: Steven Vazquez MD;  Location: [facility] OR    ESOPHAGOSCOPY, GASTROSCOPY, DUODENOSCOPY (EGD), COMBINED N/A 7/13/2020    Procedure: ESOPHAGOGASTRODUODENOSCOPY, WITH BIOPSY;  Surgeon: Steven Vazquez MD;  [facility]    EXAM UNDER ANESTHESIA, INSERT ANN MARIE SLEEVE, UTERINE PLACEMENT OF TANDEM AND RING FOR RAD, ULTRASOUND N/A 8/30/2019    Procedure: Ann Marie Sleeve Insertion, Ultrasound Guided;  Surgeon: Anne Tidwell MD;  Location: [facility] OR    EXCISE MASS TRUNK Left 9/16/2015    Procedure: EXCISE MASS TRUNK;  Surgeon: Carlos Enrique Briones MD;  Location: [facility] OR    ZZC VENTRICULO-CISTERNOSTOMY,3RD VENT      for treatment of HA related to hydrocephalus       Social History     Tobacco Use    Smoking status: Former     Current packs/day: 0.00     Average packs/day: 0.3 packs/day for 20.0 years (5.0 ttl pk-yrs)     Types: Cigarettes     Start date: 2/18/1998      Quit date: 2018     Years since quittin.3    Smokeless tobacco: Former     Quit date: 2015   Substance Use Topics    Alcohol use: Not Currently     Alcohol/week: 0.0 standard drinks of alcohol     Comment: once every 3-4 months     Family History   Problem Relation Age of Onset    Diabetes Mother     Cerebrovascular Disease Mother     Ovarian Cancer Mother     C.A.D. Father 40    Cerebrovascular Disease Father     C.A.D. Sister 62        stent placed    Ovarian Cancer Sister     Ovarian Cancer Sister     Schizophrenia Son     Bipolar Disorder Son          Current Outpatient Medications   Medication Sig Dispense Refill    albuterol (PROAIR HFA/PROVENTIL HFA/VENTOLIN HFA) 108 (90 Base) MCG/ACT inhaler Inhale 2 puffs into the lungs every 6 hours as needed for shortness of breath or wheezing 18 g 1    budesonide (PULMICORT FLEXHALER) 180 MCG/ACT inhaler Inhale 2 puffs into the lungs 2 times daily 1 each 5           FLUoxetine (PROZAC) 40 MG capsule TAKE 1 CAPSULE BY MOUTH EVERY DAY 90 capsule 1    fluticasone (FLONASE) 50 MCG/ACT nasal spray Spray 1-2 sprays into both nostrils daily 16 g 11    levocetirizine (XYZAL) 5 MG tablet Take 1 tablet (5 mg) by mouth every evening 90 tablet 3    levothyroxine (SYNTHROID/LEVOTHROID) 25 MCG tablet Take 1 tablet (25 mcg) by mouth daily 90 tablet 3    montelukast (SINGULAIR) 10 MG tablet TAKE 1 TABLET BY MOUTH EVERYDAY AT BEDTIME 90 tablet 3    Multiple Vitamin (MULTIVITAMINS PO) Take  by mouth daily.      solifenacin (VESICARE) 5 MG tablet Take 1 tablet (5 mg) by mouth daily for 360 doses 90 tablet 3    solifenacin (VESICARE) 5 MG tablet Take 1 tablet (5 mg) by mouth daily 30 tablet 0    traZODone (DESYREL) 100 MG tablet TAKE 1 TABLET BY MOUTH AT BEDTIME 90 tablet 1     Allergies   Allergen Reactions    Seasonal Allergies             Objective    Exam  /60 (BP Location: Right arm, Patient Position: Sitting, Cuff Size: Adult Regular)   Pulse 61   Temp 97.7  F  "(36.5  C) (Oral)   Resp 16   Ht 1.727 m (5' 8\")   Wt 77.2 kg (170 lb 3.2 oz)   LMP  (LMP Unknown)   SpO2 99%   BMI 25.88 kg/m     Estimated body mass index is 25.88 kg/m  as calculated from the following:    Height as of this encounter: 1.727 m (5' 8\").    Weight as of this encounter: 77.2 kg (170 lb 3.2 oz).    Physical Exam  GENERAL: alert and no distress  EYES: Eyes grossly normal to inspection, PERRL and conjunctivae and sclerae normal  HENT: ear canals and TM's normal, nose and mouth without ulcers or lesions  NECK: no adenopathy, no asymmetry, masses, or scars  RESP: lungs clear to auscultation - no rales, rhonchi or wheezes  BREAST: normal without masses, tenderness or nipple discharge and no palpable axillary masses or adenopathy  CV: regular rate and rhythm, normal S1 S2, no S3 or S4, no murmur, click or rub, no peripheral edema  ABDOMEN: soft, nontender, no hepatosplenomegaly, no masses and bowel sounds normal   (female) w/bimanual: normal female external genitalia, normal urethral meatus, normal vaginal mucosa, and normal cervix/adnexa/uterus without masses or discharge  MS: no gross musculoskeletal defects noted, no edema  SKIN: no suspicious lesions or rashes  NEURO: Normal strength and tone, mentation intact and speech normal  PSYCH: mentation appears normal, affect normal/bright        Signed Electronically by: Domonique Zavala NP    Answers submitted by the patient for this visit:  Patient Health Questionnaire (Submitted on 6/11/2024)  If you checked off any problems, how difficult have these problems made it for you to do your work, take care of things at home, or get along with other people?: Somewhat difficult  PHQ9 TOTAL SCORE: 13    "

## 2024-06-11 NOTE — LETTER
June 11, 2024      Nicole L Fritsche  [address] [place] [address]        To Whom It May Concern,     Cassi is under my care and was seen today 6/11/24.  I recommend/support that Cassi       Sincerely,      Domonique Zavala, DNP, APRN, CNP

## 2024-06-11 NOTE — PATIENT INSTRUCTIONS
"Preventive Care Advice   This is general advice we often give to help people stay healthy. Your care team may have specific advice just for you. Please talk to your care team about your own preventive care needs.  Lifestyle  Exercise at least 150 minutes each week (30 minutes a day, 5 days a week).  Do muscle strengthening activities 2 days a week. These help control your weight and prevent disease.  No smoking.  Wear sunscreen to prevent skin cancer.  Have your home tested for radon every 2 to 5 years. Radon is a colorless, odorless gas that can harm your lungs. To learn more, go to www.[email or web address].[place]. and search for \"Radon in Homes.\"  Keep guns unloaded and locked up in a safe place like a safe or gun vault, or, use a gun lock and hide the keys. Always lock away bullets separately. To learn more, visit [employer].[place].gov and search for \"safe gun storage.\"  Nutrition  Eat 5 or more servings of fruits and vegetables each day.  Try wheat bread, brown rice and whole grain pasta (instead of white bread, rice, and pasta).  Get enough calcium and vitamin D. Check the label on foods and aim for 100% of the RDA (recommended daily allowance).  Regular exams  Have a dental exam and cleaning every 6 months.  See your health care team every year to talk about:  Any changes in your health.  Any medicines your care team has prescribed.  Preventive care, family planning, and ways to prevent chronic diseases.  Shots (vaccines)   HPV shots (up to age 26), if you've never had them before.  Hepatitis B shots (up to age 59), if you've never had them before.  COVID-19 shot: Get this shot when it's due.  Flu shot: Get a flu shot every year.  Tetanus shot: Get a tetanus shot every 10 years.  Pneumococcal, hepatitis A, and RSV shots: Ask your care team if you need these based on your risk.  Shingles shot (for age 50 and up).  General health tests  Diabetes screening:  Starting at age 35, Get screened for diabetes at least every 3 years.  If " you are younger than age 35, ask your care team if you should be screened for diabetes.  Cholesterol test: At age 39, start having a cholesterol test every 5 years, or more often if advised.  Bone density scan (DEXA): At age 50, ask your care team if you should have this scan for osteoporosis (brittle bones).  Hepatitis C: Get tested at least once in your life.  Abdominal aortic aneurysm screening: Talk to your doctor about having this screening if you:  Have ever smoked; and  Are biologically male; and  Are between the ages of 65 and 75.  STIs (sexually transmitted infections)  Before age 24: Ask your care team if you should be screened for STIs.  After age 24: Get screened for STIs if you're at risk. You are at risk for STIs (including HIV) if:  You are sexually active with more than one person.  You don't use condoms every time.  You or a partner was diagnosed with a sexually transmitted infection.  If you are at risk for HIV, ask about PrEP medicine to prevent HIV.  Get tested for HIV at least once in your life, whether you are at risk for HIV or not.  Cancer screening tests  Cervical cancer screening: If you have a cervix, begin getting regular cervical cancer screening tests at age 21. Most people who have regular screenings with normal results can stop after age 65. Talk about this with your provider.  Breast cancer scan (mammogram): If you've ever had breasts, begin having regular mammograms starting at age 40. This is a scan to check for breast cancer.  Colon cancer screening: It is important to start screening for colon cancer at age 45.  Have a colonoscopy test every 10 years (or more often if you're at risk) Or, ask your provider about stool tests like a FIT test every year or Cologuard test every 3 years.  To learn more about your testing options, visit: www.[employer]/739294.pdf.  For help making a decision, visit: vignesh/cr79037.  Prostate cancer screening test: If you have a prostate and are age 55  to 69, ask your provider if you would benefit from a yearly prostate cancer screening test.  Lung cancer screening: If you are a current or former smoker age 50 to 80, ask your care team if ongoing lung cancer screenings are right for you.  For informational purposes only. Not to replace the advice of your health care provider. Copyright   2023 [employer]. All rights reserved. Clinically reviewed by the [facility] Transitions [facility]. [employer] 738221 - REV 04/24.    Learning About Stress  What is stress?     Stress is your body's response to a hard situation. Your body can have a physical, emotional, or mental response. Stress is a fact of life for most people, and it affects everyone differently. What causes stress for you may not be stressful for someone else.  A lot of things can cause stress. You may feel stress when you go on a job interview, take a test, or run a race. This kind of short-term stress is normal and even useful. It can help you if you need to work hard or react quickly. For example, stress can help you finish an important job on time.  Long-term stress is caused by ongoing stressful situations or events. Examples of long-term stress include long-term health problems, ongoing problems at work, or conflicts in your family. Long-term stress can harm your health.  How does stress affect your health?  When you are stressed, your body responds as though you are in danger. It makes hormones that speed up your heart, make you breathe faster, and give you a burst of energy. This is called the fight-or-flight stress response. If the stress is over quickly, your body goes back to normal and no harm is done.  But if stress happens too often or lasts too long, it can have bad effects. Long-term stress can make you more likely to get sick, and it can make symptoms of some diseases worse. If you tense up when you are stressed, you may develop neck, shoulder, or low back pain. Stress is  linked to high blood pressure and heart disease.  Stress also harms your emotional health. It can make you blevins, tense, or depressed. Your relationships may suffer, and you may not do well at work or school.  What can you do to manage stress?  You can try these things to help manage stress:   Do something active. Exercise or activity can help reduce stress. Walking is a great way to get started. Even everyday activities such as housecleaning or yard work can help.  Try yoga or anupam chi. These techniques combine exercise and meditation. You may need some training at first to learn them.  Do something you enjoy. For example, listen to music or go to a movie. Practice your hobby or do volunteer work.  Meditate. This can help you relax, because you are not worrying about what happened before or what may happen in the future.  Do guided imagery. Imagine yourself in any setting that helps you feel calm. You can use online videos, books, or a teacher to guide you.  Do breathing exercises. For example:  From a standing position, bend forward from the waist with your knees slightly bent. Let your arms dangle close to the floor.  Breathe in slowly and deeply as you return to a standing position. Roll up slowly and lift your head last.  Hold your breath for just a few seconds in the standing position.  Breathe out slowly and bend forward from the waist.  Let your feelings out. Talk, laugh, cry, and express anger when you need to. Talking with supportive friends or family, a counselor, or a wali leader about your feelings is a healthy way to relieve stress. Avoid discussing your feelings with people who make you feel worse.  Write. It may help to write about things that are bothering you. This helps you find out how much stress you feel and what is causing it. When you know this, you can find better ways to cope.  What can you do to prevent stress?  You might try some of these things to help prevent stress:  Manage your time.  "This helps you find time to do the things you want and need to do.  Get enough sleep. Your body recovers from the stresses of the day while you are sleeping.  Get support. Your family, friends, and community can make a difference in how you experience stress.  Limit your news feed. Avoid or limit time on social media or news that may make you feel stressed.  Do something active. Exercise or activity can help reduce stress. Walking is a great way to get started.  Where can you learn more?  Go to https://www.[employer].net/patiented  Enter N032 in the search box to learn more about \"Learning About Stress.\"  Current as of: October 24, 2023               Content Version: 14.0    6628-0154 [employer].   Care instructions adapted under license by your healthcare professional. If you have questions about a medical condition or this instruction, always ask your healthcare professional. [employer] disclaims any warranty or liability for your use of this information.      Learning About Depression Screening  What is depression screening?  Depression screening is a way to see if you have depression symptoms. It may be done by a doctor or counselor. It's often part of a routine checkup. That's because your mental health is just as important as your physical health.  Depression is a mental health condition that affects how you feel, think, and act. You may:  Have less energy.  Lose interest in your daily activities.  Feel sad and grouchy for a long time.  Depression is very common. It affects people of all ages.  Many things can lead to depression. Some people become depressed after they have a stroke or find out they have a major illness like cancer or heart disease. The death of a loved one or a breakup may lead to depression. It can run in families. Most experts believe that a combination of inherited genes and stressful life events can cause it.  What happens during screening?  You may be asked to " "fill out a form about your depression symptoms. You and the doctor will discuss your answers. The doctor may ask you more questions to learn more about how you think, act, and feel.  What happens after screening?  If you have symptoms of depression, your doctor will talk to you about your options.  Doctors usually treat depression with medicines or counseling. Often, combining the two works best. Many people don't get help because they think that they'll get over the depression on their own. But people with depression may not get better unless they get treatment.  The cause of depression is not well understood. There may be many factors involved. But if you have depression, it's not your fault.  A serious symptom of depression is thinking about death or suicide. If you or someone you care about talks about this or about feeling hopeless, get help right away.  It's important to know that depression can be treated. Medicine, counseling, and self-care may help.  Where can you learn more?  Go to https://www.[employer].net/patiented  Enter T185 in the search box to learn more about \"Learning About Depression Screening.\"  Current as of: June 24, 2023               Content Version: 14.0    2190-9941 [employer].   Care instructions adapted under license by your healthcare professional. If you have questions about a medical condition or this instruction, always ask your healthcare professional. [employer] disclaims any warranty or liability for your use of this information.      "

## 2024-06-13 LAB
HPV HR 12 DNA CVX QL NAA+PROBE: NEGATIVE
HPV16 DNA CVX QL NAA+PROBE: NEGATIVE
HPV18 DNA CVX QL NAA+PROBE: NEGATIVE
HUMAN PAPILLOMA VIRUS FINAL DIAGNOSIS: NORMAL

## 2024-06-18 ENCOUNTER — PATIENT OUTREACH (OUTPATIENT)
Dept: FAMILY MEDICINE | Facility: CLINIC | Age: 54
End: 2024-06-18
Payer: COMMERCIAL

## 2024-06-18 LAB
BKR LAB AP GYN ADEQUACY: NORMAL
BKR LAB AP GYN INTERPRETATION: NORMAL
BKR LAB AP PREVIOUS ABNORMAL: NORMAL
PATH REPORT.COMMENTS IMP SPEC: NORMAL
PATH REPORT.COMMENTS IMP SPEC: NORMAL
PATH REPORT.RELEVANT HX SPEC: NORMAL

## 2024-06-24 ENCOUNTER — PATIENT OUTREACH (OUTPATIENT)
Dept: CARE COORDINATION | Facility: CLINIC | Age: 54
End: 2024-06-24
Payer: COMMERCIAL

## 2024-07-03 DIAGNOSIS — F41.9 ANXIETY: ICD-10-CM

## 2024-07-03 DIAGNOSIS — E03.9 HYPOTHYROIDISM, UNSPECIFIED TYPE: ICD-10-CM

## 2024-07-03 RX ORDER — LEVOTHYROXINE SODIUM 25 UG/1
25 TABLET ORAL DAILY
Qty: 90 TABLET | Refills: 3 | Status: SHIPPED | OUTPATIENT
Start: 2024-07-03

## 2024-07-03 RX ORDER — BUSPIRONE HYDROCHLORIDE 10 MG/1
10 TABLET ORAL 2 TIMES DAILY
Qty: 180 TABLET | Refills: 0 | Status: SHIPPED | OUTPATIENT
Start: 2024-07-03

## 2024-08-08 ENCOUNTER — TELEPHONE (OUTPATIENT)
Dept: ONCOLOGY | Facility: CLINIC | Age: 54
End: 2024-08-08
Payer: COMMERCIAL

## 2024-08-08 NOTE — TELEPHONE ENCOUNTER
Contacted patient due to cancelled visit with Lorena NAM Patient stated she is doing just fine and does not wish to reschedule. Patient confirmed if anything changes she will reach out to us for follow up. -Chelsey KHALIL

## 2024-09-22 DIAGNOSIS — F51.02 ADJUSTMENT INSOMNIA: ICD-10-CM

## 2024-09-22 DIAGNOSIS — F41.9 ANXIETY: ICD-10-CM

## 2024-09-23 RX ORDER — FLUOXETINE 40 MG/1
40 CAPSULE ORAL DAILY
Qty: 90 CAPSULE | Refills: 1 | Status: SHIPPED | OUTPATIENT
Start: 2024-09-23

## 2024-09-23 RX ORDER — TRAZODONE HYDROCHLORIDE 100 MG/1
100 TABLET ORAL AT BEDTIME
Qty: 90 TABLET | Refills: 1 | Status: SHIPPED | OUTPATIENT
Start: 2024-09-23

## 2024-10-09 DIAGNOSIS — F41.9 ANXIETY: ICD-10-CM

## 2024-10-09 RX ORDER — BUSPIRONE HYDROCHLORIDE 10 MG/1
10 TABLET ORAL 2 TIMES DAILY
Qty: 180 TABLET | Refills: 1 | Status: SHIPPED | OUTPATIENT
Start: 2024-10-09

## 2024-11-16 ENCOUNTER — HEALTH MAINTENANCE LETTER (OUTPATIENT)
Age: 54
End: 2024-11-16

## 2024-12-26 ENCOUNTER — PATIENT OUTREACH (OUTPATIENT)
Dept: CARE COORDINATION | Facility: CLINIC | Age: 54
End: 2024-12-26
Payer: COMMERCIAL

## 2025-01-18 ENCOUNTER — PATIENT OUTREACH (OUTPATIENT)
Dept: CARE COORDINATION | Facility: CLINIC | Age: 55
End: 2025-01-18
Payer: COMMERCIAL

## 2025-02-26 ENCOUNTER — PATIENT OUTREACH (OUTPATIENT)
Dept: CARE COORDINATION | Facility: CLINIC | Age: 55
End: 2025-02-26
Payer: COMMERCIAL

## 2025-03-06 ENCOUNTER — TELEPHONE (OUTPATIENT)
Dept: FAMILY MEDICINE | Facility: CLINIC | Age: 55
End: 2025-03-06
Payer: COMMERCIAL

## 2025-03-06 NOTE — LETTER
March 6, 2025      Nicole L Fritsche  [address]  [place] [address]      Your healthcare team cares about your health. To provide you with the best care, we have reviewed your chart and based on our findings, we see that you are due to:     Asthma Control Test     This screening tool helps us to assess how well your asthma is controlled.Good asthma control leads to fewer asthma symptoms and greater health. If your asthma is not in good control (score is 19 or less) or you have been to the ER or urgent care for your asthma, it is recommended you be seen by your provider for medication and lifestyle adjustments.      Please complete and return the enclosed Asthma Control Test      This is valuable information that is requested by your Care Team.    Schedule Annual MAMMOGRAPHY. [facility] scheduling number is [phone] or schedule in eTechart (self referral).    If you have already completed these items, please contact the clinic via phone or goTennahart so your care team can review and update your records.  Thank you for choosing [facility] for your healthcare needs. For any questions, concerns, or to schedule an appointment please contact the clinic.     Healthy Regards,    Your [facility] Care Team

## 2025-03-06 NOTE — TELEPHONE ENCOUNTER
Addened, Patient not due for Physical until June 2025.   Patient Quality Outreach    Patient is due for the following:   Asthma  -  ACT needed  Breast Cancer Screening - Mammogram  Physical Preventive Adult Physical    Action(s) Taken:   Schedule a office visit for Physical     Type of outreach:    Letter sent     Questions for provider review:    None           Kaylyn Cortes MA  Chart routed to Care Team.  '

## 2025-03-06 NOTE — LETTER
March 6, 2025      Nicole L Fritsche  [address]  [place] [address]      Your healthcare team cares about your health. To provide you with the best care, we have reviewed your chart and based on our findings, we see that you are due to:     Asthma Control Test     This screening tool helps us to assess how well your asthma is controlled.Good asthma control leads to fewer asthma symptoms and greater health. If your asthma is not in good control (score is 19 or less) or you have been to the ER or urgent care for your asthma, it is recommended you be seen by your provider for medication and lifestyle adjustments.      Please complete and return the enclosed Asthma Control Test, and return to clinic     This is valuable information that is requested by your Care Team.    Schedule Annual MAMMOGRAPHY. [facility] scheduling number is [phone] or schedule in [employer] (self referral).  PREVENTATIVE VISIT: Physical    If you have already completed these items, please contact the clinic via phone or [facility] so your care team can review and update your records.  Thank you for choosing [facility] for your healthcare needs. For any questions, concerns, or to schedule an appointment please contact the clinic.     Healthy Regards,    Your [facility] Care Team

## 2025-03-18 DIAGNOSIS — N32.81 OAB (OVERACTIVE BLADDER): ICD-10-CM

## 2025-03-18 DIAGNOSIS — J30.2 SEASONAL ALLERGIC RHINITIS, UNSPECIFIED TRIGGER: ICD-10-CM

## 2025-03-18 DIAGNOSIS — F51.02 ADJUSTMENT INSOMNIA: ICD-10-CM

## 2025-03-18 DIAGNOSIS — J45.30 MILD PERSISTENT ASTHMA WITHOUT COMPLICATION: ICD-10-CM

## 2025-03-18 DIAGNOSIS — F41.9 ANXIETY: ICD-10-CM

## 2025-03-18 RX ORDER — SOLIFENACIN SUCCINATE 5 MG/1
5 TABLET, FILM COATED ORAL DAILY
Qty: 90 TABLET | Refills: 0 | Status: SHIPPED | OUTPATIENT
Start: 2025-03-18

## 2025-03-18 RX ORDER — LEVOCETIRIZINE DIHYDROCHLORIDE 5 MG/1
5 TABLET, FILM COATED ORAL EVERY EVENING
Qty: 90 TABLET | Refills: 3 | Status: SHIPPED | OUTPATIENT
Start: 2025-03-18 | End: 2025-03-19

## 2025-03-18 NOTE — TELEPHONE ENCOUNTER
After chart review and based on prior discussion with MD it was noted that this is appropriate for a refill.    LAUREN ArambulaN RN Specialty Triage 3/18/2025 11:56 AM

## 2025-03-18 NOTE — TELEPHONE ENCOUNTER
Pending Prescriptions:                       Disp   Refills    solifenacin (VESICARE) 5 MG tablet        90 tab*3            Sig: Take 1 tablet (5 mg) by mouth daily.    Jolie Smith Certified Medical Assistant

## 2025-03-19 RX ORDER — FLUOXETINE HYDROCHLORIDE 40 MG/1
40 CAPSULE ORAL DAILY
Qty: 90 CAPSULE | Refills: 0 | Status: SHIPPED | OUTPATIENT
Start: 2025-03-19

## 2025-03-19 RX ORDER — LEVOCETIRIZINE DIHYDROCHLORIDE 5 MG/1
5 TABLET, FILM COATED ORAL EVERY EVENING
Qty: 90 TABLET | Refills: 0 | Status: SHIPPED | OUTPATIENT
Start: 2025-03-19

## 2025-03-19 RX ORDER — TRAZODONE HYDROCHLORIDE 100 MG/1
100 TABLET ORAL AT BEDTIME
Qty: 90 TABLET | Refills: 0 | Status: SHIPPED | OUTPATIENT
Start: 2025-03-19

## 2025-03-24 ENCOUNTER — PATIENT OUTREACH (OUTPATIENT)
Dept: CARE COORDINATION | Facility: CLINIC | Age: 55
End: 2025-03-24
Payer: COMMERCIAL

## 2025-04-01 DIAGNOSIS — F41.9 ANXIETY: ICD-10-CM

## 2025-04-01 RX ORDER — BUSPIRONE HYDROCHLORIDE 10 MG/1
10 TABLET ORAL 2 TIMES DAILY
Qty: 180 TABLET | Refills: 0 | Status: SHIPPED | OUTPATIENT
Start: 2025-04-01

## 2025-05-12 ENCOUNTER — PATIENT OUTREACH (OUTPATIENT)
Dept: CARE COORDINATION | Facility: CLINIC | Age: 55
End: 2025-05-12
Payer: COMMERCIAL

## 2025-05-26 ENCOUNTER — PATIENT OUTREACH (OUTPATIENT)
Dept: CARE COORDINATION | Facility: CLINIC | Age: 55
End: 2025-05-26
Payer: COMMERCIAL

## 2025-05-27 DIAGNOSIS — J45.30 MILD PERSISTENT ASTHMA WITHOUT COMPLICATION: ICD-10-CM

## 2025-05-28 ENCOUNTER — OFFICE VISIT (OUTPATIENT)
Dept: FAMILY MEDICINE | Facility: CLINIC | Age: 55
End: 2025-05-28
Payer: COMMERCIAL

## 2025-05-28 VITALS
HEART RATE: 71 BPM | WEIGHT: 172.4 LBS | TEMPERATURE: 97.7 F | BODY MASS INDEX: 26.13 KG/M2 | SYSTOLIC BLOOD PRESSURE: 110 MMHG | RESPIRATION RATE: 16 BRPM | OXYGEN SATURATION: 98 % | DIASTOLIC BLOOD PRESSURE: 68 MMHG | HEIGHT: 68 IN

## 2025-05-28 DIAGNOSIS — Z12.4 CERVICAL CANCER SCREENING: ICD-10-CM

## 2025-05-28 DIAGNOSIS — N32.81 OAB (OVERACTIVE BLADDER): ICD-10-CM

## 2025-05-28 DIAGNOSIS — C53.1 MALIGNANT NEOPLASM OF EXOCERVIX (H): ICD-10-CM

## 2025-05-28 DIAGNOSIS — G91.1: ICD-10-CM

## 2025-05-28 DIAGNOSIS — E03.9 HYPOTHYROIDISM, UNSPECIFIED TYPE: ICD-10-CM

## 2025-05-28 DIAGNOSIS — Z11.3 ROUTINE SCREENING FOR STI (SEXUALLY TRANSMITTED INFECTION): ICD-10-CM

## 2025-05-28 DIAGNOSIS — Z13.220 SCREENING CHOLESTEROL LEVEL: ICD-10-CM

## 2025-05-28 DIAGNOSIS — J30.2 SEASONAL ALLERGIC RHINITIS, UNSPECIFIED TRIGGER: ICD-10-CM

## 2025-05-28 DIAGNOSIS — J45.30 MILD PERSISTENT ASTHMA WITHOUT COMPLICATION: ICD-10-CM

## 2025-05-28 DIAGNOSIS — F33.1 MODERATE RECURRENT MAJOR DEPRESSION (H): ICD-10-CM

## 2025-05-28 DIAGNOSIS — F41.9 ANXIETY: Primary | ICD-10-CM

## 2025-05-28 DIAGNOSIS — F51.02 ADJUSTMENT INSOMNIA: ICD-10-CM

## 2025-05-28 DIAGNOSIS — Z12.31 VISIT FOR SCREENING MAMMOGRAM: ICD-10-CM

## 2025-05-28 PROBLEM — F32.1 CURRENT MODERATE EPISODE OF MAJOR DEPRESSIVE DISORDER WITHOUT PRIOR EPISODE (H): Status: RESOLVED | Noted: 2021-12-01 | Resolved: 2025-05-28

## 2025-05-28 LAB
ALBUMIN UR-MCNC: 30 MG/DL
APPEARANCE UR: CLEAR
BACTERIA #/AREA URNS HPF: ABNORMAL /HPF
BILIRUB UR QL STRIP: NEGATIVE
COLOR UR AUTO: YELLOW
GLUCOSE UR STRIP-MCNC: NEGATIVE MG/DL
HGB UR QL STRIP: ABNORMAL
KETONES UR STRIP-MCNC: NEGATIVE MG/DL
LEUKOCYTE ESTERASE UR QL STRIP: ABNORMAL
NITRATE UR QL: NEGATIVE
PH UR STRIP: 8.5 [PH] (ref 5–7)
RBC #/AREA URNS AUTO: ABNORMAL /HPF
SP GR UR STRIP: 1.01 (ref 1–1.03)
SQUAMOUS #/AREA URNS AUTO: ABNORMAL /LPF
UROBILINOGEN UR STRIP-ACNC: 0.2 E.U./DL
WBC #/AREA URNS AUTO: ABNORMAL /HPF

## 2025-05-28 PROCEDURE — 3078F DIAST BP <80 MM HG: CPT | Performed by: NURSE PRACTITIONER

## 2025-05-28 PROCEDURE — 99214 OFFICE O/P EST MOD 30 MIN: CPT | Performed by: NURSE PRACTITIONER

## 2025-05-28 PROCEDURE — 84439 ASSAY OF FREE THYROXINE: CPT | Performed by: NURSE PRACTITIONER

## 2025-05-28 PROCEDURE — 1126F AMNT PAIN NOTED NONE PRSNT: CPT | Performed by: NURSE PRACTITIONER

## 2025-05-28 PROCEDURE — 87591 N.GONORRHOEAE DNA AMP PROB: CPT | Performed by: NURSE PRACTITIONER

## 2025-05-28 PROCEDURE — 87491 CHLMYD TRACH DNA AMP PROBE: CPT | Performed by: NURSE PRACTITIONER

## 2025-05-28 PROCEDURE — 87624 HPV HI-RISK TYP POOLED RSLT: CPT | Performed by: NURSE PRACTITIONER

## 2025-05-28 PROCEDURE — G2211 COMPLEX E/M VISIT ADD ON: HCPCS | Performed by: NURSE PRACTITIONER

## 2025-05-28 PROCEDURE — 3074F SYST BP LT 130 MM HG: CPT | Performed by: NURSE PRACTITIONER

## 2025-05-28 PROCEDURE — 84443 ASSAY THYROID STIM HORMONE: CPT | Performed by: NURSE PRACTITIONER

## 2025-05-28 PROCEDURE — 96127 BRIEF EMOTIONAL/BEHAV ASSMT: CPT | Performed by: NURSE PRACTITIONER

## 2025-05-28 PROCEDURE — 36415 COLL VENOUS BLD VENIPUNCTURE: CPT | Performed by: NURSE PRACTITIONER

## 2025-05-28 PROCEDURE — 80061 LIPID PANEL: CPT | Performed by: NURSE PRACTITIONER

## 2025-05-28 PROCEDURE — 81001 URINALYSIS AUTO W/SCOPE: CPT | Performed by: NURSE PRACTITIONER

## 2025-05-28 RX ORDER — BUSPIRONE HYDROCHLORIDE 15 MG/1
15 TABLET ORAL 2 TIMES DAILY
Qty: 180 TABLET | Refills: 1 | Status: SHIPPED | OUTPATIENT
Start: 2025-05-28

## 2025-05-28 RX ORDER — FLUTICASONE PROPIONATE 50 MCG
1-2 SPRAY, SUSPENSION (ML) NASAL DAILY
Qty: 16 G | Refills: 11 | Status: SHIPPED | OUTPATIENT
Start: 2025-05-28

## 2025-05-28 RX ORDER — FLUOXETINE HYDROCHLORIDE 40 MG/1
40 CAPSULE ORAL DAILY
Qty: 90 CAPSULE | Refills: 1 | Status: SHIPPED | OUTPATIENT
Start: 2025-05-28

## 2025-05-28 RX ORDER — LEVOTHYROXINE SODIUM 25 UG/1
25 TABLET ORAL DAILY
Qty: 90 TABLET | Refills: 3 | Status: SHIPPED | OUTPATIENT
Start: 2025-05-28

## 2025-05-28 RX ORDER — LEVOCETIRIZINE DIHYDROCHLORIDE 5 MG/1
5 TABLET, FILM COATED ORAL EVERY EVENING
Qty: 90 TABLET | Refills: 3 | Status: SHIPPED | OUTPATIENT
Start: 2025-05-28

## 2025-05-28 RX ORDER — MONTELUKAST SODIUM 10 MG/1
1 TABLET ORAL AT BEDTIME
Qty: 90 TABLET | Refills: 3 | Status: SHIPPED | OUTPATIENT
Start: 2025-05-28

## 2025-05-28 RX ORDER — TRAZODONE HYDROCHLORIDE 100 MG/1
100 TABLET ORAL AT BEDTIME
Qty: 90 TABLET | Refills: 3 | Status: SHIPPED | OUTPATIENT
Start: 2025-05-28

## 2025-05-28 RX ORDER — ALBUTEROL SULFATE 90 UG/1
2 INHALANT RESPIRATORY (INHALATION) EVERY 6 HOURS PRN
Qty: 18 G | Refills: 1 | Status: SHIPPED | OUTPATIENT
Start: 2025-05-28

## 2025-05-28 RX ORDER — SOLIFENACIN SUCCINATE 5 MG/1
5 TABLET, FILM COATED ORAL DAILY
Qty: 30 TABLET | Refills: 0 | Status: SHIPPED | OUTPATIENT
Start: 2025-05-28 | End: 2025-05-29

## 2025-05-28 RX ORDER — BUSPIRONE HYDROCHLORIDE 10 MG/1
10 TABLET ORAL 2 TIMES DAILY
Qty: 180 TABLET | Refills: 0 | Status: CANCELLED | OUTPATIENT
Start: 2025-05-28

## 2025-05-28 ASSESSMENT — ASTHMA QUESTIONNAIRES
QUESTION_5 LAST FOUR WEEKS HOW WOULD YOU RATE YOUR ASTHMA CONTROL: SOMEWHAT CONTROLLED
QUESTION_1 LAST FOUR WEEKS HOW MUCH OF THE TIME DID YOUR ASTHMA KEEP YOU FROM GETTING AS MUCH DONE AT WORK, SCHOOL OR AT HOME: SOME OF THE TIME
QUESTION_2 LAST FOUR WEEKS HOW OFTEN HAVE YOU HAD SHORTNESS OF BREATH: ONCE OR TWICE A WEEK
ACT_TOTALSCORE: 18
QUESTION_4 LAST FOUR WEEKS HOW OFTEN HAVE YOU USED YOUR RESCUE INHALER OR NEBULIZER MEDICATION (SUCH AS ALBUTEROL): ONCE A WEEK OR LESS
QUESTION_3 LAST FOUR WEEKS HOW OFTEN DID YOUR ASTHMA SYMPTOMS (WHEEZING, COUGHING, SHORTNESS OF BREATH, CHEST TIGHTNESS OR PAIN) WAKE YOU UP AT NIGHT OR EARLIER THAN USUAL IN THE MORNING: ONCE OR TWICE

## 2025-05-28 ASSESSMENT — ANXIETY QUESTIONNAIRES
6. BECOMING EASILY ANNOYED OR IRRITABLE: MORE THAN HALF THE DAYS
3. WORRYING TOO MUCH ABOUT DIFFERENT THINGS: NEARLY EVERY DAY
IF YOU CHECKED OFF ANY PROBLEMS ON THIS QUESTIONNAIRE, HOW DIFFICULT HAVE THESE PROBLEMS MADE IT FOR YOU TO DO YOUR WORK, TAKE CARE OF THINGS AT HOME, OR GET ALONG WITH OTHER PEOPLE: VERY DIFFICULT
5. BEING SO RESTLESS THAT IT IS HARD TO SIT STILL: MORE THAN HALF THE DAYS
GAD7 TOTAL SCORE: 17
7. FEELING AFRAID AS IF SOMETHING AWFUL MIGHT HAPPEN: NEARLY EVERY DAY
8. IF YOU CHECKED OFF ANY PROBLEMS, HOW DIFFICULT HAVE THESE MADE IT FOR YOU TO DO YOUR WORK, TAKE CARE OF THINGS AT HOME, OR GET ALONG WITH OTHER PEOPLE?: VERY DIFFICULT
1. FEELING NERVOUS, ANXIOUS, OR ON EDGE: MORE THAN HALF THE DAYS
GAD7 TOTAL SCORE: 17
2. NOT BEING ABLE TO STOP OR CONTROL WORRYING: NEARLY EVERY DAY
4. TROUBLE RELAXING: MORE THAN HALF THE DAYS
GAD7 TOTAL SCORE: 17
7. FEELING AFRAID AS IF SOMETHING AWFUL MIGHT HAPPEN: NEARLY EVERY DAY

## 2025-05-28 ASSESSMENT — PATIENT HEALTH QUESTIONNAIRE - PHQ9
SUM OF ALL RESPONSES TO PHQ QUESTIONS 1-9: 13
SUM OF ALL RESPONSES TO PHQ QUESTIONS 1-9: 13
10. IF YOU CHECKED OFF ANY PROBLEMS, HOW DIFFICULT HAVE THESE PROBLEMS MADE IT FOR YOU TO DO YOUR WORK, TAKE CARE OF THINGS AT HOME, OR GET ALONG WITH OTHER PEOPLE: SOMEWHAT DIFFICULT

## 2025-05-28 ASSESSMENT — ENCOUNTER SYMPTOMS: NERVOUS/ANXIOUS: 1

## 2025-05-28 ASSESSMENT — PAIN SCALES - GENERAL: PAINLEVEL_OUTOF10: NO PAIN (0)

## 2025-05-28 NOTE — LETTER
My Asthma Action Plan    Name: Nicole L Fritsche   YOB: 1970  Date: 5/28/2025   My doctor: HOMERO Fairchild CNP   My clinic: [facility]        My Control Medicine: Budesonide (Pulmicort Flexhaler) -  180 mcg two puffs twice daily  Montelukast (Singulair) -  10 mg daily  My Rescue Medicine: Albuterol (Proair/Ventolin/Proventil HFA) 2-4 puffs EVERY 4 HOURS as needed. Use a spacer if recommended by your provider.   My Asthma Severity:   Mild Persistent  Know your asthma triggers:   allergies            GREEN ZONE   Good Control  I feel good  No cough or wheeze  Can work, sleep and play without asthma symptoms       Take your asthma control medicine every day.     If exercise triggers your asthma, take your rescue medication  15 minutes before exercise or sports, and  During exercise if you have asthma symptoms  Spacer to use with inhaler: If you have a spacer, make sure to use it with your inhaler             YELLOW ZONE Getting Worse  I have ANY of these:  I do not feel good  Cough or wheeze  Chest feels tight  Wake up at night   Keep taking your Green Zone medications  Start taking your rescue medicine:  every 20 minutes for up to 1 hour. Then every 4 hours for 24-48 hours.  If you stay in the Yellow Zone for more than 12-24 hours, contact your doctor.  If you do not return to the Green Zone in 12-24 hours or you get worse, start taking your oral steroid medicine if prescribed by your provider.           RED ZONE Medical Alert - Get Help  I have ANY of these:  I feel awful  Medicine is not helping  Breathing getting harder  Trouble walking or talking  Nose opens wide to breathe       Take your rescue medicine NOW  If your provider has prescribed an oral steroid medicine, start taking it NOW  Call your doctor NOW  If you are still in the Red Zone after 20 minutes and you have not reached your doctor:  Take your rescue medicine again and  Call 911 or go to the emergency  room right away    See your regular doctor within 2 weeks of an Emergency Room or Urgent Care visit for follow-up treatment.          Annual Reminders:  Meet with Asthma Educator,  Flu Shot in the Fall, consider Pneumonia Vaccination for patients with asthma (aged 19 and older).    Pharmacy: [facility] [id number] [place] [address] [place]    Electronically signed by HOMERO Fairchild CNP   Date: 05/28/25                      Asthma Triggers  How To Control Things That Make Your Asthma Worse    Triggers are things that make your asthma worse.  Look at the list below to help you find your triggers and what you can do about them.  You can help prevent asthma flare-ups by staying away from your triggers.      Trigger                                                          What you can do   Cigarette Smoke  Tobacco smoke can make asthma worse. Do not allow smoking in your home, car or around you.  Be sure no one smokes at a child s day care or school.  If you smoke, ask your health care provider for ways to help you quit.  Ask family members to quit too.  Ask your health care provider for a referral to Quit Plan to help you quit smoking, or call 2-213-010-PLAN.     Colds, Flu, Bronchitis  These are common triggers of asthma. Wash your hands often.  Don t touch your eyes, nose or mouth.  Get a flu shot every year.     Dust Mites  These are tiny bugs that live in cloth or carpet. They are too small to see. Wash sheets and blankets in hot water every week.   Encase pillows and mattress in dust mite proof covers.  Avoid having carpet if you can. If you have carpet, vacuum weekly.   Use a dust mask and HEPA vacuum.   Pollen and Outdoor Mold  Some people are allergic to trees, grass, or weed pollen, or molds. Try to keep your windows closed.  Limit time out doors when pollen count is high.   Ask you health care provider about taking medicine during allergy season.     Animal Dander  Some people are allergic  to skin flakes, urine or saliva from pets with fur or feathers. Keep pets with fur or feathers out of your home.    If you can t keep the pet outdoors, then keep the pet out of your bedroom.  Keep the bedroom door closed.  Keep pets off cloth furniture and away from stuffed toys.     Mice, Rats, and Cockroaches   Some people are allergic to the waste from these pests.   Cover food and garbage.  Clean up spills and food crumbs.  Store grease in the refrigerator.   Keep food out of the bedroom.   Indoor Mold  This can be a trigger if your home has high moisture. Fix leaking faucets, pipes, or other sources of water.   Clean moldy surfaces.  Dehumidify basement if it is damp and smelly.   Smoke, Strong Odors, and Sprays  These can reduce air quality. Stay away from strong odors and sprays, such as perfume, powder, hair spray, paints, smoke incense, paint, cleaning products, candles and new carpet.   Exercise or Sports  Some people with asthma have this trigger. Be active!  Ask your doctor about taking medicine before sports or exercise to prevent symptoms.    Warm up for 5-10 minutes before and after sports or exercise.     Other Triggers of Asthma  Cold air:  Cover your nose and mouth with a scarf.  Sometimes laughing or crying can be a trigger.  Some medicines and food can trigger asthma.

## 2025-05-28 NOTE — PROGRESS NOTES
Assessment & Plan     Anxiety  Chronic, patient would like to trial increase in the Buspar from 10 mg twice daily to 15 mg twice daily to help with anxiety.  - FLUoxetine (PROZAC) 40 MG capsule; Take 1 capsule (40 mg) by mouth daily.  - busPIRone (BUSPAR) 15 MG tablet; Take 1 tablet (15 mg) by mouth 2 times daily.    Hypothyroidism, unspecified type  Chronic, stable, continue current treatment.  Will adjust treatment if needed based on results.  - TSH WITH FREE T4 REFLEX; Future  - levothyroxine (SYNTHROID/LEVOTHROID) 25 MCG tablet; Take 1 tablet (25 mcg) by mouth daily.  - TSH WITH FREE T4 REFLEX    Adjustment insomnia  Chronic, stable, continue current treatment.   - traZODone (DESYREL) 100 MG tablet; Take 1 tablet (100 mg) by mouth at bedtime.    Mild persistent asthma without complication  Chronic, stable, continue current treatment.   - montelukast (SINGULAIR) 10 MG tablet; Take 1 tablet (10 mg) by mouth at bedtime.  - levocetirizine (XYZAL) 5 MG tablet; Take 1 tablet (5 mg) by mouth every evening.  - budesonide (PULMICORT FLEXHALER) 180 MCG/ACT inhaler; Inhale 2 puffs into the lungs 2 times daily.  - albuterol (PROAIR HFA/PROVENTIL HFA/VENTOLIN HFA) 108 (90 Base) MCG/ACT inhaler; Inhale 2 puffs into the lungs every 6 hours as needed for shortness of breath or wheezing.    Seasonal allergic rhinitis, unspecified trigger  Chronic, stable, continue current treatment.   - levocetirizine (XYZAL) 5 MG tablet; Take 1 tablet (5 mg) by mouth every evening.  - fluticasone (FLONASE) 50 MCG/ACT nasal spray; Spray 1-2 sprays into both nostrils daily.    Cervical cancer screening    - HPV and Gynecologic Cytology Panel - Recommended Age 30 - 65 Years    Malignant neoplasm of exocervix (H)  She has been lost to follow up with Gyn-Onc (last visit with Gyn-Onc was 2/2024) and I recommend she sees them for follow up.  - Adult Oncology/Hematology  Referral; Future    OAB (overactive bladder)  Chronic, stable, continue  "current treatment.  Was previously seen by Urology.  - solifenacin (VESICARE) 5 MG tablet; Take 1 tablet (5 mg) by mouth daily.  - UA with Microscopic reflex to Culture - lab collect; Future  - UA with Microscopic reflex to Culture - lab collect  - UA Microscopic with Reflex to Culture    Visit for screening mammogram    - MA Screen Bilateral w/Víctor; Future    Routine screening for STI (sexually transmitted infection)    - Chlamydia trachomatis/Neisseria gonorrhoeae by PCR - lab collect    Screening cholesterol level    - Lipid panel reflex to direct LDL Fasting; Future  - Lipid panel reflex to direct LDL Fasting    Arrested hydrocephalus (H)  Known issue that I take into account for their medical decisions, no current exacerbations or new concerns.     Moderate recurrent major depression (H)  Chronic, stable, continue current treatment.   - FLUoxetine (PROZAC) 40 MG capsule; Take 1 capsule (40 mg) by mouth daily.      Follow up in 3 months for physical      BMI  Estimated body mass index is 26.21 kg/m  as calculated from the following:    Height as of this encounter: 1.727 m (5' 8\").    Weight as of this encounter: 78.2 kg (172 lb 6.4 oz).             Yosvany Ye is a 55 year old, presenting for the following health issues:  Thyroid Problem, Depression, and Anxiety      5/28/2025     8:46 AM   Additional Questions   Roomed by Vy KHALIL     Anxiety    History of Present Illness       Mental Health Follow-up:  Patient presents to follow-up on Depression & Anxiety.Patient's depression since last visit has been:  No change  The patient is not having other symptoms associated with depression.  Patient's anxiety since last visit has been:  No change  The patient is not having other symptoms associated with anxiety.  Any significant life events: health concerns  Patient is not feeling anxious or having panic attacks.  Patient has no concerns about alcohol or drug use.    Hypothyroidism:     Since last visit, patient " "describes the following symptoms::  Anxiety, Depression, Fatigue and Weight gain    Weight gain::  No weight gain    She eats 2-3 servings of fruits and vegetables daily.She consumes 2 sweetened beverage(s) daily.She exercises with enough effort to increase her heart rate 10 to 19 minutes per day.  She exercises with enough effort to increase her heart rate 3 or less days per week.   She is taking medications regularly.      Urinary incontinence, urinary urgency.  Wears a pad at night, drenched in the mornings.  Sometimes lack of sensation to urinate.  H/o overactive bladder and ran out of Vesicare and would like to restart.         Asthma      5/28/2025     8:47 AM   ACT Total Scores   ACT TOTAL SCORE (Goal Greater than or Equal to 20) 18    In the past 12 months, how many times did you visit the emergency room for your asthma without being admitted to the hospital? 0   In the past 12 months, how many times were you hospitalized overnight because of your asthma? 0       Patient-reported     Do you have any of the following symptoms? None of these symptoms (cough/noisy breathing/trouble with breathing)  What makes your asthma/breathing worse?  Pollens dry air   Do you want more information about how to use your inhaler? No            Objective    /68 (BP Location: Right arm, Patient Position: Sitting, Cuff Size: Adult Regular)   Pulse 71   Temp 97.7  F (36.5  C) (Oral)   Resp 16   Ht 1.727 m (5' 8\")   Wt 78.2 kg (172 lb 6.4 oz)   LMP  (LMP Unknown)   SpO2 98%   BMI 26.21 kg/m    Body mass index is 26.21 kg/m .  Physical Exam   GENERAL: alert and no distress  EYES: Eyes grossly normal to inspection, PERRL and conjunctivae and sclerae normal  HENT: ear canals and TM's normal, nose and mouth without ulcers or lesions  NECK: no adenopathy, no asymmetry, masses, or scars  RESP: lungs clear to auscultation - no rales, rhonchi or wheezes  CV: regular rate and rhythm, normal S1 S2, no S3 or S4, no murmur, " click or rub, no peripheral edema   (female) w/bimanual: normal female external genitalia, normal urethral meatus, normal vaginal mucosa, normal cervix/adnexa/uterus without masses or discharge  PSYCH: mentation appears normal, affect normal/bright, judgement and insight intact, and appearance well groomed    Results for orders placed or performed in visit on 05/28/25   UA with Microscopic reflex to Culture - lab collect     Status: Abnormal    Specimen: Urine, Clean Catch   Result Value Ref Range    Color Urine Yellow Colorless, Straw, Light Yellow, Yellow    Appearance Urine Clear Clear    Glucose Urine Negative Negative mg/dL    Bilirubin Urine Negative Negative    Ketones Urine Negative Negative mg/dL    Specific Gravity Urine 1.015 1.003 - 1.035    Blood Urine Trace (A) Negative    pH Urine 8.5 (H) 5.0 - 7.0    Protein Albumin Urine 30 (A) Negative mg/dL    Urobilinogen Urine 0.2 0.2, 1.0 E.U./dL    Nitrite Urine Negative Negative    Leukocyte Esterase Urine Trace (A) Negative   UA Microscopic with Reflex to Culture     Status: Abnormal   Result Value Ref Range    Bacteria Urine Moderate (A) None Seen /HPF    RBC Urine 2-5 (A) 0-2 /HPF /HPF    WBC Urine 0-5 0-5 /HPF /HPF    Squamous Epithelials Urine Few (A) None Seen /LPF    Narrative    Urine Culture not indicated           Signed Electronically by: HOMERO Fairchild CNP

## 2025-05-29 ENCOUNTER — PATIENT OUTREACH (OUTPATIENT)
Dept: CARE COORDINATION | Facility: CLINIC | Age: 55
End: 2025-05-29
Payer: COMMERCIAL

## 2025-05-29 DIAGNOSIS — N32.81 OAB (OVERACTIVE BLADDER): ICD-10-CM

## 2025-05-29 LAB
C TRACH DNA SPEC QL PROBE+SIG AMP: NEGATIVE
CHOLEST SERPL-MCNC: 207 MG/DL
FASTING STATUS PATIENT QL REPORTED: YES
HDLC SERPL-MCNC: 45 MG/DL
HPV HR 12 DNA CVX QL NAA+PROBE: NEGATIVE
HPV16 DNA CVX QL NAA+PROBE: NEGATIVE
HPV18 DNA CVX QL NAA+PROBE: NEGATIVE
HUMAN PAPILLOMA VIRUS FINAL DIAGNOSIS: NORMAL
LDLC SERPL CALC-MCNC: 134 MG/DL
N GONORRHOEA DNA SPEC QL NAA+PROBE: NEGATIVE
NONHDLC SERPL-MCNC: 162 MG/DL
SPECIMEN TYPE: NORMAL
T4 FREE SERPL-MCNC: 1.22 NG/DL (ref 0.9–1.7)
TRIGL SERPL-MCNC: 140 MG/DL
TSH SERPL DL<=0.005 MIU/L-ACNC: 5.54 UIU/ML (ref 0.3–4.2)

## 2025-05-29 RX ORDER — MONTELUKAST SODIUM 10 MG/1
1 TABLET ORAL AT BEDTIME
Qty: 90 TABLET | Refills: 3 | OUTPATIENT
Start: 2025-05-29

## 2025-05-29 RX ORDER — SOLIFENACIN SUCCINATE 5 MG/1
5 TABLET, FILM COATED ORAL DAILY
Qty: 90 TABLET | Refills: 3 | Status: SHIPPED | OUTPATIENT
Start: 2025-05-29

## 2025-05-30 ENCOUNTER — RESULTS FOLLOW-UP (OUTPATIENT)
Dept: OBGYN | Facility: CLINIC | Age: 55
End: 2025-05-30

## 2025-05-30 DIAGNOSIS — R87.613 HGSIL ON PAP SMEAR OF CERVIX: ICD-10-CM

## 2025-05-30 DIAGNOSIS — G91.1: ICD-10-CM

## 2025-05-30 DIAGNOSIS — E78.2 MIXED HYPERLIPIDEMIA: Primary | ICD-10-CM

## 2025-06-02 LAB
BKR AP ASSOCIATED HPV REPORT: NORMAL
BKR LAB AP GYN ADEQUACY: NORMAL
BKR LAB AP GYN INTERPRETATION: NORMAL
BKR LAB AP PREVIOUS ABNL DX: NORMAL
BKR LAB AP PREVIOUS ABNORMAL: NORMAL
PATH REPORT.COMMENTS IMP SPEC: NORMAL
PATH REPORT.COMMENTS IMP SPEC: NORMAL
PATH REPORT.RELEVANT HX SPEC: NORMAL

## 2025-06-03 ENCOUNTER — PATIENT OUTREACH (OUTPATIENT)
Dept: OBGYN | Facility: CLINIC | Age: 55
End: 2025-06-03
Payer: COMMERCIAL

## 2025-06-03 NOTE — TELEPHONE ENCOUNTER
2009, 2012 NIL pap  6/1/ NIL pap, Neg HPV  5/21/19 ASCUS pap, + HR HPV # 18. Plan: Brodnax  6/3/19 Brodnax: HSIL, can not rule out invasive malignancy  6/12/19 Punch biopsy- INVASIVE WELL-DIFFERENTIATED SQUAMOUS CELL CARCINOMA, focally   Keratinizing, HSIL. 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 Radiation therapy   2/28/21 NIL pap, radiation changes: Reviewed recommendations from [facility] against performing colposcopy in patients treated for cervical cancer with Pap tests of LSIL or less. Colposcopy for LSIL in this group does not detect recurrence unless there is a visible lesion.   12/2/21 NIL pap.She will continue to have a pap (no HPV) annually and is next due 2/2022.   6/7/23 NIL pap, Neg HPV Plan: pending. To defer to gyn onc  6/26/23 GYN/ONC appt-  HÉCTOR on exam or pap smear (6/7/2023 pap reviewed today with pt). Continue every 6 month exams (until 9/2024) then annually. Pap smear due 6/2024 6/11/24 NIL pap, neg HR HPV. Plan pap in 1 year  5/28/25 NIL Pap, Neg HR HPV. Plan: pap in 1 yr.

## 2025-07-13 ENCOUNTER — HEALTH MAINTENANCE LETTER (OUTPATIENT)
Age: 55
End: 2025-07-13

## 2025-08-21 ENCOUNTER — TELEPHONE (OUTPATIENT)
Dept: FAMILY MEDICINE | Facility: CLINIC | Age: 55
End: 2025-08-21
Payer: COMMERCIAL

## (undated) DEVICE — SU ETHIBOND 0 CT-2 30" X412H

## (undated) DEVICE — SYR PISTON URETHRAL 60ML 68000

## (undated) DEVICE — PREP SKIN SCRUB TRAY 4461A

## (undated) DEVICE — NDL COUNTER 20CT 31142493

## (undated) DEVICE — JELLY LUBRICATING SURGILUBE 2OZ TUBE

## (undated) DEVICE — LINEN TOWEL PACK X5 5464

## (undated) DEVICE — TUBING SUCTION 12"X1/4" N612

## (undated) DEVICE — GLOVE PROTEXIS W/NEU-THERA 6.5  2D73TE65

## (undated) DEVICE — GOWN XLG DISP 9545

## (undated) DEVICE — DRAPE LEGGINGS CLEAR 8430

## (undated) DEVICE — SUCTION MANIFOLD NEPTUNE 2 SYS 1 PORT 702-025-000

## (undated) DEVICE — PREP TECHNI-CARE CHLOROXYLENOL 3% 4OZ BOTTLE C222-4ZWO

## (undated) DEVICE — CATH TRAY FOLEY SURESTEP 16FR WDRAIN BAG STLK LATEX A300316A

## (undated) DEVICE — GLOVE SENSICARE 7.0 MSG1070 LATEX FREE

## (undated) DEVICE — SPECIMEN CONTAINER 3OZ W/FORMALIN 59901

## (undated) DEVICE — BASIN EMESIS STERILE  SSK9005A

## (undated) DEVICE — ENDO FORCEP BX CAPTURA PRO SPIKE G50696

## (undated) DEVICE — CATH PLUG W/CAP 000076

## (undated) DEVICE — SYR 30ML SLIP TIP W/O NDL 302833

## (undated) DEVICE — SUCTION CATH AIRLIFE TRI-FLO W/CONTROL PORT 14FR  T60C

## (undated) DEVICE — GOWN IMPERVIOUS 2XL BLUE

## (undated) DEVICE — SPONGE RAY-TEC 4X8" 7318

## (undated) DEVICE — SOL WATER IRRIG 1000ML BOTTLE 2F7114

## (undated) DEVICE — SOL NACL 0.9% IRRIG 1000ML BOTTLE 2F7124

## (undated) DEVICE — PAD CHUX UNDERPAD 23X24" 7136

## (undated) DEVICE — GLOVE EXAM NITRILE LG PF LATEX FREE 5064

## (undated) DEVICE — ENDO FORCEP ENDOJAW BIOPSY 2.8MMX230CM FB-220U

## (undated) DEVICE — SPONGE PACK VAGINAL 2"X9

## (undated) DEVICE — DRAPE BACK TABLE  44X90" 8377

## (undated) DEVICE — KIT ENDO TURNOVER/PROCEDURE CARRY-ON 101822

## (undated) DEVICE — ENDO BITE BLOCK ADULT OMNI-BLOC

## (undated) DEVICE — PITCHER STERILE 1000ML  SSK9004A

## (undated) DEVICE — CATH FOLEY 24FR 5ML SILICONE LUBRI-SIL 175824

## (undated) DEVICE — SUCTION MANIFOLD DORNOCH ULTRA CART UL-CL500

## (undated) RX ORDER — KETOROLAC TROMETHAMINE 30 MG/ML
INJECTION, SOLUTION INTRAMUSCULAR; INTRAVENOUS
Status: DISPENSED
Start: 2019-09-18

## (undated) RX ORDER — FENTANYL CITRATE 50 UG/ML
INJECTION, SOLUTION INTRAMUSCULAR; INTRAVENOUS
Status: DISPENSED
Start: 2019-08-30

## (undated) RX ORDER — ONDANSETRON 2 MG/ML
INJECTION INTRAMUSCULAR; INTRAVENOUS
Status: DISPENSED
Start: 2019-08-30

## (undated) RX ORDER — KETOROLAC TROMETHAMINE 30 MG/ML
INJECTION, SOLUTION INTRAMUSCULAR; INTRAVENOUS
Status: DISPENSED
Start: 2019-09-09

## (undated) RX ORDER — ACETAMINOPHEN 325 MG/1
TABLET ORAL
Status: DISPENSED
Start: 2019-08-30

## (undated) RX ORDER — DEXAMETHASONE SODIUM PHOSPHATE 4 MG/ML
INJECTION, SOLUTION INTRA-ARTICULAR; INTRALESIONAL; INTRAMUSCULAR; INTRAVENOUS; SOFT TISSUE
Status: DISPENSED
Start: 2019-09-11

## (undated) RX ORDER — PROPOFOL 10 MG/ML
INJECTION, EMULSION INTRAVENOUS
Status: DISPENSED
Start: 2019-08-30

## (undated) RX ORDER — DEXAMETHASONE SODIUM PHOSPHATE 4 MG/ML
INJECTION, SOLUTION INTRA-ARTICULAR; INTRALESIONAL; INTRAMUSCULAR; INTRAVENOUS; SOFT TISSUE
Status: DISPENSED
Start: 2019-09-13

## (undated) RX ORDER — FENTANYL CITRATE 50 UG/ML
INJECTION, SOLUTION INTRAMUSCULAR; INTRAVENOUS
Status: DISPENSED
Start: 2020-12-28

## (undated) RX ORDER — LIDOCAINE HYDROCHLORIDE 20 MG/ML
INJECTION, SOLUTION EPIDURAL; INFILTRATION; INTRACAUDAL; PERINEURAL
Status: DISPENSED
Start: 2019-08-30

## (undated) RX ORDER — FENTANYL CITRATE 50 UG/ML
INJECTION, SOLUTION INTRAMUSCULAR; INTRAVENOUS
Status: DISPENSED
Start: 2019-09-11

## (undated) RX ORDER — FENTANYL CITRATE 50 UG/ML
INJECTION, SOLUTION INTRAMUSCULAR; INTRAVENOUS
Status: DISPENSED
Start: 2019-09-16

## (undated) RX ORDER — HYDROMORPHONE HYDROCHLORIDE 1 MG/ML
INJECTION, SOLUTION INTRAMUSCULAR; INTRAVENOUS; SUBCUTANEOUS
Status: DISPENSED
Start: 2019-09-16

## (undated) RX ORDER — HYDROMORPHONE HYDROCHLORIDE 1 MG/ML
INJECTION, SOLUTION INTRAMUSCULAR; INTRAVENOUS; SUBCUTANEOUS
Status: DISPENSED
Start: 2019-09-11

## (undated) RX ORDER — KETOROLAC TROMETHAMINE 30 MG/ML
INJECTION, SOLUTION INTRAMUSCULAR; INTRAVENOUS
Status: DISPENSED
Start: 2019-09-13

## (undated) RX ORDER — KETOROLAC TROMETHAMINE 30 MG/ML
INJECTION, SOLUTION INTRAMUSCULAR; INTRAVENOUS
Status: DISPENSED
Start: 2019-09-11

## (undated) RX ORDER — HYDROMORPHONE HYDROCHLORIDE 1 MG/ML
INJECTION, SOLUTION INTRAMUSCULAR; INTRAVENOUS; SUBCUTANEOUS
Status: DISPENSED
Start: 2019-09-18

## (undated) RX ORDER — HYDROMORPHONE HYDROCHLORIDE 1 MG/ML
INJECTION, SOLUTION INTRAMUSCULAR; INTRAVENOUS; SUBCUTANEOUS
Status: DISPENSED
Start: 2019-09-09

## (undated) RX ORDER — FUROSEMIDE 10 MG/ML
INJECTION INTRAMUSCULAR; INTRAVENOUS
Status: DISPENSED
Start: 2019-07-01

## (undated) RX ORDER — FENTANYL CITRATE 50 UG/ML
INJECTION, SOLUTION INTRAMUSCULAR; INTRAVENOUS
Status: DISPENSED
Start: 2019-09-13

## (undated) RX ORDER — PROPOFOL 10 MG/ML
INJECTION, EMULSION INTRAVENOUS
Status: DISPENSED
Start: 2019-09-16

## (undated) RX ORDER — ONDANSETRON 2 MG/ML
INJECTION INTRAMUSCULAR; INTRAVENOUS
Status: DISPENSED
Start: 2019-09-13

## (undated) RX ORDER — HYDROMORPHONE HYDROCHLORIDE 1 MG/ML
INJECTION, SOLUTION INTRAMUSCULAR; INTRAVENOUS; SUBCUTANEOUS
Status: DISPENSED
Start: 2019-08-30

## (undated) RX ORDER — FENTANYL CITRATE 50 UG/ML
INJECTION, SOLUTION INTRAMUSCULAR; INTRAVENOUS
Status: DISPENSED
Start: 2019-09-09

## (undated) RX ORDER — FENTANYL CITRATE 50 UG/ML
INJECTION, SOLUTION INTRAMUSCULAR; INTRAVENOUS
Status: DISPENSED
Start: 2020-07-13

## (undated) RX ORDER — LIDOCAINE HYDROCHLORIDE 20 MG/ML
INJECTION, SOLUTION EPIDURAL; INFILTRATION; INTRACAUDAL; PERINEURAL
Status: DISPENSED
Start: 2019-09-09

## (undated) RX ORDER — KETOROLAC TROMETHAMINE 30 MG/ML
INJECTION, SOLUTION INTRAMUSCULAR; INTRAVENOUS
Status: DISPENSED
Start: 2019-09-16

## (undated) RX ORDER — FENTANYL CITRATE 50 UG/ML
INJECTION, SOLUTION INTRAMUSCULAR; INTRAVENOUS
Status: DISPENSED
Start: 2019-09-18